# Patient Record
Sex: FEMALE | Race: WHITE | NOT HISPANIC OR LATINO | Employment: FULL TIME | URBAN - METROPOLITAN AREA
[De-identification: names, ages, dates, MRNs, and addresses within clinical notes are randomized per-mention and may not be internally consistent; named-entity substitution may affect disease eponyms.]

---

## 2017-02-06 ENCOUNTER — HOSPITAL ENCOUNTER (OUTPATIENT)
Dept: RADIOLOGY | Facility: HOSPITAL | Age: 47
Discharge: HOME | End: 2017-02-06
Attending: ORTHOPAEDIC SURGERY
Payer: COMMERCIAL

## 2017-02-06 ENCOUNTER — OFFICE VISIT (OUTPATIENT)
Dept: SPORTS MEDICINE | Facility: CLINIC | Age: 47
End: 2017-02-06
Payer: COMMERCIAL

## 2017-02-06 VITALS
WEIGHT: 215 LBS | DIASTOLIC BLOOD PRESSURE: 100 MMHG | BODY MASS INDEX: 33.74 KG/M2 | HEART RATE: 75 BPM | HEIGHT: 67 IN | SYSTOLIC BLOOD PRESSURE: 158 MMHG

## 2017-02-06 DIAGNOSIS — M25.551 RIGHT HIP PAIN: Primary | ICD-10-CM

## 2017-02-06 DIAGNOSIS — M21.851 HIP DYSPLASIA, ACQUIRED, RIGHT: ICD-10-CM

## 2017-02-06 DIAGNOSIS — M25.551 RIGHT HIP PAIN: ICD-10-CM

## 2017-02-06 DIAGNOSIS — M24.151 DEGENERATIVE TEAR OF ACETABULAR LABRUM OF RIGHT HIP: ICD-10-CM

## 2017-02-06 DIAGNOSIS — M25.859 FEMORAL ACETABULAR IMPINGEMENT: ICD-10-CM

## 2017-02-06 PROCEDURE — 99214 OFFICE O/P EST MOD 30 MIN: CPT | Mod: S$GLB,,, | Performed by: ORTHOPAEDIC SURGERY

## 2017-02-06 PROCEDURE — 99999 PR PBB SHADOW E&M-EST. PATIENT-LVL IV: CPT | Mod: PBBFAC,,, | Performed by: ORTHOPAEDIC SURGERY

## 2017-02-06 PROCEDURE — 73502 X-RAY EXAM HIP UNI 2-3 VIEWS: CPT | Mod: 26,RT,, | Performed by: RADIOLOGY

## 2017-02-06 PROCEDURE — 73502 X-RAY EXAM HIP UNI 2-3 VIEWS: CPT | Mod: TC,PO,RT

## 2017-02-06 RX ORDER — CELECOXIB 200 MG/1
200 CAPSULE ORAL 2 TIMES DAILY
Qty: 60 CAPSULE | Refills: 2 | Status: SHIPPED | OUTPATIENT
Start: 2017-02-06 | End: 2017-03-08

## 2017-02-06 NOTE — PATIENT INSTRUCTIONS
When Your Child Has Developmental Dysplasia of the Hip  Your child has been diagnosed with developmental dysplasia of the hip. Hip dysplasia (also called hip dislocation) is a problem with the hip joint. This condition is congenital, meaning its present at birth. It is not painful. And it is very treatable in young children. If your child has this problem, he or she may see a pediatric orthopedist. This is a doctor specializing in treating bone and joint problems in children.  What are the causes of developmental hip dysplasia?  Developmental hip dysplasia happens when the femoral head (the rounded top of the leg bone) slips partly or completely out of the hip socket. The hip socket may be too small or too shallow. This problem is more common in female children, in firstborn children, and in children born into families with a history of this condition. It is also more common in babies that are in breech position (feet first) in the uterus.  What are the signs of developmental hip dysplasia?  Signs of this problem may be noted at birth. But the hip may also seem fine at birth and not show problems until later weeks. Common signs include:  · A hip that is dislocated at birth  · A hip that is loose (this may get worse as the child grows and becomes more active)  · A hip that is less flexible than normal  · A shorter leg, or a leg that turns outward (on the side of the dysplasia)  · Uneven skin folds on the thighs or buttocks  · Limping, walking on the toes, or a waddling duck-like gait in children who have begun to walk  How is developmental hip dysplasia diagnosed?  The condition may be noticed just after birth. Or, it may not be found until later screenings are done. Very mild cases may not be noticed until the child begins to walk. If this problem is suspected, imaging tests such as an ultrasound or an X-ray may be done to confirm the diagnosis.  How is developmental hip dysplasia treated?  In a quarter of  the children with hip dysplasia, the problem goes away without treatment. If treatment is needed, the options depend on the childs age:  · Infants. A Carlos harness is used for 2 to 3 months. This harness holds the hip joint in place so the tissues around the joint can tighten and hold the joint in normal position. Once the harness is removed, the child should grow and develop normally.  · Children 6 months and older. A closed reduction (moving the hip joint into position without incisions) may be done. In some cases, surgery may be needed to move the hip joint into place. After either closed reduction or surgery, a cast is put on the leg and hip to hold the joint in place. The cast may stay on for several months. Once the cast is removed, the child should grow and develop normally.  What are the long-term concerns?     Congenital hip dysplasia can be treated with a special harness.   After hip dysplasia goes away on its own or is treated, most children grow normally. But if the dysplasia remains and isnt treated, long-term joint problems can result. So to be sure there are no lingering problems, your child will likely need to see the doctor regularly for monitoring. This is usually done at 6 months of age, 12 months of age, 4 to 6 years of age, and 14 years of age.  Date Last Reviewed: 11/15/2015  © 2113-4295 [employer]. [address] [place] [address], [place] [address]. All rights reserved. This information is not intended as a substitute for professional medical care. Always follow your healthcare professional's instructions.        Hip Arthroscopy: Repairing Femoroacetabular Impingement           When excess bone has formed on the edge of the ball (femoral head) or the socket (acetabulum) of the hip, it is called femoroacetabular impingement (ALBERT). ALBERT can cause pain and limit movement. Arthroscopy can repair ALBERT with only small incisions and special instruments.  In the operating room  Just before  surgery, you may be asked several times which hip is to be treated. This is a standard safety measure. In the operating room, you will likely receive general anesthesia to make you sleep.  During the procedure  After you receive anesthesia, your leg is gently pulled to distract, or widen, the hip joint. Next, the surgeon makes a few small incisions called portals. Through these portals, he or she inserts surgical tools, including the arthroscope. The arthroscope sends images of the joint to a video screen. These images allow the surgeon to look inside the joint. The joint is filled with sterile fluid to help the surgeon see more clearly.  Repairing femoroacetabular impingement  To treat ALBERT, the area is reshaped by removing excess bone. Excess bone can be removed from the socket side or ball side of the hip joint, or both. ALBERT can lead to other problems, such as labral tears or chondral damage. If present, these problems are also treated. Once the surgeon finishes the procedure, the portals are closed and bandaged. Then you are taken to the recovery room.  Date Last Reviewed: 7/1/2016 © 2000-2016 [employer]. [address] [place] [address]. All rights reserved. This information is not intended as a substitute for professional medical care. Always follow your healthcare professional's instructions.        Hip Arthroscopy: Repairing Labral Tears     During arthroscopy, the hip joint is gently widened (distracted) to allow access to the joint.                The strong, flexible ring of cartilage attached to the edge of the hip socket is called the labrum. When the labrum is torn, there can be symptoms of pain, catching, clicking, or locking in the joint. Arthroscopy is a surgical procedure that uses special instruments inserted through small incisions. With these tools, your surgeon can repair or remove a torn labrum. Your healthcare provider has suggested this procedure called  arthroscopy.  In the operating room  Just before surgery, your healthcare providers may ask you several times which hip is to be treated. This is a standard safety measure in the operating room. You will likely receive general anesthesia to make you sleep.  During the procedure  After you receive anesthesia, your leg is gently pulled to widen the hip joint. Next, your surgeon makes a few small incisions called portals. Through these portals, he or she inserts surgical tools, including the arthroscope. The arthroscope sends images of your joint to a video screen. These images allow your surgeon to look inside your joint. The joint is filled with sterile fluid to help your surgeon see more clearly.  Repairing labral tears  Labral tears can be removed or repaired. The torn piece of labrum may be removed by cutting, shaving, or heating the tissue to remove it (ablation). A repair of the torn labrum may be done by suturing the tear to the bone. In this case, an anchor is placed in the bone and a suture ties the labrum to the anchor. Some bone may be removed during this process using arthroscopic instruments. Your surgeon also uses arthroscopic instruments to pass the suture through tissue and tie knots through the small arthroscopic portals. Once your surgeon finishes the procedure, he or she closes the portals and bandages them. Then you are taken to the recovery room.  Date Last Reviewed: 8/28/2015 © 2000-2016 [employer]. [address] [place] [address]. All rights reserved. This information is not intended as a substitute for professional medical care. Always follow your healthcare professional's instructions.        Hip Arthroscopy: Repairing Chondral Damage           When the firm, smooth tissue that covers the ball and socket of the hip (joint cartilage) is damaged, it can cause painful catching in the joint.  Arthroscopy, a surgical technique that requires only small incisions and special  instruments, can repair chondral (cartilage) damage.   In the operating room  Just before surgery, you may be asked several times which hip is to be treated. This is a standard safety measure in the operating room. You will likely receive general anesthesia to make you sleep.  During the procedure  After you receive anesthesia, your leg is gently pulled to widen, the hip joint. Next, the surgeon makes a few small incisions called portals. Through these portals, he or she inserts surgical tools, including the arthroscope. The arthroscope sends images of the joint to a video screen. These images allow the surgeon to look inside the joint. The joint is filled with sterile fluid to help the surgeon see more clearly.  Repairing chondral damage  If the damaged cartilage is loose, it is removed. If the cartilage is missing, the exposed bone may be shaved to smooth it. Or, small holes may be placed in the bone (microfracture). This allows new cartilage to form. Once the surgeon finishes the procedure, the portals are closed and bandaged. Then you are taken to the recovery room.  Date Last Reviewed: 8/28/2015 © 2000-2016 [employer]. [address] [place] [address]. All rights reserved. This information is not intended as a substitute for professional medical care. Always follow your healthcare professional's instructions.        Hip Arthroscopy: After Surgery  You will likely go home the day of your surgery. Before you go home, you will be told how to care for your hip as it heals. Youll probably have a follow-up appointment 1 to 2 weeks after surgery. At this visit, sutures may be removed. Your surgeon may also prescribe physical therapy.  In the recovery room    You are in the recovery room for a short time after your procedure. While youre there, hospital staff help you get up and walking soon after your procedure. Your surgeon may limit your amount of weight-bearing activity after surgery. You  may also need a brace and/or crutches.   Going home  You are likely given crutches or a walker to bring home with you. Youre told how long to use them. Youre also told how much you can walk or stand on the operated leg. You may be given a brace to wear. You may also be told to wear compression stockings for a few days after surgery to help prevent blood clots.     When to call your doctor   Call your surgeon or doctor if you have any of the following:  · Shortness of breath, coughing up of blood, or chest pain  · Increasing pain, or pain not controlled by medication  · Pain and swelling in the calf or thigh  · Fever, drainage from the wound, redness, sudden increase in pain, or other signs of infection      After surgery  Use this list to remind you of what to do after surgery.  · Your dressing should be changed in 24 hours. Ask whether this should be done at home or at the surgeons office.  · Take care of your incisions as directed. You can usually begin bathing again in 24 hours.  · Use ice as instructed to reduce swelling and pain.  · If you were given crutches or a walker, use as instructed.  · See your surgeon for a follow-up visit on ______________________.                                   · Do rehab exercises as prescribed.  · Ask your surgeon what activities you should avoid.  Date Last Reviewed: 8/28/2015  © 3034-6566 [employer]. [address] [place] [address]. All rights reserved. This information is not intended as a substitute for professional medical care. Always follow your healthcare professional's instructions.        Hip Arthroscopy: Before Surgery    Your surgeon may have recommended hip arthroscopy. This procedure may relieve much or all of your hip pain. In fact, you may be moving around more easily soon after surgery. But depending on your original hip problem, it can take up to several months for you to fully heal. And youll likely need to commit to a prescribed  rehabilitation (rehab) plan.  Before your procedure  You will be examined and have certain tests. These help make sure you are healthy enough for surgery. Then, you may meet with your surgeon. At this meeting:  · Your surgeon may explain more about the surgery and discuss risks with you.  · You may be told to not eat or drink anything for at least 8 hours before surgery.  · You should tell your surgeon what medicines you take. This includes all prescription and non-prescription medicines and supplements, herbal remedies, etc. And ask if you should stop taking any of them.  · You may be given a prescription for pain medicine to take after the surgery.  Risks and complications  Although safe, hip arthroscopy has certain risks and possible complications. These include:  · Nerve or blood vessel injury  · Infection  · Bleeding  · Blood clots  · Continued or increased hip pain  · Damage to articular cartilage or labrum  · Progression of arthritis  · Need for more surgery  Before surgery  Use this list to remind you what to do before your surgery:  · Tell your surgeon what medicines, supplements, or herbal remedies you take. Ask if you should stop taking any of them before surgery.  · If possible, fill any prescriptions for post-op pain medicines before you have the procedure.  · Confirm what time you should arrive at the hospital or surgery center. Arrange for an adult family member or friend to give you a ride to and from surgery.  · Dont eat or drink anything as youve been instructed before your procedure. This includes water, gum, and mints.  Date Last Reviewed: 8/28/2015  © 6894-3650 [employer]. [address] [place] [address], [place] [address]. All rights reserved. This information is not intended as a substitute for professional medical care. Always follow your healthcare professional's instructions.

## 2017-02-06 NOTE — MR AVS SNAPSHOT
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 Vern Carrasquillo for MRA results review. Patient will not fill out Franco Hip Sco, for RTC in 1 weeks with Dr. Vern Carrasquillo for MRA results review. Patient will not fill out Franco Hip Sco.       These Medications        Disp Refills Start End    celecoxib (CELEBREX) 200 MG capsule 60 capsule 2 2017 3/8/2017    Take 1 capsule (200 mg total) by mouth 2 (two) times daily. - Oral    Pharmacy: [facility] [address] #: [phone]         [facility]     [facility] -  Assistance  Registered nurses in the [facility] provide clinical advisement, health education, appointment booking, and other advisory services.  Call for this free service at [phone].             Medications           Message regarding Medications     Verify the changes and/or additions to your medication regime listed below are the same as discussed with your clinician today.  If any of these changes or additions are incorrect, please notify your healthcare provider.        START taking these NEW medications        Refills    celecoxib (CELEBREX) 200 MG capsule 2    Sig: Take 1 capsule (200 mg total) by mouth 2 (two) times daily.    Class: Normal  "   Route: Oral           Verify that the below list of medications is an accurate representation of the medications you are currently taking.  If none reported, the list may be blank. If incorrect, please contact your healthcare provider. Carry this list with you in case of emergency.           Current Medications     diclofenac sodium 1 % Gel Apply 2 g topically as needed.    fish oil-omega-3 fatty acids 300-1,000 mg capsule Take 1 g by mouth once daily.    losartan (COZAAR) 50 MG tablet Take 1 tablet (50 mg total) by mouth once daily.    multivitamin (THERAGRAN) per tablet Take 1 tablet by mouth once daily.     vitamin D 185 MG Tab Take 185 mg by mouth once daily.     celecoxib (CELEBREX) 200 MG capsule Take 1 capsule (200 mg total) by mouth 2 (two) times daily.           Clinical Reference Information           Your Vitals Were     BP Pulse Height Weight BMI    158/100 75 5' 7" (1.702 m) 97.5 kg (215 lb) 33.67 kg/m2      Blood Pressure          Most Recent Value    BP  (!)  158/100      Allergies as of 2/6/2017     Cephalexin    Methohexital    Penicillins    Sulfa (Sulfonamide Antibiotics)      Immunizations Administered on Date of Encounter - 2/6/2017     None      Orders Placed During Today's Visit     Future Labs/Procedures Expected by Expires    MRI Lower Extremity Joint With Contrast Right  2/6/2017 2/6/2018    X-Ray Arthrogram Hip Right  2/6/2017 2/6/2018    X-Ray Hip 2 View Right  2/6/2017 2/6/2018      Instructions      When Your Child Has Developmental Dysplasia of the Hip  Your child has been diagnosed with developmental dysplasia of the hip. Hip dysplasia (also called hip dislocation) is a problem with the hip joint. This condition is congenital, meaning its present at birth. It is not painful. And it is very treatable in young children. If your child has this problem, he or she may see a pediatric orthopedist. This is a doctor specializing in treating bone and joint problems in children.  What are " the causes of developmental hip dysplasia?  Developmental hip dysplasia happens when the femoral head (the rounded top of the leg bone) slips partly or completely out of the hip socket. The hip socket may be too small or too shallow. This problem is more common in female children, in firstborn children, and in children born into families with a history of this condition. It is also more common in babies that are in breech position (feet first) in the uterus.  What are the signs of developmental hip dysplasia?  Signs of this problem may be noted at birth. But the hip may also seem fine at birth and not show problems until later weeks. Common signs include:  · A hip that is dislocated at birth  · A hip that is loose (this may get worse as the child grows and becomes more active)  · A hip that is less flexible than normal  · A shorter leg, or a leg that turns outward (on the side of the dysplasia)  · Uneven skin folds on the thighs or buttocks  · Limping, walking on the toes, or a waddling duck-like gait in children who have begun to walk  How is developmental hip dysplasia diagnosed?  The condition may be noticed just after birth. Or, it may not be found until later screenings are done. Very mild cases may not be noticed until the child begins to walk. If this problem is suspected, imaging tests such as an ultrasound or an X-ray may be done to confirm the diagnosis.  How is developmental hip dysplasia treated?  In a quarter of the children with hip dysplasia, the problem goes away without treatment. If treatment is needed, the options depend on the childs age:  · Infants. A Carlos harness is used for 2 to 3 months. This harness holds the hip joint in place so the tissues around the joint can tighten and hold the joint in normal position. Once the harness is removed, the child should grow and develop normally.  · Children 6 months and older. A closed reduction (moving the hip joint into position without incisions)  may be done. In some cases, surgery may be needed to move the hip joint into place. After either closed reduction or surgery, a cast is put on the leg and hip to hold the joint in place. The cast may stay on for several months. Once the cast is removed, the child should grow and develop normally.  What are the long-term concerns?     Congenital hip dysplasia can be treated with a special harness.   After hip dysplasia goes away on its own or is treated, most children grow normally. But if the dysplasia remains and isnt treated, long-term joint problems can result. So to be sure there are no lingering problems, your child will likely need to see the doctor regularly for monitoring. This is usually done at 6 months of age, 12 months of age, 4 to 6 years of age, and 14 years of age.  Date Last Reviewed: 11/15/2015  © 9145-6855 [employer]. [address] [place] [address]. All rights reserved. This information is not intended as a substitute for professional medical care. Always follow your healthcare professional's instructions.        Hip Arthroscopy: Repairing Femoroacetabular Impingement           When excess bone has formed on the edge of the ball (femoral head) or the socket (acetabulum) of the hip, it is called femoroacetabular impingement (ALBERT). ALBERT can cause pain and limit movement. Arthroscopy can repair ALBERT with only small incisions and special instruments.  In the operating room  Just before surgery, you may be asked several times which hip is to be treated. This is a standard safety measure. In the operating room, you will likely receive general anesthesia to make you sleep.  During the procedure  After you receive anesthesia, your leg is gently pulled to distract, or widen, the hip joint. Next, the surgeon makes a few small incisions called portals. Through these portals, he or she inserts surgical tools, including the arthroscope. The arthroscope sends images of the joint to a  video screen. These images allow the surgeon to look inside the joint. The joint is filled with sterile fluid to help the surgeon see more clearly.  Repairing femoroacetabular impingement  To treat ALBERT, the area is reshaped by removing excess bone. Excess bone can be removed from the socket side or ball side of the hip joint, or both. ALBERT can lead to other problems, such as labral tears or chondral damage. If present, these problems are also treated. Once the surgeon finishes the procedure, the portals are closed and bandaged. Then you are taken to the recovery room.  Date Last Reviewed: 7/1/2016 © 2000-2016 [employer]. [address] [place] [address], [place] [address]. All rights reserved. This information is not intended as a substitute for professional medical care. Always follow your healthcare professional's instructions.        Hip Arthroscopy: Repairing Labral Tears     During arthroscopy, the hip joint is gently widened (distracted) to allow access to the joint.                The strong, flexible ring of cartilage attached to the edge of the hip socket is called the labrum. When the labrum is torn, there can be symptoms of pain, catching, clicking, or locking in the joint. Arthroscopy is a surgical procedure that uses special instruments inserted through small incisions. With these tools, your surgeon can repair or remove a torn labrum. Your healthcare provider has suggested this procedure called arthroscopy.  In the operating room  Just before surgery, your healthcare providers may ask you several times which hip is to be treated. This is a standard safety measure in the operating room. You will likely receive general anesthesia to make you sleep.  During the procedure  After you receive anesthesia, your leg is gently pulled to widen the hip joint. Next, your surgeon makes a few small incisions called portals. Through these portals, he or she inserts surgical tools, including the arthroscope. The  arthroscope sends images of your joint to a video screen. These images allow your surgeon to look inside your joint. The joint is filled with sterile fluid to help your surgeon see more clearly.  Repairing labral tears  Labral tears can be removed or repaired. The torn piece of labrum may be removed by cutting, shaving, or heating the tissue to remove it (ablation). A repair of the torn labrum may be done by suturing the tear to the bone. In this case, an anchor is placed in the bone and a suture ties the labrum to the anchor. Some bone may be removed during this process using arthroscopic instruments. Your surgeon also uses arthroscopic instruments to pass the suture through tissue and tie knots through the small arthroscopic portals. Once your surgeon finishes the procedure, he or she closes the portals and bandages them. Then you are taken to the recovery room.  Date Last Reviewed: 8/28/2015 © 2000-2016 [employer]. [address] [place] [address]. All rights reserved. This information is not intended as a substitute for professional medical care. Always follow your healthcare professional's instructions.        Hip Arthroscopy: Repairing Chondral Damage           When the firm, smooth tissue that covers the ball and socket of the hip (joint cartilage) is damaged, it can cause painful catching in the joint.  Arthroscopy, a surgical technique that requires only small incisions and special instruments, can repair chondral (cartilage) damage.   In the operating room  Just before surgery, you may be asked several times which hip is to be treated. This is a standard safety measure in the operating room. You will likely receive general anesthesia to make you sleep.  During the procedure  After you receive anesthesia, your leg is gently pulled to widen, the hip joint. Next, the surgeon makes a few small incisions called portals. Through these portals, he or she inserts surgical tools, including  the arthroscope. The arthroscope sends images of the joint to a video screen. These images allow the surgeon to look inside the joint. The joint is filled with sterile fluid to help the surgeon see more clearly.  Repairing chondral damage  If the damaged cartilage is loose, it is removed. If the cartilage is missing, the exposed bone may be shaved to smooth it. Or, small holes may be placed in the bone (microfracture). This allows new cartilage to form. Once the surgeon finishes the procedure, the portals are closed and bandaged. Then you are taken to the recovery room.  Date Last Reviewed: 8/28/2015 © 2000-2016 [employer]. [address] [place] [address], [place] [address]. All rights reserved. This information is not intended as a substitute for professional medical care. Always follow your healthcare professional's instructions.        Hip Arthroscopy: After Surgery  You will likely go home the day of your surgery. Before you go home, you will be told how to care for your hip as it heals. Youll probably have a follow-up appointment 1 to 2 weeks after surgery. At this visit, sutures may be removed. Your surgeon may also prescribe physical therapy.  In the recovery room    You are in the recovery room for a short time after your procedure. While youre there, hospital staff help you get up and walking soon after your procedure. Your surgeon may limit your amount of weight-bearing activity after surgery. You may also need a brace and/or crutches.   Going home  You are likely given crutches or a walker to bring home with you. Youre told how long to use them. Youre also told how much you can walk or stand on the operated leg. You may be given a brace to wear. You may also be told to wear compression stockings for a few days after surgery to help prevent blood clots.     When to call your doctor   Call your surgeon or doctor if you have any of the following:  · Shortness of breath, coughing up of blood, or  chest pain  · Increasing pain, or pain not controlled by medication  · Pain and swelling in the calf or thigh  · Fever, drainage from the wound, redness, sudden increase in pain, or other signs of infection      After surgery  Use this list to remind you of what to do after surgery.  · Your dressing should be changed in 24 hours. Ask whether this should be done at home or at the surgeons office.  · Take care of your incisions as directed. You can usually begin bathing again in 24 hours.  · Use ice as instructed to reduce swelling and pain.  · If you were given crutches or a walker, use as instructed.  · See your surgeon for a follow-up visit on ______________________.                                   · Do rehab exercises as prescribed.  · Ask your surgeon what activities you should avoid.  Date Last Reviewed: 8/28/2015  © 1861-4506 "[employer]. [address] [place] [address]. All rights reserved. This information is not intended as a substitute for professional medical care. Always follow your healthcare professional's instructions.        Hip Arthroscopy: Before Surgery    Your surgeon may have recommended hip arthroscopy. This procedure may relieve much or all of your hip pain. In fact, you may be moving around more easily soon after surgery. But depending on your original hip problem, it can take up to several months for you to fully heal. And youll likely need to commit to a prescribed rehabilitation (rehab) plan.  Before your procedure  You will be examined and have certain tests. These help make sure you are healthy enough for surgery. Then, you may meet with your surgeon. At this meeting:  · Your surgeon may explain more about the surgery and discuss risks with you.  · You may be told to not eat or drink anything for at least 8 hours before surgery.  · You should tell your surgeon what medicines you take. This includes all prescription and non-prescription medicines and  supplements, herbal remedies, etc. And ask if you should stop taking any of them.  · You may be given a prescription for pain medicine to take after the surgery.  Risks and complications  Although safe, hip arthroscopy has certain risks and possible complications. These include:  · Nerve or blood vessel injury  · Infection  · Bleeding  · Blood clots  · Continued or increased hip pain  · Damage to articular cartilage or labrum  · Progression of arthritis  · Need for more surgery  Before surgery  Use this list to remind you what to do before your surgery:  · Tell your surgeon what medicines, supplements, or herbal remedies you take. Ask if you should stop taking any of them before surgery.  · If possible, fill any prescriptions for post-op pain medicines before you have the procedure.  · Confirm what time you should arrive at the hospital or surgery center. Arrange for an adult family member or friend to give you a ride to and from surgery.  · Dont eat or drink anything as youve been instructed before your procedure. This includes water, gum, and mints.  Date Last Reviewed: 8/28/2015 © 2000-2016 [employer]. [address] [place] [address]. All rights reserved. This information is not intended as a substitute for professional medical care. Always follow your healthcare professional's instructions.             Language Assistance Services     ATTENTION: Language assistance services are available, free of charge. Please call [phone].      ATENCIÓN: Si fabiolala marisel, tiene a smith disposición servicios gratuitos de asistencia lingüística. Llame al [phone].     MELIZA Ý: N?u b?n nói Ti?ng Vi?t, có các d?ch v? h? tr? ngôn ng? mi?n phí dành cho b?n. G?i s? [phone].         [facility] complies with applicable Federal civil rights laws and does not discriminate on the basis of race, color, national origin, age, disability, or sex.

## 2017-02-06 NOTE — PROGRESS NOTES
Subjective:          Chief Complaint: Shelbie Ardon is a 47 y.o. female who had concerns including Pain of the Right Hip.    HPI Comments: Patient is here for an evaluation of her right hip. She states that it has been bothering her on and off for the past year. She notices more pain with exertion. The pain will calm down or go away with rest and NSAIDs but flares up with any exertion or extended periods of walking.     Handedness: right-handed  Sport played:    Level:            Pain   This is a new problem. The current episode started more than 1 year ago. The problem has been waxing and waning. Pertinent negatives include no chest pain, chills, diaphoresis, joint swelling, myalgias, nausea, numbness, rash, vomiting or weakness. The symptoms are aggravated by exertion. She has tried NSAIDs for the symptoms. The treatment provided mild relief.       Review of Systems   Constitution: Negative for chills, decreased appetite, diaphoresis, weakness, malaise/fatigue, night sweats, weight gain and weight loss.   Eyes: Negative for blurred vision, double vision and pain.   Cardiovascular: Negative for chest pain, cyanosis, dyspnea on exertion, leg swelling, orthopnea and palpitations.   Respiratory: Negative for hemoptysis, shortness of breath and wheezing.    Skin: Negative for color change and rash.   Musculoskeletal: Positive for joint pain. Negative for arthritis, back pain, falls, gout, joint swelling, muscle cramps, muscle weakness, myalgias and stiffness.   Gastrointestinal: Negative for hematemesis, hematochezia, melena, nausea and vomiting.   Genitourinary: Negative for dysuria, frequency and hematuria.   Neurological: Negative for dizziness, light-headedness, loss of balance and numbness.   Psychiatric/Behavioral: Negative for altered mental status, depression and memory loss. The patient does not have insomnia and is not nervous/anxious.        Pain Related Questions  Over the past 3 days, what was your  average pain during activity? (I.e. running, jogging, walking, climbing stairs, getting dressed, ect.): 6  Over the past 3 days, what was your highest pain level?: 5  Over the past 3 days, what was your lowest pain level? : 1    Other  How many nights a week are you awakened by your affected body part?: 0  Was the patient's HEIGHT measured or patient reported?: Patient Reported  Was the patient's WEIGHT measured or patient reported?: Measured      Objective:        General: Shelbie is well-developed, well-nourished, appears stated age, in no acute distress, alert and oriented to time, place and person.     General    Vitals reviewed.  Constitutional: She is oriented to person, place, and time. She appears well-developed and well-nourished. No distress.   HENT:   Mouth/Throat: No oropharyngeal exudate.   Eyes: Right eye exhibits no discharge. Left eye exhibits no discharge.   Neck: Normal range of motion.   Cardiovascular: Normal rate.    Pulmonary/Chest: Effort normal and breath sounds normal. No respiratory distress.   Neurological: She is alert and oriented to person, place, and time. She has normal reflexes. No cranial nerve deficit. Coordination normal.   Psychiatric: She has a normal mood and affect. Her behavior is normal. Judgment and thought content normal.     General Musculoskeletal Exam   Gait: antalgic   Pelvic Obliquity: none      Right Knee Exam     Inspection   Alignment:  normal  Effusion: effusion    Left Knee Exam     Inspection   Alignment:  normal  Effusion: absent    Right Hip Exam     Inspection   Scars: absent  Swelling: absent  Bruising: absent  No deformity of hip.  Quadriceps Atrophy:  Negative  Erythema: absent    Range of Motion   Extension:  0 normal   Flexion:  110 normal   Internal Rotation:  15 normal   External Rotation:  50 normal   Abduction:  40 normal   Adduction:  20 normal     Tests   Pain w/ forced internal rotation (ERIK): absent  Pain w/ forced external rotation (FADIR):  present  Baylee: negative  Trendelenburg Test: negative  Circumduction test: negative  Stinchfield test: positive  Log Roll: negative  Snapping Hip (internal): negative  Step-down test: negative  Resisted sit-up pain: negative  Unresisted sit-up pain: negative  Resisted sit-up pain with adductor contraction pain: negative    Other   Sensation: normal  Left Hip Exam     Inspection   Scars: absent  Swelling: absent  No deformity of hip.  Quadriceps Atrophy:  negative  Erythema: absent  Bruising: absent    Range of Motion   Extension:  0 normal   Flexion:  110 normal   Internal Rotation: 15 normal   External Rotation:  60 normal   Abduction:  40 normal   Adduction:  20 normal     Tests   Pain w/ forced internal rotation (ERIK): absent  Pain w/ forced external rotation (FADIR): absent  Baylee: negative  Trendelenburg Test: negative  Circumduction test: negative  Stinchfield test: negative  Log Roll: negative  Snapping Hip (internal): negative  Step-down test: negative  Resisted sit-up pain: negative  Resisted sit-up pain with adductor contraction pain: negative  Unresisted sit-up pain: negative    Other   Sensation: normal      Back (L-Spine & T-Spine) / Neck (C-Spine) Exam   Back exam is normal.      Muscle Strength   Right Lower Extremity   Hip Abduction: 5/5   Hip Adduction: 5/5   Hip Flexion: 5/5   Left Lower Extremity   Hip Abduction: 5/5   Hip Adduction: 5/5   Hip Flexion: 5/5     Reflexes     Left Side  Quadriceps:  2+  Achilles:  2+    Right Side   Quadriceps:  2+  Achilles:  2+    Vascular Exam     Right Pulses  Dorsalis Pedis:      2+  Posterior Tibial:      2+        Left Pulses  Dorsalis Pedis:      2+  Posterior Tibial:      2+        Capillary Refill  Right Hand: normal capillary refill  Left Hand: normal capillary refill    Edema  Right Upper Leg: absent  Left Upper Leg: absent      RADIOGRAPHS TODAY  Results:  2 views of the right hip were obtained. Moderate degenerative changes are seen within the superior  acetabulum of the right hip with joint space narrowing and marginal osteophytes. Mild degenerative changes are seen within the left hip.    No evidence of acute fracture or dislocation.  Bony mineralization is normal.  Soft tissues are unremarkable.        Assessment:       Encounter Diagnoses   Name Primary?   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 Franco Hip Score was filled out today in clinic.     RTC in 1 weeks with Dr. Vern Carrasquillo for MRA results review. Patient will not fill out Franco Hip Score on return.      2. MRA right hip to assess labrum and ALBERT and to provide acetabular index                    [facility] patient questionnaires have been collected today.

## 2017-02-10 ENCOUNTER — HOSPITAL ENCOUNTER (OUTPATIENT)
Dept: RADIOLOGY | Facility: HOSPITAL | Age: 47
Discharge: HOME | End: 2017-02-10
Attending: ORTHOPAEDIC SURGERY
Payer: COMMERCIAL

## 2017-02-10 DIAGNOSIS — M24.151 DEGENERATIVE TEAR OF ACETABULAR LABRUM OF RIGHT HIP: ICD-10-CM

## 2017-02-10 DIAGNOSIS — M21.851 HIP DYSPLASIA, ACQUIRED, RIGHT: ICD-10-CM

## 2017-02-10 DIAGNOSIS — M25.551 RIGHT HIP PAIN: ICD-10-CM

## 2017-02-10 DIAGNOSIS — M25.859 FEMORAL ACETABULAR IMPINGEMENT: ICD-10-CM

## 2017-02-10 PROCEDURE — 73525 CONTRAST X-RAY OF HIP: CPT | Mod: 26,,, | Performed by: RADIOLOGY

## 2017-02-10 PROCEDURE — 25500020 PHARM REV CODE 255: Performed by: ORTHOPAEDIC SURGERY

## 2017-02-10 PROCEDURE — 73525 CONTRAST X-RAY OF HIP: CPT | Mod: TC

## 2017-02-10 PROCEDURE — 25000003 PHARM REV CODE 250: Performed by: ORTHOPAEDIC SURGERY

## 2017-02-10 PROCEDURE — 73722 MRI JOINT OF LWR EXTR W/DYE: CPT | Mod: TC,RT

## 2017-02-10 PROCEDURE — A9585 GADOBUTROL INJECTION: HCPCS | Performed by: ORTHOPAEDIC SURGERY

## 2017-02-10 PROCEDURE — 27093 INJECTION FOR HIP X-RAY: CPT | Mod: ,,, | Performed by: RADIOLOGY

## 2017-02-10 PROCEDURE — 73722 MRI JOINT OF LWR EXTR W/DYE: CPT | Mod: 26,RT,, | Performed by: RADIOLOGY

## 2017-02-10 RX ORDER — GADOBUTROL 604.72 MG/ML
5 INJECTION INTRAVENOUS
Status: COMPLETED | OUTPATIENT
Start: 2017-02-10 | End: 2017-02-10

## 2017-02-10 RX ORDER — BUPIVACAINE HYDROCHLORIDE 2.5 MG/ML
5 INJECTION, SOLUTION EPIDURAL; INFILTRATION; INTRACAUDAL ONCE
Status: COMPLETED | OUTPATIENT
Start: 2017-02-10 | End: 2017-02-10

## 2017-02-10 RX ORDER — LIDOCAINE HYDROCHLORIDE 10 MG/ML
10 INJECTION, SOLUTION EPIDURAL; INFILTRATION; INTRACAUDAL; PERINEURAL ONCE
Status: COMPLETED | OUTPATIENT
Start: 2017-02-10 | End: 2017-02-10

## 2017-02-10 RX ADMIN — GADOBUTROL 5 ML: 604.72 INJECTION INTRAVENOUS at 03:02

## 2017-02-10 RX ADMIN — LIDOCAINE HYDROCHLORIDE 100 MG: 10 INJECTION, SOLUTION EPIDURAL; INFILTRATION; INTRACAUDAL; PERINEURAL at 03:02

## 2017-02-10 RX ADMIN — IOHEXOL 10 ML: 300 INJECTION, SOLUTION INTRAVENOUS at 03:02

## 2017-02-10 RX ADMIN — BUPIVACAINE HYDROCHLORIDE 12.5 MG: 2.5 INJECTION, SOLUTION EPIDURAL; INFILTRATION; INTRACAUDAL; PERINEURAL at 03:02

## 2017-02-13 ENCOUNTER — OFFICE VISIT (OUTPATIENT)
Dept: SPORTS MEDICINE | Facility: CLINIC | Age: 47
End: 2017-02-13
Payer: COMMERCIAL

## 2017-02-13 VITALS
DIASTOLIC BLOOD PRESSURE: 91 MMHG | HEIGHT: 67 IN | HEART RATE: 79 BPM | WEIGHT: 215 LBS | BODY MASS INDEX: 33.74 KG/M2 | SYSTOLIC BLOOD PRESSURE: 137 MMHG

## 2017-02-13 DIAGNOSIS — M24.151 DEGENERATIVE TEAR OF ACETABULAR LABRUM OF RIGHT HIP: ICD-10-CM

## 2017-02-13 DIAGNOSIS — M21.851 ACQUIRED DYSPLASIA OF RIGHT HIP: ICD-10-CM

## 2017-02-13 DIAGNOSIS — M25.859 FEMORAL ACETABULAR IMPINGEMENT: Primary | ICD-10-CM

## 2017-02-13 PROCEDURE — 99214 OFFICE O/P EST MOD 30 MIN: CPT | Mod: S$GLB,,, | Performed by: ORTHOPAEDIC SURGERY

## 2017-02-13 PROCEDURE — 99999 PR PBB SHADOW E&M-EST. PATIENT-LVL III: CPT | Mod: PBBFAC,,, | Performed by: ORTHOPAEDIC SURGERY

## 2017-02-13 NOTE — MR AVS SNAPSHOT
[facility]  [address]  [facility] [address]  Phone: [phone]                  Shelbie Ardon   2017 1:30 PM   Office Visit    Description:  Female : 1970   Provider:  Vern Carrasquillo MD   Department:  [facility]           Reason for Visit     Right Hip - Follow-up           Diagnoses this Visit        Comments    Femoral acetabular impingement    -  Primary     Degenerative tear of acetabular labrum of right hip         Acquired dysplasia of right hip                To Do List           Goals (5 Years of Data)     None      Follow-Up and Disposition     Return in about 6 weeks (around 3/27/2017) for RTC in 6 weeks follow injections with Dr. Vern Carrasquillo. Patient will fill out Franco Hip  Score on re.      [facility] On Call     [facility] On Call Nurse Care Line -  Assistance  Registered nurses in the [facility] On Call Center provide clinical advisement, health education, appointment booking, and other advisory services.  Call for this free service at [phone].             Medications           Message regarding Medications     Verify the changes and/or additions to your medication regime listed below are the same as discussed with your clinician today.  If any of these changes or additions are incorrect, please notify your healthcare provider.             Verify that the below list of medications is an accurate representation of the medications you are currently taking.  If none reported, the list may be blank. If incorrect, please contact your healthcare provider. Carry this list with you in case of emergency.           Current Medications     celecoxib (CELEBREX) 200 MG capsule Take 1 capsule (200 mg total) by mouth 2 (two) times daily.    diclofenac sodium 1 % Gel Apply 2 g topically as needed.    fish oil-omega-3 fatty acids 300-1,000 mg capsule Take 1 g by mouth once daily.    losartan (COZAAR) 50 MG tablet Take 1 tablet (50 mg total)  "by mouth once daily.    multivitamin (THERAGRAN) per tablet Take 1 tablet by mouth once daily.     vitamin D 185 MG Tab Take 185 mg by mouth once daily.            Clinical Reference Information           Your Vitals Were     BP Pulse Height Weight Last Period BMI    137/91 79 5' 7" (1.702 m) 97.5 kg (215 lb) 02/01/2017 33.67 kg/m2      Blood Pressure          Most Recent Value    BP  (!)  137/91      Allergies as of 2/13/2017     Cephalexin    Methohexital    Penicillins    Sulfa (Sulfonamide Antibiotics)      Immunizations Administered on Date of Encounter - 2/13/2017     None      Orders Placed During Today's Visit      Normal Orders This Visit    Ambulatory Referral to Physical/Occupational Therapy       Instructions      Hip Arthroscopy: Repairing Labral Tears     During arthroscopy, the hip joint is gently widened (distracted) to allow access to the joint.                The strong, flexible ring of cartilage attached to the edge of the hip socket is called the labrum. When the labrum is torn, there can be symptoms of pain, catching, clicking, or locking in the joint. Arthroscopy is a surgical procedure that uses special instruments inserted through small incisions. With these tools, your surgeon can repair or remove a torn labrum. Your healthcare provider has suggested this procedure called arthroscopy.  In the operating room  Just before surgery, your healthcare providers may ask you several times which hip is to be treated. This is a standard safety measure in the operating room. You will likely receive general anesthesia to make you sleep.  During the procedure  After you receive anesthesia, your leg is gently pulled to widen the hip joint. Next, your surgeon makes a few small incisions called portals. Through these portals, he or she inserts surgical tools, including the arthroscope. The arthroscope sends images of your joint to a video screen. These images allow your surgeon to look inside your joint. " The joint is filled with sterile fluid to help your surgeon see more clearly.  Repairing labral tears  Labral tears can be removed or repaired. The torn piece of labrum may be removed by cutting, shaving, or heating the tissue to remove it (ablation). A repair of the torn labrum may be done by suturing the tear to the bone. In this case, an anchor is placed in the bone and a suture ties the labrum to the anchor. Some bone may be removed during this process using arthroscopic instruments. Your surgeon also uses arthroscopic instruments to pass the suture through tissue and tie knots through the small arthroscopic portals. Once your surgeon finishes the procedure, he or she closes the portals and bandages them. Then you are taken to the recovery room.  Date Last Reviewed: 8/28/2015 © 2000-2016 [employer]. [address] [place] [address]. All rights reserved. This information is not intended as a substitute for professional medical care. Always follow your healthcare professional's instructions.        Hip Arthroscopy: Repairing Femoroacetabular Impingement           When excess bone has formed on the edge of the ball (femoral head) or the socket (acetabulum) of the hip, it is called femoroacetabular impingement (ALBERT). ALBERT can cause pain and limit movement. Arthroscopy can repair ALBERT with only small incisions and special instruments.  In the operating room  Just before surgery, you may be asked several times which hip is to be treated. This is a standard safety measure. In the operating room, you will likely receive general anesthesia to make you sleep.  During the procedure  After you receive anesthesia, your leg is gently pulled to distract, or widen, the hip joint. Next, the surgeon makes a few small incisions called portals. Through these portals, he or she inserts surgical tools, including the arthroscope. The arthroscope sends images of the joint to a video screen. These images allow  the surgeon to look inside the joint. The joint is filled with sterile fluid to help the surgeon see more clearly.  Repairing femoroacetabular impingement  To treat ALBERT, the area is reshaped by removing excess bone. Excess bone can be removed from the socket side or ball side of the hip joint, or both. ALBERT can lead to other problems, such as labral tears or chondral damage. If present, these problems are also treated. Once the surgeon finishes the procedure, the portals are closed and bandaged. Then you are taken to the recovery room.  Date Last Reviewed: 7/1/2016 © 2000-2016 [employer]. [address] [place] [address]. All rights reserved. This information is not intended as a substitute for professional medical care. Always follow your healthcare professional's instructions.        Hip Arthroscopy: Repairing Chondral Damage           When the firm, smooth tissue that covers the ball and socket of the hip (joint cartilage) is damaged, it can cause painful catching in the joint.  Arthroscopy, a surgical technique that requires only small incisions and special instruments, can repair chondral (cartilage) damage.   In the operating room  Just before surgery, you may be asked several times which hip is to be treated. This is a standard safety measure in the operating room. You will likely receive general anesthesia to make you sleep.  During the procedure  After you receive anesthesia, your leg is gently pulled to widen, the hip joint. Next, the surgeon makes a few small incisions called portals. Through these portals, he or she inserts surgical tools, including the arthroscope. The arthroscope sends images of the joint to a video screen. These images allow the surgeon to look inside the joint. The joint is filled with sterile fluid to help the surgeon see more clearly.  Repairing chondral damage  If the damaged cartilage is loose, it is removed. If the cartilage is missing, the exposed bone  may be shaved to smooth it. Or, small holes may be placed in the bone (microfracture). This allows new cartilage to form. Once the surgeon finishes the procedure, the portals are closed and bandaged. Then you are taken to the recovery room.  Date Last Reviewed: 8/28/2015 © 2000-2016 [employer]. [address] [place] [address]. All rights reserved. This information is not intended as a substitute for professional medical care. Always follow your healthcare professional's instructions.        Hip Arthroscopy: After Surgery  You will likely go home the day of your surgery. Before you go home, you will be told how to care for your hip as it heals. Youll probably have a follow-up appointment 1 to 2 weeks after surgery. At this visit, sutures may be removed. Your surgeon may also prescribe physical therapy.  In the recovery room    You are in the recovery room for a short time after your procedure. While youre there, hospital staff help you get up and walking soon after your procedure. Your surgeon may limit your amount of weight-bearing activity after surgery. You may also need a brace and/or crutches.   Going home  You are likely given crutches or a walker to bring home with you. Youre told how long to use them. Youre also told how much you can walk or stand on the operated leg. You may be given a brace to wear. You may also be told to wear compression stockings for a few days after surgery to help prevent blood clots.     When to call your doctor   Call your surgeon or doctor if you have any of the following:  · Shortness of breath, coughing up of blood, or chest pain  · Increasing pain, or pain not controlled by medication  · Pain and swelling in the calf or thigh  · Fever, drainage from the wound, redness, sudden increase in pain, or other signs of infection      After surgery  Use this list to remind you of what to do after surgery.  · Your dressing should be changed in 24 hours. Ask  whether this should be done at home or at the surgeons office.  · Take care of your incisions as directed. You can usually begin bathing again in 24 hours.  · Use ice as instructed to reduce swelling and pain.  · If you were given crutches or a walker, use as instructed.  · See your surgeon for a follow-up visit on ______________________.                                   · Do rehab exercises as prescribed.  · Ask your surgeon what activities you should avoid.  Date Last Reviewed: 8/28/2015 © 2000-2016 [employer]. [address] [place] [address], [place] [address]. All rights reserved. This information is not intended as a substitute for professional medical care. Always follow your healthcare professional's instructions.        Hip Arthroscopy: Before Surgery    Your surgeon may have recommended hip arthroscopy. This procedure may relieve much or all of your hip pain. In fact, you may be moving around more easily soon after surgery. But depending on your original hip problem, it can take up to several months for you to fully heal. And youll likely need to commit to a prescribed rehabilitation (rehab) plan.  Before your procedure  You will be examined and have certain tests. These help make sure you are healthy enough for surgery. Then, you may meet with your surgeon. At this meeting:  · Your surgeon may explain more about the surgery and discuss risks with you.  · You may be told to not eat or drink anything for at least 8 hours before surgery.  · You should tell your surgeon what medicines you take. This includes all prescription and non-prescription medicines and supplements, herbal remedies, etc. And ask if you should stop taking any of them.  · You may be given a prescription for pain medicine to take after the surgery.  Risks and complications  Although safe, hip arthroscopy has certain risks and possible complications. These include:  · Nerve or blood vessel injury  · Infection  · Bleeding  · Blood  clots  · Continued or increased hip pain  · Damage to articular cartilage or labrum  · Progression of arthritis  · Need for more surgery  Before surgery  Use this list to remind you what to do before your surgery:  · Tell your surgeon what medicines, supplements, or herbal remedies you take. Ask if you should stop taking any of them before surgery.  · If possible, fill any prescriptions for post-op pain medicines before you have the procedure.  · Confirm what time you should arrive at the hospital or surgery center. Arrange for an adult family member or friend to give you a ride to and from surgery.  · Dont eat or drink anything as youve been instructed before your procedure. This includes water, gum, and mints.  Date Last Reviewed: 8/28/2015 © 2000-2016 [employer]. [address] [place] [address], [place] [address]. All rights reserved. This information is not intended as a substitute for professional medical care. Always follow your healthcare professional's instructions.             Language Assistance Services     ATTENTION: Language assistance services are available, free of charge. Please call [phone].      ATENCIÓN: Si habla marisel, tiene a smith disposición servicios gratuitos de asistencia lingüística. Llame al [phone].     MELIZA Ý: N?u b?n nói Ti?ng Vi?t, có các d?ch v? h? tr? ngôn ng? mi?n phí dành cho b?n. G?i s? [phone].         [facility] complies with applicable Federal civil rights laws and does not discriminate on the basis of race, color, national origin, age, disability, or sex.

## 2017-02-13 NOTE — PATIENT INSTRUCTIONS
Hip Arthroscopy: Repairing Labral Tears     During arthroscopy, the hip joint is gently widened (distracted) to allow access to the joint.                The strong, flexible ring of cartilage attached to the edge of the hip socket is called the labrum. When the labrum is torn, there can be symptoms of pain, catching, clicking, or locking in the joint. Arthroscopy is a surgical procedure that uses special instruments inserted through small incisions. With these tools, your surgeon can repair or remove a torn labrum. Your healthcare provider has suggested this procedure called arthroscopy.  In the operating room  Just before surgery, your healthcare providers may ask you several times which hip is to be treated. This is a standard safety measure in the operating room. You will likely receive general anesthesia to make you sleep.  During the procedure  After you receive anesthesia, your leg is gently pulled to widen the hip joint. Next, your surgeon makes a few small incisions called portals. Through these portals, he or she inserts surgical tools, including the arthroscope. The arthroscope sends images of your joint to a video screen. These images allow your surgeon to look inside your joint. The joint is filled with sterile fluid to help your surgeon see more clearly.  Repairing labral tears  Labral tears can be removed or repaired. The torn piece of labrum may be removed by cutting, shaving, or heating the tissue to remove it (ablation). A repair of the torn labrum may be done by suturing the tear to the bone. In this case, an anchor is placed in the bone and a suture ties the labrum to the anchor. Some bone may be removed during this process using arthroscopic instruments. Your surgeon also uses arthroscopic instruments to pass the suture through tissue and tie knots through the small arthroscopic portals. Once your surgeon finishes the procedure, he or she closes the portals and bandages them. Then you are  taken to the recovery room.  Date Last Reviewed: 8/28/2015  © 8622-1647 [employer]. [address] [place] [address]. All rights reserved. This information is not intended as a substitute for professional medical care. Always follow your healthcare professional's instructions.        Hip Arthroscopy: Repairing Femoroacetabular Impingement           When excess bone has formed on the edge of the ball (femoral head) or the socket (acetabulum) of the hip, it is called femoroacetabular impingement (ALBERT). ALBERT can cause pain and limit movement. Arthroscopy can repair ALBERT with only small incisions and special instruments.  In the operating room  Just before surgery, you may be asked several times which hip is to be treated. This is a standard safety measure. In the operating room, you will likely receive general anesthesia to make you sleep.  During the procedure  After you receive anesthesia, your leg is gently pulled to distract, or widen, the hip joint. Next, the surgeon makes a few small incisions called portals. Through these portals, he or she inserts surgical tools, including the arthroscope. The arthroscope sends images of the joint to a video screen. These images allow the surgeon to look inside the joint. The joint is filled with sterile fluid to help the surgeon see more clearly.  Repairing femoroacetabular impingement  To treat ALBERT, the area is reshaped by removing excess bone. Excess bone can be removed from the socket side or ball side of the hip joint, or both. ALBERT can lead to other problems, such as labral tears or chondral damage. If present, these problems are also treated. Once the surgeon finishes the procedure, the portals are closed and bandaged. Then you are taken to the recovery room.  Date Last Reviewed: 7/1/2016  © 7610-7294 [employer]. [address] [place] [address]. All rights reserved. This information is not intended as a substitute for  professional medical care. Always follow your healthcare professional's instructions.        Hip Arthroscopy: Repairing Chondral Damage           When the firm, smooth tissue that covers the ball and socket of the hip (joint cartilage) is damaged, it can cause painful catching in the joint.  Arthroscopy, a surgical technique that requires only small incisions and special instruments, can repair chondral (cartilage) damage.   In the operating room  Just before surgery, you may be asked several times which hip is to be treated. This is a standard safety measure in the operating room. You will likely receive general anesthesia to make you sleep.  During the procedure  After you receive anesthesia, your leg is gently pulled to widen, the hip joint. Next, the surgeon makes a few small incisions called portals. Through these portals, he or she inserts surgical tools, including the arthroscope. The arthroscope sends images of the joint to a video screen. These images allow the surgeon to look inside the joint. The joint is filled with sterile fluid to help the surgeon see more clearly.  Repairing chondral damage  If the damaged cartilage is loose, it is removed. If the cartilage is missing, the exposed bone may be shaved to smooth it. Or, small holes may be placed in the bone (microfracture). This allows new cartilage to form. Once the surgeon finishes the procedure, the portals are closed and bandaged. Then you are taken to the recovery room.  Date Last Reviewed: 8/28/2015  © 9327-2535 [employer]. [address] [place] [address]. All rights reserved. This information is not intended as a substitute for professional medical care. Always follow your healthcare professional's instructions.        Hip Arthroscopy: After Surgery  You will likely go home the day of your surgery. Before you go home, you will be told how to care for your hip as it heals. Youll probably have a follow-up appointment 1 to  2 weeks after surgery. At this visit, sutures may be removed. Your surgeon may also prescribe physical therapy.  In the recovery room    You are in the recovery room for a short time after your procedure. While youre there, hospital staff help you get up and walking soon after your procedure. Your surgeon may limit your amount of weight-bearing activity after surgery. You may also need a brace and/or crutches.   Going home  You are likely given crutches or a walker to bring home with you. Youre told how long to use them. Youre also told how much you can walk or stand on the operated leg. You may be given a brace to wear. You may also be told to wear compression stockings for a few days after surgery to help prevent blood clots.     When to call your doctor   Call your surgeon or doctor if you have any of the following:  · Shortness of breath, coughing up of blood, or chest pain  · Increasing pain, or pain not controlled by medication  · Pain and swelling in the calf or thigh  · Fever, drainage from the wound, redness, sudden increase in pain, or other signs of infection      After surgery  Use this list to remind you of what to do after surgery.  · Your dressing should be changed in 24 hours. Ask whether this should be done at home or at the surgeons office.  · Take care of your incisions as directed. You can usually begin bathing again in 24 hours.  · Use ice as instructed to reduce swelling and pain.  · If you were given crutches or a walker, use as instructed.  · See your surgeon for a follow-up visit on ______________________.                                   · Do rehab exercises as prescribed.  · Ask your surgeon what activities you should avoid.  Date Last Reviewed: 8/28/2015  © 1831-4577 [employer]. [address] [place] [address], [place] [address]. All rights reserved. This information is not intended as a substitute for professional medical care. Always follow your healthcare professional's  instructions.        Hip Arthroscopy: Before Surgery    Your surgeon may have recommended hip arthroscopy. This procedure may relieve much or all of your hip pain. In fact, you may be moving around more easily soon after surgery. But depending on your original hip problem, it can take up to several months for you to fully heal. And youll likely need to commit to a prescribed rehabilitation (rehab) plan.  Before your procedure  You will be examined and have certain tests. These help make sure you are healthy enough for surgery. Then, you may meet with your surgeon. At this meeting:  · Your surgeon may explain more about the surgery and discuss risks with you.  · You may be told to not eat or drink anything for at least 8 hours before surgery.  · You should tell your surgeon what medicines you take. This includes all prescription and non-prescription medicines and supplements, herbal remedies, etc. And ask if you should stop taking any of them.  · You may be given a prescription for pain medicine to take after the surgery.  Risks and complications  Although safe, hip arthroscopy has certain risks and possible complications. These include:  · Nerve or blood vessel injury  · Infection  · Bleeding  · Blood clots  · Continued or increased hip pain  · Damage to articular cartilage or labrum  · Progression of arthritis  · Need for more surgery  Before surgery  Use this list to remind you what to do before your surgery:  · Tell your surgeon what medicines, supplements, or herbal remedies you take. Ask if you should stop taking any of them before surgery.  · If possible, fill any prescriptions for post-op pain medicines before you have the procedure.  · Confirm what time you should arrive at the hospital or surgery center. Arrange for an adult family member or friend to give you a ride to and from surgery.  · Dont eat or drink anything as youve been instructed before your procedure. This includes water, gum, and  mints.  Date Last Reviewed: 8/28/2015  © 0239-6717 [employer]. [address] [place] [address], [place] [address]. All rights reserved. This information is not intended as a substitute for professional medical care. Always follow your healthcare professional's instructions.

## 2017-02-13 NOTE — PROGRESS NOTES
Subjective:          Chief Complaint: Shelbie Ardon is a 47 y.o. female who had concerns including Follow-up of the Right Hip.    HPI Comments: Patient is here for an evaluation of her right hip. She states that it has been bothering her on and off for the past year. She notices more pain with exertion. The pain will calm down or go away with rest and NSAIDs but flares up with any exertion or extended periods of walking.     Handedness: right-handed  Sport played:    Level:            Pain   This is a new problem. The current episode started more than 1 year ago. The problem has been waxing and waning. Pertinent negatives include no chest pain, chills, diaphoresis, joint swelling, myalgias, nausea, numbness, rash, vomiting or weakness. The symptoms are aggravated by exertion. She has tried NSAIDs for the symptoms. The treatment provided mild relief.       Review of Systems   Constitution: Negative for chills, decreased appetite, diaphoresis, weakness, malaise/fatigue, night sweats, weight gain and weight loss.   Eyes: Negative for blurred vision, double vision and pain.   Cardiovascular: Negative for chest pain, cyanosis, dyspnea on exertion, leg swelling, orthopnea and palpitations.   Respiratory: Negative for hemoptysis, shortness of breath and wheezing.    Skin: Negative for color change and rash.   Musculoskeletal: Positive for joint pain. Negative for arthritis, back pain, falls, gout, joint swelling, muscle cramps, muscle weakness, myalgias and stiffness.   Gastrointestinal: Negative for hematemesis, hematochezia, melena, nausea and vomiting.   Genitourinary: Negative for dysuria, frequency and hematuria.   Neurological: Negative for dizziness, light-headedness, loss of balance and numbness.   Psychiatric/Behavioral: Negative for altered mental status, depression and memory loss. The patient does not have insomnia and is not nervous/anxious.        Pain Related Questions  Over the past 3 days, what was  your average pain during activity? (I.e. running, jogging, walking, climbing stairs, getting dressed, ect.): 9  Over the past 3 days, what was your highest pain level?: 8  Over the past 3 days, what was your lowest pain level? : 0    Other  How many nights a week are you awakened by your affected body part?: 1  Was the patient's HEIGHT measured or patient reported?: Patient Reported  Was the patient's WEIGHT measured or patient reported?: Measured      Objective:        General: Shelbie is well-developed, well-nourished, appears stated age, in no acute distress, alert and oriented to time, place and person.     General    Vitals reviewed.  Constitutional: She is oriented to person, place, and time. She appears well-developed and well-nourished. No distress.   HENT:   Mouth/Throat: No oropharyngeal exudate.   Eyes: Right eye exhibits no discharge. Left eye exhibits no discharge.   Neck: Normal range of motion.   Cardiovascular: Normal rate.    Pulmonary/Chest: Effort normal and breath sounds normal. No respiratory distress.   Neurological: She is alert and oriented to person, place, and time. She has normal reflexes. No cranial nerve deficit. Coordination normal.   Psychiatric: She has a normal mood and affect. Her behavior is normal. Judgment and thought content normal.     General Musculoskeletal Exam   Gait: antalgic   Pelvic Obliquity: none      Right Knee Exam     Inspection   Alignment:  normal  Effusion: effusion    Left Knee Exam     Inspection   Alignment:  normal  Effusion: absent    Right Hip Exam     Inspection   Scars: absent  Swelling: absent  Bruising: absent  No deformity of hip.  Quadriceps Atrophy:  Negative  Erythema: absent    Range of Motion   Extension:  0 normal   Flexion:  110 normal   Internal Rotation:  15 normal   External Rotation:  50 normal   Abduction:  40 normal   Adduction:  20 normal     Tests   Pain w/ forced internal rotation (ERIK): absent  Pain w/ forced external rotation  (FADIR): present  Baylee: negative  Trendelenburg Test: negative  Circumduction test: negative  Stinchfield test: positive  Log Roll: negative  Snapping Hip (internal): negative  Step-down test: negative  Resisted sit-up pain: negative  Unresisted sit-up pain: negative  Resisted sit-up pain with adductor contraction pain: negative    Other   Sensation: normal  Left Hip Exam     Inspection   Scars: absent  Swelling: absent  No deformity of hip.  Quadriceps Atrophy:  negative  Erythema: absent  Bruising: absent    Range of Motion   Extension:  0 normal   Flexion:  110 normal   Internal Rotation: 15 normal   External Rotation:  60 normal   Abduction:  40 normal   Adduction:  20 normal     Tests   Pain w/ forced internal rotation (ERIK): absent  Pain w/ forced external rotation (FADIR): absent  Baylee: negative  Trendelenburg Test: negative  Circumduction test: negative  Stinchfield test: negative  Log Roll: negative  Snapping Hip (internal): negative  Step-down test: negative  Resisted sit-up pain: negative  Resisted sit-up pain with adductor contraction pain: negative  Unresisted sit-up pain: negative    Other   Sensation: normal      Back (L-Spine & T-Spine) / Neck (C-Spine) Exam   Back exam is normal.      Muscle Strength   Right Lower Extremity   Hip Abduction: 5/5   Hip Adduction: 5/5   Hip Flexion: 5/5   Left Lower Extremity   Hip Abduction: 5/5   Hip Adduction: 5/5   Hip Flexion: 5/5     Reflexes     Left Side  Quadriceps:  2+  Achilles:  2+    Right Side   Quadriceps:  2+  Achilles:  2+    Vascular Exam     Right Pulses  Dorsalis Pedis:      2+  Posterior Tibial:      2+        Left Pulses  Dorsalis Pedis:      2+  Posterior Tibial:      2+        Capillary Refill  Right Hand: normal capillary refill  Left Hand: normal capillary refill    Edema  Right Upper Leg: absent  Left Upper Leg: absent      MRA RESULTS    There is no evidence of a discrete labral tear.  The labrum is prominent with mild degenerative  fraying.    The chondrolabral junction is intact. There is mild subchondral edema with associated superior joint space narrowing with underlying cartilage loss at both the acetabular and superiorly and superolateral femoral head.    This represents moderate degenerative change         There is an osseous spur at the level of the femoral head neck junction anterior aspect seen in retrospect on the plain film examination.  The alpha angle measures 59 degrees, borderline normal.  There is no definite MR evidence of femoral acetabular impingement.      The abductors of the hip, including gluteus minimus, muscular attachment of the medius and medius tendon are normal. There is no abnormality at the level of the greater trochanteric bursa.Ligamentum teres is intact.    Limited pelvic content evaluation demonstrates no significant abnormalities.\    Limited pelvic content evaluation demonstrates no significant abnormalities.   Impression    Degenerative changes RIGHT hip with joint space loss superior femoral-acetabular region with associated cartilaginous loss at this level and mild underlying subchondral edema superolateral acetabulum.                 Assessment:       Encounter Diagnoses   Name Primary?    Femoral acetabular impingement Yes    Degenerative tear of acetabular labrum of right hip     Acquired dysplasia of right hip           Plan:       1. Franco Hip Score was not filled out today in clinic.     RTC in 3 months with Dr. Vern Carrasquillo. Patient will fill out Franco Hip  Score on return.    2. Referral to Dr. Ortega for hip joint injection    3. We reviewed with Shelbie today, the pathology and natural history of her diagnosis. We have discussed a variety of treatment options including medications, physical therapy and other alternative treatments. I also explained the indications, risks and benefits of surgery. After discussion, Shelbie decided to proceed with surgery. The decision was made to go  forward with :  1. Right hip arthroscopic femoral neck osteoplasty  2. Right hip arthroscopic chondroplasty  3. Right hip arthroscopic labral debridement    The details of the surgical procedure were explained, including the location of probable incisions and a description of likely hardware and/or grafts to be used.  The patient understands the likely convalescence after surgery.  Also, we have thoroughly discussed the risks, benefits and alternatives to surgery, including, but not limited to, the risk of infection, joint stiffness, blood clot (including DVT and/or pulmonary embolus), neurologic and vascular injury.  It was explained that, if tissue has been repaired or reconstructed, there is a chance of failure, which may require further management.      All of the patient's questions were answered and informed consent was obtained. The patient will contact us if they have any questions or concerns in the interim.    4. Continue celebrex at this time    5. Referral to PT at [facility]                [facility] patient questionnaires have been collected today.

## 2017-03-10 ENCOUNTER — CLINICAL SUPPORT (OUTPATIENT)
Dept: REHABILITATION | Facility: HOSPITAL | Age: 47
End: 2017-03-10
Attending: ORTHOPAEDIC SURGERY
Payer: COMMERCIAL

## 2017-03-10 DIAGNOSIS — M25.551 RIGHT HIP PAIN: Primary | ICD-10-CM

## 2017-03-10 PROCEDURE — 97530 THERAPEUTIC ACTIVITIES: CPT | Mod: PO

## 2017-03-10 PROCEDURE — 97161 PT EVAL LOW COMPLEX 20 MIN: CPT | Mod: PO

## 2017-03-10 NOTE — PROGRESS NOTES
Physical Therapy Evaluation    Visit: 1    Name: Shelbie Ardon  Clinic Number: [id number]    Shelbie is a 47 y.o. female evaluated on 03/10/2017.     Diagnosis:   Encounter Diagnosis   Name Primary?    Right hip pain Yes       DOS: NA    Physician: Vern Carrasquillo MD  Treatment Orders: PT Eval and Treat    Past Medical History:   Diagnosis Date    Arthritis     Arthritis     Cough     Female infertility NEC     Headache(784.0)     Hyperlipidemia     Hypertension     Tinnitus 12/17/2013     Current Outpatient Prescriptions   Medication Sig    diclofenac sodium 1 % Gel Apply 2 g topically as needed.    fish oil-omega-3 fatty acids 300-1,000 mg capsule Take 1 g by mouth once daily.    losartan (COZAAR) 50 MG tablet Take 1 tablet (50 mg total) by mouth once daily.    multivitamin (THERAGRAN) per tablet Take 1 tablet by mouth once daily.     vitamin D 185 MG Tab Take 185 mg by mouth once daily.      No current facility-administered medications for this visit.      Review of patient's allergies indicates:   Allergen Reactions    Cephalexin Hives    Methohexital      Other reaction(s): Difficulty breathing    Penicillins Hives    Sulfa (sulfonamide antibiotics) Hives     Precautions: Standard  Occupation: [employer]       Subjective  Onset/PIERO: gradual  Involved Area/Location: right  Current Symptoms: Anterior Pain and pinching    Increases symptoms: Sit to stand transfers  Decreases Symptoms: Medication    Current Function: Limited walking secondary to R hip  Previous function:  Walking through B foot pain    Diagnostic Tests: MRI    Pain Scale: Shelbie rates R hip pain to be 2 - 6 on a scale of 1-10.    Patient Goals: Llearn how to manage her R hip pain and set up an exercise program      Objective  Trunk ROM WNL     Observation:    Posture: Pelvis height WNL      Passive Range of Motion: hip   Left Right   Flexion:  110 90   Extension NT NT            Lower Extremity Strength  Right LE  Left LE    Knee extension: 5/5 Knee extension: 4+/5   Knee flexion: 5/5 Knee flexion: 4/5   Hip flexion: 3+/5 Hip flexion: 4/5   Hip extension:  4-/5 Hip extension: 4+/5   Hip abduction/Glut Medius: 3+/5 Hip abduction/Glut medius 5/5   Hip adduction: 4+/5 Hip adduction: 4+/5   Hip internal rotation: NT Hip internal rotation: NT   Hip external rotation: NT Hip external rotation: NT   Ankle dorsiflexion: NT Ankle dorsiflexion: NT   Ankle plantarflexion: NT Ankle plantarflexion: NT       Special Tests: SI LIGAMENT TESTS (-)    Joint Mobility: hypomobile R hip flexion & IR  Palpation: no palpable tenderness.  Sensation: intact to light touch    Edema: NA      Gait: Shelbie ambulated 200 feet with no assistive device.    Balance: NT    PT Evaluation Completed? Yes  Discussed Plan of Care with patient: Yes    Treatment:  Pt performed there ex's for R LE strengthening, ROM, flexibility and endurance including:  Clams 10 x 3 on R and L  Bridges 10 x 3  Long axis distraction R hip    Exercises were reviewed and pt was able to demonstrate them prior to the end of the session.     Ed pt to use ice PRN 10- 20 min when pain or swelling occurs.     Shelbie laura good understanding of the education provided. Patient demo good return demo of skill of exercises.      Assessment  This is a 47 y.o. female referred to outpatient physical therapy and presents with a medical diagnosis of   Encounter Diagnosis   Name Primary?    Right hip pain Yes    and demonstrates limitations as described in the problem list. Pt will benefit from physcial therapy services in order to maximize pain free and/or functional use of right hip. The following goals were discussed with the patient and patient is in agreement with them as to be addressed in the treatment plan.     Problem List: pain, decreased ROM, decreased flexibility, decreased strength and decreased ability to fully  participate in recreational/sports related activities.      GOALS: Short Term Goals:  2 weeks  1. Independent with HEP to increase patient's tolerance for ADL's   2. Pt able to perform sit to stand transfers with less discomfort.    Long Term Goals: 8 weeks  1.Report decreased    R hip    pain  <   / =  2  /10 at worse with Sit to Stand transfers to increase tolerance for ADL's and walking  2.Increased MMT  for  R LE  to increase tolerance for ADL and work activities.  3.Increased arom  for  R hip to improve normal movement patterns during ADL's.  4. Pt will report improvement in overall functional abilities of LE, evidenced by improved score on the FOTO Hip Survey.    Plan  Pt will be treated by physical therapy 1-2 times a week for 8 weeks for Pt Education, HEP, therapeutic exercises, neuromuscular re-education/ balance exercises, joint mobilizations, modalities prn   to achieve established goals. Shelbie may at times be seen by a PTA as part of the Rehab Team.     Cont PT for  8         weeks.   I certify the need for these services furnished under this plan of treatment and while under my care.______________________________ Physician/Referring Practitioner  Date of Signature

## 2017-03-13 ENCOUNTER — TELEPHONE (OUTPATIENT)
Dept: OBSTETRICS AND GYNECOLOGY | Facility: CLINIC | Age: 47
End: 2017-03-13

## 2017-03-13 NOTE — TELEPHONE ENCOUNTER
----- Message from Urbano Lindsey sent at 3/13/2017 10:22 AM CDT -----  Contact: Pt  _X 1st Request  _ 2nd Request  _ 3rd Request    Who:MELINDA HDEZ [[id number]]    Why: Patient would like to speak with staff in regards to scheduling an annual     What Number to Call Back: [phone]    When to Expect a call back: (Before the end of the day)  -- if call after 3:00 call back will be tomorrow.

## 2017-03-14 NOTE — PLAN OF CARE
Physical Therapy Evaluation    Visit: 1    Name: Shelbie Ardon  Clinic Number: [id number]    Shelbie is a 47 y.o. female evaluated on 03/10/2017.     Diagnosis:   Encounter Diagnosis   Name Primary?    Right hip pain Yes       DOS: NA    Physician: Vern Carrasquillo MD  Treatment Orders: PT Eval and Treat    Past Medical History:   Diagnosis Date    Arthritis     Arthritis     Cough     Female infertility NEC     Headache(784.0)     Hyperlipidemia     Hypertension     Tinnitus 12/17/2013     Current Outpatient Prescriptions   Medication Sig    diclofenac sodium 1 % Gel Apply 2 g topically as needed.    fish oil-omega-3 fatty acids 300-1,000 mg capsule Take 1 g by mouth once daily.    losartan (COZAAR) 50 MG tablet Take 1 tablet (50 mg total) by mouth once daily.    multivitamin (THERAGRAN) per tablet Take 1 tablet by mouth once daily.     vitamin D 185 MG Tab Take 185 mg by mouth once daily.      No current facility-administered medications for this visit.      Review of patient's allergies indicates:   Allergen Reactions    Cephalexin Hives    Methohexital      Other reaction(s): Difficulty breathing    Penicillins Hives    Sulfa (sulfonamide antibiotics) Hives     Precautions: Standard  Occupation: [employer]       Subjective  Onset/PIERO: gradual  Involved Area/Location: right  Current Symptoms: Anterior Pain and pinching    Increases symptoms: Sit to stand transfers  Decreases Symptoms: Medication    Current Function: Limited walking secondary to R hip  Previous function:  Walking through B foot pain    Diagnostic Tests: MRI    Pain Scale: Shelbie rates R hip pain to be 2 - 6 on a scale of 1-10.    Patient Goals: Llearn how to manage her R hip pain and set up an exercise program      Objective  Trunk ROM WNL     Observation:    Posture: Pelvis height WNL      Passive Range of Motion: hip   Left Right   Flexion: 110 90   Extension NT NT            Lower Extremity Strength  Right LE  Left  LE    Knee extension: 5/5 Knee extension: 4+/5   Knee flexion: 5/5 Knee flexion: 4/5   Hip flexion: 3+/5 Hip flexion: 4/5   Hip extension:  4-/5 Hip extension: 4+/5   Hip abduction/Glut Medius: 3+/5 Hip abduction/Glut medius 5/5   Hip adduction: 4+/5 Hip adduction: 4+/5   Hip internal rotation: NT Hip internal rotation: NT   Hip external rotation: NT Hip external rotation: NT   Ankle dorsiflexion: NT Ankle dorsiflexion: NT   Ankle plantarflexion: NT Ankle plantarflexion: NT       Special Tests: SI LIGAMENT TESTS (-)    Joint Mobility: hypomobile R hip flexion & IR  Palpation: no palpable tenderness.  Sensation: intact to light touch    Edema: NA      Gait: Shelbie ambulated 200 feet with no assistive device.    Balance: NT    PT Evaluation Completed? Yes  Discussed Plan of Care with patient: Yes    Treatment:  Pt performed there ex's for R LE strengthening, ROM, flexibility and endurance including:  Clams 10 x 3 on R and L  Bridges 10 x 3  Long axis distraction R hip    Exercises were reviewed and pt was able to demonstrate them prior to the end of the session.     Ed pt to use ice PRN 10- 20 min when pain or swelling occurs.     Shelbie laura good understanding of the education provided. Patient demo good return demo of skill of exercises.      Assessment  This is a 47 y.o. female referred to outpatient physical therapy and presents with a medical diagnosis of   Encounter Diagnosis   Name Primary?    Right hip pain Yes    and demonstrates limitations as described in the problem list. Pt will benefit from physcial therapy services in order to maximize pain free and/or functional use of right hip. The following goals were discussed with the patient and patient is in agreement with them as to be addressed in the treatment plan.     Problem List: pain, decreased ROM, decreased flexibility, decreased strength and decreased ability to fully participate in recreational/sports related activities.      GOALS: Short Term  Goals:  2 weeks  1. Independent with HEP to increase patient's tolerance for ADL's   2. Pt able to perform sit to stand transfers with less discomfort.    Long Term Goals: 8 weeks  1.Report decreased    R hip    pain  <   / =  2  /10 at worse with Sit to Stand transfers to increase tolerance for ADL's and walking  2.Increased MMT  for  R LE  to increase tolerance for ADL and work activities.  3.Increased arom  for  R hip to improve normal movement patterns during ADL's.  4. Pt will report improvement in overall functional abilities of LE, evidenced by improved score on the FOTO Hip Survey.    Plan  Pt will be treated by physical therapy 1-2 times a week for 8 weeks for Pt Education, HEP, therapeutic exercises, neuromuscular re-education/ balance exercises, joint mobilizations, modalities prn   to achieve established goals. Shelbie may at times be seen by a PTA as part of the Rehab Team.     Cont PT for  8         weeks.   I certify the need for these services furnished under this plan of treatment and while under my care.______________________________ Physician/Referring Practitioner  Date of Signature

## 2017-03-21 ENCOUNTER — CLINICAL SUPPORT (OUTPATIENT)
Dept: REHABILITATION | Facility: HOSPITAL | Age: 47
End: 2017-03-21
Attending: ORTHOPAEDIC SURGERY
Payer: COMMERCIAL

## 2017-03-21 DIAGNOSIS — M25.551 RIGHT HIP PAIN: Primary | ICD-10-CM

## 2017-03-21 PROCEDURE — 97110 THERAPEUTIC EXERCISES: CPT | Mod: PO

## 2017-03-21 PROCEDURE — 97140 MANUAL THERAPY 1/> REGIONS: CPT | Mod: PO

## 2017-03-21 NOTE — PROGRESS NOTES
Physical Therapy Daily Note    Visit: 2    Name: Shelbie Ardon  Clinic Number: [id number]    Shelbie is a 47 y.o. female evaluated on 03/21/2017.     Diagnosis:   Encounter Diagnosis   Name Primary?    Right hip pain Yes       DOS: NA    Physician: Vern Carrasquillo MD  Treatment Orders: PT Eval and Treat    Past Medical History:   Diagnosis Date    Arthritis     Arthritis     Cough     Female infertility NEC     Headache(784.0)     Hyperlipidemia     Hypertension     Tinnitus 12/17/2013     Current Outpatient Prescriptions   Medication Sig    diclofenac sodium 1 % Gel Apply 2 g topically as needed.    fish oil-omega-3 fatty acids 300-1,000 mg capsule Take 1 g by mouth once daily.    losartan (COZAAR) 50 MG tablet Take 1 tablet (50 mg total) by mouth once daily.    multivitamin (THERAGRAN) per tablet Take 1 tablet by mouth once daily.     vitamin D 185 MG Tab Take 185 mg by mouth once daily.      No current facility-administered medications for this visit.      Review of patient's allergies indicates:   Allergen Reactions    Cephalexin Hives    Methohexital      Other reaction(s): Difficulty breathing    Penicillins Hives    Sulfa (sulfonamide antibiotics) Hives     Precautions: Standard  Occupation: [employer]       Subjective  Onset/PIERO: gradual  Involved Area/Location: right  Current Symptoms: Anterior Pain and pinching    Increases symptoms: Sit to stand transfers  Decreases Symptoms: Medication    Current Function: Limited walking secondary to R hip  Previous function:  Walking through B foot pain    Diagnostic Tests: MRI    Pain Scale: Shelbie rates R hip pain to be 2 - 6 on a scale of 1-10.    Patient Goals: Learn how to manage her R hip pain and set up an exercise program      Objective  Trunk ROM WNL     Observation:    Posture: Pelvis height WNL      Passive Range of Motion: hip   Left Right   Flexion: 110  90   Extension NT NT            Lower Extremity Strength  Right LE  Left LE    Knee extension: 5/5 Knee extension: 4+/5   Knee flexion: 5/5 Knee flexion: 4/5   Hip flexion: 3+/5 Hip flexion: 4/5   Hip extension:  4-/5 Hip extension: 4+/5   Hip abduction/Glut Medius: 3+/5 Hip abduction/Glut medius 5/5   Hip adduction: 4+/5 Hip adduction: 4+/5   Hip internal rotation: NT Hip internal rotation: NT   Hip external rotation: NT Hip external rotation: NT   Ankle dorsiflexion: NT Ankle dorsiflexion: NT   Ankle plantarflexion: NT Ankle plantarflexion: NT       Special Tests: SI LIGAMENT TESTS (-)    Joint Mobility: hypomobile R hip flexion & IR  Palpation: no palpable tenderness.  Soft tissue restriction in R adductors as compared to the L  Sensation: intact to light touch    Edema: NA      Gait: Shelbie ambulated 200 feet with no assistive device.    Balance: NT    Treatment:  Pt performed there ex's for R LE strengthening, ROM, flexibility and endurance including:  Clams 10 x 3 on R and L  Bridges 10 x 3  Hook lying adduction with ball 5 sec hold 10 x 3   Long axis distraction R hip  Lateral distraction of the R hip    Exercises were reviewed and pt was able to demonstrate them prior to the end of the session.     Ed pt to use ice PRN 10- 20 min when pain or swelling occurs.     Shelbie sanchez good understanding of the education provided. Patient demo good return demo of skill of exercises.      Assessment  This is a 47 y.o. female referred to outpatient physical therapy and presents with a medical diagnosis of   Encounter Diagnosis   Name Primary?    Right hip pain Yes    and demonstrates limitations as described in the problem list. Pt will benefit from physcial therapy services in order to maximize pain free and/or functional use of right hip. The following goals were discussed with the patient and patient is in agreement with them as to be addressed in the treatment plan.  Pt with less discomfort with R hip flexion  following joint mobilization.    Problem List: pain, decreased ROM, decreased flexibility, decreased strength and decreased ability to fully participate in recreational/sports related activities.      GOALS: Short Term Goals:  2 weeks  1. Independent with HEP to increase patient's tolerance for ADL's   2. Pt able to perform sit to stand transfers with less discomfort.    Long Term Goals: 8 weeks  1.Report decreased    R hip    pain  <   / =  2  /10 at worse with Sit to Stand transfers to increase tolerance for ADL's and walking  2.Increased MMT  for  R LE  to increase tolerance for ADL and work activities.  3.Increased arom  for  R hip to improve normal movement patterns during ADL's.  4. Pt will report improvement in overall functional abilities of LE, evidenced by improved score on the FOTO Hip Survey.    Plan  Pt will be treated by physical therapy 1-2 times a week for 8 weeks for Pt Education, HEP, therapeutic exercises, neuromuscular re-education/ balance exercises, joint mobilizations, modalities prn   to achieve established goals. Shelbie may at times be seen by a PTA as part of the Rehab Team.     Cont PT for  8         weeks.   I certify the need for these services furnished under this plan of treatment and while under my care.______________________________ Physician/Referring Practitioner  Date of Signature

## 2017-03-23 ENCOUNTER — CLINICAL SUPPORT (OUTPATIENT)
Dept: REHABILITATION | Facility: HOSPITAL | Age: 47
End: 2017-03-23
Attending: ORTHOPAEDIC SURGERY
Payer: COMMERCIAL

## 2017-03-23 DIAGNOSIS — M25.551 RIGHT HIP PAIN: Primary | ICD-10-CM

## 2017-03-23 PROCEDURE — 97530 THERAPEUTIC ACTIVITIES: CPT | Mod: PO

## 2017-03-23 PROCEDURE — 97110 THERAPEUTIC EXERCISES: CPT | Mod: PO

## 2017-03-23 NOTE — PROGRESS NOTES
Physical Therapy Daily Note    Visit: 3    Name: Shelbie Ardon  Clinic Number: [id number]    Shelbie is a 47 y.o. female evaluated on 03/23/2017.     Diagnosis:   Encounter Diagnosis   Name Primary?    Right hip pain Yes       DOS: NA    Physician: Vern Carrasquillo MD  Treatment Orders: PT Eval and Treat    Past Medical History:   Diagnosis Date    Arthritis     Arthritis     Cough     Female infertility NEC     Headache(784.0)     Hyperlipidemia     Hypertension     Tinnitus 12/17/2013     Current Outpatient Prescriptions   Medication Sig    diclofenac sodium 1 % Gel Apply 2 g topically as needed.    fish oil-omega-3 fatty acids 300-1,000 mg capsule Take 1 g by mouth once daily.    losartan (COZAAR) 50 MG tablet Take 1 tablet (50 mg total) by mouth once daily.    multivitamin (THERAGRAN) per tablet Take 1 tablet by mouth once daily.     vitamin D 185 MG Tab Take 185 mg by mouth once daily.      No current facility-administered medications for this visit.      Review of patient's allergies indicates:   Allergen Reactions    Cephalexin Hives    Methohexital      Other reaction(s): Difficulty breathing    Penicillins Hives    Sulfa (sulfonamide antibiotics) Hives     Precautions: Standard  Occupation: [employer]       Subjective  Onset/PIERO: gradual  Involved Area/Location: right  Current Symptoms: Anterior Pain and pinching    Increases symptoms: Sit to stand transfers  Decreases Symptoms: Medication    Current Function: Limited walking secondary to R hip  Previous function:  Walking through B foot pain    Diagnostic Tests: MRI    Pain Scale: Shelbie rates R hip pain to be 2  on a scale of 1-10.    Patient Goals: Llearn how to manage her R hip pain and set up an exercise program      Objective  Trunk ROM WNL     Observation:    Posture: Pelvis height WNL      Passive Range of Motion: hip   Left Right  "  Flexion: 110 90   Extension NT NT            Lower Extremity Strength  Right LE  Left LE    Knee extension: 5/5 Knee extension: 4+/5   Knee flexion: 5/5 Knee flexion: 4/5   Hip flexion: 3+/5 Hip flexion: 4/5   Hip extension:  4-/5 Hip extension: 4+/5   Hip abduction/Glut Medius: 3+/5 Hip abduction/Glut medius 5/5   Hip adduction: 4+/5 Hip adduction: 4+/5   Hip internal rotation: NT Hip internal rotation: NT   Hip external rotation: NT Hip external rotation: NT   Ankle dorsiflexion: NT Ankle dorsiflexion: NT   Ankle plantarflexion: NT Ankle plantarflexion: NT       Special Tests: SI LIGAMENT TESTS (-)    Joint Mobility: hypomobile R hip flexion & IR  Palpation: no palpable tenderness.  Sensation: intact to light touch    Edema: NA      Gait: Shelbie ambulated 200 feet with no assistive device.    Balance: NT    PT Evaluation Completed? Yes  Discussed Plan of Care with patient: Yes    Treatment:  Pt performed there ex's for R LE strengthening, ROM, flexibility and endurance including:  Clams 10 x 3 on R and L  Bridges 10 x 3 with 6" box under L foot  Long axis distraction R hip  Shuttle standing hip extension on R 7 L with 12.5# (Red cord)  Lateral step up 10 x 3 on R & L  Supine hip flexor stretches on L & R 4 x 30 sec each  Exercises were reviewed and pt was able to demonstrate them prior to the end of the session.       Added standing orange TB hip ext/abd to HEP  Ed pt to use ice PRN 10- 20 min when pain or swelling occurs.     Shelbie sanchez good understanding of the education provided. Patient demo good return demo of skill of exercises.      Assessment  This is a 47 y.o. female referred to outpatient physical therapy and presents with a medical diagnosis of   Encounter Diagnosis   Name Primary?    Right hip pain Yes    and demonstrates limitations as described in the problem list. Pt will benefit from physcial therapy services in order to maximize pain free and/or functional use of right hip. The following " goals were discussed with the patient and patient is in agreement with them as to be addressed in the treatment plan. Pt remains limited to 90 deg of hip flexion on the R.  She had no difficulty with new exercises today.    Problem List: pain, decreased ROM, decreased flexibility, decreased strength and decreased ability to fully participate in recreational/sports related activities.      GOALS: Short Term Goals:  2 weeks  1. Independent with HEP to increase patient's tolerance for ADL's   2. Pt able to perform sit to stand transfers with less discomfort.    Long Term Goals: 8 weeks  1.Report decreased    R hip    pain  <   / =  2  /10 at worse with Sit to Stand transfers to increase tolerance for ADL's and walking  2.Increased MMT  for  R LE  to increase tolerance for ADL and work activities.  3.Increased arom  for  R hip to improve normal movement patterns during ADL's.  4. Pt will report improvement in overall functional abilities of LE, evidenced by improved score on the FOTO Hip Survey.    Plan  Pt will be treated by physical therapy 1-2 times a week for 8 weeks for Pt Education, HEP, therapeutic exercises, neuromuscular re-education/ balance exercises, joint mobilizations, modalities prn   to achieve established goals. Shelbie may at times be seen by a PTA as part of the Rehab Team.     Cont PT for  8         weeks.   I certify the need for these services furnished under this plan of treatment and while under my care.______________________________ Physician/Referring Practitioner  Date of Signature

## 2017-03-27 ENCOUNTER — HOSPITAL ENCOUNTER (OUTPATIENT)
Dept: RADIOLOGY | Facility: HOSPITAL | Age: 47
Discharge: HOME | End: 2017-03-27
Attending: OBSTETRICS & GYNECOLOGY
Payer: COMMERCIAL

## 2017-03-27 ENCOUNTER — OFFICE VISIT (OUTPATIENT)
Dept: OBSTETRICS AND GYNECOLOGY | Facility: CLINIC | Age: 47
End: 2017-03-27
Payer: COMMERCIAL

## 2017-03-27 VITALS
HEIGHT: 67 IN | SYSTOLIC BLOOD PRESSURE: 150 MMHG | WEIGHT: 213.63 LBS | DIASTOLIC BLOOD PRESSURE: 100 MMHG | BODY MASS INDEX: 33.53 KG/M2

## 2017-03-27 DIAGNOSIS — Z12.31 SCREENING MAMMOGRAM, ENCOUNTER FOR: ICD-10-CM

## 2017-03-27 DIAGNOSIS — Z01.419 ENCOUNTER FOR GYNECOLOGICAL EXAMINATION WITHOUT ABNORMAL FINDING: Primary | ICD-10-CM

## 2017-03-27 PROCEDURE — 77063 BREAST TOMOSYNTHESIS BI: CPT | Mod: 26,,, | Performed by: RADIOLOGY

## 2017-03-27 PROCEDURE — 77067 SCR MAMMO BI INCL CAD: CPT | Mod: TC

## 2017-03-27 PROCEDURE — 77067 SCR MAMMO BI INCL CAD: CPT | Mod: 26,,, | Performed by: RADIOLOGY

## 2017-03-27 PROCEDURE — 1160F RVW MEDS BY RX/DR IN RCRD: CPT | Mod: S$GLB,,, | Performed by: OBSTETRICS & GYNECOLOGY

## 2017-03-27 PROCEDURE — 99203 OFFICE O/P NEW LOW 30 MIN: CPT | Mod: S$GLB,,, | Performed by: OBSTETRICS & GYNECOLOGY

## 2017-03-27 PROCEDURE — 99999 PR PBB SHADOW E&M-EST. PATIENT-LVL III: CPT | Mod: PBBFAC,,, | Performed by: OBSTETRICS & GYNECOLOGY

## 2017-03-27 NOTE — MR AVS SNAPSHOT
[facility]  [address] [facility], [address]  [facility] [address]  Phone: [phone]  Fax: [phone]                  Shelbie Ardon   3/27/2017 9:15 AM   Office Visit    Description:  Female : 1970   Provider:  Farida Izquierdo MD   Department:  [facility]           Reason for Visit     Gynecologic Exam     Vaginal Bleeding                To Do List           Future Appointments        Provider Department Dept Phone    3/28/2017 5:00 PM Jb Mahan, PT [facility] [phone]    3/30/2017 1:30 PM Jb Mahan, PT [facility] [phone]    2017 8:00 AM Jb Mahan, PT [facility] [phone]    2017 8:00 AM Jb Mahan, PT [facility] [phone]    5/15/2017 8:30 AM Vern Carrasquillo MD [facility] [phone]      Goals (5 Years of Data)     None      [facility]     [facility] Care [facility] -  Assistance  Registered nurses in the [facility] provide clinical advisement, health education, appointment booking, and other advisory services.  Call for this free service at [phone].             Medications           Message regarding Medications     Verify the changes and/or additions to your medication regime listed below are the same as discussed with your clinician today.  If any of these changes or additions are incorrect, please notify your healthcare provider.             Verify that the below list of medications is an accurate representation of the medications you are currently taking.  If none reported, the list may be blank. If incorrect, please contact your healthcare provider. Carry this list with you in case of emergency.           Current Medications     diclofenac sodium 1 % Gel Apply 2 g topically as needed.    fish oil-omega-3 fatty acids 300-1,000 mg capsule Take 1 g by mouth once daily.    losartan (COZAAR) 50  "MG tablet Take 1 tablet (50 mg total) by mouth once daily.    multivitamin (THERAGRAN) per tablet Take 1 tablet by mouth once daily.     vitamin D 185 MG Tab Take 185 mg by mouth once daily.            Clinical Reference Information           Your Vitals Were     BP Height Weight Last Period BMI    124/70 5' 7" (1.702 m) 96.9 kg (213 lb 10 oz) 03/26/2017 33.46 kg/m2      Blood Pressure          Most Recent Value    BP  124/70      Allergies as of 3/27/2017     Cephalexin    Methohexital    Penicillins    Sulfa (Sulfonamide Antibiotics)      Immunizations Administered on Date of Encounter - 3/27/2017     None      Language Assistance Services     ATTENTION: Language assistance services are available, free of charge. Please call [phone].      ATENCIÓN: Si fabiolala marisel, tiene a smith disposición servicios gratuitos de asistencia lingüística. Llame al [phone].     MELIZA Ý: N?u b?n nói Ti?ng Vi?t, có các d?ch v? h? tr? ngôn ng? mi?n phí dành cho b?n. G?i s? [phone].         [facility] complies with applicable Federal civil rights laws and does not discriminate on the basis of race, color, national origin, age, disability, or sex.        "

## 2017-03-27 NOTE — PROGRESS NOTES
Subjective:       Patient ID: Shelbie Ardon is a 47 y.o. female.    Chief Complaint:  Vaginal Bleeding      History of Present Illness  HP47yo WF originally scheduled today for annual but would like to defer due to heavy menses.  Last seen by me in 2012.  She was being seen by multiple fertility specialists but was never successful.  Now with menses q27-30 days lasting 6-7 days.  On her heaviest day she requires 2 pads at a time and changes q3-4 hours.  Passes quarter sized clots.  ++cramps--better with Celebrex.  Reports that menses worsened about 1 yr ago.  No hot flashes.  + mood swings nakul prior to menses.  Some trouble sleeping.      Last pap 3 yrs ago per pt  Due for MMG    GYN & OB History  Patient's last menstrual period was 03/26/2017.   Date of Last Pap: No result found    OB History   No data available       Review of Systems  Review of Systems    Constitutional: negative  Eyes: negative  Ears, nose, mouth, throat, and face: negative  Respiratory: negative  Cardiovascular: negative  Gastrointestinal: negative  Genitourinary:  See HPI  Integument/breast: negative  Hematologic/lymphatic: negative  Musculoskeletal:negative  Neurological: negative  Behavioral/Psych: negative  Endocrine: negative  Allergic/Immunologic: negative        Objective:    Physical Exam      Deferred    Assessment:        1. Encounter for gynecological examination without abnormal finding    2. Screening mammogram, encounter for                Plan:      Counseled on menorrhagia and change in menses.  Would recommend EMBx due to change over the last yr and possible ultrasound based on exam.  Will return in 2 weeks for annual exam with pap and EMBx.      Management options for menorrhagia discussed including low dose OCPs/Ortho Evra/Nuvaring (since BP is well controlled and this would be more likely to help with PMS also) or Novasure.  Risks, benefits and side effects of all reviewed.  She will consider.    Approximately 20  min spent in counseling today.

## 2017-03-28 ENCOUNTER — CLINICAL SUPPORT (OUTPATIENT)
Dept: REHABILITATION | Facility: HOSPITAL | Age: 47
End: 2017-03-28
Attending: ORTHOPAEDIC SURGERY
Payer: COMMERCIAL

## 2017-03-28 DIAGNOSIS — M25.551 RIGHT HIP PAIN: Primary | ICD-10-CM

## 2017-03-28 PROCEDURE — 97110 THERAPEUTIC EXERCISES: CPT | Mod: PO

## 2017-03-28 NOTE — PROGRESS NOTES
Physical Therapy Daily Note    Visit: 4    Name: Shelbie Ardon  Clinic Number: [id number]    Shelbie is a 47 y.o. female evaluated on 03/28/2017.     Diagnosis:   Encounter Diagnosis   Name Primary?    Right hip pain Yes       DOS: NA    Physician: Vern Carrasquillo MD  Treatment Orders: PT Eval and Treat    Past Medical History:   Diagnosis Date    Arthritis     Arthritis     Cough     Female infertility NEC     Headache(784.0)     Hyperlipidemia     Hypertension     Tinnitus 12/17/2013     Current Outpatient Prescriptions   Medication Sig    diclofenac sodium 1 % Gel Apply 2 g topically as needed.    fish oil-omega-3 fatty acids 300-1,000 mg capsule Take 1 g by mouth once daily.    losartan (COZAAR) 50 MG tablet Take 1 tablet (50 mg total) by mouth once daily.    multivitamin (THERAGRAN) per tablet Take 1 tablet by mouth once daily.     vitamin D 185 MG Tab Take 185 mg by mouth once daily.      No current facility-administered medications for this visit.      Review of patient's allergies indicates:   Allergen Reactions    Cephalexin Hives    Methohexital      Other reaction(s): Difficulty breathing    Penicillins Hives    Sulfa (sulfonamide antibiotics) Hives     Precautions: Standard  Occupation: [employer]       Subjective  Onset/PIERO: gradual  Involved Area/Location: right  Current Symptoms: Anterior Pain and pinching    Increases symptoms: Sit to stand transfers  Decreases Symptoms: Medication    Current Function: Limited walking secondary to R hip  Previous function:  Walking through B foot pain    Diagnostic Tests: MRI    Pain Scale: Shelbie rates R hip pain to be 2  on a scale of 1-10.    Patient Goals: Llearn how to manage her R hip pain and set up an exercise program  Pt reported significant pain in her R hip following her last PT visit.    Objective  Trunk ROM WNL     Observation:    Posture:  Pelvis height WNL      Passive Range of Motion: hip   Left Right   Flexion: 110 90   Extension NT NT            Lower Extremity Strength  Right LE  Left LE    Knee extension: 5/5 Knee extension: 4+/5   Knee flexion: 5/5 Knee flexion: 4/5   Hip flexion: 3+/5 Hip flexion: 4/5   Hip extension:  4-/5 Hip extension: 4+/5   Hip abduction/Glut Medius: 3+/5 Hip abduction/Glut medius 5/5   Hip adduction: 4+/5 Hip adduction: 4+/5   Hip internal rotation: NT Hip internal rotation: NT   Hip external rotation: NT Hip external rotation: NT   Ankle dorsiflexion: NT Ankle dorsiflexion: NT   Ankle plantarflexion: NT Ankle plantarflexion: NT       Special Tests: SI LIGAMENT TESTS (-)    Joint Mobility: hypomobile R hip flexion & IR  Palpation: no palpable tenderness.  Sensation: intact to light touch    Edema: NA      Gait: Shelbie ambulated 200 feet with no assistive device.    Balance: NT    PT Evaluation Completed? Yes  Discussed Plan of Care with patient: Yes    Treatment:  Pt performed there ex's for R LE strengthening, ROM, flexibility and endurance including:  Clams 15 x 3 on R and L  Bridges 12 x 3   Long axis distraction R hip  Shuttle standing hip extension on R & L with 12.5# (Red cord)  Monster walks 20 ' x 6 with orange TB  Pilates box step downs 10 x 3 on L & R with springs in highest position.  Supine hip flexor stretches on L & R 4 x 30 sec each  Exercises were reviewed and pt was able to demonstrate them prior to the end of the session.       Added standing orange TB hip ext/abd to HEP  Ed pt to use ice PRN 10- 20 min when pain or swelling occurs.  Treatments not performed today:   Lateral step up 10 x 3 on R & L  Shelbie sanchez good understanding of the education provided. Patient demo good return demo of skill of exercises.      Assessment  This is a 47 y.o. female referred to outpatient physical therapy and presents with a medical diagnosis of   Encounter Diagnosis   Name Primary?    Right hip pain Yes    and  demonstrates limitations as described in the problem list. Pt will benefit from physcial therapy services in order to maximize pain free and/or functional use of right hip. The following goals were discussed with the patient and patient is in agreement with them as to be addressed in the treatment plan. Pt remains limited to 90 deg of hip flexion on the R.  Pt with increased pain following her last visit.  Removed block from L LE and cut out lateral step ups today.  Added monster walks and Pilates box with no report of increased discomfort.    Problem List: pain, decreased ROM, decreased flexibility, decreased strength and decreased ability to fully participate in recreational/sports related activities.      GOALS: Short Term Goals:  2 weeks  1. Independent with HEP to increase patient's tolerance for ADL's   2. Pt able to perform sit to stand transfers with less discomfort.    Long Term Goals: 8 weeks  1.Report decreased    R hip    pain  <   / =  2  /10 at worse with Sit to Stand transfers to increase tolerance for ADL's and walking  2.Increased MMT  for  R LE  to increase tolerance for ADL and work activities.  3.Increased arom  for  R hip to improve normal movement patterns during ADL's.  4. Pt will report improvement in overall functional abilities of LE, evidenced by improved score on the FOTO Hip Survey.    Plan  Pt will be treated by physical therapy 1-2 times a week for 8 weeks for Pt Education, HEP, therapeutic exercises, neuromuscular re-education/ balance exercises, joint mobilizations, modalities prn   to achieve established goals. Shelbie may at times be seen by a PTA as part of the Rehab Team.     Cont PT for  8         weeks.   I certify the need for these services furnished under this plan of treatment and while under my care.______________________________ Physician/Referring Practitioner  Date of Signature

## 2017-03-30 ENCOUNTER — CLINICAL SUPPORT (OUTPATIENT)
Dept: REHABILITATION | Facility: HOSPITAL | Age: 47
End: 2017-03-30
Attending: ORTHOPAEDIC SURGERY
Payer: COMMERCIAL

## 2017-03-30 DIAGNOSIS — M25.551 RIGHT HIP PAIN: Primary | ICD-10-CM

## 2017-03-30 PROCEDURE — 97110 THERAPEUTIC EXERCISES: CPT | Mod: PO

## 2017-03-30 PROCEDURE — 97140 MANUAL THERAPY 1/> REGIONS: CPT | Mod: PO

## 2017-03-30 NOTE — PROGRESS NOTES
Physical Therapy Daily Note    Visit: 5    Name: Shelbie Ardon  Clinic Number: [id number]    Shelbie is a 47 y.o. female evaluated on 03/30/2017.     Diagnosis:   Encounter Diagnosis   Name Primary?    Right hip pain Yes       DOS: NA    Physician: Vern Carrasquillo MD  Treatment Orders: PT Eval and Treat    Past Medical History:   Diagnosis Date    Arthritis     Arthritis     Cough     Female infertility NEC     Headache(784.0)     Hyperlipidemia     Hypertension     Tinnitus 12/17/2013     Current Outpatient Prescriptions   Medication Sig    diclofenac sodium 1 % Gel Apply 2 g topically as needed.    fish oil-omega-3 fatty acids 300-1,000 mg capsule Take 1 g by mouth once daily.    losartan (COZAAR) 50 MG tablet Take 1 tablet (50 mg total) by mouth once daily.    multivitamin (THERAGRAN) per tablet Take 1 tablet by mouth once daily.     vitamin D 185 MG Tab Take 185 mg by mouth once daily.      No current facility-administered medications for this visit.      Review of patient's allergies indicates:   Allergen Reactions    Cephalexin Hives    Methohexital      Other reaction(s): Difficulty breathing    Penicillins Hives    Sulfa (sulfonamide antibiotics) Hives     Precautions: Standard  Occupation: [employer]       Subjective  Onset/PIERO: gradual  Involved Area/Location: right  Current Symptoms: Anterior Pain and pinching    Increases symptoms: Sit to stand transfers  Decreases Symptoms: Medication    Current Function: Limited walking secondary to R hip  Previous function:  Walking through B foot pain    Diagnostic Tests: MRI    Pain Scale: Shelbie rates R hip pain to be 2  on a scale of 1-10.    Patient Goals: Llearn how to manage her R hip pain and set up an exercise program  Pt reported that her R hip was not as sore following her last Tx as it was before    Objective  Trunk ROM  WNL     Observation:    Posture: Pelvis height WNL      Passive Range of Motion: hip   Left Right   Flexion: 110 90   Extension NT NT            Lower Extremity Strength  Right LE  Left LE    Knee extension: 5/5 Knee extension: 4+/5   Knee flexion: 5/5 Knee flexion: 4/5   Hip flexion: 3+/5 Hip flexion: 4/5   Hip extension:  4-/5 Hip extension: 4+/5   Hip abduction/Glut Medius: 3+/5 Hip abduction/Glut medius 5/5   Hip adduction: 4+/5 Hip adduction: 4+/5   Hip internal rotation: NT Hip internal rotation: NT   Hip external rotation: NT Hip external rotation: NT   Ankle dorsiflexion: NT Ankle dorsiflexion: NT   Ankle plantarflexion: NT Ankle plantarflexion: NT       Special Tests: SI LIGAMENT TESTS (-)    Joint Mobility: hypomobile R hip flexion & IR  Palpation: no palpable tenderness.  Sensation: intact to light touch    Edema: NA      Gait: Shelbie ambulated 200 feet with no assistive device.    Balance: NT    Treatment:  Pt performed there ex's for R LE strengthening, ROM, flexibility and endurance including:  Clams 15 x 3 on R and L  Bridges 12 x 3   Long axis distraction R hip  Shuttle standing hip extension on R & L with 25# (Black cord)  Monster walks 20 ' x 6 with orange TB  Pilates box step downs 10 x 3 on L & R with springs in highest position.  Supine hip flexor stretches on L & R 4 x 30 sec each  Exercises were reviewed and pt was able to demonstrate them prior to the end of the session.       Added standing orange TB hip ext/abd to HEP  Ed pt to use ice PRN 10- 20 min when pain or swelling occurs.  Treatments not performed today:   Lateral step up 10 x 3 on R & L  Shelbie demo good understanding of the education provided. Patient demo good return demo of skill of exercises.      Assessment  This is a 47 y.o. female referred to outpatient physical therapy and presents with a medical diagnosis of   Encounter Diagnosis   Name Primary?    Right hip pain Yes    and demonstrates limitations as described in the  "problem list. Pt will benefit from physcial therapy services in order to maximize pain free and/or functional use of right hip. The following goals were discussed with the patient and patient is in agreement with them as to be addressed in the treatment plan. Pt remains limited to 90 deg of hip flexion on the R.  Pt attempted step ups to a 4" box with the L LE and c/o R hip pain much like she had following a prior Tx.  The exercise was abandoned.    Problem List: pain, decreased ROM, decreased flexibility, decreased strength and decreased ability to fully participate in recreational/sports related activities.      GOALS: Short Term Goals:  2 weeks  1. Independent with HEP to increase patient's tolerance for ADL's   2. Pt able to perform sit to stand transfers with less discomfort.    Long Term Goals: 8 weeks  1.Report decreased    R hip    pain  <   / =  2  /10 at worse with Sit to Stand transfers to increase tolerance for ADL's and walking  2.Increased MMT  for  R LE  to increase tolerance for ADL and work activities.  3.Increased arom  for  R hip to improve normal movement patterns during ADL's.  4. Pt will report improvement in overall functional abilities of LE, evidenced by improved score on the FOTO Hip Survey.    Plan  Pt will be treated by physical therapy 1-2 times a week for 8 weeks for Pt Education, HEP, therapeutic exercises, neuromuscular re-education/ balance exercises, joint mobilizations, modalities prn   to achieve established goals. Shelbie may at times be seen by a PTA as part of the Rehab Team.     Cont PT for  8         weeks.   I certify the need for these services furnished under this plan of treatment and while under my care.______________________________ Physician/Referring Practitioner  Date of Signature         "

## 2017-04-06 ENCOUNTER — CLINICAL SUPPORT (OUTPATIENT)
Dept: REHABILITATION | Facility: HOSPITAL | Age: 47
End: 2017-04-06
Attending: ORTHOPAEDIC SURGERY
Payer: COMMERCIAL

## 2017-04-06 DIAGNOSIS — M25.551 RIGHT HIP PAIN: ICD-10-CM

## 2017-04-06 DIAGNOSIS — M21.851 ACQUIRED DYSPLASIA OF RIGHT HIP: Primary | ICD-10-CM

## 2017-04-06 PROCEDURE — 97110 THERAPEUTIC EXERCISES: CPT | Mod: PO

## 2017-04-06 NOTE — PROGRESS NOTES
Physical Therapy Daily Note    Visit: 6    Name: Shelbie Ardon  Clinic Number: [id number]    Shelbie is a 47 y.o. female evaluated on 04/06/2017.     Diagnosis:   Encounter Diagnoses   Name Primary?    Acquired dysplasia of right hip Yes    Right hip pain        DOS: NA    Physician: Vern Carrasquillo MD  Treatment Orders: PT Eval and Treat    Past Medical History:   Diagnosis Date    Arthritis     Arthritis     Cough     Female infertility NEC     Headache     Hyperlipidemia     Hypertension     Tinnitus 12/17/2013     Current Outpatient Prescriptions   Medication Sig    diclofenac sodium 1 % Gel Apply 2 g topically as needed.    fish oil-omega-3 fatty acids 300-1,000 mg capsule Take 1 g by mouth once daily.    losartan (COZAAR) 50 MG tablet Take 1 tablet (50 mg total) by mouth once daily.    multivitamin (THERAGRAN) per tablet Take 1 tablet by mouth once daily.     vitamin D 185 MG Tab Take 185 mg by mouth once daily.      No current facility-administered medications for this visit.      Review of patient's allergies indicates:   Allergen Reactions    Cephalexin Hives    Methohexital      Other reaction(s): Difficulty breathing    Penicillins Hives    Sulfa (sulfonamide antibiotics) Hives     Precautions: Standard  Occupation: [employer]       Subjective  Onset/PIERO: gradual  Involved Area/Location: right  Current Symptoms: Anterior Pain and pinching    Increases symptoms: Sit to stand transfers  Decreases Symptoms: Medication    Current Function: Limited walking secondary to R hip  Previous function:  Walking through B foot pain    Diagnostic Tests: MRI    Pain Scale: Shelbie rates R hip pain to be 2  on a scale of 1-10.    Patient Goals: Llearn how to manage her R hip pain and set up an exercise program  Pt reported that she has a cold today.  She also stated that she tried to reschedule her appointment  "on Tuesday then was unable to make it due to traffic problems.    Objective  Trunk ROM WNL     Observation:    Posture: Pelvis height WNL      Passive Range of Motion: hip   Left Right   Flexion: 110 95   Extension NT NT            Lower Extremity Strength  Right LE  Left LE    Knee extension: 5/5 Knee extension: 4+/5   Knee flexion: 5/5 Knee flexion: 4/5   Hip flexion: 3+/5 Hip flexion: 4/5   Hip extension:  4-/5 Hip extension: 4+/5   Hip abduction/Glut Medius: 3+/5 Hip abduction/Glut medius 5/5   Hip adduction: 4+/5 Hip adduction: 4+/5   Hip internal rotation: NT Hip internal rotation: NT   Hip external rotation: NT Hip external rotation: NT   Ankle dorsiflexion: NT Ankle dorsiflexion: NT   Ankle plantarflexion: NT Ankle plantarflexion: NT       Special Tests: SI LIGAMENT TESTS (-)    Joint Mobility: hypomobile R hip flexion & IR  Palpation: no palpable tenderness.  Sensation: intact to light touch    Edema: NA      Gait: Shelbie ambulated 200 feet with no assistive device.    Balance: NT    Treatment:  Pt performed there ex's for R LE strengthening, ROM, flexibility and endurance including:  Clams 20 x 3 on R and L  Bridges 15 x 3 opposite foot on 4" box  Long axis distraction R hip  Shuttle standing hip extension on R & L with 25# (Black cord) 10 x 3 on each  Monster walks 20 ' x 6 with orange TB  Pilates box step downs 10 x 3 on L & R with springs in highest position.  Supine hip flexor stretches on L & R 4 x 30 sec each  Exercises were reviewed and pt was able to demonstrate them prior to the end of the session.       Added standing orange TB hip ext/abd to HEP  Ed pt to use ice PRN 10- 20 min when pain or swelling occurs.  Treatments not performed today:   Lateral step up 10 x 3 on R & L  Shelbie demo good understanding of the education provided. Patient demo good return demo of skill of exercises.      Assessment  This is a 47 y.o. female referred to outpatient physical therapy and presents with a medical " diagnosis of   Encounter Diagnoses   Name Primary?    Acquired dysplasia of right hip Yes    Right hip pain     and demonstrates limitations as described in the problem list. Pt will benefit from physcial therapy services in order to maximize pain free and/or functional use of right hip. The following goals were discussed with the patient and patient is in agreement with them as to be addressed in the treatment plan. R hip flexion to 95 degrees following long axis distraction today.     Problem List: pain, decreased ROM, decreased flexibility, decreased strength and decreased ability to fully participate in recreational/sports related activities.      GOALS: Short Term Goals:  2 weeks  1. Independent with HEP to increase patient's tolerance for ADL's   2. Pt able to perform sit to stand transfers with less discomfort.    Long Term Goals: 8 weeks  1.Report decreased    R hip    pain  <   / =  2  /10 at worse with Sit to Stand transfers to increase tolerance for ADL's and walking  2.Increased MMT  for  R LE  to increase tolerance for ADL and work activities.  3.Increased arom  for  R hip to improve normal movement patterns during ADL's.  4. Pt will report improvement in overall functional abilities of LE, evidenced by improved score on the FOTO Hip Survey.    Plan  Pt will be treated by physical therapy 1-2 times a week for 8 weeks for Pt Education, HEP, therapeutic exercises, neuromuscular re-education/ balance exercises, joint mobilizations, modalities prn   to achieve established goals. Shelbie may at times be seen by a PTA as part of the Rehab Team.     Cont PT for  8         weeks.

## 2017-04-11 ENCOUNTER — CLINICAL SUPPORT (OUTPATIENT)
Dept: REHABILITATION | Facility: HOSPITAL | Age: 47
End: 2017-04-11
Attending: ORTHOPAEDIC SURGERY
Payer: COMMERCIAL

## 2017-04-11 PROCEDURE — 97110 THERAPEUTIC EXERCISES: CPT | Mod: PO

## 2017-04-11 PROCEDURE — 97530 THERAPEUTIC ACTIVITIES: CPT | Mod: PO

## 2017-04-11 NOTE — PROGRESS NOTES
Physical Therapy Daily Note    Visit: 7    Name: Shelbie Ardon  Clinic Number: [id number]    Shelbie is a 47 y.o. female evaluated on 04/11/2017.     Diagnosis:   No diagnosis found.    DOS: NA    Physician: Vern Carrasquillo MD  Treatment Orders: PT Eval and Treat    Past Medical History:   Diagnosis Date    Arthritis     Arthritis     Cough     Female infertility NEC     Headache     Hyperlipidemia     Hypertension     Tinnitus 12/17/2013     Current Outpatient Prescriptions   Medication Sig    diclofenac sodium 1 % Gel Apply 2 g topically as needed.    fish oil-omega-3 fatty acids 300-1,000 mg capsule Take 1 g by mouth once daily.    losartan (COZAAR) 50 MG tablet Take 1 tablet (50 mg total) by mouth once daily.    multivitamin (THERAGRAN) per tablet Take 1 tablet by mouth once daily.     vitamin D 185 MG Tab Take 185 mg by mouth once daily.      No current facility-administered medications for this visit.      Review of patient's allergies indicates:   Allergen Reactions    Cephalexin Hives    Methohexital      Other reaction(s): Difficulty breathing    Penicillins Hives    Sulfa (sulfonamide antibiotics) Hives     Precautions: Standard  Occupation: [employer]       Subjective  Onset/PIERO: gradual  Involved Area/Location: right  Current Symptoms: Anterior Pain and pinching    Increases symptoms: Sit to stand transfers  Decreases Symptoms: Medication    Current Function: Limited walking secondary to R hip  Previous function:  Walking through B foot pain    Diagnostic Tests: MRI    Pain Scale: Shelbie rates R hip pain to be 2  on a scale of 1-10.    Patient Goals: Llearn how to manage her R hip pain and set up an exercise program  Pt reported that her hip feels good today.  She stated that she did a lot of hip work in a Yoga class and felt good this morning.    Objective  Trunk ROM  WNL     Observation:    Posture: Pelvis height WNL      Passive Range of Motion: hip   Left Right   Flexion: 110 95   Extension NT NT            Lower Extremity Strength  Right LE  Left LE    Knee extension: 5/5 Knee extension: 4+/5   Knee flexion: 5/5 Knee flexion: 4/5   Hip flexion: 3+/5 Hip flexion: 4/5   Hip extension:  4-/5 Hip extension: 4+/5   Hip abduction/Glut Medius: 3+/5 Hip abduction/Glut medius 5/5   Hip adduction: 4+/5 Hip adduction: 4+/5   Hip internal rotation: NT Hip internal rotation: NT   Hip external rotation: NT Hip external rotation: NT   Ankle dorsiflexion: NT Ankle dorsiflexion: NT   Ankle plantarflexion: NT Ankle plantarflexion: NT       Special Tests: SI LIGAMENT TESTS (-)    Joint Mobility: hypomobile R hip flexion & IR  Palpation: no palpable tenderness.  Sensation: intact to light touch    Edema: NA      Gait: Shelbie ambulated 200 feet with no assistive device.    Balance: NT    Treatment:  Pt performed there ex's for R LE strengthening, ROM, flexibility and endurance including:  Clams 10 x 3 on R and L with orange   Bridges 15 x 3 with both feet on a green ball  Long axis distraction R hip  Shuttle standing hip extension on R & L with 25# (Black cord) 10 x 3 on each  Monster walks 20 ' x 6 with Green TB  Pilates box step downs 10 x 3 on L & R with springs in highest position.  Supine hip flexor stretches on L & R 4 x 30 sec each  Matrix hip flexion 10 x 3 with 25# on R & L  Side lying hip adduction 10 x 3 with 0#  Exercises were reviewed and pt was able to demonstrate them prior to the end of the session.       Added standing orange TB hip ext/abd to HEP  Ed pt to use ice PRN 10- 20 min when pain or swelling occurs.  Treatments not performed today:   Lateral step up 10 x 3 on R & L  Shelbie demo good understanding of the education provided. Patient demo good return demo of skill of exercises.      Assessment  This is a 47 y.o. female referred to outpatient physical therapy and  presents with a medical diagnosis of   No diagnosis found. and demonstrates limitations as described in the problem list. Pt will benefit from physcial therapy services in order to maximize pain free and/or functional use of right hip. The following goals were discussed with the patient and patient is in agreement with them as to be addressed in the treatment plan. R hip flexion to 95 degrees following long axis distraction today.   Pt is feeling better overall with no c/o increased pain following Tx today.  Problem List: pain, decreased ROM, decreased flexibility, decreased strength and decreased ability to fully participate in recreational/sports related activities.      GOALS: Short Term Goals:  2 weeks  1. Independent with HEP to increase patient's tolerance for ADL's   2. Pt able to perform sit to stand transfers with less discomfort.    Long Term Goals: 8 weeks  1.Report decreased    R hip    pain  <   / =  2  /10 at worse with Sit to Stand transfers to increase tolerance for ADL's and walking  2.Increased MMT  for  R LE  to increase tolerance for ADL and work activities.  3.Increased arom  for  R hip to improve normal movement patterns during ADL's.  4. Pt will report improvement in overall functional abilities of LE, evidenced by improved score on the FOTO Hip Survey.    Plan  Pt will be treated by physical therapy 1-2 times a week for 8 weeks for Pt Education, HEP, therapeutic exercises, neuromuscular re-education/ balance exercises, joint mobilizations, modalities prn   to achieve established goals. Shelbie may at times be seen by a PTA as part of the Rehab Team.     Cont PT for  8         weeks.

## 2017-04-13 ENCOUNTER — OFFICE VISIT (OUTPATIENT)
Dept: OBSTETRICS AND GYNECOLOGY | Facility: CLINIC | Age: 47
End: 2017-04-13
Attending: OBSTETRICS & GYNECOLOGY
Payer: COMMERCIAL

## 2017-04-13 ENCOUNTER — CLINICAL SUPPORT (OUTPATIENT)
Dept: REHABILITATION | Facility: HOSPITAL | Age: 47
End: 2017-04-13
Attending: ORTHOPAEDIC SURGERY
Payer: COMMERCIAL

## 2017-04-13 VITALS
SYSTOLIC BLOOD PRESSURE: 140 MMHG | WEIGHT: 211.88 LBS | HEIGHT: 67 IN | BODY MASS INDEX: 33.26 KG/M2 | DIASTOLIC BLOOD PRESSURE: 100 MMHG

## 2017-04-13 DIAGNOSIS — N92.0 MENORRHAGIA WITH REGULAR CYCLE: ICD-10-CM

## 2017-04-13 DIAGNOSIS — M25.551 RIGHT HIP PAIN: Primary | ICD-10-CM

## 2017-04-13 DIAGNOSIS — Z01.419 ENCOUNTER FOR GYNECOLOGICAL EXAMINATION WITHOUT ABNORMAL FINDING: Primary | ICD-10-CM

## 2017-04-13 PROCEDURE — 58100 BIOPSY OF UTERUS LINING: CPT | Mod: S$GLB,,, | Performed by: OBSTETRICS & GYNECOLOGY

## 2017-04-13 PROCEDURE — 88175 CYTOPATH C/V AUTO FLUID REDO: CPT

## 2017-04-13 PROCEDURE — 97110 THERAPEUTIC EXERCISES: CPT | Mod: PO

## 2017-04-13 PROCEDURE — 99999 PR PBB SHADOW E&M-EST. PATIENT-LVL III: CPT | Mod: PBBFAC,,, | Performed by: OBSTETRICS & GYNECOLOGY

## 2017-04-13 PROCEDURE — 88305 TISSUE EXAM BY PATHOLOGIST: CPT | Performed by: PATHOLOGY

## 2017-04-13 PROCEDURE — 99396 PREV VISIT EST AGE 40-64: CPT | Mod: 25,S$GLB,, | Performed by: OBSTETRICS & GYNECOLOGY

## 2017-04-13 RX ORDER — CELECOXIB 200 MG/1
CAPSULE ORAL
COMMUNITY
End: 2018-09-17 | Stop reason: ALTCHOICE

## 2017-04-13 NOTE — MR AVS SNAPSHOT
[facility] [address]  [address]  [address]  [place] [facility] [address]  Phone: [phone]  Fax: [phone]                  Shelbie Ardon   2017 9:45 AM   Office Visit    Description:  Female : 1970   Provider:  Farida Izquierdo MD   Department:  [facility] [address]           Reason for Visit     Established Patient                To Do List           Future Appointments        Provider Department Dept Phone    2017 1:00 PM Jb Mahan, PT [facility] [phone]    2017 8:00 AM Silva Raygoza PTA [facility] [phone]    2017 8:00 AM Silva Raygoza PTA [facility] [phone]    2017 8:00 AM Silva Raygoza PTA [facility] [phone]    2017 7:00 AM Jb Mahan, WIL [facility] [phone]      Goals (5 Years of Data)     None      [facility]     [facility] Nurse Care Line -  Assistance  Unless otherwise directed by your provider, please contact [facility], our nurse care line that is available for  assistance.     Registered nurses in the [facility] provide: appointment scheduling, clinical advisement, health education, and other advisory services.  Call: [phone] (toll free)               Medications           Message regarding Medications     Verify the changes and/or additions to your medication regime listed below are the same as discussed with your clinician today.  If any of these changes or additions are incorrect, please notify your healthcare provider.             Verify that the below list of medications is an accurate representation of the medications you are currently taking.  If none reported, the list may be blank. If incorrect, please contact your healthcare provider. Carry this list with you in case of emergency.           Current Medications     CELECOXIB (CELEBREX  "ORAL) Take by mouth.    diclofenac sodium 1 % Gel Apply 2 g topically as needed.    fish oil-omega-3 fatty acids 300-1,000 mg capsule Take 1 g by mouth once daily.    losartan (COZAAR) 50 MG tablet Take 1 tablet (50 mg total) by mouth once daily.    multivitamin (THERAGRAN) per tablet Take 1 tablet by mouth once daily.     vitamin D 185 MG Tab Take 185 mg by mouth once daily.            Clinical Reference Information           Your Vitals Were     BP Height Weight Last Period BMI    140/100 5' 7" (1.702 m) 96.1 kg (211 lb 13.8 oz) 03/26/2017 (Exact Date) 33.18 kg/m2      Blood Pressure          Most Recent Value    BP  (!)  140/100      Allergies as of 4/13/2017     Cephalexin    Methohexital    Penicillins    Sulfa (Sulfonamide Antibiotics)      Immunizations Administered on Date of Encounter - 4/13/2017     None      Language Assistance Services     ATTENTION: Language assistance services are available, free of charge. Please call [phone].      ATENCIÓN: Si habla marisel, tiene a smith disposición servicios gratuitos de asistencia lingüística. Llame al [phone].     MELIZA Ý: N?u b?n nói Ti?ng Vi?t, có các d?ch v? h? tr? ngôn ng? mi?n phí dành cho b?n. G?i s? [phone].         [facility] [address] complies with applicable Federal civil rights laws and does not discriminate on the basis of race, color, national origin, age, disability, or sex.        "

## 2017-04-13 NOTE — PROGRESS NOTES
Physical Therapy Daily Note    Visit: 8    Name: Shelbie Ardon  Clinic Number: [id number]    Shelbei is a 47 y.o. female evaluated on 04/13/2017.     Diagnosis:   Encounter Diagnosis   Name Primary?    Right hip pain Yes       DOS: NA    Physician: Vern Carrasquillo MD  Treatment Orders: PT Eval and Treat    Past Medical History:   Diagnosis Date    Arthritis     Arthritis     Cough     Female infertility NEC     Headache     Hyperlipidemia     Hypertension     Tinnitus 12/17/2013     Current Outpatient Prescriptions   Medication Sig    CELECOXIB (CELEBREX ORAL) Take by mouth.    diclofenac sodium 1 % Gel Apply 2 g topically as needed.    fish oil-omega-3 fatty acids 300-1,000 mg capsule Take 1 g by mouth once daily.    losartan (COZAAR) 50 MG tablet Take 1 tablet (50 mg total) by mouth once daily.    multivitamin (THERAGRAN) per tablet Take 1 tablet by mouth once daily.     vitamin D 185 MG Tab Take 185 mg by mouth once daily.      No current facility-administered medications for this visit.      Review of patient's allergies indicates:   Allergen Reactions    Cephalexin Hives    Methohexital      Other reaction(s): Difficulty breathing    Penicillins Hives    Sulfa (sulfonamide antibiotics) Hives     Precautions: Standard  Occupation: [employer]       Subjective  Onset/PIERO: gradual  Involved Area/Location: right  Current Symptoms: Anterior Pain and pinching    Increases symptoms: Sit to stand transfers  Decreases Symptoms: Medication    Current Function: Limited walking secondary to R hip  Previous function:  Walking through B foot pain    Diagnostic Tests: MRI    Pain Scale: Shelbie rates R hip pain to be 2  on a scale of 1-10.    Patient Goals: Llearn how to manage her R hip pain and set up an exercise program  Pt reported that her hip feels good today, but she feel a little tired.    Objective  Trunk  ROM WNL     Observation:    Posture: Pelvis height WNL      Passive Range of Motion: hip   Left Right   Flexion: 110 110   Extension NT NT            Lower Extremity Strength  Right LE  Left LE    Knee extension: 5/5 Knee extension: 4+/5   Knee flexion: 5/5 Knee flexion: 4/5   Hip flexion: 3+/5 Hip flexion: 4/5   Hip extension:  4-/5 Hip extension: 4+/5   Hip abduction/Glut Medius: 3+/5 Hip abduction/Glut medius 5/5   Hip adduction: 4+/5 Hip adduction: 4+/5   Hip internal rotation: NT Hip internal rotation: NT   Hip external rotation: NT Hip external rotation: NT   Ankle dorsiflexion: NT Ankle dorsiflexion: NT   Ankle plantarflexion: NT Ankle plantarflexion: NT       Special Tests: SI LIGAMENT TESTS (-)    Joint Mobility: hypomobile R hip flexion & IR  Palpation: no palpable tenderness.  Sensation: intact to light touch    Edema: NA      Gait: Shelbie ambulated 200 feet with no assistive device.    Balance: NT    Treatment:  Pt performed there ex's for R LE strengthening, ROM, flexibility and endurance including:  Clams 10 x 3 on R and L with orange   Bridges 15 x 3 with both feet on a green ball  Long axis distraction R hip  Shuttle standing hip extension on R & L with 25# (Black cord) 10 x 3 on each  Shuttle leg press 10 x 3 with 75#  Monster walks 20 ' x 6 with Green TB  Pilates box step downs 10 x 3 on L & R with springs in highest position with no use of hands.  Supine hip flexor stretches on L & R 4 x 30 sec each  Matrix hip flexion 10 x 3 with 40# on R & L  Side lying hip adduction 10 x 3 with 0#  Exercises were reviewed and pt was able to demonstrate them prior to the end of the session.       Added standing orange TB hip ext/abd to HEP  Ed pt to use ice PRN 10- 20 min when pain or swelling occurs.  Treatments not performed today:   Lateral step up 10 x 3 on R & L - painful  Shelbie demo good understanding of the education provided. Patient demo good return demo of skill of exercises.      Assessment  This  is a 47 y.o. female referred to outpatient physical therapy and presents with a medical diagnosis of   Encounter Diagnosis   Name Primary?    Right hip pain Yes    and demonstrates limitations as described in the problem list. Pt will benefit from physcial therapy services in order to maximize pain free and/or functional use of right hip. The following goals were discussed with the patient and patient is in agreement with them as to be addressed in the treatment plan. R hip flexion to 95 degrees following long axis distraction today.   Pt tolerated shuttle leg press without c/o increased pain in her R hip.      Problem List: pain, decreased ROM, decreased flexibility, decreased strength and decreased ability to fully participate in recreational/sports related activities.      GOALS: Short Term Goals:  2 weeks  1. Independent with HEP to increase patient's tolerance for ADL's   2. Pt able to perform sit to stand transfers with less discomfort.    Long Term Goals: 8 weeks  1.Report decreased    R hip    pain  <   / =  2  /10 at worse with Sit to Stand transfers to increase tolerance for ADL's and walking  2.Increased MMT  for  R LE  to increase tolerance for ADL and work activities.  3.Increased arom  for  R hip to improve normal movement patterns during ADL's.  4. Pt will report improvement in overall functional abilities of LE, evidenced by improved score on the FOTO Hip Survey.    Plan  Pt will be treated by physical therapy 1-2 times a week for 8 weeks for Pt Education, HEP, therapeutic exercises, neuromuscular re-education/ balance exercises, joint mobilizations, modalities prn   to achieve established goals. Shelbie may at times be seen by a PTA as part of the Rehab Team.   Progress closed chain R hip exercises slowly to tolerance.  Cont PT for  8         weeks.

## 2017-04-13 NOTE — PROGRESS NOTES
"CC: Well woman exam    Shelbie Ardon is a 47 y.o. female No obstetric history on file. presents for a well woman exam.  She has no issues, problems, or complaints.  Was seen for menorrhagia 2 weeks ago and is here today for annual exam.    Last pap 3 yrs ago--normal    Past Medical History:   Diagnosis Date    Arthritis     Arthritis     Cough     Female infertility NEC     Headache     Hyperlipidemia     Hypertension     Tinnitus 12/17/2013       Past Surgical History:   Procedure Laterality Date    DILATION AND CURETTAGE OF UTERUS      KNEE SURGERY      acl repair as child/ revision 2013       OB History   No data available       Family History   Problem Relation Age of Onset    Hypertension Mother     Cancer Father     Lung cancer Father     Cancer Paternal Grandmother      unknown GYN cancer     Lung cancer Paternal Grandmother     Cancer Paternal Grandfather     Lung cancer Paternal Grandfather     No Known Problems Sister     No Known Problems Sister     No Known Problems Brother     No Known Problems Maternal Aunt     No Known Problems Maternal Uncle     No Known Problems Paternal Aunt     No Known Problems Paternal Uncle     No Known Problems Maternal Grandmother     No Known Problems Maternal Grandfather     Anemia Neg Hx     Arrhythmia Neg Hx     Asthma Neg Hx     Clotting disorder Neg Hx     Fainting Neg Hx     Heart attack Neg Hx     Heart disease Neg Hx     Heart failure Neg Hx     Hyperlipidemia Neg Hx     Stroke Neg Hx     Atrial Septal Defect Neg Hx     Breast cancer Neg Hx     Colon cancer Neg Hx     Ovarian cancer Neg Hx        Social History   Substance Use Topics    Smoking status: Never Smoker    Smokeless tobacco: Never Used    Alcohol use 4.2 oz/week     7 Glasses of wine per week       BP (!) 140/100  Ht 5' 7" (1.702 m)  Wt 96.1 kg (211 lb 13.8 oz)  LMP 03/26/2017 (Exact Date)  BMI 33.18 kg/m2    ROS:  GENERAL: Denies weight gain or weight " loss. Feeling well overall.   SKIN: Denies rash or lesions.   HEAD: Denies head injury or headache.   NODES: Denies enlarged lymph nodes.   CHEST: Denies chest pain or shortness of breath.   CARDIOVASCULAR: Denies palpitations or left sided chest pain.   ABDOMEN: No abdominal pain, constipation, diarrhea, nausea, vomiting or rectal bleeding.   URINARY: No frequency, dysuria, hematuria, or burning on urination.  REPRODUCTIVE: See HPI.   BREASTS: The patient performs breast self-examination and denies pain, lumps, or nipple discharge.   HEMATOLOGIC: No easy bruisability or excessive bleeding.  MUSCULOSKELETAL: Denies joint pain or swelling.   NEUROLOGIC: Denies syncope or weakness.   PSYCHIATRIC: Denies depression, anxiety or mood swings.    Physical Exam:  APPEARANCE: Well nourished, well developed, in no acute distress.  AFFECT: WNL, alert and oriented x 3  SKIN: No acne or hirsutism  NECK: Neck symmetric without masses or thyromegaly  NODES: No inguinal, cervical, axillary, or femoral lymph node enlargement  CHEST: Good respiratory effect  ABDOMEN: Soft.  No tenderness or masses.  No hepatosplenomegaly.  No hernias.  BREASTS: Symmetrical, no skin changes or visible lesions.  No palpable masses, nipple discharge bilaterally.  Fibrocystic changes equal and bilateral.  PELVIC: Normal external genitalia without lesions.  Normal hair distribution.  Adequate perineal body, normal urethral meatus.  Vagina moist and well rugated without lesions or discharge.  Cervix pink, without lesions, discharge or tenderness.  No significant cystocele or rectocele.  Bimanual exam shows uterus to be normal size, regular, mobile and nontender.  Adnexa without masses or tenderness.    EXTREMITIES: No edema.    CC: ENDOMETRIAL BIOPSPY  The risks, benefits and alternatives were discussed and she has agreed to proceed.    The cervix was visualized and prepped with betadine x 3.  The cvx was grasped anteriorly using a single tooth tenaculum  and then then uterus was sounded to 9cm using the endometrial pipelle.  Adequate endometial tissue was obtained with the pipelle.  The single tooth tenaculum was removed and both tenaculum sites were noted to be hemostatic.      The pt tolerated the entire procedure well.  Tissue will be sent to pathology.  There were not complications.      ASSESSMENT AND PLAN  1. Encounter for gynecological examination without abnormal finding         Patient was counseled today on diet, exercise and A.C.S. Pap guidelines and recommendations for yearly pelvic exams, mammograms and monthly self breast exams; to see her PCP for other health maintenance. Next pap due 2020 if this one is normal.  Will determine management plan for menorrhagia based on results.    POST ENDOMETRIAL BIOPSY COUNSELING:    Manage post biopsy cramping with NSAIDs or Tylenol.  Expect spotting or light bleeding for a few days.  Report bleeding heavier than a period, fever > 101.0 F, worsening pain or a foul smelling vaginal discharge.    Counseling lasted approximately 15 minutes and all her questions were answered.    Return in about 1 year (around 4/13/2018).for annual exam.

## 2017-04-18 ENCOUNTER — CLINICAL SUPPORT (OUTPATIENT)
Dept: REHABILITATION | Facility: HOSPITAL | Age: 47
End: 2017-04-18
Attending: ORTHOPAEDIC SURGERY
Payer: COMMERCIAL

## 2017-04-18 DIAGNOSIS — M21.851 ACQUIRED DYSPLASIA OF RIGHT HIP: ICD-10-CM

## 2017-04-18 DIAGNOSIS — M25.551 RIGHT HIP PAIN: Primary | ICD-10-CM

## 2017-04-18 PROCEDURE — 97110 THERAPEUTIC EXERCISES: CPT | Mod: PO

## 2017-04-18 NOTE — PROGRESS NOTES
Physical Therapy Daily Note    Visit: 8    Name: Shelbie Ardon  Clinic Number: [id number]    Shelbie is a 47 y.o. female evaluated on 04/18/2017.     Diagnosis:   Encounter Diagnoses   Name Primary?    Right hip pain Yes    Acquired dysplasia of right hip        DOS: NA    Physician: Vern Carrasquillo MD  Treatment Orders: PT Eval and Treat    Past Medical History:   Diagnosis Date    Arthritis     Arthritis     Cough     Female infertility NEC     Headache     Hyperlipidemia     Hypertension     Tinnitus 12/17/2013     Current Outpatient Prescriptions   Medication Sig    CELECOXIB (CELEBREX ORAL) Take by mouth.    diclofenac sodium 1 % Gel Apply 2 g topically as needed.    fish oil-omega-3 fatty acids 300-1,000 mg capsule Take 1 g by mouth once daily.    losartan (COZAAR) 50 MG tablet Take 1 tablet (50 mg total) by mouth once daily.    multivitamin (THERAGRAN) per tablet Take 1 tablet by mouth once daily.     vitamin D 185 MG Tab Take 185 mg by mouth once daily.      No current facility-administered medications for this visit.      Review of patient's allergies indicates:   Allergen Reactions    Cephalexin Hives    Methohexital      Other reaction(s): Difficulty breathing    Penicillins Hives    Sulfa (sulfonamide antibiotics) Hives     Precautions: Standard  Occupation: [employer]       Subjective  Onset/PIERO: gradual  Involved Area/Location: right  Current Symptoms: Anterior Pain and pinching    Increases symptoms: Sit to stand transfers  Decreases Symptoms: Medication    Current Function: Limited walking secondary to R hip  Previous function:  Walking through B foot pain    Diagnostic Tests: MRI    Pain Scale: Shelbie rates R hip pain to be 4 on a scale of 1-10.    Patient Goals: Llearn how to manage her R hip pain and set up an exercise program  Pt reported that her hip feels good today, but she feel a  little tired.    Objective  Trunk ROM WNL     Observation:    Posture: Pelvis height WNL      Passive Range of Motion: hip   Left Right   Flexion: 110 110   Extension NT NT            Lower Extremity Strength  Right LE  Left LE    Knee extension: 5/5 Knee extension: 4+/5   Knee flexion: 5/5 Knee flexion: 4/5   Hip flexion: 3+/5 Hip flexion: 4/5   Hip extension:  4-/5 Hip extension: 4+/5   Hip abduction/Glut Medius: 3+/5 Hip abduction/Glut medius 5/5   Hip adduction: 4+/5 Hip adduction: 4+/5   Hip internal rotation: NT Hip internal rotation: NT   Hip external rotation: NT Hip external rotation: NT   Ankle dorsiflexion: NT Ankle dorsiflexion: NT   Ankle plantarflexion: NT Ankle plantarflexion: NT       Special Tests: SI LIGAMENT TESTS (-)    Joint Mobility: hypomobile R hip flexion & IR  Palpation: no palpable tenderness.  Sensation: intact to light touch    Edema: NA      Gait: Shelbie ambulated 200 feet with no assistive device.    Balance: NT    Treatment:    Pt performed there ex's for R LE strengthening, ROM, flexibility and endurance including:    Clams 10 x 3 on R and L with orange   Bridges 15 x 3 with both feet on a green ball  Long axis distraction R hip  Shuttle standing hip extension on R & L with 25# (Black cord) 10 x 3 on each  Shuttle leg press 10 x 3 with 75#  Monster walks 20 ' x 6 with Green TB  Pilates box step downs 10 x 3 on L & R with springs in highest position with no use of hands.  Supine hip flexor stretches on L & R 4 x 30 sec each  Matrix hip flexion 10 x 3 with 40# on R & L (#55lbs for hip flexion)  Side lying hip adduction 10 x 3 with 0#    Exercises were reviewed and pt was able to demonstrate them prior to the end of the session.       Added standing orange TB hip ext/abd to HEP  Ed pt to use ice PRN 10- 20 min when pain or swelling occurs.  Treatments not performed today:   Lateral step up 10 x 3 on R & L - painful  Shelbie jannyo good understanding of the education provided. Patient  demo good return demo of skill of exercises.      Assessment  Patient tolerated therapy session well. Remains with significant weakness to bilateral hips but has made progress since time of evaluation. This is a 47 y.o. female referred to outpatient physical therapy and presents with a medical diagnosis of   Encounter Diagnoses   Name Primary?    Right hip pain Yes    Acquired dysplasia of right hip     and demonstrates limitations as described in the problem list. Pt will benefit from physcial therapy services in order to maximize pain free and/or functional use of right hip. The following goals were discussed with the patient and patient is in agreement with them as to be addressed in the treatment plan. R hip flexion to 95 degrees following long axis distraction today.   Pt tolerated shuttle leg press without c/o increased pain in her R hip.      Problem List: pain, decreased ROM, decreased flexibility, decreased strength and decreased ability to fully participate in recreational/sports related activities.      GOALS: Short Term Goals:  2 weeks  1. Independent with HEP to increase patient's tolerance for ADL's   2. Pt able to perform sit to stand transfers with less discomfort.    Long Term Goals: 8 weeks  1.Report decreased    R hip    pain  <   / =  2  /10 at worse with Sit to Stand transfers to increase tolerance for ADL's and walking  2.Increased MMT  for  R LE  to increase tolerance for ADL and work activities.  3.Increased arom  for  R hip to improve normal movement patterns during ADL's.  4. Pt will report improvement in overall functional abilities of LE, evidenced by improved score on the FOTO Hip Survey.    Plan  Pt will be treated by physical therapy 1-2 times a week for 8 weeks for Pt Education, HEP, therapeutic exercises, neuromuscular re-education/ balance exercises, joint mobilizations, modalities prn   to achieve established goals. Shelbie may at times be seen by a PTA as part of the Rehab  Team.   Progress closed chain R hip exercises slowly to tolerance.  Cont PT for  8         weeks.

## 2017-04-20 ENCOUNTER — CLINICAL SUPPORT (OUTPATIENT)
Dept: REHABILITATION | Facility: HOSPITAL | Age: 47
End: 2017-04-20
Attending: ORTHOPAEDIC SURGERY
Payer: COMMERCIAL

## 2017-04-20 DIAGNOSIS — M21.851 ACQUIRED DYSPLASIA OF RIGHT HIP: ICD-10-CM

## 2017-04-20 DIAGNOSIS — M25.551 RIGHT HIP PAIN: Primary | ICD-10-CM

## 2017-04-20 PROCEDURE — 97110 THERAPEUTIC EXERCISES: CPT | Mod: PO

## 2017-04-20 NOTE — PROGRESS NOTES
Physical Therapy Daily Note    Visit: 8    Name: Shelbie Ardon  Clinic Number: [id number]    Shelbie is a 47 y.o. female evaluated on 04/20/2017.     Diagnosis:   Encounter Diagnoses   Name Primary?    Right hip pain Yes    Acquired dysplasia of right hip        DOS: NA    Physician: Vern Carrasquillo MD  Treatment Orders: PT Eval and Treat    Past Medical History:   Diagnosis Date    Arthritis     Arthritis     Cough     Female infertility NEC     Headache     Hyperlipidemia     Hypertension     Tinnitus 12/17/2013     Current Outpatient Prescriptions   Medication Sig    CELECOXIB (CELEBREX ORAL) Take by mouth.    diclofenac sodium 1 % Gel Apply 2 g topically as needed.    fish oil-omega-3 fatty acids 300-1,000 mg capsule Take 1 g by mouth once daily.    losartan (COZAAR) 50 MG tablet Take 1 tablet (50 mg total) by mouth once daily.    multivitamin (THERAGRAN) per tablet Take 1 tablet by mouth once daily.     vitamin D 185 MG Tab Take 185 mg by mouth once daily.      No current facility-administered medications for this visit.      Review of patient's allergies indicates:   Allergen Reactions    Cephalexin Hives    Methohexital      Other reaction(s): Difficulty breathing    Penicillins Hives    Sulfa (sulfonamide antibiotics) Hives     Precautions: Standard  Occupation: [employer]       Subjective  Onset/PIERO: gradual  Involved Area/Location: right  Current Symptoms: Anterior Pain and pinching    Increases symptoms: Sit to stand transfers  Decreases Symptoms: Medication    Current Function: Limited walking secondary to R hip  Previous function:  Walking through B foot pain    Diagnostic Tests: MRI    Pain Scale: Shelbie rates R hip pain to be 3 on a scale of 1-10.    Patient Goals: Learn how to manage her R hip pain and set up an exercise program  Pt report no pain today     Objective  Trunk ROM  WNL     Observation:    ial Tests: SI LIGAMENT TESTS (-)    Joint Mobility: hypomobile R hip flexion & IR  Palpation: no palpable tenderness.  Sensation: intact to light touch    Edema: NA      Gait: Shelbie ambulated 200 feet with no assistive device.    Balance: NT    Treatment:    Pt performed there ex's for R LE strengthening, ROM, flexibility and endurance including:    Clams 10 x 3 on R and L with Green   Bridges 15 x 3 with both feet on a green ball  Long axis distraction R hip  Shuttle standing hip extension on R & L with 25# (Black cord) 10 x 3 on each  Shuttle leg press 10 x 3 with 75#  Monster walks 20 ' x 6 with Green TB  Pilates box step downs 10 x 3 on L & R with springs in highest position with no use of hands.  Supine hip flexor stretches on L & R 4 x 30 sec each  Matrix hip flexion 10 x 3 with 40# on R & L (#55lbs for hip flexion)  Side lying hip adduction 10 x 3 with 0#  Single leg ballerinas : 3 x 20 seconds on foam     Exercises were reviewed and pt was able to demonstrate them prior to the end of the session      Added standing orange TB hip ext/abd to HEP  Ed pt to use ice PRN 10- 20 min when pain or swelling occurs.  Treatments not performed today:   Lateral step up 10 x 3 on R & L - painful  Shelbie demo good understanding of the education provided. Patient demo good return demo of skill of exercises.      Assessment  Patient tolerated therapy session well. Remains with significant weakness to bilateral hips but has made progress since time of evaluation. ABle to increase resistance with clamshells and add balance activities.  This is a 47 y.o. female referred to outpatient physical therapy and presents with a medical diagnosis of   Encounter Diagnoses   Name Primary?    Right hip pain Yes    Acquired dysplasia of right hip     and demonstrates limitations as described in the problem list. Pt will benefit from physcial therapy services in order to maximize pain free and/or functional use  of right hip. The following goals were discussed with the patient and patient is in agreement with them as to be addressed in the treatment plan. R hip flexion to 95 degrees following long axis distraction today.   Pt tolerated shuttle leg press without c/o increased pain in her R hip.      Problem List: pain, decreased ROM, decreased flexibility, decreased strength and decreased ability to fully participate in recreational/sports related activities.      GOALS: Short Term Goals:  2 weeks  1. Independent with HEP to increase patient's tolerance for ADL's   2. Pt able to perform sit to stand transfers with less discomfort.    Long Term Goals: 8 weeks  1.Report decreased    R hip    pain  <   / =  2  /10 at worse with Sit to Stand transfers to increase tolerance for ADL's and walking  2.Increased MMT  for  R LE  to increase tolerance for ADL and work activities.  3.Increased arom  for  R hip to improve normal movement patterns during ADL's.  4. Pt will report improvement in overall functional abilities of LE, evidenced by improved score on the FOTO Hip Survey.    Plan  Pt will be treated by physical therapy 1-2 times a week for 8 weeks for Pt Education, HEP, therapeutic exercises, neuromuscular re-education/ balance exercises, joint mobilizations, modalities prn   to achieve established goals. Shelbie may at times be seen by a PTA as part of the Rehab Team.   Progress closed chain R hip exercises slowly to tolerance.  Cont PT for  8         weeks.

## 2017-04-21 ENCOUNTER — PATIENT MESSAGE (OUTPATIENT)
Dept: OBSTETRICS AND GYNECOLOGY | Facility: CLINIC | Age: 47
End: 2017-04-21

## 2017-04-21 DIAGNOSIS — N92.0 MENORRHAGIA WITH REGULAR CYCLE: Primary | ICD-10-CM

## 2017-04-28 ENCOUNTER — CLINICAL SUPPORT (OUTPATIENT)
Dept: REHABILITATION | Facility: HOSPITAL | Age: 47
End: 2017-04-28
Attending: ORTHOPAEDIC SURGERY
Payer: COMMERCIAL

## 2017-04-28 DIAGNOSIS — M25.551 RIGHT HIP PAIN: Primary | ICD-10-CM

## 2017-04-28 PROCEDURE — 97110 THERAPEUTIC EXERCISES: CPT | Mod: PO

## 2017-04-28 RX ORDER — MEDROXYPROGESTERONE ACETATE 150 MG/ML
150 INJECTION, SUSPENSION INTRAMUSCULAR ONCE
Qty: 1 ML | Refills: 3 | Status: SHIPPED | OUTPATIENT
Start: 2017-04-28 | End: 2018-04-17 | Stop reason: SDUPTHER

## 2017-04-28 NOTE — PROGRESS NOTES
Physical Therapy Daily Note    Visit: 11    Name: Shelbie Ardon  Clinic Number: [id number]    Shelbie is a 47 y.o. female evaluated on 04/28/2017.     Diagnosis:   Encounter Diagnosis   Name Primary?    Right hip pain Yes       DOS: NA    Physician: Vern Carrasquillo MD  Treatment Orders: PT Eval and Treat    Past Medical History:   Diagnosis Date    Arthritis     Arthritis     Cough     Female infertility NEC     Headache     Hyperlipidemia     Hypertension     Tinnitus 12/17/2013     Current Outpatient Prescriptions   Medication Sig    CELECOXIB (CELEBREX ORAL) Take by mouth.    diclofenac sodium 1 % Gel Apply 2 g topically as needed.    fish oil-omega-3 fatty acids 300-1,000 mg capsule Take 1 g by mouth once daily.    losartan (COZAAR) 50 MG tablet Take 1 tablet (50 mg total) by mouth once daily.    multivitamin (THERAGRAN) per tablet Take 1 tablet by mouth once daily.     vitamin D 185 MG Tab Take 185 mg by mouth once daily.      No current facility-administered medications for this visit.      Review of patient's allergies indicates:   Allergen Reactions    Cephalexin Hives    Methohexital      Other reaction(s): Difficulty breathing    Penicillins Hives    Sulfa (sulfonamide antibiotics) Hives     Precautions: Standard  Occupation: [employer]       Subjective  Onset/PIERO: gradual  Involved Area/Location: right  Current Symptoms: Anterior Pain and pinching    Increases symptoms: Sit to stand transfers  Decreases Symptoms: Medication    Current Function: Limited walking secondary to R hip  Previous function:  Walking through B foot pain    Diagnostic Tests: MRI    Pain Scale: Shelbie rates R hip pain to be 3 on a scale of 1-10.    Patient Goals: Learn how to manage her R hip pain and set up an exercise program  Pt reported that she was out of town on Tuesday and forgot to call and Cx her  appointment    Objective  Trunk ROM WNL     Observation:    ial Tests: SI LIGAMENT TESTS (-)    Joint Mobility: hypomobile R hip flexion & IR  Palpation: no palpable tenderness.  Sensation: intact to light touch    Edema: NA      Gait: Shelbie ambulated 200 feet with no assistive device.    Balance: NT    Treatment:    Pt performed there ex's for R LE strengthening, ROM, flexibility and endurance including:    Clams 10 x 3 on R and L with orange TB   Bridges 15 x 3 with both feet on a green ball  Long axis distraction R hip  Shuttle standing hip extension on R & L with 25# (Black cord) 10 x 3 on each  Shuttle leg press 10 x 3 with 125# with green band around knees  Single leg press 10 x 3 with 75#  Monster walks 20 ' x 6 with Green TB  Pilates box step downs 10 x 3 on L & R with springs in highest position with no use of hands.  Supine hip flexor stretches on L & R 4 x 30 sec each  Matrix hip abd & flexion 10 x 3 with 40# on R & L     Treatments not performed today:  Side lying hip adduction 10 x 3 with 0#  Single leg ballerinas : 3 x 20 seconds on foam     Exercises were reviewed and pt was able to demonstrate them prior to the end of the session      Added standing orange TB hip ext/abd to HEP  Ed pt to use ice PRN 10- 20 min when pain or swelling occurs.  Treatments not performed today:   Lateral step up 10 x 3 on R & L - painful  Shelbie demo good understanding of the education provided. Patient demo good return demo of skill of exercises.      Assessment  Patient tolerated therapy session well. Remains with significant weakness to bilateral hips but has made progress since time of evaluation. ABle to increase resistance with clamshells and add balance activities.  This is a 47 y.o. female referred to outpatient physical therapy and presents with a medical diagnosis of   Encounter Diagnosis   Name Primary?    Right hip pain Yes    and demonstrates limitations as described in the problem list. Pt will benefit  from physcial therapy services in order to maximize pain free and/or functional use of right hip. The following goals were discussed with the patient and patient is in agreement with them as to be addressed in the treatment plan. R hip flexion to 95 degrees following long axis distraction today.   Pt reported that her legs were fatigued following Tx today.     Problem List: pain, decreased ROM, decreased flexibility, decreased strength and decreased ability to fully participate in recreational/sports related activities.      GOALS: Short Term Goals:  2 weeks  1. Independent with HEP to increase patient's tolerance for ADL's   2. Pt able to perform sit to stand transfers with less discomfort.    Long Term Goals: 8 weeks  1.Report decreased    R hip    pain  <   / =  2  /10 at worse with Sit to Stand transfers to increase tolerance for ADL's and walking  2.Increased MMT  for  R LE  to increase tolerance for ADL and work activities.  3.Increased arom  for  R hip to improve normal movement patterns during ADL's.  4. Pt will report improvement in overall functional abilities of LE, evidenced by improved score on the FOTO Hip Survey.    Plan  Pt will be treated by physical therapy 1-2 times a week for 8 weeks for Pt Education, HEP, therapeutic exercises, neuromuscular re-education/ balance exercises, joint mobilizations, modalities prn   to achieve established goals. Shelbie may at times be seen by a PTA as part of the Rehab Team.   Progress closed chain R hip exercises slowly to tolerance.  Cont PT for  8         weeks.

## 2017-05-01 ENCOUNTER — PATIENT MESSAGE (OUTPATIENT)
Dept: OBSTETRICS AND GYNECOLOGY | Facility: CLINIC | Age: 47
End: 2017-05-01

## 2017-05-02 NOTE — PROGRESS NOTES
Administered Medroxyprogesterone 150mg/ml on 04/28/2017    LOT [id number]  EXP 12/2020    Dr JUAN More, PharmD

## 2017-05-09 ENCOUNTER — CLINICAL SUPPORT (OUTPATIENT)
Dept: REHABILITATION | Facility: HOSPITAL | Age: 47
End: 2017-05-09
Attending: ORTHOPAEDIC SURGERY
Payer: COMMERCIAL

## 2017-05-09 DIAGNOSIS — M25.551 RIGHT HIP PAIN: Primary | ICD-10-CM

## 2017-05-09 PROCEDURE — 97110 THERAPEUTIC EXERCISES: CPT | Mod: PO

## 2017-05-09 NOTE — PROGRESS NOTES
Physical Therapy Daily Note    Visit: 11    Name: Shelbie Ardon  Clinic Number: [id number]    Shelbie is a 47 y.o. female evaluated on 05/09/2017.     Diagnosis:   Encounter Diagnosis   Name Primary?    Right hip pain Yes       DOS: NA    Physician: Vern Carrasquillo MD  Treatment Orders: PT Eval and Treat    Past Medical History:   Diagnosis Date    Arthritis     Arthritis     Cough     Female infertility NEC     Headache     Hyperlipidemia     Hypertension     Tinnitus 12/17/2013     Current Outpatient Prescriptions   Medication Sig    CELECOXIB (CELEBREX ORAL) Take by mouth.    diclofenac sodium 1 % Gel Apply 2 g topically as needed.    fish oil-omega-3 fatty acids 300-1,000 mg capsule Take 1 g by mouth once daily.    losartan (COZAAR) 50 MG tablet Take 1 tablet (50 mg total) by mouth once daily.    medroxyPROGESTERone (DEPO-PROVERA) 150 mg/mL Syrg Inject 1 mL (150 mg total) into the muscle once.    multivitamin (THERAGRAN) per tablet Take 1 tablet by mouth once daily.     vitamin D 185 MG Tab Take 185 mg by mouth once daily.      No current facility-administered medications for this visit.      Review of patient's allergies indicates:   Allergen Reactions    Cephalexin Hives    Methohexital      Other reaction(s): Difficulty breathing    Penicillins Hives    Sulfa (sulfonamide antibiotics) Hives     Precautions: Standard  Occupation: [employer]       Subjective  Onset/PIERO: gradual  Involved Area/Location: right  Current Symptoms: Anterior Pain and pinching    Increases symptoms: Sit to stand transfers  Decreases Symptoms: Medication    Current Function: Limited walking secondary to R hip  Previous function:  Walking through B foot pain    Diagnostic Tests: MRI    Pain Scale: Shelbie rates R hip pain to be 3 on a scale of 1-10.    Patient Goals: Learn how to manage her R hip pain and set up an exercise  program  Pt reports no pain in her hip today.    Objective  Trunk ROM WNL     Observation:    ial Tests: SI LIGAMENT TESTS (-)    Joint Mobility: hypomobile R hip flexion & IR  Palpation: no palpable tenderness.  Sensation: intact to light touch    Edema: NA      Gait: Shelbie ambulated 200 feet with no assistive device.    Balance: NT    Treatment:    Pt performed there ex's for R LE strengthening, ROM, flexibility and endurance including:  Elliptical x 10 min  Clams 10 x 3 on R and L with green TB   Bridges 15 x 2 with both feet on a green ball  Single leg bridge 10 x 2 on R & L  Long axis distraction R hip  Shuttle standing hip extension on R & L with 25# (Black cord) 10 x 3 on each  Shuttle leg press 10 x 3 with 125# with green band around knees  Single leg press 10 x 3 with 75#  Monster walks 20 ' x 6 with Green TB  Pilates box step downs 10 x 3 on L & R with springs in highest position with no use of hands.  Supine hip flexor stretches on L & R 4 x 30 sec each  Matrix hip abd & flexion 10 x 3 with 40# on R & L     Treatments not performed today:  Side lying hip adduction 10 x 3 with 0#  Single leg ballerinas : 3 x 20 seconds on foam     Exercises were reviewed and pt was able to demonstrate them prior to the end of the session      Added standing orange TB hip ext/abd to HEP  Ed pt to use ice PRN 10- 20 min when pain or swelling occurs.  Treatments not performed today:   Lateral step up 10 x 3 on R & L - painful  Shelbie demo good understanding of the education provided. Patient demo good return demo of skill of exercises.      Assessment  Patient tolerated therapy session well. Remains with significant weakness to bilateral hips but has made progress since time of evaluation. ABle to increase resistance with clamshells and add balance activities.  This is a 47 y.o. female referred to outpatient physical therapy and presents with a medical diagnosis of   Encounter Diagnosis   Name Primary?    Right hip pain  Yes    and demonstrates limitations as described in the problem list. Pt will benefit from physcial therapy services in order to maximize pain free and/or functional use of right hip. The following goals were discussed with the patient and patient is in agreement with them as to be addressed in the treatment plan. R hip flexion to 95 degrees following long axis distraction today.   Pt reported that her hips were tired following Tx today.     Problem List: pain, decreased ROM, decreased flexibility, decreased strength and decreased ability to fully participate in recreational/sports related activities.      GOALS: Short Term Goals:  2 weeks  1. Independent with HEP to increase patient's tolerance for ADL's   2. Pt able to perform sit to stand transfers with less discomfort.    Long Term Goals: 8 weeks  1.Report decreased    R hip    pain  <   / =  2  /10 at worse with Sit to Stand transfers to increase tolerance for ADL's and walking  2.Increased MMT  for  R LE  to increase tolerance for ADL and work activities.  3.Increased arom  for  R hip to improve normal movement patterns during ADL's.  4. Pt will report improvement in overall functional abilities of LE, evidenced by improved score on the FOTO Hip Survey.    Plan  Pt will be treated by physical therapy 1-2 times a week for 8 weeks for Pt Education, HEP, therapeutic exercises, neuromuscular re-education/ balance exercises, joint mobilizations, modalities prn   to achieve established goals. Shelbie may at times be seen by a PTA as part of the Rehab Team.   Progress closed chain R hip exercises slowly to tolerance.  Cont PT for  8         weeks.

## 2017-05-11 ENCOUNTER — CLINICAL SUPPORT (OUTPATIENT)
Dept: REHABILITATION | Facility: HOSPITAL | Age: 47
End: 2017-05-11
Attending: ORTHOPAEDIC SURGERY
Payer: COMMERCIAL

## 2017-05-11 DIAGNOSIS — M25.551 RIGHT HIP PAIN: Primary | ICD-10-CM

## 2017-05-11 PROCEDURE — 97110 THERAPEUTIC EXERCISES: CPT | Mod: PO

## 2017-05-11 NOTE — PROGRESS NOTES
Physical Therapy Daily Note    Visit: 12    Name: Shelbie Ardon  Clinic Number: [id number]    Shelbie is a 47 y.o. female evaluated on 05/11/2017.     Diagnosis:   Encounter Diagnosis   Name Primary?    Right hip pain Yes       DOS: NA    Physician: Vern Carrasquillo MD  Treatment Orders: PT Eval and Treat    Past Medical History:   Diagnosis Date    Arthritis     Arthritis     Cough     Female infertility NEC     Headache     Hyperlipidemia     Hypertension     Tinnitus 12/17/2013     Current Outpatient Prescriptions   Medication Sig    CELECOXIB (CELEBREX ORAL) Take by mouth.    diclofenac sodium 1 % Gel Apply 2 g topically as needed.    fish oil-omega-3 fatty acids 300-1,000 mg capsule Take 1 g by mouth once daily.    losartan (COZAAR) 50 MG tablet Take 1 tablet (50 mg total) by mouth once daily.    medroxyPROGESTERone (DEPO-PROVERA) 150 mg/mL Syrg Inject 1 mL (150 mg total) into the muscle once.    multivitamin (THERAGRAN) per tablet Take 1 tablet by mouth once daily.     vitamin D 185 MG Tab Take 185 mg by mouth once daily.      No current facility-administered medications for this visit.      Review of patient's allergies indicates:   Allergen Reactions    Cephalexin Hives    Methohexital      Other reaction(s): Difficulty breathing    Penicillins Hives    Sulfa (sulfonamide antibiotics) Hives     Precautions: Standard  Occupation: [employer]       Subjective  Onset/PIERO: gradual  Involved Area/Location: right  Current Symptoms: Anterior Pain and pinching    Increases symptoms: Sit to stand transfers  Decreases Symptoms: Medication    Current Function: Limited walking secondary to R hip  Previous function:  Walking through B foot pain    Diagnostic Tests: MRI    Pain Scale: Shelbie rates R hip pain to be 3 on a scale of 1-10.    Patient Goals: Learn how to manage her R hip pain and set up an exercise  program  Pt with no new c/o    Objective  Trunk ROM WNL     Observation:    Special Tests: SI LIGAMENT TESTS (-)    Joint Mobility: hypomobile R hip flexion & IR  Palpation: no palpable tenderness.  Sensation: intact to light touch    Edema: NA      Gait: Shelbie ambulated 200 feet with no assistive device.    Balance: NT    Treatment:    Pt performed there ex's for R LE strengthening, ROM, flexibility and endurance including:  Elliptical x 10 min  Clams 10 x 3 on R and L with green TB   Bridges 15 x 2 with both feet on a green ball  Single leg bridge 10 x 2 on R & L  Long axis distraction R hip  Shuttle standing hip extension on R & L with 25# (Black cord) 10 x 3 on each  Shuttle leg press 10 x 3 with 125# with green band around knees  Single leg press 10 x 3 with 75#  Monster walks 20 ' x 6 with Green TB  Pilates box step downs 10 x 3 on L & R with springs in highest position with no use of hands.  Supine hip flexor stretches on L & R 4 x 30 sec each  Matrix hip abd & flexion 10 x 3 with 40# on R & L     Treatments not performed today:  Side lying hip adduction 10 x 3 with 0#  Single leg ballerinas : 3 x 20 seconds on foam     Exercises were reviewed and pt was able to demonstrate them prior to the end of the session      Added standing orange TB hip ext/abd to HEP  Ed pt to use ice PRN 10- 20 min when pain or swelling occurs.  Treatments not performed today:   Lateral step up 10 x 3 on R & L - painful  Shelbie demo good understanding of the education provided. Patient demo good return demo of skill of exercises.      Assessment  Patient tolerated therapy session well. Remains with significant weakness to bilateral hips but has made progress since time of evaluation. ABle to increase resistance with clamshells and add balance activities.  This is a 47 y.o. female referred to outpatient physical therapy and presents with a medical diagnosis of   Encounter Diagnosis   Name Primary?    Right hip pain Yes    and  demonstrates limitations as described in the problem list. Pt will benefit from physcial therapy services in order to maximize pain free and/or functional use of right hip. The following goals were discussed with the patient and patient is in agreement with them as to be addressed in the treatment plan. R hip flexion to 95 degrees following long axis distraction today.   Pt with no c/o pain following Tx today    Problem List: pain, decreased ROM, decreased flexibility, decreased strength and decreased ability to fully participate in recreational/sports related activities.      GOALS: Short Term Goals:  2 weeks  1. Independent with HEP to increase patient's tolerance for ADL's   2. Pt able to perform sit to stand transfers with less discomfort.    Long Term Goals: 8 weeks  1.Report decreased    R hip    pain  <   / =  2  /10 at worse with Sit to Stand transfers to increase tolerance for ADL's and walking  2.Increased MMT  for  R LE  to increase tolerance for ADL and work activities.  3.Increased arom  for  R hip to improve normal movement patterns during ADL's.  4. Pt will report improvement in overall functional abilities of LE, evidenced by improved score on the FOTO Hip Survey.    Plan  Pt will be treated by physical therapy 1-2 times a week for 8 weeks for Pt Education, HEP, therapeutic exercises, neuromuscular re-education/ balance exercises, joint mobilizations, modalities prn   to achieve established goals. Shelbie may at times be seen by a PTA as part of the Rehab Team.   Progress closed chain R hip exercises slowly to tolerance.  Cont PT for  8         weeks.

## 2017-05-15 ENCOUNTER — OFFICE VISIT (OUTPATIENT)
Dept: SPORTS MEDICINE | Facility: CLINIC | Age: 47
End: 2017-05-15
Payer: COMMERCIAL

## 2017-05-15 VITALS
BODY MASS INDEX: 33.12 KG/M2 | DIASTOLIC BLOOD PRESSURE: 84 MMHG | SYSTOLIC BLOOD PRESSURE: 128 MMHG | HEART RATE: 83 BPM | WEIGHT: 211 LBS | HEIGHT: 67 IN

## 2017-05-15 DIAGNOSIS — M25.859 FEMORAL ACETABULAR IMPINGEMENT: ICD-10-CM

## 2017-05-15 DIAGNOSIS — M21.851 ACQUIRED DYSPLASIA OF RIGHT HIP: ICD-10-CM

## 2017-05-15 DIAGNOSIS — M17.10 PRIMARY LOCALIZED OSTEOARTHROSIS, LOWER LEG, UNSPECIFIED LATERALITY: ICD-10-CM

## 2017-05-15 DIAGNOSIS — M24.151 DEGENERATIVE TEAR OF ACETABULAR LABRUM OF RIGHT HIP: ICD-10-CM

## 2017-05-15 DIAGNOSIS — M25.551 RIGHT HIP PAIN: Primary | ICD-10-CM

## 2017-05-15 PROCEDURE — 99214 OFFICE O/P EST MOD 30 MIN: CPT | Mod: S$GLB,,, | Performed by: ORTHOPAEDIC SURGERY

## 2017-05-15 PROCEDURE — 99999 PR PBB SHADOW E&M-EST. PATIENT-LVL III: CPT | Mod: PBBFAC,,, | Performed by: ORTHOPAEDIC SURGERY

## 2017-05-15 PROCEDURE — 1160F RVW MEDS BY RX/DR IN RCRD: CPT | Mod: S$GLB,,, | Performed by: ORTHOPAEDIC SURGERY

## 2017-05-15 RX ORDER — CELECOXIB 200 MG/1
200 CAPSULE ORAL 2 TIMES DAILY
Qty: 60 CAPSULE | Refills: 2 | Status: SHIPPED | OUTPATIENT
Start: 2017-05-15 | End: 2019-04-08 | Stop reason: SDUPTHER

## 2017-05-15 NOTE — MR AVS SNAPSHOT
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 Vern Carrasquillo. Patient will fill out Franco Hip  Score on return..       These Medications        Disp Refills Start End    celecoxib (CELEBREX) 200 MG capsule 60 capsule 2 5/15/2017     Take 1 capsule (200 mg total) by mouth 2 (two) times daily. - Oral    [address] #: [phone]         [facility]     [facility] Nurse Care Line - 24/ Assistance  Unless otherwise directed by your provider, please contact [facility], our nurse care line that is available for  assistance.     Registered nurses in the [facility] provide: appointment scheduling, clinical advisement, health education, and other advisory services.  Call: [phone] (toll free)               Medications           Message regarding Medications     Verify the changes and/or additions to your medication regime listed below are the same as discussed with your clinician  "today.  If any of these changes or additions are incorrect, please notify your healthcare provider.        START taking these NEW medications        Refills    celecoxib (CELEBREX) 200 MG capsule 2    Sig: Take 1 capsule (200 mg total) by mouth 2 (two) times daily.    Class: Normal    Route: Oral           Verify that the below list of medications is an accurate representation of the medications you are currently taking.  If none reported, the list may be blank. If incorrect, please contact your healthcare provider. Carry this list with you in case of emergency.           Current Medications     CELECOXIB (CELEBREX ORAL) Take by mouth.    diclofenac sodium 1 % Gel Apply 2 g topically as needed.    fish oil-omega-3 fatty acids 300-1,000 mg capsule Take 1 g by mouth once daily.    losartan (COZAAR) 50 MG tablet Take 1 tablet (50 mg total) by mouth once daily.    multivitamin (THERAGRAN) per tablet Take 1 tablet by mouth once daily.     vitamin D 185 MG Tab Take 185 mg by mouth once daily.     celecoxib (CELEBREX) 200 MG capsule Take 1 capsule (200 mg total) by mouth 2 (two) times daily.    medroxyPROGESTERone (DEPO-PROVERA) 150 mg/mL Syrg Inject 1 mL (150 mg total) into the muscle once.           Clinical Reference Information           Your Vitals Were     BP Pulse Height Weight BMI    128/84 83 5' 7" (1.702 m) 95.7 kg (211 lb) 33.05 kg/m2      Blood Pressure          Most Recent Value    BP  128/84      Allergies as of 5/15/2017     Cephalexin    Methohexital    Penicillins    Sulfa (Sulfonamide Antibiotics)      Immunizations Administered on Date of Encounter - 5/15/2017     None      Orders Placed During Today's Visit      Normal Orders This Visit    Ambulatory Referral to Physical/Occupational Therapy       Language Assistance Services     ATTENTION: Language assistance services are available, free of charge. Please call [phone].      ATENCIÓN: Si omar joiner, tiene a smith disposición servicios gratuitos " de asistencia lingüística. Ana Cristina jones [phone].     MELIZA Ý: N?u b?n nói Ti?ng Vi?t, có các d?ch v? h? tr? ngôn ng? mi?n phí dành cho b?n. G?i s? [phone].         [facility] complies with applicable Federal civil rights laws and does not discriminate on the basis of race, color, national origin, age, disability, or sex.

## 2017-05-15 NOTE — PROGRESS NOTES
Subjective:          Chief Complaint: Shelbie Ardon is a 47 y.o. female who had concerns including Pain of the Right Hip.    HPI Comments: Shelbie Ardon is a 47 y.o. female here for followup for right hip. [facility]. Notes improvement in her pain.    Handedness: right-handed  Sport played:    Level:            Pain   This is a new problem. The current episode started more than 1 year ago. The problem has been waxing and waning. Pertinent negatives include no chest pain, chills, diaphoresis, joint swelling, myalgias, nausea, numbness, rash, vomiting or weakness. The symptoms are aggravated by exertion. She has tried NSAIDs for the symptoms. The treatment provided mild relief.       Review of Systems   Constitution: Negative for chills, decreased appetite, diaphoresis, weakness, malaise/fatigue, night sweats, weight gain and weight loss.   Eyes: Negative for blurred vision, double vision and pain.   Cardiovascular: Negative for chest pain, cyanosis, dyspnea on exertion, leg swelling, orthopnea and palpitations.   Respiratory: Negative for hemoptysis, shortness of breath and wheezing.    Skin: Negative for color change and rash.   Musculoskeletal: Positive for joint pain. Negative for arthritis, back pain, falls, gout, joint swelling, muscle cramps, muscle weakness, myalgias and stiffness.   Gastrointestinal: Negative for hematemesis, hematochezia, melena, nausea and vomiting.   Genitourinary: Negative for dysuria, frequency and hematuria.   Neurological: Negative for dizziness, light-headedness, loss of balance and numbness.   Psychiatric/Behavioral: Negative for altered mental status, depression and memory loss. The patient does not have insomnia and is not nervous/anxious.        Pain Related Questions  Over the past 3 days, what was your average pain during activity? (I.e. running, jogging, walking, climbing stairs, getting dressed, ect.): 4  Over the past 3 days, what was your  highest pain level?: 4  Over the past 3 days, what was your lowest pain level? : 2    Other  How many nights a week are you awakened by your affected body part?: 0  Was the patient's HEIGHT measured or patient reported?: Patient Reported  Was the patient's WEIGHT measured or patient reported?: Patient Reported      Objective:        General: Shelbie is well-developed, well-nourished, appears stated age, in no acute distress, alert and oriented to time, place and person.     General    Vitals reviewed.  Constitutional: She is oriented to person, place, and time. She appears well-developed and well-nourished. No distress.   HENT:   Mouth/Throat: No oropharyngeal exudate.   Eyes: Right eye exhibits no discharge. Left eye exhibits no discharge.   Neck: Normal range of motion.   Cardiovascular: Normal rate.    Pulmonary/Chest: Effort normal and breath sounds normal. No respiratory distress.   Neurological: She is alert and oriented to person, place, and time. She has normal reflexes. No cranial nerve deficit. Coordination normal.   Psychiatric: She has a normal mood and affect. Her behavior is normal. Judgment and thought content normal.     General Musculoskeletal Exam   Gait: antalgic   Pelvic Obliquity: none      Right Knee Exam     Inspection   Alignment:  normal  Effusion: effusion    Left Knee Exam     Inspection   Alignment:  normal  Effusion: absent    Right Hip Exam     Inspection   Scars: absent  Swelling: absent  Bruising: absent  No deformity of hip.  Quadriceps Atrophy:  Negative  Erythema: absent    Range of Motion   Extension:  0 normal   Flexion:  110 normal   Internal Rotation:  15 normal   External Rotation:  50 normal   Abduction:  40 normal   Adduction:  20 normal     Tests   Pain w/ forced internal rotation (ERIK): absent  Pain w/ forced external rotation (FADIR): present  Baylee: negative  Trendelenburg Test: negative  Circumduction test: negative  Stinchfield test: positive  Log Roll:  negative  Snapping Hip (internal): negative  Step-down test: negative  Resisted sit-up pain: negative  Unresisted sit-up pain: negative  Resisted sit-up pain with adductor contraction pain: negative    Other   Sensation: normal  Left Hip Exam     Inspection   Scars: absent  Swelling: absent  No deformity of hip.  Quadriceps Atrophy:  negative  Erythema: absent  Bruising: absent    Range of Motion   Extension:  0 normal   Flexion:  110 normal   Internal Rotation: 15 normal   External Rotation:  60 normal   Abduction:  40 normal   Adduction:  20 normal     Tests   Pain w/ forced internal rotation (ERIK): absent  Pain w/ forced external rotation (FADIR): absent  Baylee: negative  Trendelenburg Test: negative  Circumduction test: negative  Stinchfield test: negative  Log Roll: negative  Snapping Hip (internal): negative  Step-down test: negative  Resisted sit-up pain: negative  Resisted sit-up pain with adductor contraction pain: negative  Unresisted sit-up pain: negative    Other   Sensation: normal      Back (L-Spine & T-Spine) / Neck (C-Spine) Exam   Back exam is normal.      Muscle Strength   Right Lower Extremity   Hip Abduction: 5/5   Hip Adduction: 5/5   Hip Flexion: 5/5   Left Lower Extremity   Hip Abduction: 5/5   Hip Adduction: 5/5   Hip Flexion: 5/5     Reflexes     Left Side  Quadriceps:  2+  Achilles:  2+    Right Side   Quadriceps:  2+  Achilles:  2+    Vascular Exam     Right Pulses  Dorsalis Pedis:      2+  Posterior Tibial:      2+        Left Pulses  Dorsalis Pedis:      2+  Posterior Tibial:      2+        Capillary Refill  Right Hand: normal capillary refill  Left Hand: normal capillary refill    Edema  Right Upper Leg: absent  Left Upper Leg: absent      MRA RESULTS    There is no evidence of a discrete labral tear.  The labrum is prominent with mild degenerative fraying.    The chondrolabral junction is intact. There is mild subchondral edema with associated superior joint space narrowing with  underlying cartilage loss at both the acetabular and superiorly and superolateral femoral head.    This represents moderate degenerative change         There is an osseous spur at the level of the femoral head neck junction anterior aspect seen in retrospect on the plain film examination.  The alpha angle measures 59 degrees, borderline normal.  There is no definite MR evidence of femoral acetabular impingement.      The abductors of the hip, including gluteus minimus, muscular attachment of the medius and medius tendon are normal. There is no abnormality at the level of the greater trochanteric bursa.Ligamentum teres is intact.    Limited pelvic content evaluation demonstrates no significant abnormalities.\    Limited pelvic content evaluation demonstrates no significant abnormalities.   Impression    Degenerative changes RIGHT hip with joint space loss superior femoral-acetabular region with associated cartilaginous loss at this level and mild underlying subchondral edema superolateral acetabulum.                 Assessment:       Encounter Diagnoses   Name Primary?   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 Franco Hip Score was filled out today in clinic.     RTC in 3 months with Dr. Vern Carrasquillo. Patient will fill out Franco Hip  Score on return.    2. If pain reoccurs/worsens will see  Dr. Ortega for hip joint injection    3. When pain worsens, can consider below:  We reviewed with Shelbie today, the pathology and natural history of her diagnosis. We have discussed a variety of treatment options including medications, physical therapy and other alternative treatments. I also explained the indications, risks and benefits of surgery. After discussion, Shelbie decided to proceed with surgery. The decision  was made to go forward with :  1. Right hip arthroscopic femoral neck osteoplasty  2. Right hip arthroscopic chondroplasty  3. Right hip arthroscopic labral debridement    The details of the surgical procedure were explained, including the location of probable incisions and a description of likely hardware and/or grafts to be used.  The patient understands the likely convalescence after surgery.  Also, we have thoroughly discussed the risks, benefits and alternatives to surgery, including, but not limited to, the risk of infection, joint stiffness, blood clot (including DVT and/or pulmonary embolus), neurologic and vascular injury.  It was explained that, if tissue has been repaired or reconstructed, there is a chance of failure, which may require further management.      All of the patient's questions were answered and informed consent was obtained. The patient will contact us if they have any questions or concerns in the interim.    4. Continue celebrex at this time    5. Referral to PT at [facility]                [facility] patient questionnaires have been collected today.

## 2017-05-16 ENCOUNTER — CLINICAL SUPPORT (OUTPATIENT)
Dept: REHABILITATION | Facility: HOSPITAL | Age: 47
End: 2017-05-16
Attending: ORTHOPAEDIC SURGERY
Payer: COMMERCIAL

## 2017-05-16 DIAGNOSIS — M25.551 RIGHT HIP PAIN: Primary | ICD-10-CM

## 2017-05-16 PROCEDURE — 97110 THERAPEUTIC EXERCISES: CPT | Mod: PO

## 2017-05-16 NOTE — PROGRESS NOTES
Physical Therapy Daily Note    Visit: 13    Name: Shelbie Ardon  Clinic Number: [id number]    Shelbie is a 47 y.o. female evaluated on 05/16/2017.     Diagnosis:   Encounter Diagnosis   Name Primary?    Right hip pain Yes       DOS: NA    Physician: Vern Carrasquillo MD  Treatment Orders: PT Eval and Treat    Past Medical History:   Diagnosis Date    Arthritis     Arthritis     Cough     Female infertility NEC     Headache     Hyperlipidemia     Hypertension     Tinnitus 12/17/2013     Current Outpatient Prescriptions   Medication Sig    CELECOXIB (CELEBREX ORAL) Take by mouth.    celecoxib (CELEBREX) 200 MG capsule Take 1 capsule (200 mg total) by mouth 2 (two) times daily.    diclofenac sodium 1 % Gel Apply 2 g topically as needed.    fish oil-omega-3 fatty acids 300-1,000 mg capsule Take 1 g by mouth once daily.    losartan (COZAAR) 50 MG tablet Take 1 tablet (50 mg total) by mouth once daily.    medroxyPROGESTERone (DEPO-PROVERA) 150 mg/mL Syrg Inject 1 mL (150 mg total) into the muscle once.    multivitamin (THERAGRAN) per tablet Take 1 tablet by mouth once daily.     vitamin D 185 MG Tab Take 185 mg by mouth once daily.      No current facility-administered medications for this visit.      Review of patient's allergies indicates:   Allergen Reactions    Cephalexin Hives    Methohexital      Other reaction(s): Difficulty breathing    Penicillins Hives    Sulfa (sulfonamide antibiotics) Hives     Precautions: Standard  Occupation: [employer]       Subjective  Onset/PIERO: gradual  Involved Area/Location: right  Current Symptoms: Anterior Pain and pinching    Increases symptoms: Sit to stand transfers  Decreases Symptoms: Medication    Current Function: Limited walking secondary to R hip  Previous function:  Walking through B foot pain    Diagnostic Tests: MRI    Pain Scale: Shelbie rates R hip pain to  be 3 on a scale of 1-10.    Patient Goals: Learn how to manage her R hip pain and set up an exercise program  Pt reported that she saw Dr. Carrasquillo last week and was sore after the evaluation.  She is to continue PT and see him again in 3 months.      Objective  Trunk ROM WNL    Special Tests: SI LIGAMENT TESTS (-)    Joint Mobility: hypomobile R hip flexion & IR  Palpation: no palpable tenderness.  Sensation: intact to light touch    Edema: NA      Gait: Shelbie ambulated 200 feet with no assistive device.    Balance: NT    Treatment:    Pt performed there ex's for R LE strengthening, ROM, flexibility and endurance including:  Elliptical x 10 min  Clams 10 x 3 on R and L with green TB   Single leg bridge 10 x 2 on R & L  Shuttle standing hip extension on R & L with 25# (Black cord) 10 x 3 on each  Shuttle leg press 10 x 3 with 125# with green band around knees  Single leg press 10 x 3 with 75#  Shuttle side lying leg press 10 x 2 with 50#  Monster walks 20 ' x 6 with Green TB  Pilates box step downs 10 x 3 on L & R with springs in highest position with no use of hands.  Supine hip flexor stretches on L & R 4 x 30 sec each  Matrix hip abd, add & flexion 10 x 3 with 40# on R & L     Treatments not performed today:  Side lying hip adduction 10 x 3 with 0#  Single leg ballerinas : 3 x 20 seconds on foam   Bridges 15 x 2 with both feet on a green ball  Exercises were reviewed and pt was able to demonstrate them prior to the end of the session  Long axis distraction R hip    Added standing orange TB hip ext/abd to HEP  Ed pt to use ice PRN 10- 20 min when pain or swelling occurs.  Treatments not performed today:   Lateral step up 10 x 3 on R & L - painful  Shelbie demo good understanding of the education provided. Patient demo good return demo of skill of exercises.      Assessment  Patient tolerated therapy session well. Remains with significant weakness to bilateral hips but has made progress since time of evaluation.  ABle to increase resistance with clamshells and add balance activities.  This is a 47 y.o. female referred to outpatient physical therapy and presents with a medical diagnosis of   Encounter Diagnosis   Name Primary?    Right hip pain Yes    and demonstrates limitations as described in the problem list. Pt will benefit from physcial therapy services in order to maximize pain free and/or functional use of right hip. The following goals were discussed with the patient and patient is in agreement with them as to be addressed in the treatment plan. R hip flexion to 95 degrees following long axis distraction today.   Pt reported that her gluteals were fatigued following Tx today.    Problem List: pain, decreased ROM, decreased flexibility, decreased strength and decreased ability to fully participate in recreational/sports related activities.      GOALS: Short Term Goals:  2 weeks  1. Independent with HEP to increase patient's tolerance for ADL's   2. Pt able to perform sit to stand transfers with less discomfort.    Long Term Goals: 8 weeks  1.Report decreased    R hip    pain  <   / =  2  /10 at worse with Sit to Stand transfers to increase tolerance for ADL's and walking  2.Increased MMT  for  R LE  to increase tolerance for ADL and work activities.  3.Increased arom  for  R hip to improve normal movement patterns during ADL's.  4. Pt will report improvement in overall functional abilities of LE, evidenced by improved score on the FOTO Hip Survey.    Plan  Pt will be treated by physical therapy 1-2 times a week for 8 weeks for Pt Education, HEP, therapeutic exercises, neuromuscular re-education/ balance exercises, joint mobilizations, modalities prn   to achieve established goals. Shelbie may at times be seen by a PTA as part of the Rehab Team.   Progress closed chain R hip exercises slowly to tolerance.  Cont PT for  8         weeks.

## 2017-05-18 ENCOUNTER — CLINICAL SUPPORT (OUTPATIENT)
Dept: REHABILITATION | Facility: HOSPITAL | Age: 47
End: 2017-05-18
Attending: ORTHOPAEDIC SURGERY
Payer: COMMERCIAL

## 2017-05-18 DIAGNOSIS — M25.551 RIGHT HIP PAIN: Primary | ICD-10-CM

## 2017-05-18 PROCEDURE — 97110 THERAPEUTIC EXERCISES: CPT | Mod: PO

## 2017-05-18 NOTE — PROGRESS NOTES
Physical Therapy Daily Note    Visit: 15    Name: Shelbie Ardon  Clinic Number: [id number]    Shelbie is a 47 y.o. female evaluated on 05/18/2017.     Diagnosis:   Encounter Diagnosis   Name Primary?    Right hip pain Yes       DOS: NA    Physician: Vern Carrasquillo MD  Treatment Orders: PT Eval and Treat    Past Medical History:   Diagnosis Date    Arthritis     Arthritis     Cough     Female infertility NEC     Headache     Hyperlipidemia     Hypertension     Tinnitus 12/17/2013     Current Outpatient Prescriptions   Medication Sig    CELECOXIB (CELEBREX ORAL) Take by mouth.    celecoxib (CELEBREX) 200 MG capsule Take 1 capsule (200 mg total) by mouth 2 (two) times daily.    diclofenac sodium 1 % Gel Apply 2 g topically as needed.    fish oil-omega-3 fatty acids 300-1,000 mg capsule Take 1 g by mouth once daily.    losartan (COZAAR) 50 MG tablet Take 1 tablet (50 mg total) by mouth once daily.    medroxyPROGESTERone (DEPO-PROVERA) 150 mg/mL Syrg Inject 1 mL (150 mg total) into the muscle once.    multivitamin (THERAGRAN) per tablet Take 1 tablet by mouth once daily.     vitamin D 185 MG Tab Take 185 mg by mouth once daily.      No current facility-administered medications for this visit.      Review of patient's allergies indicates:   Allergen Reactions    Cephalexin Hives    Methohexital      Other reaction(s): Difficulty breathing    Penicillins Hives    Sulfa (sulfonamide antibiotics) Hives     Precautions: Standard  Occupation: [employer]       Subjective  Onset/PIERO: gradual  Involved Area/Location: right  Current Symptoms: Anterior Pain and pinching    Increases symptoms: Sit to stand transfers  Decreases Symptoms: Medication    Current Function: Limited walking secondary to R hip  Previous function:  Walking through B foot pain    Diagnostic Tests: MRI    Pain Scale: Shelbie rates R hip pain to  be 3 on a scale of 1-10.    Patient Goals: Learn how to manage her R hip pain and set up an exercise program  Pt with no c/o pain in her hip.  She stated that she could feel her hip abductors working on the multi-hip machine      Objective  Trunk ROM WNL    Special Tests: SI LIGAMENT TESTS (-)    Joint Mobility: hypomobile R hip flexion & IR  Palpation: no palpable tenderness.  Sensation: intact to light touch    Edema: NA      Gait: Shelbie ambulated 200 feet with no assistive device.    Balance: NT    Treatment:    Pt performed there ex's for R LE strengthening, ROM, flexibility and endurance including:  Elliptical x 10 min  Clams 10 x 3 on R and L with green TB   Single leg bridge 10 x 2 on R & L  Shuttle standing hip extension on R & L with 25# (Black cord) 10 x 3 on each  Shuttle leg press 10 x 3 with 125# with green band around knees  Single leg press 10 x 3 with 75#  Shuttle side lying leg press 10 x 2 with 50#  Monster walks 20 ' x 6 with Green TB  Pilates box step downs 10 x 3 on L & R with springs in highest position with no use of hands.  Supine hip flexor stretches on L & R 4 x 30 sec each  Matrix hip abd, add & flexion 10 x 3 with 40# on R & L     Treatments not performed today:  Side lying hip adduction 10 x 3 with 0#  Single leg ballerinas : 3 x 20 seconds on foam   Bridges 15 x 2 with both feet on a green ball  Exercises were reviewed and pt was able to demonstrate them prior to the end of the session  Long axis distraction R hip    Added standing orange TB hip ext/abd to HEP  Ed pt to use ice PRN 10- 20 min when pain or swelling occurs.  Treatments not performed today:   Lateral step up 10 x 3 on R & L - painful  Shelbie demo good understanding of the education provided. Patient demo good return demo of skill of exercises.      Assessment  Patient tolerated therapy session well. Remains with significant weakness to bilateral hips but has made progress since time of evaluation. ABle to increase  resistance with clamshells and add balance activities.  This is a 47 y.o. female referred to outpatient physical therapy and presents with a medical diagnosis of   Encounter Diagnosis   Name Primary?    Right hip pain Yes    and demonstrates limitations as described in the problem list. Pt will benefit from physcial therapy services in order to maximize pain free and/or functional use of right hip. The following goals were discussed with the patient and patient is in agreement with them as to be addressed in the treatment plan. R hip flexion to 95 degrees following long axis distraction today.   Pt is tolerating her program well.  Plan to increase resistance with some of her exercises next visit.    Problem List: pain, decreased ROM, decreased flexibility, decreased strength and decreased ability to fully participate in recreational/sports related activities.      GOALS: Short Term Goals:  2 weeks  1. Independent with HEP to increase patient's tolerance for ADL's   2. Pt able to perform sit to stand transfers with less discomfort.    Long Term Goals: 8 weeks  1.Report decreased    R hip    pain  <   / =  2  /10 at worse with Sit to Stand transfers to increase tolerance for ADL's and walking  2.Increased MMT  for  R LE  to increase tolerance for ADL and work activities.  3.Increased arom  for  R hip to improve normal movement patterns during ADL's.  4. Pt will report improvement in overall functional abilities of LE, evidenced by improved score on the FOTO Hip Survey.    Plan  Pt will be treated by physical therapy 1-2 times a week for 8 weeks for Pt Education, HEP, therapeutic exercises, neuromuscular re-education/ balance exercises, joint mobilizations, modalities prn   to achieve established goals. Shelbie may at times be seen by a PTA as part of the Rehab Team.   Progress closed chain R hip exercises slowly to tolerance.  Cont PT for  8         weeks.

## 2017-05-26 ENCOUNTER — CLINICAL SUPPORT (OUTPATIENT)
Dept: REHABILITATION | Facility: HOSPITAL | Age: 47
End: 2017-05-26
Attending: ORTHOPAEDIC SURGERY
Payer: COMMERCIAL

## 2017-05-26 DIAGNOSIS — M25.551 RIGHT HIP PAIN: Primary | ICD-10-CM

## 2017-05-26 PROCEDURE — 97110 THERAPEUTIC EXERCISES: CPT | Mod: PO

## 2017-05-26 NOTE — PROGRESS NOTES
Physical Therapy Daily Note    Visit: 16    Name: Shelbie Ardon  Clinic Number: [id number]    Shelbie is a 47 y.o. female treated on 05/26/2017.     Diagnosis:   Encounter Diagnosis   Name Primary?    Right hip pain Yes       DOS: NA    Physician: Vern Carrasquillo MD  Treatment Orders: PT Eval and Treat    Past Medical History:   Diagnosis Date    Arthritis     Arthritis     Cough     Female infertility NEC     Headache     Hyperlipidemia     Hypertension     Tinnitus 12/17/2013     Current Outpatient Prescriptions   Medication Sig    CELECOXIB (CELEBREX ORAL) Take by mouth.    celecoxib (CELEBREX) 200 MG capsule Take 1 capsule (200 mg total) by mouth 2 (two) times daily.    diclofenac sodium 1 % Gel Apply 2 g topically as needed.    fish oil-omega-3 fatty acids 300-1,000 mg capsule Take 1 g by mouth once daily.    losartan (COZAAR) 50 MG tablet Take 1 tablet (50 mg total) by mouth once daily.    medroxyPROGESTERone (DEPO-PROVERA) 150 mg/mL Syrg Inject 1 mL (150 mg total) into the muscle once.    multivitamin (THERAGRAN) per tablet Take 1 tablet by mouth once daily.     vitamin D 185 MG Tab Take 185 mg by mouth once daily.      No current facility-administered medications for this visit.      Review of patient's allergies indicates:   Allergen Reactions    Cephalexin Hives    Methohexital      Other reaction(s): Difficulty breathing    Penicillins Hives    Sulfa (sulfonamide antibiotics) Hives     Precautions: Standard  Occupation: [employer]       Subjective  Onset/PIERO: gradual  Involved Area/Location: right  Current Symptoms: Anterior Pain and pinching    Increases symptoms: Sit to stand transfers  Decreases Symptoms: Medication    Current Function: Limited walking secondary to R hip  Previous function:  Walking through B foot pain    Diagnostic Tests: MRI    Pain Scale: Shelbie rates R hip pain to be  3 on a scale of 1-10.    Patient Goals: Learn how to manage her R hip pain and set up an exercise program  Pt with no c/o pain in her hip.  She stated that she mixed up her Tx times on Tuesday and was not able to come in    Objective  Trunk ROM WNL    Special Tests: SI LIGAMENT TESTS (-)    Joint Mobility: hypomobile R hip flexion & IR  Palpation: no palpable tenderness.  Sensation: intact to light touch    Edema: NA      Gait: Shelbie ambulated 200 feet with no assistive device.    Balance: NT    Treatment:    Pt performed there ex's for R LE strengthening, ROM, flexibility and endurance including:  Elliptical x 10 min  Clams 10 x 3 on R and L with green TB   Single leg bridge 10 x 2 on R & L  Shuttle standing hip extension on R & L with 25# (Black cord) 10 x 3 on each  Shuttle leg press 10 x 3 with 125# with green band around knees  Single leg press 10 x 3 with 75#  Shuttle side lying leg press 10 x 2 with 50#  Monster walks 20 ' x 6 with Green TB  Pilates box step downs 10 x 3 on L & R with springs in highest position with no use of hands.  Supine hip flexor stretches on L & R 4 x 30 sec each  Matrix hip abd, add, extension & flexion 10 x 3 with 40# on R & L     Treatments not performed today:  Side lying hip adduction 10 x 3 with 0#  Single leg ballerinas : 3 x 20 seconds on foam   Bridges 15 x 2 with both feet on a green ball  Exercises were reviewed and pt was able to demonstrate them prior to the end of the session  Long axis distraction R hip    Added standing orange TB hip ext/abd to HEP  Ed pt to use ice PRN 10- 20 min when pain or swelling occurs.  Treatments not performed today:   Lateral step up 10 x 3 on R & L - painful  Shelbie demo good understanding of the education provided. Patient demo good return demo of skill of exercises.      Assessment  Patient tolerated therapy session well. Remains with significant weakness to bilateral hips but has made progress since time of evaluation. ABle to  increase resistance with clamshells and add balance activities.  This is a 47 y.o. female referred to outpatient physical therapy and presents with a medical diagnosis of   Encounter Diagnosis   Name Primary?    Right hip pain Yes    and demonstrates limitations as described in the problem list. Pt will benefit from physcial therapy services in order to maximize pain free and/or functional use of right hip. The following goals were discussed with the patient and patient is in agreement with them as to be addressed in the treatment plan. R hip flexion to 95 degrees following long axis distraction today.   Pt is tolerating her program well.      Problem List: pain, decreased ROM, decreased flexibility, decreased strength and decreased ability to fully participate in recreational/sports related activities.      GOALS: Short Term Goals:  2 weeks  1. Independent with HEP to increase patient's tolerance for ADL's   2. Pt able to perform sit to stand transfers with less discomfort.    Long Term Goals: 8 weeks  1.Report decreased    R hip    pain  <   / =  2  /10 at worse with Sit to Stand transfers to increase tolerance for ADL's and walking  2.Increased MMT  for  R LE  to increase tolerance for ADL and work activities.  3.Increased arom  for  R hip to improve normal movement patterns during ADL's.  4. Pt will report improvement in overall functional abilities of LE, evidenced by improved score on the FOTO Hip Survey.    Plan  Pt will be treated by physical therapy 1-2 times a week for 8 weeks for Pt Education, HEP, therapeutic exercises, neuromuscular re-education/ balance exercises, joint mobilizations, modalities prn   to achieve established goals. Shelbie may at times be seen by a PTA as part of the Rehab Team.   Progress closed chain R hip exercises slowly to tolerance.  Cont PT for  8         weeks.

## 2017-05-30 ENCOUNTER — CLINICAL SUPPORT (OUTPATIENT)
Dept: REHABILITATION | Facility: HOSPITAL | Age: 47
End: 2017-05-30
Attending: ORTHOPAEDIC SURGERY
Payer: COMMERCIAL

## 2017-05-30 DIAGNOSIS — M25.551 RIGHT HIP PAIN: Primary | ICD-10-CM

## 2017-05-30 PROCEDURE — 97110 THERAPEUTIC EXERCISES: CPT | Mod: PO

## 2017-05-30 NOTE — PROGRESS NOTES
Physical Therapy Daily Note    Visit: 17    Name: Shelbie Ardon  Clinic Number: [id number]    Shelbie is a 47 y.o. female treated on 05/30/2017.     Diagnosis:   Encounter Diagnosis   Name Primary?    Right hip pain Yes       DOS: NA    Physician: Vern Carrasquillo MD  Treatment Orders: PT Eval and Treat    Past Medical History:   Diagnosis Date    Arthritis     Arthritis     Cough     Female infertility NEC     Headache     Hyperlipidemia     Hypertension     Tinnitus 12/17/2013     Current Outpatient Prescriptions   Medication Sig    CELECOXIB (CELEBREX ORAL) Take by mouth.    celecoxib (CELEBREX) 200 MG capsule Take 1 capsule (200 mg total) by mouth 2 (two) times daily.    diclofenac sodium 1 % Gel Apply 2 g topically as needed.    fish oil-omega-3 fatty acids 300-1,000 mg capsule Take 1 g by mouth once daily.    losartan (COZAAR) 50 MG tablet Take 1 tablet (50 mg total) by mouth once daily.    medroxyPROGESTERone (DEPO-PROVERA) 150 mg/mL Syrg Inject 1 mL (150 mg total) into the muscle once.    multivitamin (THERAGRAN) per tablet Take 1 tablet by mouth once daily.     vitamin D 185 MG Tab Take 185 mg by mouth once daily.      No current facility-administered medications for this visit.      Review of patient's allergies indicates:   Allergen Reactions    Cephalexin Hives    Methohexital      Other reaction(s): Difficulty breathing    Penicillins Hives    Sulfa (sulfonamide antibiotics) Hives     Precautions: Standard  Occupation: [employer]     Time in:: 11:00  Time Out: 12:00  Treatment time: 55   1on1 time: 30      Subjective  Patient reports improved overall pain control and activity tolerance. She states that she was able to go for a prolonged walk yesterday without onset of symptoms.  No c/o pain presently.       Objective     Treatment:    Pt performed there ex's for R LE strengthening, ROM,  flexibility and endurance x 55 minutes including:  Elliptical x 8 min  Level 3  Supine hip flexor stretches on L & R 3 x 30 sec each  Clams 10 x 3 on R and L with green TB   Single leg bridge 10 x 2 on R & L  Shuttle standing hip extension on R & L with 25# (Black cord) 10 x 3 on each  Shuttle leg press 10 x 3 with 125# with green band around knees  Single leg press 10 x 3 with 75#  Shuttle side lying leg press 10 x 2 with 50#  Monster walks 20 ' x 6 with Green TB    Matrix hip abd, add, extension & flexion 10 x 3 with 40# on R & L     Treatments not performed today:  Side lying hip adduction 10 x 3 with 0#  Single leg ballerinas : 3 x 20 seconds on foam   Bridges 15 x 2 with both feet on a green ball  Exercises were reviewed and pt was able to demonstrate them prior to the end of the session  Long axis distraction R hip  Pilates box step downs 10 x 3 on L & R with springs in highest position with no use of hands.--NP   standing orange TB hip ext/abd to HEP    Ed pt to use ice PRN 10- 20 min when pain or swelling occurs.        Shelbie sanchez good understanding of the education provided. Patient demo good return demo of skill of exercises.      Assessment  Patient tolerated therapy session well. She was able to perform the above ex's and activities with muscular fatigue only and no c/o pain. Patient with subjective reports of improved overall pain control and activity tolerance and is pleased with her progress thus far.    This is a 47 y.o. female referred to outpatient physical therapy and presents with a medical diagnosis of   Encounter Diagnosis   Name Primary?    Right hip pain Yes    and demonstrates limitations as described in the problem list. Pt will benefit from physcial therapy services in order to maximize pain free and/or functional use of right hip. The following goals were discussed with the patient and patient is in agreement with them as to be addressed in the treatment plan. R hip flexion to 95  degrees following long axis distraction today.   Pt is tolerating her program well.      Problem List: pain, decreased ROM, decreased flexibility, decreased strength and decreased ability to fully participate in recreational/sports related activities.      GOALS: Short Term Goals:  2 weeks  1. Independent with HEP to increase patient's tolerance for ADL's   2. Pt able to perform sit to stand transfers with less discomfort.    Long Term Goals: 8 weeks  1.Report decreased    R hip    pain  <   / =  2  /10 at worse with Sit to Stand transfers to increase tolerance for ADL's and walking  2.Increased MMT  for  R LE  to increase tolerance for ADL and work activities.  3.Increased arom  for  R hip to improve normal movement patterns during ADL's.  4. Pt will report improvement in overall functional abilities of LE, evidenced by improved score on the FOTO Hip Survey.    Plan  Pt will be treated by physical therapy 1-2 times a week for 8 weeks for Pt Education, HEP, therapeutic exercises, neuromuscular re-education/ balance exercises, joint mobilizations, modalities prn   to achieve established goals. Shelbie may at times be seen by a PTA as part of the Rehab Team.   Progress closed chain R hip exercises slowly to tolerance.  Cont PT for  8         weeks.

## 2017-06-13 ENCOUNTER — CLINICAL SUPPORT (OUTPATIENT)
Dept: REHABILITATION | Facility: HOSPITAL | Age: 47
End: 2017-06-13
Attending: ORTHOPAEDIC SURGERY
Payer: COMMERCIAL

## 2017-06-13 DIAGNOSIS — M25.551 RIGHT HIP PAIN: Primary | ICD-10-CM

## 2017-06-13 PROCEDURE — 97110 THERAPEUTIC EXERCISES: CPT | Mod: PO

## 2017-06-13 NOTE — PROGRESS NOTES
Physical Therapy Daily Note    Visit: 18    Name: Shelbie Ardon  Clinic Number: [id number]    Shelbie is a 47 y.o. female treated on 06/13/2017.     Diagnosis:   Encounter Diagnosis   Name Primary?    Right hip pain Yes       DOS: NA    Physician: Vern Carrasquillo MD  Treatment Orders: PT Eval and Treat    Past Medical History:   Diagnosis Date    Arthritis     Arthritis     Cough     Female infertility NEC     Headache     Hyperlipidemia     Hypertension     Tinnitus 12/17/2013     Current Outpatient Prescriptions   Medication Sig    CELECOXIB (CELEBREX ORAL) Take by mouth.    celecoxib (CELEBREX) 200 MG capsule Take 1 capsule (200 mg total) by mouth 2 (two) times daily.    diclofenac sodium 1 % Gel Apply 2 g topically as needed.    fish oil-omega-3 fatty acids 300-1,000 mg capsule Take 1 g by mouth once daily.    losartan (COZAAR) 50 MG tablet Take 1 tablet (50 mg total) by mouth once daily.    medroxyPROGESTERone (DEPO-PROVERA) 150 mg/mL Syrg Inject 1 mL (150 mg total) into the muscle once.    multivitamin (THERAGRAN) per tablet Take 1 tablet by mouth once daily.     vitamin D 185 MG Tab Take 185 mg by mouth once daily.      No current facility-administered medications for this visit.      Review of patient's allergies indicates:   Allergen Reactions    Cephalexin Hives    Methohexital      Other reaction(s): Difficulty breathing    Penicillins Hives    Sulfa (sulfonamide antibiotics) Hives     Precautions: Standard  Occupation: [employer]     Time in:: 8:00  Time Out: 9:00  Treatment time: 60   1on1 time: 60      Subjective  Patient reported that she experienced increased R hip pain riding a jet ski with her grandson siting in front of her.  This position put her in hip flexion greater than 90 degrees with hip abduction  No c/o pain presently.       Objective     Treatment:    Pt performed there ex's  "for R LE strengthening, ROM, flexibility and endurance x 55 minutes including:  Elliptical x 10 min  Level 3  Supine hip flexor stretches on L & R 3 x 30 sec each  Clams 10 x 3 on R and L with green TB   Single leg bridge 10 x 3 on R & L  Shuttle standing hip extension on R & L with 37# (1 Black & 1 red cord) 10 x 3 on each  Shuttle leg press 10 x 3 with 125# with green band around knees  Single leg press 10 x 3 with 75#  Shuttle side lying leg press 10 x 2 with 62#  Monster walks 20 ' x 6 with Green TB  Matrix hip abd, add, extension & flexion 10 x 3 with 55# on R & L   Long axis distraction R hip  Step ups forward and laterally x 10 on a 4" and 6" step    Treatments not performed today:  Side lying hip adduction 10 x 3 with 0#  Single leg ballerinas : 3 x 20 seconds on foam   Bridges 15 x 2 with both feet on a green ball  Exercises were reviewed and pt was able to demonstrate them prior to the end of the session  Pilates box step downs 10 x 3 on L & R with springs in highest position with no use of hands.--NP   standing orange TB hip ext/abd to HEP    Ed pt to use ice PRN 10- 20 min when pain or swelling occurs.        Shelbie sanchez good understanding of the education provided. Patient demo good return demo of skill of exercises.      Assessment  Patient tolerated therapy session with little discomfort.  She reported relief from R hip discomfort with long axis distraction of the R LE.  Pt was able to tolerate increased resistance with standing hip extension on the shuttle and with all four exercises on the Matrix without increased pain.  She also was able to perform steps forward and laterally on a 4 and 6" step without pain.    This is a 47 y.o. female referred to outpatient physical therapy and presents with a medical diagnosis of   Encounter Diagnosis   Name Primary?    Right hip pain Yes    and demonstrates limitations as described in the problem list. Pt will benefit from physcial therapy services in order " to maximize pain free and/or functional use of right hip. The following goals were discussed with the patient and patient is in agreement with them as to be addressed in the treatment plan. R hip flexion to 95 degrees following long axis distraction today.   Pt is tolerating her program well.      Problem List: pain, decreased ROM, decreased flexibility, decreased strength and decreased ability to fully participate in recreational/sports related activities.      GOALS: Short Term Goals:  2 weeks  1. Independent with HEP to increase patient's tolerance for ADL's   2. Pt able to perform sit to stand transfers with less discomfort.    Long Term Goals: 8 weeks  1.Report decreased    R hip    pain  <   / =  2  /10 at worse with Sit to Stand transfers to increase tolerance for ADL's and walking  2.Increased MMT  for  R LE  to increase tolerance for ADL and work activities.  3.Increased arom  for  R hip to improve normal movement patterns during ADL's.  4. Pt will report improvement in overall functional abilities of LE, evidenced by improved score on the FOTO Hip Survey.    Plan  Pt will be treated by physical therapy 1-2 times a week for 8 weeks for Pt Education, HEP, therapeutic exercises, neuromuscular re-education/ balance exercises, joint mobilizations, modalities prn   to achieve established goals. Shelbie may at times be seen by a PTA as part of the Rehab Team.   Progress closed chain R hip exercises slowly to tolerance.  Cont PT for  2         weeks.

## 2017-06-15 ENCOUNTER — CLINICAL SUPPORT (OUTPATIENT)
Dept: REHABILITATION | Facility: HOSPITAL | Age: 47
End: 2017-06-15
Attending: ORTHOPAEDIC SURGERY
Payer: COMMERCIAL

## 2017-06-15 DIAGNOSIS — M25.551 RIGHT HIP PAIN: Primary | ICD-10-CM

## 2017-06-15 PROCEDURE — 97110 THERAPEUTIC EXERCISES: CPT | Mod: PO

## 2017-06-15 NOTE — PROGRESS NOTES
Physical Therapy Daily Note    Visit: 19    Name: Shelbie Ardon  Clinic Number: [id number]    Shelbie is a 47 y.o. female treated on 06/15/2017.     Diagnosis:   Encounter Diagnosis   Name Primary?    Right hip pain Yes       DOS: NA    Physician: Vern Carrasquillo MD  Treatment Orders: PT Eval and Treat    Past Medical History:   Diagnosis Date    Arthritis     Arthritis     Cough     Female infertility NEC     Headache     Hyperlipidemia     Hypertension     Tinnitus 12/17/2013     Current Outpatient Prescriptions   Medication Sig    CELECOXIB (CELEBREX ORAL) Take by mouth.    celecoxib (CELEBREX) 200 MG capsule Take 1 capsule (200 mg total) by mouth 2 (two) times daily.    diclofenac sodium 1 % Gel Apply 2 g topically as needed.    fish oil-omega-3 fatty acids 300-1,000 mg capsule Take 1 g by mouth once daily.    losartan (COZAAR) 50 MG tablet Take 1 tablet (50 mg total) by mouth once daily.    medroxyPROGESTERone (DEPO-PROVERA) 150 mg/mL Syrg Inject 1 mL (150 mg total) into the muscle once.    multivitamin (THERAGRAN) per tablet Take 1 tablet by mouth once daily.     vitamin D 185 MG Tab Take 185 mg by mouth once daily.      No current facility-administered medications for this visit.      Review of patient's allergies indicates:   Allergen Reactions    Cephalexin Hives    Methohexital      Other reaction(s): Difficulty breathing    Penicillins Hives    Sulfa (sulfonamide antibiotics) Hives     Precautions: Standard  Occupation: [employer]     Time in:: 8:00  Time Out: 9:00  Treatment time: 60   1on1 time: 60      Subjective  Patient reported no new c/o pain in her R hip  No c/o pain presently.       Objective     Treatment:    Pt performed there ex's for R LE strengthening, ROM, flexibility and endurance x 55 minutes including:  Elliptical x 10 min  Level 3  Supine hip flexor stretches on L & R 3 x 30  "sec each  Single leg bridge 10 x 3 on R & L  Shuttle standing hip extension on R & L with 37# (1 Black & 1 red cord) 10 x 3 on each  Shuttle leg press 10 x 3 with 125# with green band around knees    Shuttle side lying leg press 10 x 3 with 62# on L & R  Matrix hip abd, add, extension & flexion 10 x 3 with 55# on R & L   Long axis distraction R hip  Pilates box step downs 10 x 3 on L & R with springs in highest position with no use of hands.      Treatments not performed today:  Clams 10 x 3 on R and L with green TB  Side lying hip adduction 10 x 3 with 0#  Single leg ballerinas : 3 x 20 seconds on foam   Bridges 15 x 2 with both feet on a green ball  Exercises were reviewed and pt was able to demonstrate them prior to the end of the session  Step ups forward and laterally x 10 on a 4" and 6" step   standing orange TB hip ext/abd to HEP    Monster walks 20 ' x 6 with Green TB  Ed pt to use ice PRN 10- 20 min when pain or swelling occurs.        Shelbie sanchez good understanding of the education provided. Patient demo good return demo of skill of exercises.      Assessment  Patient tolerated therapy session with no discomfort.  She reported relief from R hip discomfort with long axis distraction of the R LE.    This is a 47 y.o. female referred to outpatient physical therapy and presents with a medical diagnosis of   Encounter Diagnosis   Name Primary?    Right hip pain Yes    and demonstrates limitations as described in the problem list. Pt will benefit from physcial therapy services in order to maximize pain free and/or functional use of right hip. The following goals were discussed with the patient and patient is in agreement with them as to be addressed in the treatment plan. R hip flexion to 95 degrees following long axis distraction today.   Pt is tolerating her program well.      Problem List: pain, decreased ROM, decreased flexibility, decreased strength and decreased ability to fully participate in " recreational/sports related activities.      GOALS: Short Term Goals:  2 weeks  1. Independent with HEP to increase patient's tolerance for ADL's   2. Pt able to perform sit to stand transfers with less discomfort.    Long Term Goals: 8 weeks  1.Report decreased    R hip    pain  <   / =  2  /10 at worse with Sit to Stand transfers to increase tolerance for ADL's and walking  2.Increased MMT  for  R LE  to increase tolerance for ADL and work activities.  3.Increased arom  for  R hip to improve normal movement patterns during ADL's.  4. Pt will report improvement in overall functional abilities of LE, evidenced by improved score on the FOTO Hip Survey.    Plan  Pt will be treated by physical therapy 1-2 times a week for 8 weeks for Pt Education, HEP, therapeutic exercises, neuromuscular re-education/ balance exercises, joint mobilizations, modalities prn   to achieve established goals. Shelbie may at times be seen by a PTA as part of the Rehab Team.   Progress closed chain R hip exercises slowly to tolerance.  Cont PT for  2         weeks.

## 2017-06-20 ENCOUNTER — CLINICAL SUPPORT (OUTPATIENT)
Dept: REHABILITATION | Facility: HOSPITAL | Age: 47
End: 2017-06-20
Attending: ORTHOPAEDIC SURGERY
Payer: COMMERCIAL

## 2017-06-20 DIAGNOSIS — M25.551 RIGHT HIP PAIN: Primary | ICD-10-CM

## 2017-06-20 PROCEDURE — 97110 THERAPEUTIC EXERCISES: CPT | Mod: PO

## 2017-06-20 NOTE — PROGRESS NOTES
Physical Therapy Daily Note    Visit: 20    Name: Shelbie Ardon  Clinic Number: [id number]    Shelbie is a 47 y.o. female treated on 06/20/2017.     Diagnosis:   Encounter Diagnosis   Name Primary?    Right hip pain Yes       DOS: NA    Physician: Vern Carrasquillo MD  Treatment Orders: PT Eval and Treat    Past Medical History:   Diagnosis Date    Arthritis     Arthritis     Cough     Female infertility NEC     Headache     Hyperlipidemia     Hypertension     Tinnitus 12/17/2013     Current Outpatient Prescriptions   Medication Sig    CELECOXIB (CELEBREX ORAL) Take by mouth.    celecoxib (CELEBREX) 200 MG capsule Take 1 capsule (200 mg total) by mouth 2 (two) times daily.    diclofenac sodium 1 % Gel Apply 2 g topically as needed.    fish oil-omega-3 fatty acids 300-1,000 mg capsule Take 1 g by mouth once daily.    losartan (COZAAR) 50 MG tablet Take 1 tablet (50 mg total) by mouth once daily.    medroxyPROGESTERone (DEPO-PROVERA) 150 mg/mL Syrg Inject 1 mL (150 mg total) into the muscle once.    multivitamin (THERAGRAN) per tablet Take 1 tablet by mouth once daily.     vitamin D 185 MG Tab Take 185 mg by mouth once daily.      No current facility-administered medications for this visit.      Review of patient's allergies indicates:   Allergen Reactions    Cephalexin Hives    Methohexital      Other reaction(s): Difficulty breathing    Penicillins Hives    Sulfa (sulfonamide antibiotics) Hives     Precautions: Standard  Occupation: [employer]     Time in:: 8:10  Time Out: 9:10  Treatment time: 60   1on1 time: 60      Subjective  Patient reported that her hip is feeling much better.  She stated that she went fishing this weekend [place], which normally causes R hip pain, with no discomfort.  Pt stated that she would like to enter the [facility],       Objective    "  Treatment:    Pt performed there ex's for R LE strengthening, ROM, flexibility and endurance x 55 minutes including:  Elliptical x 10 min  Level 3  Single leg bridge 10 x 3 on R & L  Shuttle standing hip extension on R & L with 37# (1 Black & 1 red cord) 10 x 3 on each  Shuttle leg press 10 x 3 with 125# with green band around knees  Shuttle side lying leg press 10 x 3 with 62# on L & R  Matrix hip abd, add, extension & flexion 10 x 3 with 55# on R & L   Pilates box step downs 10 x 3 on L & R with springs in highest position with no use of hands.      Treatments not performed today:  Clams 10 x 3 on R and L with green TB  Side lying hip adduction 10 x 3 with 0#  Single leg ballerinas : 3 x 20 seconds on foam   Bridges 15 x 2 with both feet on a green ball  Exercises were reviewed and pt was able to demonstrate them prior to the end of the session  Step ups forward and laterally x 10 on a 4" and 6" step  Standing orange TB hip ext/abd to HEP  Supine hip flexor stretches on L & R 3 x 30 sec each  Monster walks 20 ' x 6 with Green TB  Long axis distraction R hip  Ed pt to use ice PRN 10- 20 min when pain or swelling occurs.        Shelbie sanchez good understanding of the education provided. Patient laura good return demo of skill of exercises.      Assessment  Patient tolerated therapy session with no discomfort.  She stated that she is ready to transition to the gym and would like to enter the [facility].   This is a 47 y.o. female referred to outpatient physical therapy and presents with a medical diagnosis of   Encounter Diagnosis   Name Primary?    Right hip pain Yes    and demonstrates limitations as described in the problem list. Pt will benefit from physcial therapy services in order to maximize pain free and/or functional use of right hip. The following goals were discussed with the patient and patient is in agreement with them as to be addressed in the treatment plan. R hip flexion to 95 degrees " following long axis distraction today.   Pt is tolerating her program well.      Problem List: pain, decreased ROM, decreased flexibility, decreased strength and decreased ability to fully participate in recreational/sports related activities.      GOALS: Short Term Goals:  2 weeks  1. Independent with HEP to increase patient's tolerance for ADL's - met  2. Pt able to perform sit to stand transfers with less discomfort. - met    Long Term Goals: 8 weeks  1.Report decreased    R hip    pain  <   / =  2  /10 at worse with Sit to Stand transfers to increase tolerance for ADL's and walking - met  2.Increased MMT  for  R LE  to increase tolerance for ADL and work activities. - met  3.Increased arom  for  R hip to improve normal movement patterns during ADL's. - met  4. Pt will report improvement in overall functional abilities of LE, evidenced by improved score on the FOTO Hip Survey. Not reassessed    Plan  Pt discharged from Physical Therapy to the [facility]

## 2017-08-10 DIAGNOSIS — I10 ESSENTIAL HYPERTENSION: ICD-10-CM

## 2017-08-10 DIAGNOSIS — E78.5 HYPERLIPIDEMIA: ICD-10-CM

## 2017-08-10 RX ORDER — LOSARTAN POTASSIUM 50 MG/1
50 TABLET ORAL DAILY
Qty: 30 TABLET | Refills: 11 | Status: SHIPPED | OUTPATIENT
Start: 2017-08-10 | End: 2018-04-26 | Stop reason: SDUPTHER

## 2018-03-05 ENCOUNTER — TELEPHONE (OUTPATIENT)
Dept: OBSTETRICS AND GYNECOLOGY | Facility: CLINIC | Age: 48
End: 2018-03-05

## 2018-03-05 NOTE — TELEPHONE ENCOUNTER
----- Message from Jeanne Everett sent at 3/2/2018  7:02 PM CST -----  Contact: PT Portal Request  Appointment Request From: Shelbie Ardon    With Provider: Other - (see comments)    Would Accept With:Only the person I've selected    Preferred Date Range: From 2/27/2018 To 5/1/2018    Preferred Times: Any    Reason for visit: Request an Appt    Comments:  I would Like to see Dr Tushar Gorman as recommended by my previous doctor, Dr. Izquierdo.  Regarding annual visit needed and prescription

## 2018-04-06 ENCOUNTER — PATIENT MESSAGE (OUTPATIENT)
Dept: OBSTETRICS AND GYNECOLOGY | Facility: CLINIC | Age: 48
End: 2018-04-06

## 2018-04-17 ENCOUNTER — OFFICE VISIT (OUTPATIENT)
Dept: OBSTETRICS AND GYNECOLOGY | Facility: CLINIC | Age: 48
End: 2018-04-17
Attending: OBSTETRICS & GYNECOLOGY
Payer: COMMERCIAL

## 2018-04-17 VITALS
DIASTOLIC BLOOD PRESSURE: 102 MMHG | WEIGHT: 211 LBS | HEIGHT: 67 IN | SYSTOLIC BLOOD PRESSURE: 154 MMHG | BODY MASS INDEX: 33.12 KG/M2

## 2018-04-17 DIAGNOSIS — Z01.419 WELL WOMAN EXAM WITH ROUTINE GYNECOLOGICAL EXAM: Primary | ICD-10-CM

## 2018-04-17 DIAGNOSIS — N92.0 MENORRHAGIA WITH REGULAR CYCLE: ICD-10-CM

## 2018-04-17 DIAGNOSIS — L91.8 SKIN TAG: ICD-10-CM

## 2018-04-17 DIAGNOSIS — Z12.31 SCREENING MAMMOGRAM, ENCOUNTER FOR: ICD-10-CM

## 2018-04-17 PROCEDURE — 99396 PREV VISIT EST AGE 40-64: CPT | Mod: S$GLB,,, | Performed by: OBSTETRICS & GYNECOLOGY

## 2018-04-17 RX ORDER — MEDROXYPROGESTERONE ACETATE 150 MG/ML
150 INJECTION, SUSPENSION INTRAMUSCULAR ONCE
Qty: 1 ML | Refills: 3 | Status: SHIPPED | OUTPATIENT
Start: 2018-04-17 | End: 2018-04-17

## 2018-04-17 NOTE — PROGRESS NOTES
Subjective:       Patient ID: Shelbie Ardon is a 48 y.o. female.    Chief Complaint:  Well Woman      History of Present Illness  HPI  This 48 yr old female is here for routine exam.  She is a patient of Dr Izquierdo's  She is up to date with her pap (normal last yr) and is due for mammogram.  She takes depoprovera for cycle control.  She is not having menses at all.  No family history that she knows of gyn wise.  She has HTN and is on Cozzar but today her BP is elevated.  runnng late will recheck.  Still elevated after recheck.  She is asymptomatic and is calling her internist.    GYN & OB History  No LMP recorded. Patient is not currently having periods (Reason: Other).   Date of Last Pap: 4/18/2017    OB History   No data available       Review of Systems  Review of Systems   Constitutional: Negative for chills and fever.   Respiratory: Negative for shortness of breath.    Cardiovascular: Negative for chest pain.   Gastrointestinal: Negative for abdominal pain, nausea and vomiting.   Genitourinary: Negative for difficulty urinating, dyspareunia, genital sores, menstrual problem, pelvic pain, vaginal bleeding, vaginal discharge and vaginal pain.   Skin: Negative for wound.   Hematological: Negative for adenopathy.           Objective:    Physical Exam:               Genitourinary:                              Assessment:        1. Well woman exam with routine gynecological exam    2. Screening mammogram, encounter for    3. Menorrhagia with regular cycle    4. Skin tag               Plan:      Will continue depoprovera and follow up yearly  Pap every 3 yrs  Mammogram yealry  Follow up for skin tag removal prn

## 2018-04-26 ENCOUNTER — OFFICE VISIT (OUTPATIENT)
Dept: CARDIOLOGY | Facility: CLINIC | Age: 48
End: 2018-04-26
Payer: COMMERCIAL

## 2018-04-26 VITALS
DIASTOLIC BLOOD PRESSURE: 86 MMHG | WEIGHT: 208.31 LBS | BODY MASS INDEX: 32.7 KG/M2 | SYSTOLIC BLOOD PRESSURE: 147 MMHG | HEIGHT: 67 IN | HEART RATE: 71 BPM

## 2018-04-26 DIAGNOSIS — E78.2 MIXED HYPERLIPIDEMIA: ICD-10-CM

## 2018-04-26 DIAGNOSIS — I10 ESSENTIAL HYPERTENSION: Primary | ICD-10-CM

## 2018-04-26 PROCEDURE — 99999 PR PBB SHADOW E&M-EST. PATIENT-LVL III: CPT | Mod: PBBFAC,,, | Performed by: INTERNAL MEDICINE

## 2018-04-26 PROCEDURE — 99214 OFFICE O/P EST MOD 30 MIN: CPT | Mod: S$GLB,,, | Performed by: INTERNAL MEDICINE

## 2018-04-26 RX ORDER — LOSARTAN POTASSIUM 100 MG/1
100 TABLET ORAL DAILY
Qty: 90 TABLET | Refills: 3 | Status: SHIPPED | OUTPATIENT
Start: 2018-04-26 | End: 2019-03-18 | Stop reason: SDUPTHER

## 2018-04-26 NOTE — PROGRESS NOTES
Subjective:   Patient ID:  Shelbie Ardon is a 48 y.o. female who presents for follow-up of Hypertension (elevated b/p) and Headache      HPI: She reports that for the past one month, her blood pressure readings have been elevated. Some readings have been as high as 170/120. She has been under a lot of stress at work. Her pressure had been well controlled prior to the past month. She practices yoga and walks for exercise. She has been watching the sodium in her diet. She occasionally take celebrex for arthritis.     Past Medical History:   Diagnosis Date    Arthritis     Arthritis     Cough     Female infertility NEC     Headache(784.0)     Hyperlipidemia     Hypertension     Tinnitus 12/17/2013       Past Surgical History:   Procedure Laterality Date    DILATION AND CURETTAGE OF UTERUS      KNEE SURGERY      acl repair as child/ revision 2013       Social History     Social History    Marital status:      Spouse name: N/A    Number of children: N/A    Years of education: N/A     Social History Main Topics    Smoking status: Never Smoker    Smokeless tobacco: Never Used    Alcohol use 4.2 oz/week     7 Glasses of wine per week    Drug use: No    Sexual activity: Yes     Partners: Male     Birth control/ protection: None     Other Topics Concern    None     Social History Narrative    .        Family History   Problem Relation Age of Onset    Hypertension Mother     Cancer Father     Lung cancer Father     Cancer Paternal Grandmother      unknown GYN cancer     Lung cancer Paternal Grandmother     Cancer Paternal Grandfather     Lung cancer Paternal Grandfather     No Known Problems Sister     No Known Problems Sister     No Known Problems Brother     No Known Problems Maternal Aunt     No Known Problems Maternal Uncle     No Known Problems Paternal Aunt     No Known Problems Paternal Uncle     No Known Problems Maternal Grandmother     No Known Problems Maternal  Grandfather     Anemia Neg Hx     Arrhythmia Neg Hx     Asthma Neg Hx     Clotting disorder Neg Hx     Fainting Neg Hx     Heart attack Neg Hx     Heart disease Neg Hx     Heart failure Neg Hx     Hyperlipidemia Neg Hx     Stroke Neg Hx     Atrial Septal Defect Neg Hx     Breast cancer Neg Hx     Colon cancer Neg Hx     Ovarian cancer Neg Hx        Patient's Medications   New Prescriptions    No medications on file   Previous Medications    CELECOXIB (CELEBREX ORAL)    Take by mouth.    CELECOXIB (CELEBREX) 200 MG CAPSULE    Take 1 capsule (200 mg total) by mouth 2 (two) times daily.    DICLOFENAC SODIUM 1 % GEL    Apply 2 g topically as needed.    FISH OIL-OMEGA-3 FATTY ACIDS 300-1,000 MG CAPSULE    Take 1 g by mouth once daily.    MEDROXYPROGESTERONE (DEPO-PROVERA) 150 MG/ML SYRG    Inject 1 mL (150 mg total) into the muscle once.    MULTIVITAMIN (THERAGRAN) PER TABLET    Take 1 tablet by mouth once daily.     VITAMIN D 185 MG TAB    Take 185 mg by mouth once daily.    Modified Medications    Modified Medication Previous Medication    LOSARTAN (COZAAR) 100 MG TABLET losartan (COZAAR) 50 MG tablet       Take 1 tablet (100 mg total) by mouth once daily.    Take 1 tablet (50 mg total) by mouth once daily.   Discontinued Medications    No medications on file       Review of Systems   Constitution: Negative for weakness, malaise/fatigue and weight gain.   HENT: Negative for hearing loss.    Eyes: Negative for visual disturbance.   Cardiovascular: Negative for chest pain, claudication, dyspnea on exertion, leg swelling, near-syncope, orthopnea, palpitations, paroxysmal nocturnal dyspnea and syncope.   Respiratory: Negative for cough, shortness of breath, sleep disturbances due to breathing, snoring and wheezing.    Endocrine: Negative for cold intolerance, heat intolerance, polydipsia, polyphagia and polyuria.   Hematologic/Lymphatic: Negative for bleeding problem. Does not bruise/bleed easily.   Skin:  "Negative for rash and suspicious lesions.   Musculoskeletal: Negative for arthritis, falls, joint pain, muscle weakness and myalgias.   Gastrointestinal: Negative for abdominal pain, change in bowel habit, constipation, diarrhea, heartburn, hematochezia, melena and nausea.   Genitourinary: Negative for hematuria and nocturia.   Neurological: Positive for headaches. Negative for excessive daytime sleepiness, dizziness, light-headedness and loss of balance.   Psychiatric/Behavioral: Negative for depression. The patient is not nervous/anxious.    Allergic/Immunologic: Negative for environmental allergies.       BP (!) 147/86 (BP Location: Left arm, Patient Position: Sitting, BP Method: Large (Automatic))   Pulse 71   Ht 5' 7" (1.702 m)   Wt 94.5 kg (208 lb 5.4 oz)   BMI 32.63 kg/m²     Objective:   Physical Exam   Constitutional: She is oriented to person, place, and time. She appears well-developed and well-nourished.        HENT:   Head: Normocephalic and atraumatic.   Mouth/Throat: Oropharynx is clear and moist.   Eyes: Conjunctivae and EOM are normal. Pupils are equal, round, and reactive to light. No scleral icterus.   Neck: Normal range of motion. Neck supple. No hepatojugular reflux and no JVD present. No tracheal deviation present. No thyromegaly present.   Cardiovascular: Normal rate, regular rhythm, normal heart sounds and intact distal pulses.  PMI is not displaced.    Pulses:       Carotid pulses are 2+ on the right side, and 2+ on the left side.       Radial pulses are 2+ on the right side, and 2+ on the left side.        Dorsalis pedis pulses are 2+ on the right side, and 2+ on the left side.        Posterior tibial pulses are 2+ on the right side, and 2+ on the left side.   Pulmonary/Chest: Effort normal and breath sounds normal.   Abdominal: Soft. Bowel sounds are normal. She exhibits no distension and no mass. There is no hepatosplenomegaly. There is no tenderness.   Musculoskeletal: She exhibits " no edema or tenderness.   Lymphadenopathy:     She has no cervical adenopathy.   Neurological: She is alert and oriented to person, place, and time.   Skin: Skin is warm and dry. No rash noted. No cyanosis or erythema. Nails show no clubbing.   Psychiatric: She has a normal mood and affect. Her speech is normal and behavior is normal.       Lab Results   Component Value Date     05/06/2015    K 4.3 05/06/2015     05/06/2015    CO2 26 05/06/2015    BUN 13 05/06/2015    CREATININE 0.9 05/06/2015    GLU 97 05/06/2015    AST 17 10/15/2014    ALT 16 10/15/2014    ALBUMIN 3.8 10/15/2014    PROT 7.4 10/15/2014    BILITOT 0.3 10/15/2014    WBC 8.02 10/15/2014    HGB 14.4 10/15/2014    HCT 41.8 10/15/2014    MCV 88 10/15/2014     10/15/2014    TSH 1.450 12/06/2013    CHOL 265 (H) 05/06/2015    HDL 47 05/06/2015    LDLCALC 183.2 (H) 05/06/2015    TRIG 174 (H) 05/06/2015       Assessment:     1. Essential hypertension: Increase losartan to 100 mg daily. We discussed keeping a log of home blood pressures and notifying the office if it is consistently running above 130/80 mmHg.If so, we discussed adding a thiazide diuretic.   2. Mixed hyperlipidemia: FLP ordered.        Plan:     Shelbie was seen today for hypertension and headache.    Diagnoses and all orders for this visit:    Essential hypertension  -     losartan (COZAAR) 100 MG tablet; Take 1 tablet (100 mg total) by mouth once daily.  -     TSH; Future  -     Lipid panel; Future  -     Comprehensive metabolic panel; Future  -     CBC auto differential; Future    Mixed hyperlipidemia  -     losartan (COZAAR) 100 MG tablet; Take 1 tablet (100 mg total) by mouth once daily.  -     TSH; Future  -     Lipid panel; Future  -     Comprehensive metabolic panel; Future  -     CBC auto differential; Future        Thank you for allowing me to participate in this patient's care. Please do not hesitate to contact me with any questions or concerns.

## 2018-04-26 NOTE — PATIENT INSTRUCTIONS
Increase losartan to 100 mg daily.    We discussed keeping a log of home blood pressures and notifying the office if it is consistently running above 130/80 mmHg.

## 2018-04-30 ENCOUNTER — PATIENT MESSAGE (OUTPATIENT)
Dept: CARDIOLOGY | Facility: CLINIC | Age: 48
End: 2018-04-30

## 2018-05-01 ENCOUNTER — TELEPHONE (OUTPATIENT)
Dept: CARDIOLOGY | Facility: CLINIC | Age: 48
End: 2018-05-01

## 2018-05-01 DIAGNOSIS — I10 ESSENTIAL HYPERTENSION: Primary | ICD-10-CM

## 2018-05-01 RX ORDER — CHLORTHALIDONE 25 MG/1
25 TABLET ORAL DAILY
Qty: 30 TABLET | Refills: 11 | Status: SHIPPED | OUTPATIENT
Start: 2018-05-01 | End: 2019-03-11 | Stop reason: SDUPTHER

## 2018-05-08 ENCOUNTER — LAB VISIT (OUTPATIENT)
Dept: LAB | Facility: HOSPITAL | Age: 48
End: 2018-05-08
Attending: INTERNAL MEDICINE
Payer: COMMERCIAL

## 2018-05-08 DIAGNOSIS — I10 ESSENTIAL HYPERTENSION: ICD-10-CM

## 2018-05-08 DIAGNOSIS — E78.2 MIXED HYPERLIPIDEMIA: ICD-10-CM

## 2018-05-08 LAB
ALBUMIN SERPL BCP-MCNC: 3.7 G/DL
ALP SERPL-CCNC: 75 U/L
ALT SERPL W/O P-5'-P-CCNC: 27 U/L
ANION GAP SERPL CALC-SCNC: 9 MMOL/L
AST SERPL-CCNC: 22 U/L
BASOPHILS # BLD AUTO: 0.07 K/UL
BASOPHILS NFR BLD: 1 %
BILIRUB SERPL-MCNC: 0.9 MG/DL
BUN SERPL-MCNC: 10 MG/DL
CALCIUM SERPL-MCNC: 9.6 MG/DL
CHLORIDE SERPL-SCNC: 103 MMOL/L
CHOLEST SERPL-MCNC: 209 MG/DL
CHOLEST/HDLC SERPL: 5.1 {RATIO}
CO2 SERPL-SCNC: 27 MMOL/L
CREAT SERPL-MCNC: 0.9 MG/DL
DIFFERENTIAL METHOD: ABNORMAL
EOSINOPHIL # BLD AUTO: 0.2 K/UL
EOSINOPHIL NFR BLD: 2.8 %
ERYTHROCYTE [DISTWIDTH] IN BLOOD BY AUTOMATED COUNT: 11.6 %
EST. GFR  (AFRICAN AMERICAN): >60 ML/MIN/1.73 M^2
EST. GFR  (NON AFRICAN AMERICAN): >60 ML/MIN/1.73 M^2
GLUCOSE SERPL-MCNC: 97 MG/DL
HCT VFR BLD AUTO: 42.4 %
HDLC SERPL-MCNC: 41 MG/DL
HDLC SERPL: 19.6 %
HGB BLD-MCNC: 14.8 G/DL
IMM GRANULOCYTES # BLD AUTO: 0.02 K/UL
IMM GRANULOCYTES NFR BLD AUTO: 0.3 %
LDLC SERPL CALC-MCNC: 136.8 MG/DL
LYMPHOCYTES # BLD AUTO: 1.5 K/UL
LYMPHOCYTES NFR BLD: 21.2 %
MCH RBC QN AUTO: 31.8 PG
MCHC RBC AUTO-ENTMCNC: 34.9 G/DL
MCV RBC AUTO: 91 FL
MONOCYTES # BLD AUTO: 0.6 K/UL
MONOCYTES NFR BLD: 8.5 %
NEUTROPHILS # BLD AUTO: 4.7 K/UL
NEUTROPHILS NFR BLD: 66.2 %
NONHDLC SERPL-MCNC: 168 MG/DL
NRBC BLD-RTO: 0 /100 WBC
PLATELET # BLD AUTO: 269 K/UL
PMV BLD AUTO: 10.6 FL
POTASSIUM SERPL-SCNC: 3.5 MMOL/L
PROT SERPL-MCNC: 7.1 G/DL
RBC # BLD AUTO: 4.65 M/UL
SODIUM SERPL-SCNC: 139 MMOL/L
TRIGL SERPL-MCNC: 156 MG/DL
TSH SERPL DL<=0.005 MIU/L-ACNC: 1.14 UIU/ML
WBC # BLD AUTO: 7.06 K/UL

## 2018-05-08 PROCEDURE — 80061 LIPID PANEL: CPT

## 2018-05-08 PROCEDURE — 80053 COMPREHEN METABOLIC PANEL: CPT

## 2018-05-08 PROCEDURE — 85025 COMPLETE CBC W/AUTO DIFF WBC: CPT

## 2018-05-08 PROCEDURE — 84443 ASSAY THYROID STIM HORMONE: CPT

## 2018-05-08 PROCEDURE — 36415 COLL VENOUS BLD VENIPUNCTURE: CPT | Mod: PO

## 2018-05-10 ENCOUNTER — PATIENT MESSAGE (OUTPATIENT)
Dept: CARDIOLOGY | Facility: CLINIC | Age: 48
End: 2018-05-10

## 2018-05-15 ENCOUNTER — PROCEDURE VISIT (OUTPATIENT)
Dept: OBSTETRICS AND GYNECOLOGY | Facility: CLINIC | Age: 48
End: 2018-05-15
Attending: OBSTETRICS & GYNECOLOGY
Payer: COMMERCIAL

## 2018-05-15 VITALS
DIASTOLIC BLOOD PRESSURE: 76 MMHG | BODY MASS INDEX: 31.74 KG/M2 | HEIGHT: 67 IN | WEIGHT: 202.25 LBS | SYSTOLIC BLOOD PRESSURE: 118 MMHG

## 2018-05-15 DIAGNOSIS — L91.8 SKIN TAG: Primary | ICD-10-CM

## 2018-05-15 PROCEDURE — 88304 TISSUE EXAM BY PATHOLOGIST: CPT | Mod: 26,,, | Performed by: PATHOLOGY

## 2018-05-15 PROCEDURE — 56605 BIOPSY OF VULVA/PERINEUM: CPT | Mod: S$GLB,,, | Performed by: OBSTETRICS & GYNECOLOGY

## 2018-05-15 PROCEDURE — 88304 TISSUE EXAM BY PATHOLOGIST: CPT | Performed by: PATHOLOGY

## 2018-05-15 PROCEDURE — 56606 BIOPSY OF VULVA/PERINEUM: CPT | Mod: S$GLB,,, | Performed by: OBSTETRICS & GYNECOLOGY

## 2018-05-15 NOTE — PROCEDURES
Procedures   After informed consent obtained, betadine prepped and draped the two skin tag sites.  One on left groin and one on right perineum.  Lidocaine injected one cc per site  While grasping the skin tag #1 by my MA, a 2-0 chromic suture was tied at the base and the skin tag removed and sent to path for eval.  The suture then came off while cutting strings and silver nitrate used x1 to stop oozing.  The second skin tag removed in similar pattern but the suture remained.  Explained to pt will fall off.  EBL <1cc.  bandaids placed and post procedure instructions given  Follow up with path results.

## 2018-06-14 ENCOUNTER — HOSPITAL ENCOUNTER (OUTPATIENT)
Dept: RADIOLOGY | Facility: HOSPITAL | Age: 48
Discharge: HOME | End: 2018-06-14
Attending: OBSTETRICS & GYNECOLOGY
Payer: COMMERCIAL

## 2018-06-14 DIAGNOSIS — Z12.31 SCREENING MAMMOGRAM, ENCOUNTER FOR: ICD-10-CM

## 2018-06-14 PROCEDURE — 77067 SCR MAMMO BI INCL CAD: CPT | Mod: TC,PO

## 2018-06-14 PROCEDURE — 77067 SCR MAMMO BI INCL CAD: CPT | Mod: 26,,, | Performed by: RADIOLOGY

## 2018-06-14 PROCEDURE — 77063 BREAST TOMOSYNTHESIS BI: CPT | Mod: 26,,, | Performed by: RADIOLOGY

## 2018-06-18 ENCOUNTER — TELEPHONE (OUTPATIENT)
Dept: RADIOLOGY | Facility: HOSPITAL | Age: 48
End: 2018-06-18

## 2018-06-18 NOTE — TELEPHONE ENCOUNTER
Spoke with patient and explained mammogram findings.Patient expressed understanding of results. Patient is scheduled for a abnormal mammogram follow up appointment at [facility] on 6/19/18

## 2018-06-19 ENCOUNTER — HOSPITAL ENCOUNTER (OUTPATIENT)
Dept: RADIOLOGY | Facility: HOSPITAL | Age: 48
Discharge: HOME | End: 2018-06-19
Attending: OBSTETRICS & GYNECOLOGY
Payer: COMMERCIAL

## 2018-06-19 DIAGNOSIS — R92.8 ABNORMAL MAMMOGRAM: ICD-10-CM

## 2018-06-19 PROCEDURE — 77061 BREAST TOMOSYNTHESIS UNI: CPT | Mod: TC,PO

## 2018-06-19 PROCEDURE — 77065 DX MAMMO INCL CAD UNI: CPT | Mod: 26,,, | Performed by: RADIOLOGY

## 2018-06-19 PROCEDURE — 77061 BREAST TOMOSYNTHESIS UNI: CPT | Mod: 26,,, | Performed by: RADIOLOGY

## 2018-06-19 PROCEDURE — 77065 DX MAMMO INCL CAD UNI: CPT | Mod: TC,PO

## 2018-07-03 RX ORDER — MEDROXYPROGESTERONE ACETATE 150 MG/ML
INJECTION, SUSPENSION INTRAMUSCULAR
Qty: 1 ML | Refills: 2 | OUTPATIENT
Start: 2018-07-03 | End: 2019-03-20 | Stop reason: SDUPTHER

## 2018-07-03 NOTE — PROGRESS NOTES
Shelbie Ardon received IM Depo Provera to the right hip upper outer side of the deltoid on 7/3/2018.    The patient was instructed to get the next injection on .    Lot Number: [id number]  Expiration Date: 12/2019   BY PERRY HUMMEL

## 2018-09-17 ENCOUNTER — OFFICE VISIT (OUTPATIENT)
Dept: INTERNAL MEDICINE | Facility: CLINIC | Age: 48
End: 2018-09-17
Payer: COMMERCIAL

## 2018-09-17 VITALS
BODY MASS INDEX: 32.2 KG/M2 | HEART RATE: 91 BPM | DIASTOLIC BLOOD PRESSURE: 78 MMHG | HEIGHT: 67 IN | OXYGEN SATURATION: 98 % | SYSTOLIC BLOOD PRESSURE: 122 MMHG | WEIGHT: 205.13 LBS

## 2018-09-17 DIAGNOSIS — Z88.0 PENICILLIN ALLERGY: ICD-10-CM

## 2018-09-17 DIAGNOSIS — Z71.84 COUNSELING ABOUT TRAVEL: ICD-10-CM

## 2018-09-17 DIAGNOSIS — I10 ESSENTIAL HYPERTENSION: ICD-10-CM

## 2018-09-17 DIAGNOSIS — Z00.00 ANNUAL PHYSICAL EXAM: Primary | ICD-10-CM

## 2018-09-17 PROCEDURE — 99386 PREV VISIT NEW AGE 40-64: CPT | Mod: 25,S$GLB,, | Performed by: FAMILY MEDICINE

## 2018-09-17 PROCEDURE — 90686 IIV4 VACC NO PRSV 0.5 ML IM: CPT | Mod: S$GLB,,, | Performed by: FAMILY MEDICINE

## 2018-09-17 PROCEDURE — 99999 PR PBB SHADOW E&M-EST. PATIENT-LVL IV: CPT | Mod: PBBFAC,,, | Performed by: FAMILY MEDICINE

## 2018-09-17 PROCEDURE — 90471 IMMUNIZATION ADMIN: CPT | Mod: S$GLB,,, | Performed by: FAMILY MEDICINE

## 2018-09-17 NOTE — PROGRESS NOTES
"Subjective:       Patient ID: Shelbie Ardon is a 48 y.o. female.    Chief Complaint: Annual Exam and Establish Care    HPI 47 y/o female with HTN is here for annual exam.  She is feeling good overall, she does have stress, she does walking daily 26 minutes and yoga a few days a week, sleeping fair, wakes up tired, she does not snore, no witnessed apnea, denies f/n/v/d/constipation/cp/sob/urinary sx. Eating healthy.   HTN: diagnosed about 4 years ago, doing well on losartan and chlorthalidone, followed by cardiology, has a home bp cuff  OA hip and OA knee: taking celebrex 1-2 times a month  GYN: mmg utd and normal  Hx of LPRD, not needed since  Colonoscopy due declined scope will do Fit kit  Derm never been  Eye exam utd  Dental utd  Flu shot today    Review of Systems   Constitutional: Negative for activity change, appetite change, fatigue and fever.   Respiratory: Negative for cough and shortness of breath.    Cardiovascular: Negative for chest pain, palpitations and leg swelling.   Gastrointestinal: Negative for constipation, diarrhea, nausea and vomiting.   Genitourinary: Negative for difficulty urinating and dysuria.   Skin: Negative for rash.   Neurological: Negative for dizziness and light-headedness.   Psychiatric/Behavioral: Negative for sleep disturbance.       Objective:      /78   Pulse 91   Ht 5' 7" (1.702 m)   Wt 93.1 kg (205 lb 2.2 oz)   LMP 01/17/2017 (Approximate)   SpO2 98%   BMI 32.13 kg/m²   Physical Exam   Constitutional: She appears well-developed and well-nourished.   HENT:   Head: Normocephalic and atraumatic.   Mouth/Throat: No oropharyngeal exudate.   Neck: Normal range of motion. Neck supple. No thyromegaly present.   Cardiovascular: Normal rate, regular rhythm and normal heart sounds.   Pulmonary/Chest: Effort normal and breath sounds normal. No respiratory distress.   Abdominal: Soft. Bowel sounds are normal. She exhibits no distension. There is no tenderness. "   Musculoskeletal: She exhibits no edema.   Lymphadenopathy:     She has no cervical adenopathy.   Neurological: She is alert.   Skin: Skin is warm and dry.   Psychiatric: She has a normal mood and affect.   Nursing note and vitals reviewed.      Assessment:       1. Annual physical exam    2. Essential hypertension    3. Penicillin allergy    4. Counseling about travel        Plan:   Shelbie was seen today for annual exam and establish care.    Diagnoses and all orders for this visit:    Annual physical exam    Essential hypertension  -     [facility]  -     Hypertension Digital Medicine ([facility]): Assign Onboarding Questionnaires  -     Comprehensive metabolic panel; Future    Penicillin allergy  -     Ambulatory referral to Allergy    Counseling about travel  -     Ambulatory Referral to Travel Clinic

## 2018-09-17 NOTE — PROGRESS NOTES
After obtaining consent, and per orders of Dr. Carrasquillo, injection of fluzone Lot [id number] 6/30/19 given in the LD by AYALA RIVERA. Patient tolerated well and band aid applied. Patient instructed to remain in clinic for 15 minutes afterwards, and to report any adverse reaction to me immediately.

## 2018-09-24 ENCOUNTER — CLINICAL SUPPORT (OUTPATIENT)
Dept: INTERNAL MEDICINE | Facility: CLINIC | Age: 48
End: 2018-09-24
Payer: COMMERCIAL

## 2018-09-24 DIAGNOSIS — Z23 NEED FOR DIPHTHERIA-TETANUS-PERTUSSIS (TDAP) VACCINE: Primary | ICD-10-CM

## 2018-09-24 PROCEDURE — 90471 IMMUNIZATION ADMIN: CPT | Mod: S$GLB,,, | Performed by: FAMILY MEDICINE

## 2018-09-24 PROCEDURE — 90715 TDAP VACCINE 7 YRS/> IM: CPT | Mod: S$GLB,,, | Performed by: FAMILY MEDICINE

## 2018-09-24 NOTE — PROGRESS NOTES
After obtaining consent, and per orders of Dr. Carrasquillo, injection of tdap Lot [id number] Exp 2/6/20 given in the LD by AYALA RIVERA. Patient tolerated well and band aid applied. Patient instructed to remain in clinic for 15 minutes afterwards, and to report any adverse reaction to me immediately.

## 2018-09-25 ENCOUNTER — OFFICE VISIT (OUTPATIENT)
Dept: ALLERGY | Facility: CLINIC | Age: 48
End: 2018-09-25
Payer: COMMERCIAL

## 2018-09-25 VITALS
HEIGHT: 67 IN | SYSTOLIC BLOOD PRESSURE: 120 MMHG | DIASTOLIC BLOOD PRESSURE: 60 MMHG | BODY MASS INDEX: 31.87 KG/M2 | OXYGEN SATURATION: 96 % | WEIGHT: 203.06 LBS | HEART RATE: 90 BPM

## 2018-09-25 DIAGNOSIS — Z88.0 HISTORY OF PENICILLIN ALLERGY: Primary | ICD-10-CM

## 2018-09-25 DIAGNOSIS — H10.423 SIMPLE CHRONIC CONJUNCTIVITIS OF BOTH EYES: ICD-10-CM

## 2018-09-25 DIAGNOSIS — I10 ESSENTIAL HYPERTENSION: ICD-10-CM

## 2018-09-25 DIAGNOSIS — M15.9 PRIMARY OSTEOARTHRITIS INVOLVING MULTIPLE JOINTS: ICD-10-CM

## 2018-09-25 PROCEDURE — 99999 PR PBB SHADOW E&M-EST. PATIENT-LVL III: CPT | Mod: PBBFAC,,, | Performed by: ALLERGY & IMMUNOLOGY

## 2018-09-25 PROCEDURE — 99244 OFF/OP CNSLTJ NEW/EST MOD 40: CPT | Mod: S$GLB,,, | Performed by: ALLERGY & IMMUNOLOGY

## 2018-09-25 NOTE — LETTER
September 25, 2018      Kendra Carrasquillo MD  [address] [place]  [address]  [place] [address]           [facility]  [address]  [facility] [address]  Phone: [phone]  Fax: [phone]          Patient: Shelbie Ardon   MR Number: [id number]   YOB: 1970   Date of Visit: 9/25/2018       Dear Dr. Kendra Carrasquillo:    Thank you for referring Shelbie Ardon to me for evaluation. Attached you will find relevant portions of my assessment and plan of care.    If you have questions, please do not hesitate to call me. I look forward to following Shelbie Ardon along with you.    Sincerely,    OSMANY Davis III, MD    Enclosure  CC:  No Recipients    If you would like to receive this communication electronically, please contact [email or web address] or [phone] to request more information on [employer] Link access.    For providers and/or their staff who would like to refer a patient to [facility], please contact us through our one-stop-shop provider referral line, [facility], at [phone].    If you feel you have received this communication in error or would no longer like to receive these types of communications, please e-mail [email or web address]

## 2018-09-25 NOTE — PROGRESS NOTES
Shelbie Ardon is referred by Dr. Kendra Carrasquillo for a consult regarding a history of penicillin allergy.  She is here alone.    When she was in high school, she had strep throat and her pediatrician gave her IM penicillin.  She subsequently developed an erythematous rash that was red, raised, and pruritic.  She was told to avoid penicillin.    In 1990 she had a rash after taking sulfa and now avoids.    In 1993 or '94 she had a tooth abscess and was given oral Keflex.  She developed urticaria.    In 1989 she had difficulty breathing after Brevital.    About a month after another strep throat she developed a rash that was diagnosed as erythema nodosum.  She was evaluated and treated by Dr. Pk Salguero in Dermatology.    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  She works with her , Dr. Padilla Howard who was at [facility] before [place].  He had endocarditis with a penicillin allergy and had open heart surgery.    She has itching of her eyes.  She attributes this to her three cats as well as dry eye syndrome.  She uses Alaway or Zaditor twice a day with relief.    She has hypertension that is controlled with chlorthalidone and losartan.  She has osteoarthritis of her toes, left knee, right hip, and left thumb.    [facility] PEQ ALLERGY QUESTIONNAIRE LONG 9/21/2018   Do you have symptoms in your head, eyes, ears, nose, or sinuses? No   Head or facial pain: No symptoms   Eyes: Itching, Contacts   Which kind of eye drops do you use, if applicable? Alaway/Zador   Ears: No symptoms   Nose: No symptoms   Throat: No symptoms   Sinuses: No symptoms   Do you have symptoms in your lungs?  No   Lungs: No symptoms   Have you ever has a tuberculosis skin test?  Yes   When did you have a tuberculosis skin test, if applicable? approx 20 years ago   Was your tuberculosis skin test positive or negative, if applicable? Negative   Have you ever had a lung-function test? No   Have you had a  flu shot this year? Yes   Have you had the pneumonia vaccine?  No   Do you have any known problems with your immune system? No   Do you suspect you may have problems with your immune system? No   Do you have frequent infections? No   Do you have skin symptoms? No   Skin: No symptoms   Have you associated the hives or swelling with any of the following? Not applicable   Have you had any other associated symptoms with the hives or swelling such as: Not applicable   When did these symptoms first occur? n/a   Are they getting worse or better? Better   How often do these symptoms occur? n/a   When do these symptoms occur? n/a   Do they occur year round? No   If there is any seasonal variation in your symptoms, when are they worse? n/a   Is there a particular time of the day or night when the symptoms are worse? n/a   Is there anything you have identified, which can cause symptoms or make them worse? (such as dust, grass, plant or animal products, mold, heat, cold, strong odors, exercise) n/a   Is there anything you have identified, which can make symptoms better?  n/a   What medications have you tried in the past to help control these symptoms?  n/a   Please list all the vitamins or herbal medications you are taking. n/a   Please list all the other medications you are taking, including over-the-counter medications. n/a   Have you ever seen an allergist for these symptoms? No   When did you see the allergist, if applicable? n/a   Have you ever had skin tests? No   When did you have skin tests, if applicable? n/a   Have you ever had any other type of allergy testing? No   When did you have allergy tests, if applicable?  n/a   Have you ever had allergy shots? No   How long did you receive allergy shots, if applicable? n/a   Were the allergy shots helpful, if applicable? No   Do you have food allergies? No   Do you have drug allergies? Yes   Please list the drug(s), type of reaction(s), last date of reaction(s), and if you  have used the medication since discovering you are allergic.  Penicillin, hives, 1987, Keflex, hives, 1994, Sulfa-abx, hives, 1990, Brevital, trouble breathing, 1999   Do you have insect allergies? No   Do you have latex allergies? No   Constitution: No symptoms   Cardiovascular: High blood pressue   Gastrointestinal: No symptoms   Genital/ urinary No symptoms   Musculoskeletal: Joint pain   Endocrine: No symptoms   Hematologic: No symptoms   How long have you lived at your current address? 2 years   Has your residence ever had water or flood damage? No   Is there any evidence of mold in the house? No   Does your house have: Central air conditioning   Does your bedroom have: Carpeting   What type of pillow do you have, for example feather, foam and fiberfill?  fiber and foam   Do you have pets? Yes   Please list the type of pet(s), how many, how long you have had the pet(s), whether or not the pet(s) are living inside or outside, and whether the pet(s) aggravate your symptoms.  cats   Does anyone in the house smoke? No   What is your occupation?    Did you find this questionnaire helpful in addressing your symptoms?  No     Physical Examination:  General: Well-developed, well-nourished, no acute distress.  Head: No sinus tenderness.  Eyes: Conjunctivae:  No bulbar or palpebral conjunctival injection.  Ears: EAC's clear.  TM's clear.  No pre-auricular nodes.  Nose: Nasal Mucosa:  Pink.  Septum: No apparent deviation.  Turbinates:  No significant edema.  Polyps/Mass:  None visible.  Teeth/Gums:  No bleeding noted.  Oropharynx: No exudates.  Neck: Supple without thyromegaly. No cervical lymphadenopathy.    Respiratory/Chest: Effort: Good.  Auscultation:  Clear bilaterally.  Cardiovascular:  No murmur, rubs, or gallop heard.   GI:  Non-tender.  No masses.  No organomegaly.  Extremities:  No swelling.  No cyanosis, clubbing, or edema.  Skin: Good turgor.  No urticaria or angioedema.  Neuro/Psych: Oriented x  3.    Assessment:  1.  History of penicillin allergy.  2.  Chronic conjunctivitis, consider allergic.  3.  Hypertension on chlorthalidone and losartan.  4.  Osteoarthritis.    Recommendations:  1.  Penicillin skin testing in the future.  2.  Hold eyedrops before test.  3.  She may have her  scheduled for penicillin skin testing as well.

## 2018-10-03 NOTE — PROGRESS NOTES
Shelbie Ardon received IM Depo Provera to the right upper outer side of the deltoid on 10/2/2018     The patient was instructed to get the next injection on .    Lot Number: [id number]  Expiration Date: 06/2020  BY PERRY HUMMEL

## 2018-10-30 ENCOUNTER — PATIENT MESSAGE (OUTPATIENT)
Dept: INTERNAL MEDICINE | Facility: CLINIC | Age: 48
End: 2018-10-30

## 2018-11-06 ENCOUNTER — CLINICAL SUPPORT (OUTPATIENT)
Dept: INTERNAL MEDICINE | Facility: CLINIC | Age: 48
End: 2018-11-06
Payer: COMMERCIAL

## 2018-11-06 DIAGNOSIS — I10 ESSENTIAL HYPERTENSION: ICD-10-CM

## 2018-11-06 LAB
ALBUMIN SERPL BCP-MCNC: 3.7 G/DL
ALP SERPL-CCNC: 69 U/L
ALT SERPL W/O P-5'-P-CCNC: 24 U/L
ANION GAP SERPL CALC-SCNC: 9 MMOL/L
AST SERPL-CCNC: 19 U/L
BILIRUB SERPL-MCNC: 0.6 MG/DL
BUN SERPL-MCNC: 17 MG/DL
CALCIUM SERPL-MCNC: 10 MG/DL
CHLORIDE SERPL-SCNC: 103 MMOL/L
CO2 SERPL-SCNC: 27 MMOL/L
CREAT SERPL-MCNC: 0.9 MG/DL
EST. GFR  (AFRICAN AMERICAN): >60 ML/MIN/1.73 M^2
EST. GFR  (NON AFRICAN AMERICAN): >60 ML/MIN/1.73 M^2
GLUCOSE SERPL-MCNC: 100 MG/DL
POTASSIUM SERPL-SCNC: 3.6 MMOL/L
PROT SERPL-MCNC: 7.4 G/DL
SODIUM SERPL-SCNC: 139 MMOL/L

## 2018-11-06 PROCEDURE — 80053 COMPREHEN METABOLIC PANEL: CPT

## 2019-02-01 DIAGNOSIS — Z12.11 COLON CANCER SCREENING: ICD-10-CM

## 2019-03-04 ENCOUNTER — PATIENT OUTREACH (OUTPATIENT)
Dept: ADMINISTRATIVE | Facility: HOSPITAL | Age: 49
End: 2019-03-04

## 2019-03-11 DIAGNOSIS — I10 ESSENTIAL HYPERTENSION: ICD-10-CM

## 2019-03-11 RX ORDER — CHLORTHALIDONE 25 MG/1
TABLET ORAL
Qty: 30 TABLET | Refills: 3 | Status: SHIPPED | OUTPATIENT
Start: 2019-03-11 | End: 2019-03-18 | Stop reason: SDUPTHER

## 2019-03-18 ENCOUNTER — OFFICE VISIT (OUTPATIENT)
Dept: INTERNAL MEDICINE | Facility: CLINIC | Age: 49
End: 2019-03-18
Payer: COMMERCIAL

## 2019-03-18 VITALS
SYSTOLIC BLOOD PRESSURE: 122 MMHG | HEIGHT: 67 IN | HEART RATE: 102 BPM | WEIGHT: 210.31 LBS | BODY MASS INDEX: 33.01 KG/M2 | DIASTOLIC BLOOD PRESSURE: 74 MMHG | OXYGEN SATURATION: 99 %

## 2019-03-18 DIAGNOSIS — Z13.21 ENCOUNTER FOR VITAMIN DEFICIENCY SCREENING: ICD-10-CM

## 2019-03-18 DIAGNOSIS — I10 ESSENTIAL HYPERTENSION: Primary | ICD-10-CM

## 2019-03-18 DIAGNOSIS — Z13.220 ENCOUNTER FOR LIPID SCREENING FOR CARDIOVASCULAR DISEASE: ICD-10-CM

## 2019-03-18 DIAGNOSIS — Z13.6 ENCOUNTER FOR LIPID SCREENING FOR CARDIOVASCULAR DISEASE: ICD-10-CM

## 2019-03-18 DIAGNOSIS — E78.2 MIXED HYPERLIPIDEMIA: ICD-10-CM

## 2019-03-18 PROCEDURE — 3008F PR BODY MASS INDEX (BMI) DOCUMENTED: ICD-10-PCS | Mod: CPTII,S$GLB,, | Performed by: FAMILY MEDICINE

## 2019-03-18 PROCEDURE — 3008F BODY MASS INDEX DOCD: CPT | Mod: CPTII,S$GLB,, | Performed by: FAMILY MEDICINE

## 2019-03-18 PROCEDURE — 99999 PR PBB SHADOW E&M-EST. PATIENT-LVL III: ICD-10-PCS | Mod: PBBFAC,,, | Performed by: FAMILY MEDICINE

## 2019-03-18 PROCEDURE — 99214 PR OFFICE/OUTPT VISIT, EST, LEVL IV, 30-39 MIN: ICD-10-PCS | Mod: S$GLB,,, | Performed by: FAMILY MEDICINE

## 2019-03-18 PROCEDURE — 99214 OFFICE O/P EST MOD 30 MIN: CPT | Mod: S$GLB,,, | Performed by: FAMILY MEDICINE

## 2019-03-18 PROCEDURE — 99999 PR PBB SHADOW E&M-EST. PATIENT-LVL III: CPT | Mod: PBBFAC,,, | Performed by: FAMILY MEDICINE

## 2019-03-18 RX ORDER — LOSARTAN POTASSIUM 100 MG/1
100 TABLET ORAL DAILY
Qty: 90 TABLET | Refills: 3 | Status: SHIPPED | OUTPATIENT
Start: 2019-03-18 | End: 2019-04-30 | Stop reason: SDUPTHER

## 2019-03-18 RX ORDER — CHLORTHALIDONE 25 MG/1
25 TABLET ORAL DAILY
Qty: 90 TABLET | Refills: 3 | Status: SHIPPED | OUTPATIENT
Start: 2019-03-18 | End: 2020-04-13

## 2019-03-18 NOTE — PROGRESS NOTES
"Subjective:       Patient ID: Shelbie Ardon is a 49 y.o. female.    Chief Complaint: Hypertension    HPI  50 y/o female with HTN is here for follow up    She has been traveling to a from [place] to visit her mother that is in a nursing home and not doing well.  She has a home cuff that has not been checked, does not want to do digital med, weight up 5 pounds.  She is running 120's over 70's at home, checking weekly.  She is walking 30 min days a week and yoga a few days a week.  She is sleeping ok, she denies f/n/v/d/constipation/cp/sob/urinary sx.  Trying to eat healthy.     HTN: diagnosed about 4 years ago, doing well on losartan and chlorthalidone, followed by cardiology, has a home bp cuff  OA hip and OA knee: taking celebrex 1-2 times a month  GYN: mmg utd and normal, has upcoming appt  Hx of LPRD, not needed since  Colonoscopy due declined scope, mother had a perforation with colonoscopy, will do Fit kit  Derm never been  Eye exam utd  Dental utd  OTC: fish oil, tumeric, Vitamin D 500 IU      Review of Systems   Constitutional: Negative for activity change, appetite change, fatigue and fever.   Respiratory: Negative for cough and shortness of breath.    Cardiovascular: Negative for chest pain, palpitations and leg swelling.   Gastrointestinal: Negative for constipation, diarrhea, nausea and vomiting.   Genitourinary: Negative for difficulty urinating and dysuria.   Skin: Negative for rash.   Neurological: Negative for dizziness and light-headedness.   Psychiatric/Behavioral: Negative for sleep disturbance.       Objective:      /74 (BP Location: Right arm, Patient Position: Sitting, BP Method: X-Large (Manual))   Pulse 102   Ht 5' 7" (1.702 m)   Wt 95.4 kg (210 lb 5.1 oz)   SpO2 99%   BMI 32.94 kg/m²   Physical Exam   Constitutional: She appears well-developed and well-nourished.   HENT:   Head: Normocephalic and atraumatic.   Mouth/Throat: No oropharyngeal exudate.   Neck: Normal range of motion. " Neck supple. No thyromegaly present.   Cardiovascular: Normal rate, regular rhythm and normal heart sounds.   Pulmonary/Chest: Effort normal and breath sounds normal. No respiratory distress.   Musculoskeletal: She exhibits no edema.   Lymphadenopathy:     She has no cervical adenopathy.   Neurological: She is alert.   Skin: Skin is warm and dry.   Psychiatric: She has a normal mood and affect.   Nursing note and vitals reviewed.      Assessment:       1. Essential hypertension    2. Encounter for lipid screening for cardiovascular disease    3. BMI 32.0-32.9,adult    4. Encounter for vitamin deficiency screening    5. Mixed hyperlipidemia        Plan:   Shelbie was seen today for hypertension.    Diagnoses and all orders for this visit:    Essential hypertension  -     CBC auto differential; Future  -     Comprehensive metabolic panel; Future  -     TSH; Future  -     Urinalysis; Future  -     losartan (COZAAR) 100 MG tablet; Take 1 tablet (100 mg total) by mouth once daily.  -     chlorthalidone (HYGROTEN) 25 MG Tab; Take 1 tablet (25 mg total) by mouth once daily.    Encounter for lipid screening for cardiovascular disease  -     Lipid panel; Future    BMI 32.0-32.9,adult  -     Hemoglobin A1c; Future    Encounter for vitamin deficiency screening  -     Vitamin D; Future    Mixed hyperlipidemia  -     losartan (COZAAR) 100 MG tablet; Take 1 tablet (100 mg total) by mouth once daily.

## 2019-03-20 ENCOUNTER — OFFICE VISIT (OUTPATIENT)
Dept: OBSTETRICS AND GYNECOLOGY | Facility: CLINIC | Age: 49
End: 2019-03-20
Payer: COMMERCIAL

## 2019-03-20 VITALS
HEIGHT: 67 IN | DIASTOLIC BLOOD PRESSURE: 92 MMHG | WEIGHT: 208.75 LBS | BODY MASS INDEX: 32.76 KG/M2 | SYSTOLIC BLOOD PRESSURE: 144 MMHG

## 2019-03-20 DIAGNOSIS — Z30.9 ENCOUNTER FOR CONTRACEPTIVE MANAGEMENT, UNSPECIFIED TYPE: ICD-10-CM

## 2019-03-20 DIAGNOSIS — Z12.31 SCREENING MAMMOGRAM, ENCOUNTER FOR: ICD-10-CM

## 2019-03-20 DIAGNOSIS — Z01.419 WELL WOMAN EXAM WITH ROUTINE GYNECOLOGICAL EXAM: Primary | ICD-10-CM

## 2019-03-20 PROCEDURE — 99396 PR PREVENTIVE VISIT,EST,40-64: ICD-10-PCS | Mod: S$GLB,,, | Performed by: OBSTETRICS & GYNECOLOGY

## 2019-03-20 PROCEDURE — 99396 PREV VISIT EST AGE 40-64: CPT | Mod: S$GLB,,, | Performed by: OBSTETRICS & GYNECOLOGY

## 2019-03-20 PROCEDURE — 99999 PR PBB SHADOW E&M-EST. PATIENT-LVL III: ICD-10-PCS | Mod: PBBFAC,,, | Performed by: OBSTETRICS & GYNECOLOGY

## 2019-03-20 PROCEDURE — 99999 PR PBB SHADOW E&M-EST. PATIENT-LVL III: CPT | Mod: PBBFAC,,, | Performed by: OBSTETRICS & GYNECOLOGY

## 2019-03-20 RX ORDER — MEDROXYPROGESTERONE ACETATE 150 MG/ML
INJECTION, SUSPENSION INTRAMUSCULAR
Qty: 1 ML | Refills: 2 | Status: SHIPPED | OUTPATIENT
Start: 2019-03-20 | End: 2020-05-05

## 2019-03-20 NOTE — PROGRESS NOTES
Subjective:       Patient ID: Shelbie Ardon is a 49 y.o. female.    Chief Complaint:  Well Woman      History of Present Illness  HPI  This 49 yr old P0 female is here for routine exam.  She is using depoprovera 150 every 3 months for birth control and for cycle control and no bleeding.  She had last pap 2017 and due next yr.  She had normal mammogram with low risk 6 months ago.  She is not having any symptoms of menopause and her mother and sister both had menses until mid 50's.  She had terrible irreg and heavy bleeding before starting the depoprovera about yr and half ago.  We had a very long discussion and will continue for one more yr and re eval.  She agrees to take baby ASA daily. No complaints.    GYN & OB History  No LMP recorded. Patient is not currently having periods (Reason: Birth Control).   Date of Last Pap: 2017    OB History    Para Term  AB Living       0         SAB TAB Ectopic Multiple Live Births                         Review of Systems  Review of Systems   Constitutional: Negative for chills and fever.   Respiratory: Negative for shortness of breath.    Cardiovascular: Negative for chest pain.   Gastrointestinal: Negative for abdominal pain, nausea and vomiting.   Genitourinary: Negative for difficulty urinating, dyspareunia, genital sores, menstrual problem, pelvic pain, vaginal bleeding, vaginal discharge and vaginal pain.   Skin: Negative for wound.   Hematological: Negative for adenopathy.           Objective:   Physical Exam:   Constitutional: She is oriented to person, place, and time. She appears well-developed and well-nourished.    HENT:   Head: Normocephalic.    Eyes: EOM are normal.    Neck: Normal range of motion.    Cardiovascular: Normal rate.     Pulmonary/Chest: Effort normal. She exhibits no mass and no tenderness. Right breast exhibits no inverted nipple, no mass, no skin change and no tenderness. Left breast exhibits no inverted nipple, no mass,  no skin change and no tenderness.        Abdominal: Soft. She exhibits no distension. There is no tenderness.     Genitourinary: Vagina normal and uterus normal. There is no rash, tenderness or lesion on the right labia. There is no rash, tenderness or lesion on the left labia. Uterus is not tender. Cervix is normal. Right adnexum displays no mass, no tenderness and no fullness. Left adnexum displays no mass, no tenderness and no fullness. Cervix exhibits no discharge.           Musculoskeletal: Normal range of motion.       Neurological: She is alert and oriented to person, place, and time.    Skin: Skin is warm and dry.    Psychiatric: She has a normal mood and affect.          Assessment:        1. Well woman exam with routine gynecological exam    2. Encounter for contraceptive management, unspecified type               Plan:      Will continue depo for another yr and reassess yearly  Pap next yr and mammogram in June.  Continue exercise and watch BP

## 2019-04-08 ENCOUNTER — PATIENT MESSAGE (OUTPATIENT)
Dept: SPORTS MEDICINE | Facility: CLINIC | Age: 49
End: 2019-04-08

## 2019-04-08 DIAGNOSIS — M25.551 RIGHT HIP PAIN: ICD-10-CM

## 2019-04-08 RX ORDER — CELECOXIB 200 MG/1
200 CAPSULE ORAL 2 TIMES DAILY
Qty: 60 CAPSULE | Refills: 6 | Status: SHIPPED | OUTPATIENT
Start: 2019-04-08 | End: 2020-05-05

## 2019-04-30 DIAGNOSIS — I10 ESSENTIAL HYPERTENSION: ICD-10-CM

## 2019-04-30 DIAGNOSIS — E78.2 MIXED HYPERLIPIDEMIA: ICD-10-CM

## 2019-04-30 RX ORDER — LOSARTAN POTASSIUM 100 MG/1
TABLET ORAL
Qty: 90 TABLET | Refills: 0 | Status: SHIPPED | OUTPATIENT
Start: 2019-04-30 | End: 2019-10-01 | Stop reason: SDUPTHER

## 2019-05-30 ENCOUNTER — LAB VISIT (OUTPATIENT)
Dept: LAB | Facility: HOSPITAL | Age: 49
End: 2019-05-30
Attending: FAMILY MEDICINE
Payer: COMMERCIAL

## 2019-05-30 DIAGNOSIS — Z13.220 ENCOUNTER FOR LIPID SCREENING FOR CARDIOVASCULAR DISEASE: ICD-10-CM

## 2019-05-30 DIAGNOSIS — Z13.6 ENCOUNTER FOR LIPID SCREENING FOR CARDIOVASCULAR DISEASE: ICD-10-CM

## 2019-05-30 DIAGNOSIS — I10 ESSENTIAL HYPERTENSION: ICD-10-CM

## 2019-05-30 DIAGNOSIS — Z13.21 ENCOUNTER FOR VITAMIN DEFICIENCY SCREENING: ICD-10-CM

## 2019-05-30 LAB
25(OH)D3+25(OH)D2 SERPL-MCNC: 46 NG/ML (ref 30–96)
ALBUMIN SERPL BCP-MCNC: 4.1 G/DL (ref 3.5–5.2)
ALP SERPL-CCNC: 57 U/L (ref 55–135)
ALT SERPL W/O P-5'-P-CCNC: 17 U/L (ref 10–44)
ANION GAP SERPL CALC-SCNC: 9 MMOL/L (ref 8–16)
AST SERPL-CCNC: 18 U/L (ref 10–40)
BASOPHILS # BLD AUTO: 0.08 K/UL (ref 0–0.2)
BASOPHILS NFR BLD: 1 % (ref 0–1.9)
BILIRUB SERPL-MCNC: 0.6 MG/DL (ref 0.1–1)
BUN SERPL-MCNC: 12 MG/DL (ref 6–20)
CALCIUM SERPL-MCNC: 10 MG/DL (ref 8.7–10.5)
CHLORIDE SERPL-SCNC: 103 MMOL/L (ref 95–110)
CHOLEST SERPL-MCNC: 190 MG/DL (ref 120–199)
CHOLEST/HDLC SERPL: 4.3 {RATIO} (ref 2–5)
CO2 SERPL-SCNC: 25 MMOL/L (ref 23–29)
CREAT SERPL-MCNC: 0.9 MG/DL (ref 0.5–1.4)
DIFFERENTIAL METHOD: ABNORMAL
EOSINOPHIL # BLD AUTO: 0.3 K/UL (ref 0–0.5)
EOSINOPHIL NFR BLD: 3.5 % (ref 0–8)
ERYTHROCYTE [DISTWIDTH] IN BLOOD BY AUTOMATED COUNT: 11.9 % (ref 11.5–14.5)
EST. GFR  (AFRICAN AMERICAN): >60 ML/MIN/1.73 M^2
EST. GFR  (NON AFRICAN AMERICAN): >60 ML/MIN/1.73 M^2
ESTIMATED AVG GLUCOSE: 91 MG/DL (ref 68–131)
GLUCOSE SERPL-MCNC: 93 MG/DL (ref 70–110)
HBA1C MFR BLD HPLC: 4.8 % (ref 4–5.6)
HCT VFR BLD AUTO: 42.2 % (ref 37–48.5)
HDLC SERPL-MCNC: 44 MG/DL (ref 40–75)
HDLC SERPL: 23.2 % (ref 20–50)
HGB BLD-MCNC: 14.6 G/DL (ref 12–16)
IMM GRANULOCYTES # BLD AUTO: 0.03 K/UL (ref 0–0.04)
IMM GRANULOCYTES NFR BLD AUTO: 0.4 % (ref 0–0.5)
LDLC SERPL CALC-MCNC: 120.2 MG/DL (ref 63–159)
LYMPHOCYTES # BLD AUTO: 2.4 K/UL (ref 1–4.8)
LYMPHOCYTES NFR BLD: 30.7 % (ref 18–48)
MCH RBC QN AUTO: 32.2 PG (ref 27–31)
MCHC RBC AUTO-ENTMCNC: 34.6 G/DL (ref 32–36)
MCV RBC AUTO: 93 FL (ref 82–98)
MONOCYTES # BLD AUTO: 0.6 K/UL (ref 0.3–1)
MONOCYTES NFR BLD: 7.4 % (ref 4–15)
NEUTROPHILS # BLD AUTO: 4.5 K/UL (ref 1.8–7.7)
NEUTROPHILS NFR BLD: 57 % (ref 38–73)
NONHDLC SERPL-MCNC: 146 MG/DL
NRBC BLD-RTO: 0 /100 WBC
PLATELET # BLD AUTO: 296 K/UL (ref 150–350)
PMV BLD AUTO: 10.7 FL (ref 9.2–12.9)
POTASSIUM SERPL-SCNC: 3.7 MMOL/L (ref 3.5–5.1)
PROT SERPL-MCNC: 7.3 G/DL (ref 6–8.4)
RBC # BLD AUTO: 4.53 M/UL (ref 4–5.4)
SODIUM SERPL-SCNC: 137 MMOL/L (ref 136–145)
TRIGL SERPL-MCNC: 129 MG/DL (ref 30–150)
TSH SERPL DL<=0.005 MIU/L-ACNC: 1.9 UIU/ML (ref 0.4–4)
WBC # BLD AUTO: 7.81 K/UL (ref 3.9–12.7)

## 2019-05-30 PROCEDURE — 85025 COMPLETE CBC W/AUTO DIFF WBC: CPT

## 2019-05-30 PROCEDURE — 80061 LIPID PANEL: CPT

## 2019-05-30 PROCEDURE — 82306 VITAMIN D 25 HYDROXY: CPT

## 2019-05-30 PROCEDURE — 83036 HEMOGLOBIN GLYCOSYLATED A1C: CPT

## 2019-05-30 PROCEDURE — 36415 COLL VENOUS BLD VENIPUNCTURE: CPT | Mod: PO

## 2019-05-30 PROCEDURE — 84443 ASSAY THYROID STIM HORMONE: CPT

## 2019-05-30 PROCEDURE — 80053 COMPREHEN METABOLIC PANEL: CPT

## 2019-06-19 ENCOUNTER — HOSPITAL ENCOUNTER (OUTPATIENT)
Dept: RADIOLOGY | Facility: HOSPITAL | Age: 49
Discharge: HOME | End: 2019-06-19
Attending: OBSTETRICS & GYNECOLOGY
Payer: COMMERCIAL

## 2019-06-19 DIAGNOSIS — Z12.31 SCREENING MAMMOGRAM, ENCOUNTER FOR: ICD-10-CM

## 2019-06-19 PROCEDURE — 77063 MAMMO DIGITAL SCREENING BILAT WITH TOMOSYNTHESIS_CAD: ICD-10-PCS | Mod: 26,,, | Performed by: RADIOLOGY

## 2019-06-19 PROCEDURE — 77067 MAMMO DIGITAL SCREENING BILAT WITH TOMOSYNTHESIS_CAD: ICD-10-PCS | Mod: 26,,, | Performed by: RADIOLOGY

## 2019-06-19 PROCEDURE — 77067 SCR MAMMO BI INCL CAD: CPT | Mod: 26,,, | Performed by: RADIOLOGY

## 2019-06-19 PROCEDURE — 77067 SCR MAMMO BI INCL CAD: CPT | Mod: TC,PO

## 2019-06-19 PROCEDURE — 77063 BREAST TOMOSYNTHESIS BI: CPT | Mod: 26,,, | Performed by: RADIOLOGY

## 2019-09-17 ENCOUNTER — PATIENT OUTREACH (OUTPATIENT)
Dept: ADMINISTRATIVE | Facility: HOSPITAL | Age: 49
End: 2019-09-17

## 2019-10-01 ENCOUNTER — OFFICE VISIT (OUTPATIENT)
Dept: INTERNAL MEDICINE | Facility: CLINIC | Age: 49
End: 2019-10-01
Payer: COMMERCIAL

## 2019-10-01 VITALS
HEART RATE: 68 BPM | DIASTOLIC BLOOD PRESSURE: 76 MMHG | TEMPERATURE: 99 F | SYSTOLIC BLOOD PRESSURE: 124 MMHG | OXYGEN SATURATION: 99 % | BODY MASS INDEX: 26.19 KG/M2 | WEIGHT: 166.88 LBS | HEIGHT: 67 IN

## 2019-10-01 DIAGNOSIS — Z00.00 ANNUAL PHYSICAL EXAM: Primary | ICD-10-CM

## 2019-10-01 DIAGNOSIS — I10 ESSENTIAL HYPERTENSION: ICD-10-CM

## 2019-10-01 DIAGNOSIS — E78.2 MIXED HYPERLIPIDEMIA: ICD-10-CM

## 2019-10-01 DIAGNOSIS — M17.10 PRIMARY LOCALIZED OSTEOARTHROSIS OF LOWER LEG, UNSPECIFIED LATERALITY: ICD-10-CM

## 2019-10-01 DIAGNOSIS — Z12.83 SKIN CANCER SCREENING: ICD-10-CM

## 2019-10-01 DIAGNOSIS — Z12.11 SCREEN FOR COLON CANCER: ICD-10-CM

## 2019-10-01 LAB
ALBUMIN SERPL BCP-MCNC: 4.2 G/DL (ref 3.5–5.2)
ALP SERPL-CCNC: 57 U/L (ref 55–135)
ALT SERPL W/O P-5'-P-CCNC: 17 U/L (ref 10–44)
ANION GAP SERPL CALC-SCNC: 10 MMOL/L (ref 8–16)
AST SERPL-CCNC: 22 U/L (ref 10–40)
BILIRUB SERPL-MCNC: 0.6 MG/DL (ref 0.1–1)
BUN SERPL-MCNC: 19 MG/DL (ref 6–20)
CALCIUM SERPL-MCNC: 9.5 MG/DL (ref 8.7–10.5)
CHLORIDE SERPL-SCNC: 101 MMOL/L (ref 95–110)
CO2 SERPL-SCNC: 25 MMOL/L (ref 23–29)
CREAT SERPL-MCNC: 0.8 MG/DL (ref 0.5–1.4)
EST. GFR  (AFRICAN AMERICAN): >60 ML/MIN/1.73 M^2
EST. GFR  (NON AFRICAN AMERICAN): >60 ML/MIN/1.73 M^2
GLUCOSE SERPL-MCNC: 89 MG/DL (ref 70–110)
POTASSIUM SERPL-SCNC: 3.8 MMOL/L (ref 3.5–5.1)
PROT SERPL-MCNC: 7.3 G/DL (ref 6–8.4)
SODIUM SERPL-SCNC: 136 MMOL/L (ref 136–145)

## 2019-10-01 PROCEDURE — 99396 PR PREVENTIVE VISIT,EST,40-64: ICD-10-PCS | Mod: 25,S$GLB,, | Performed by: FAMILY MEDICINE

## 2019-10-01 PROCEDURE — 90471 IMMUNIZATION ADMIN: CPT | Mod: S$GLB,,, | Performed by: FAMILY MEDICINE

## 2019-10-01 PROCEDURE — 90686 IIV4 VACC NO PRSV 0.5 ML IM: CPT | Mod: S$GLB,,, | Performed by: FAMILY MEDICINE

## 2019-10-01 PROCEDURE — 99999 PR PBB SHADOW E&M-EST. PATIENT-LVL IV: ICD-10-PCS | Mod: PBBFAC,,, | Performed by: FAMILY MEDICINE

## 2019-10-01 PROCEDURE — 90686 FLU VACCINE (QUAD) GREATER THAN OR EQUAL TO 3YO PRESERVATIVE FREE IM: ICD-10-PCS | Mod: S$GLB,,, | Performed by: FAMILY MEDICINE

## 2019-10-01 PROCEDURE — 99999 PR PBB SHADOW E&M-EST. PATIENT-LVL IV: CPT | Mod: PBBFAC,,, | Performed by: FAMILY MEDICINE

## 2019-10-01 PROCEDURE — 90471 FLU VACCINE (QUAD) GREATER THAN OR EQUAL TO 3YO PRESERVATIVE FREE IM: ICD-10-PCS | Mod: S$GLB,,, | Performed by: FAMILY MEDICINE

## 2019-10-01 PROCEDURE — 99396 PREV VISIT EST AGE 40-64: CPT | Mod: 25,S$GLB,, | Performed by: FAMILY MEDICINE

## 2019-10-01 PROCEDURE — 80053 COMPREHEN METABOLIC PANEL: CPT

## 2019-10-01 RX ORDER — DICLOFENAC SODIUM 10 MG/G
2 GEL TOPICAL
Qty: 1 TUBE | Refills: 1 | Status: SHIPPED | OUTPATIENT
Start: 2019-10-01 | End: 2020-10-14

## 2019-10-01 RX ORDER — LOSARTAN POTASSIUM 100 MG/1
100 TABLET ORAL DAILY
Qty: 90 TABLET | Refills: 1 | Status: SHIPPED | OUTPATIENT
Start: 2019-10-01 | End: 2020-05-05 | Stop reason: SDUPTHER

## 2019-10-01 NOTE — PROGRESS NOTES
After obtaining consent, and per orders of Dr. Carrasquillo, injection of Fluarix Lot [id number] Exp 06/30/2020 given in the LD by Vanda Mercedes. Patient tolerated well and band aid applied. Patient instructed to remain in clinic for 15 minutes afterwards, and to report any adverse reaction to me immediately.

## 2019-10-01 NOTE — PROGRESS NOTES
"Subjective:       Patient ID: Shelbie Ardon is a 49 y.o. female.    Chief Complaint: Annual Exam    HPI  48 y/o female with HTN is here for annual exam.     She has lost 40 pounds over the past 6 months, she has been doing weight watchers, she is walking most days for an hour. She has known L knee OA, was followed by sports med in the past, she is taking Celebrex prn and topical diclofenac prn.  She is sleeping ok, she denies f/n/v/d/constipation/cp/sob/urinary sx.      HTN: diagnosed about 2015, losartan 100 mg daily and chlorthalidone 25 mg daily, followed by cardiology, has a home bp cuff  OA hip and OA knee: taking celebrex 1-2 times a month  GYN: mmg utd and normal, depo shot montly  Hx of LPRD, not needed since  Colonoscopy due declined scope, mother had a perforation with colonoscopy, will do Fit kit  Derm never been  Eye exam utd  Dental utd  OTC: fish oil, tumeric, Vitamin D 500 IU    Review of Systems   Constitutional: Negative for activity change, appetite change, fatigue and fever.   Respiratory: Negative for cough and shortness of breath.    Cardiovascular: Negative for chest pain, palpitations and leg swelling.   Gastrointestinal: Negative for constipation, diarrhea, nausea and vomiting.   Genitourinary: Negative for difficulty urinating and dysuria.   Skin: Negative for rash.   Neurological: Negative for dizziness and light-headedness.   Psychiatric/Behavioral: Negative for sleep disturbance.       Objective:      /76 (BP Location: Left arm, Patient Position: Sitting, BP Method: Large (Manual))   Pulse 68   Temp 98.5 °F (36.9 °C)   Ht 5' 7" (1.702 m)   Wt 75.7 kg (166 lb 14.2 oz)   LMP  (LMP Unknown)   SpO2 99%   BMI 26.14 kg/m²   Physical Exam   Constitutional: She appears well-developed and well-nourished.   HENT:   Head: Normocephalic and atraumatic.   Mouth/Throat: No oropharyngeal exudate.   Neck: Normal range of motion. Neck supple. No thyromegaly present.   Cardiovascular: " Normal rate, regular rhythm and normal heart sounds.   Pulmonary/Chest: Effort normal and breath sounds normal. No respiratory distress.   Abdominal: Soft. Bowel sounds are normal. She exhibits no distension. There is no tenderness.   Musculoskeletal: She exhibits no edema.   Lymphadenopathy:     She has no cervical adenopathy.   Neurological: She is alert.   Skin: Skin is warm and dry.   Psychiatric: She has a normal mood and affect.   Nursing note and vitals reviewed.      Assessment:       1. Annual physical exam    2. Primary localized osteoarthrosis of lower leg, unspecified laterality    3. Essential hypertension    4. Mixed hyperlipidemia    5. Screen for colon cancer    6. Skin cancer screening        Plan:   Shelbie was seen today for annual exam.    Diagnoses and all orders for this visit:    Annual physical exam    Primary localized osteoarthrosis of lower leg, unspecified laterality  -     diclofenac sodium (VOLTAREN) 1 % Gel; Apply 2 g topically as needed.    Essential hypertension  -     losartan (COZAAR) 100 MG tablet; Take 1 tablet (100 mg total) by mouth once daily.  -     Comprehensive metabolic panel    Mixed hyperlipidemia  -     losartan (COZAAR) 100 MG tablet; Take 1 tablet (100 mg total) by mouth once daily.    Screen for colon cancer  -     Fecal Immunochemical Test (iFOBT); Future    Skin cancer screening  -     Ambulatory referral to Dermatology

## 2019-11-11 ENCOUNTER — LAB VISIT (OUTPATIENT)
Dept: LAB | Facility: HOSPITAL | Age: 49
End: 2019-11-11
Attending: FAMILY MEDICINE
Payer: COMMERCIAL

## 2019-11-11 DIAGNOSIS — Z12.11 SCREEN FOR COLON CANCER: ICD-10-CM

## 2019-11-11 PROCEDURE — 82274 ASSAY TEST FOR BLOOD FECAL: CPT

## 2019-11-20 LAB — HEMOCCULT STL QL IA: NEGATIVE

## 2019-12-10 ENCOUNTER — OFFICE VISIT (OUTPATIENT)
Dept: DERMATOLOGY | Facility: CLINIC | Age: 49
End: 2019-12-10
Payer: COMMERCIAL

## 2019-12-10 DIAGNOSIS — L81.4 LENTIGO: Primary | ICD-10-CM

## 2019-12-10 DIAGNOSIS — D22.9 MULTIPLE BENIGN NEVI: ICD-10-CM

## 2019-12-10 DIAGNOSIS — L82.1 SK (SEBORRHEIC KERATOSIS): ICD-10-CM

## 2019-12-10 DIAGNOSIS — Z12.83 SCREENING EXAM FOR SKIN CANCER: ICD-10-CM

## 2019-12-10 PROCEDURE — 99999 PR PBB SHADOW E&M-EST. PATIENT-LVL II: CPT | Mod: PBBFAC,,, | Performed by: DERMATOLOGY

## 2019-12-10 PROCEDURE — 99203 PR OFFICE/OUTPT VISIT, NEW, LEVL III, 30-44 MIN: ICD-10-PCS | Mod: S$GLB,,, | Performed by: DERMATOLOGY

## 2019-12-10 PROCEDURE — 99999 PR PBB SHADOW E&M-EST. PATIENT-LVL II: ICD-10-PCS | Mod: PBBFAC,,, | Performed by: DERMATOLOGY

## 2019-12-10 PROCEDURE — 99203 OFFICE O/P NEW LOW 30 MIN: CPT | Mod: S$GLB,,, | Performed by: DERMATOLOGY

## 2019-12-10 NOTE — PROGRESS NOTES
Subjective:       Patient ID:  Shelbie Ardon is a 49 y.o. female who presents for   Chief Complaint   Patient presents with    Skin Check     TBSE      Here for TBSE    No h/o nmsc or mm    Had dry patch on left ear x 5 days that self resolved    + fishes and boats      Review of Systems   Skin: Positive for daily sunscreen use and activity-related sunscreen use. Negative for recent sunburn.   Hematologic/Lymphatic: Does not bruise/bleed easily.        Objective:    Physical Exam   Constitutional: She appears well-developed and well-nourished. No distress.   Neurological: She is alert and oriented to person, place, and time. She is not disoriented.   Psychiatric: She has a normal mood and affect.   Skin:   Areas Examined (abnormalities noted in diagram):   Scalp / Hair Palpated and Inspected  Head / Face Inspection Performed  Neck Inspection Performed  Chest / Axilla Inspection Performed  Abdomen Inspection Performed  Genitals / Buttocks / Groin Inspection Performed  Back Inspection Performed  RUE Inspected  LUE Inspection Performed  RLE Inspected  LLE Inspection Performed  Nails and Digits Inspection Performed                       Diagram Legend     Erythematous scaling macule/papule c/w actinic keratosis       Vascular papule c/w angioma      Pigmented verrucoid papule/plaque c/w seborrheic keratosis      Yellow umbilicated papule c/w sebaceous hyperplasia      Irregularly shaped tan macule c/w lentigo     1-2 mm smooth white papules consistent with Milia      Movable subcutaneous cyst with punctum c/w epidermal inclusion cyst      Subcutaneous movable cyst c/w pilar cyst      Firm pink to brown papule c/w dermatofibroma      Pedunculated fleshy papule(s) c/w skin tag(s)      Evenly pigmented macule c/w junctional nevus     Mildly variegated pigmented, slightly irregular-bordered macule c/w mildly atypical nevus      Flesh colored to evenly pigmented papule c/w intradermal nevus       Pink pearly  papule/plaque c/w basal cell carcinoma      Erythematous hyperkeratotic cursted plaque c/w SCC      Surgical scar with no sign of skin cancer recurrence      Open and closed comedones      Inflammatory papules and pustules      Verrucoid papule consistent consistent with wart     Erythematous eczematous patches and plaques     Dystrophic onycholytic nail with subungual debris c/w onychomycosis     Umbilicated papule    Erythematous-base heme-crusted tan verrucoid plaque consistent with inflamed seborrheic keratosis     Erythematous Silvery Scaling Plaque c/w Psoriasis     See annotation      Assessment / Plan:        Lentigo  This is a benign hyperpigmented sun induced lesion. Daily sun protection will reduce the number of new lesions. Treatment of these benign lesions are considered cosmetic.      SK (seborrheic keratosis)  These are benign inherited growths without a malignant potential. Reassurance given to patient. No treatment is necessary.     Multiple benign nevi  total body skin examination performed today including at least 12 points as noted in physical examination. No lesions suspicious for malignancy noted.  Reassurance provided.  Instructed patient to observe lesion(s) for changes and follow up in clinic if changes are noted. Discussed ABCDE's of moles and brochure provided.    Screening exam for skin cancer    Total body skin examination performed today including at least 12 points as noted in physical examination. No lesions suspicious for malignancy noted.  Patient instructed in importance in daily sun protection of at least spf 30. Sun avoidance and topical protection discussed.     Patient encouraged to wear hat for all outdoor exposure.     Also discussed sun protective clothing.               Follow up if symptoms worsen or fail to improve.

## 2020-01-22 ENCOUNTER — OFFICE VISIT (OUTPATIENT)
Dept: OBSTETRICS AND GYNECOLOGY | Facility: CLINIC | Age: 50
End: 2020-01-22
Payer: COMMERCIAL

## 2020-01-22 ENCOUNTER — LAB VISIT (OUTPATIENT)
Dept: LAB | Facility: HOSPITAL | Age: 50
End: 2020-01-22
Attending: OBSTETRICS & GYNECOLOGY
Payer: COMMERCIAL

## 2020-01-22 VITALS
BODY MASS INDEX: 24.4 KG/M2 | SYSTOLIC BLOOD PRESSURE: 122 MMHG | HEIGHT: 67 IN | DIASTOLIC BLOOD PRESSURE: 80 MMHG | WEIGHT: 155.44 LBS

## 2020-01-22 DIAGNOSIS — Z78.0 MENOPAUSE: ICD-10-CM

## 2020-01-22 DIAGNOSIS — Z12.4 PAP SMEAR FOR CERVICAL CANCER SCREENING: ICD-10-CM

## 2020-01-22 DIAGNOSIS — Z01.419 WELL WOMAN EXAM WITH ROUTINE GYNECOLOGICAL EXAM: Primary | ICD-10-CM

## 2020-01-22 LAB
ESTRADIOL SERPL-MCNC: <10 PG/ML
FSH SERPL-ACNC: 38.1 MIU/ML
PROGEST SERPL-MCNC: 0.3 NG/ML

## 2020-01-22 PROCEDURE — 99999 PR PBB SHADOW E&M-EST. PATIENT-LVL III: CPT | Mod: PBBFAC,,, | Performed by: OBSTETRICS & GYNECOLOGY

## 2020-01-22 PROCEDURE — 84402 ASSAY OF FREE TESTOSTERONE: CPT

## 2020-01-22 PROCEDURE — 84144 ASSAY OF PROGESTERONE: CPT

## 2020-01-22 PROCEDURE — 83001 ASSAY OF GONADOTROPIN (FSH): CPT

## 2020-01-22 PROCEDURE — 99396 PR PREVENTIVE VISIT,EST,40-64: ICD-10-PCS | Mod: S$GLB,,, | Performed by: OBSTETRICS & GYNECOLOGY

## 2020-01-22 PROCEDURE — 36415 COLL VENOUS BLD VENIPUNCTURE: CPT

## 2020-01-22 PROCEDURE — 87624 HPV HI-RISK TYP POOLED RSLT: CPT

## 2020-01-22 PROCEDURE — 99999 PR PBB SHADOW E&M-EST. PATIENT-LVL III: ICD-10-PCS | Mod: PBBFAC,,, | Performed by: OBSTETRICS & GYNECOLOGY

## 2020-01-22 PROCEDURE — 99396 PREV VISIT EST AGE 40-64: CPT | Mod: S$GLB,,, | Performed by: OBSTETRICS & GYNECOLOGY

## 2020-01-22 PROCEDURE — 88175 CYTOPATH C/V AUTO FLUID REDO: CPT

## 2020-01-22 PROCEDURE — 82670 ASSAY OF TOTAL ESTRADIOL: CPT

## 2020-01-22 NOTE — PROGRESS NOTES
Subjective:       Patient ID: Shelbie Ardon is a 49 y.o. female.    Chief Complaint:  Well Woman      History of Present Illness  HPI  This 49 yr old G0 female is here for WWE.  Her last pap was negative in  and her mammogram was negative and low risk in .  She just lost lost 60 lbs with weight watcher and exercise.  She had her last injection of depoprovera in Sept and not bleeding now.  Having night sweats and has hip pain that also keeps her up.  We discussed and will check her hormone levels and decide what to do next.  She is open to HRT just wants to make sure this is what this is.  She will call when she is ready to come in and discuss.    GYN & OB History  No LMP recorded. (Menstrual status: Birth Control).   Date of Last Pap: 2017    OB History    Para Term  AB Living       0         SAB TAB Ectopic Multiple Live Births                   Review of Systems  Review of Systems   Constitutional: Negative for chills and fever.   Respiratory: Negative for shortness of breath.    Cardiovascular: Negative for chest pain.   Gastrointestinal: Negative for abdominal pain, nausea and vomiting.   Endocrine: Positive for heat intolerance.   Genitourinary: Negative for difficulty urinating, dyspareunia, genital sores, menstrual problem, pelvic pain, vaginal bleeding, vaginal discharge and vaginal pain.   Skin: Negative for wound.   Hematological: Negative for adenopathy.           Objective:   Physical Exam:   Constitutional: She is oriented to person, place, and time. She appears well-developed and well-nourished.    HENT:   Head: Normocephalic.    Eyes: EOM are normal.    Neck: Normal range of motion.    Cardiovascular: Normal rate.     Pulmonary/Chest: Effort normal. She exhibits no mass and no tenderness. Right breast exhibits no inverted nipple, no mass, no skin change and no tenderness. Left breast exhibits no inverted nipple, no mass, no skin change and no tenderness.         Abdominal: Soft. She exhibits no distension. There is no tenderness.     Genitourinary: Vagina normal and uterus normal. There is no rash, tenderness or lesion on the right labia. There is no rash, tenderness or lesion on the left labia. Uterus is not tender. Cervix is normal. Right adnexum displays no mass, no tenderness and no fullness. Left adnexum displays no mass, no tenderness and no fullness. Cervix exhibits no discharge.           Musculoskeletal: Normal range of motion.       Neurological: She is alert and oriented to person, place, and time.    Skin: Skin is warm and dry.    Psychiatric: She has a normal mood and affect.          Assessment:        1. Well woman exam with routine gynecological exam    2. Pap smear for cervical cancer screening    3. Menopause               Plan:      Pap today  Mammogram yearly  Will get labs today and pt to let me know if she would like to try HRT in few months.

## 2020-01-27 LAB — TESTOST FREE SERPL-MCNC: 0.7 PG/ML

## 2020-01-28 ENCOUNTER — PATIENT MESSAGE (OUTPATIENT)
Dept: OBSTETRICS AND GYNECOLOGY | Facility: CLINIC | Age: 50
End: 2020-01-28

## 2020-01-28 LAB
HPV HR 12 DNA SPEC QL NAA+PROBE: NEGATIVE
HPV16 AG SPEC QL: NEGATIVE
HPV18 DNA SPEC QL NAA+PROBE: NEGATIVE

## 2020-02-13 LAB
FINAL PATHOLOGIC DIAGNOSIS: NORMAL
Lab: NORMAL

## 2020-02-17 ENCOUNTER — HOSPITAL ENCOUNTER (OUTPATIENT)
Dept: RADIOLOGY | Facility: HOSPITAL | Age: 50
Discharge: HOME | End: 2020-02-17
Attending: ORTHOPAEDIC SURGERY
Payer: COMMERCIAL

## 2020-02-17 ENCOUNTER — OFFICE VISIT (OUTPATIENT)
Dept: SPORTS MEDICINE | Facility: CLINIC | Age: 50
End: 2020-02-17
Payer: COMMERCIAL

## 2020-02-17 ENCOUNTER — TELEPHONE (OUTPATIENT)
Dept: SPORTS MEDICINE | Facility: CLINIC | Age: 50
End: 2020-02-17

## 2020-02-17 VITALS
DIASTOLIC BLOOD PRESSURE: 87 MMHG | BODY MASS INDEX: 24.33 KG/M2 | HEIGHT: 67 IN | WEIGHT: 155 LBS | HEART RATE: 71 BPM | SYSTOLIC BLOOD PRESSURE: 132 MMHG

## 2020-02-17 DIAGNOSIS — M25.859 FEMORAL ACETABULAR IMPINGEMENT: Primary | ICD-10-CM

## 2020-02-17 DIAGNOSIS — M16.11 ARTHRITIS OF RIGHT HIP: ICD-10-CM

## 2020-02-17 DIAGNOSIS — M25.552 PAIN OF BOTH HIP JOINTS: Primary | ICD-10-CM

## 2020-02-17 DIAGNOSIS — M25.551 RIGHT HIP PAIN: ICD-10-CM

## 2020-02-17 DIAGNOSIS — M25.552 PAIN OF BOTH HIP JOINTS: ICD-10-CM

## 2020-02-17 DIAGNOSIS — M25.551 PAIN OF BOTH HIP JOINTS: Primary | ICD-10-CM

## 2020-02-17 DIAGNOSIS — M21.851 ACQUIRED DYSPLASIA OF RIGHT HIP: ICD-10-CM

## 2020-02-17 DIAGNOSIS — M25.551 PAIN OF BOTH HIP JOINTS: ICD-10-CM

## 2020-02-17 PROCEDURE — 3008F BODY MASS INDEX DOCD: CPT | Mod: CPTII,S$GLB,, | Performed by: ORTHOPAEDIC SURGERY

## 2020-02-17 PROCEDURE — 73521 X-RAY EXAM HIPS BI 2 VIEWS: CPT | Mod: 26,,, | Performed by: RADIOLOGY

## 2020-02-17 PROCEDURE — 20611 PR DRAIN/ASP/INJECT MAJOR JOINT/BURSA W/US GUIDANCE: ICD-10-PCS | Mod: RT,S$GLB,, | Performed by: ORTHOPAEDIC SURGERY

## 2020-02-17 PROCEDURE — 3008F PR BODY MASS INDEX (BMI) DOCUMENTED: ICD-10-PCS | Mod: CPTII,S$GLB,, | Performed by: ORTHOPAEDIC SURGERY

## 2020-02-17 PROCEDURE — 99214 PR OFFICE/OUTPT VISIT, EST, LEVL IV, 30-39 MIN: ICD-10-PCS | Mod: 25,S$GLB,, | Performed by: ORTHOPAEDIC SURGERY

## 2020-02-17 PROCEDURE — 99214 OFFICE O/P EST MOD 30 MIN: CPT | Mod: 25,S$GLB,, | Performed by: ORTHOPAEDIC SURGERY

## 2020-02-17 PROCEDURE — 99999 PR PBB SHADOW E&M-EST. PATIENT-LVL III: ICD-10-PCS | Mod: PBBFAC,,, | Performed by: ORTHOPAEDIC SURGERY

## 2020-02-17 PROCEDURE — 73521 XR HIPS BILATERAL 2 VIEW INCL AP PELVIS: ICD-10-PCS | Mod: 26,,, | Performed by: RADIOLOGY

## 2020-02-17 PROCEDURE — 99999 PR PBB SHADOW E&M-EST. PATIENT-LVL III: CPT | Mod: PBBFAC,,, | Performed by: ORTHOPAEDIC SURGERY

## 2020-02-17 PROCEDURE — 20611 DRAIN/INJ JOINT/BURSA W/US: CPT | Mod: RT,S$GLB,, | Performed by: ORTHOPAEDIC SURGERY

## 2020-02-17 PROCEDURE — 73521 X-RAY EXAM HIPS BI 2 VIEWS: CPT | Mod: TC

## 2020-02-17 RX ORDER — TRIAMCINOLONE ACETONIDE 40 MG/ML
80 INJECTION, SUSPENSION INTRA-ARTICULAR; INTRAMUSCULAR
Status: COMPLETED | OUTPATIENT
Start: 2020-02-17 | End: 2020-02-17

## 2020-02-17 RX ORDER — MELOXICAM 15 MG/1
15 TABLET ORAL DAILY
Qty: 30 TABLET | Refills: 6 | Status: SHIPPED | OUTPATIENT
Start: 2020-02-17 | End: 2020-03-19

## 2020-02-17 RX ADMIN — TRIAMCINOLONE ACETONIDE 80 MG: 40 INJECTION, SUSPENSION INTRA-ARTICULAR; INTRAMUSCULAR at 04:02

## 2020-02-17 NOTE — PROGRESS NOTES
Subjective:          Chief Complaint: Shelbie Ardon is a 50 y.o. female who had concerns including Pain of the Right Hip.    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 Pain is located over anterior hip, lateral thigh, gluteus muscle and later knee on the R hip. She reports that the pain is a 3 /10 sharp, radiating, intermittent, pain with movement and deep pain today and not responding adequately to conservative measures which have included activity modifications, rest, and oral medication. Is affecting ADLs and limiting desired level of activity. She has tried Celebrex without relief.  Denies numbness, tingling, radiation, and inability to bear weight. She enjoys walking for exercises and was once able to walk 5 miles. However, she now has severe pain at mile 2.  Pain is 8 /10 at its worst    Mechanical symptoms:  Subjective instability: (+)   Worse with walking.  Better with rest.   Nocturnal symptoms: (+)    No previous surgeries or trauma on Right hip    DATE OF PROCEDURE:  08/27/2013      ATTENDING SURGEON:  Vern Carrasquillo M.D.      FIRST ASSISTANT:  Giles Hamlin M.D. (RES).     SECOND ASSISTANT:  Sarkis Salazar.     PREOPERATIVE DIAGNOSIS:  Left knee painful hardware.     POSTOPERATIVE DIAGNOSES:  1.  Left knee medial meniscus tear.  2.  Left knee chondromalacia.  3.  Left knee painful hardware.     OPERATIVE PROCEDURES PERFORMED:  1.  Left knee arthroscopic medial meniscectomy.  2.  Left knee arthroscopic chondroplasty.  3.  Left knee deep complex hardware removal.          Review of Systems   Constitution: Negative.   HENT: Negative.    Eyes: Negative.    Cardiovascular: Negative.    Respiratory: Negative.    Endocrine: Negative.    Hematologic/Lymphatic: Negative.    Skin: Negative.    Musculoskeletal: Positive for joint pain.   Gastrointestinal: Negative.    Genitourinary: Negative.     Neurological: Negative.    Psychiatric/Behavioral: Negative.    Allergic/Immunologic: Negative.        Pain Related Questions  Over the past 3 days, what was your average pain during activity? (I.e. running, jogging, walking, climbing stairs, getting dressed, ect.): 8  Over the past 3 days, what was your highest pain level?: 8  Over the past 3 days, what was your lowest pain level? : 5    Other  How many nights a week are you awakened by your affected body part?: 6  Was the patient's HEIGHT measured or patient reported?: Patient Reported  Was the patient's WEIGHT measured or patient reported?: Measured      Objective:        General: Shelbie is well-developed, well-nourished, appears stated age, in no acute distress, alert and oriented to time, place and person.     General    Vitals reviewed.  Constitutional: She is oriented to person, place, and time. She appears well-developed and well-nourished. No distress.   HENT:   Mouth/Throat: No oropharyngeal exudate.   Eyes: Right eye exhibits no discharge. Left eye exhibits no discharge.   Neck: Normal range of motion.   Cardiovascular: Normal rate.    Pulmonary/Chest: Effort normal and breath sounds normal. No respiratory distress.   Neurological: She is alert and oriented to person, place, and time. She has normal reflexes. No cranial nerve deficit. Coordination normal.   Psychiatric: She has a normal mood and affect. Her behavior is normal. Judgment and thought content normal.     General Musculoskeletal Exam   Gait: normal   Pelvic Obliquity: none      Right Knee Exam     Inspection   Alignment:  normal  Erythema: absent  Scars: absent  Swelling: absent  Effusion: absent  Deformity: absent  Bruising: absent    Tenderness   The patient is experiencing no tenderness.     Range of Motion   Extension:  0 normal   Flexion:  130 normal     Tests   Meniscus   Greg:  Medial - negative Lateral - negative  Ligament Examination Lachman: normal (-1 to 2mm) PCL-Posterior  Drawer: normal (0 to 2mm)     MCL - Valgus: normal (0 to 2mm)  LCL - Varus: normalPivot Shift: normal (Equal)Reverse Pivot Shift: normal (Equal)Dial Test at 30 degrees: normal (< 5 degrees)Dial Test at 90 degrees: normal (< 5 degrees)  Posterior Sag Test: negative  Posterolateral Corner: unstable (>15 degrees difference)  Patella   Patellar apprehension: negative  Passive Patellar Tilt: neutral  Patellar Tracking: normal  Patellar Glide (quadrants): Lateral - 1   Medial - 2  Q-Angle at 90 degrees: normal  Patellar Grind: negative  J-Sign: none    Other   Meniscal Cyst: absent  Popliteal (Baker's) Cyst: absent  Sensation: normal    Left Knee Exam     Inspection   Alignment:  normal  Erythema: absent  Scars: absent  Swelling: absent  Effusion: absent  Deformity: absent  Bruising: absent    Tenderness   The patient is experiencing no tenderness.     Range of Motion   Extension:  0 normal   Flexion:  130 normal     Tests   Meniscus   Greg:  Medial - negative Lateral - negative  Stability Lachman: normal (-1 to 2mm) PCL-Posterior Drawer: normal (0 to 2mm)  MCL - Valgus: normal (0 to 2mm)  LCL - Varus: normal (0 to 2mm)Pivot Shift: normal (Equal)Reverse Pivot Shift: normal (Equal)Dial Test at 30 degrees: normal (< 5 degrees)Dial Test at 90 degrees: normal (< 5 degrees)  Posterior Sag Test: negative  Posterolateral Corner: unstable (>15 degrees difference)  Patella   Patellar apprehension: negative  Passive Patellar Tilt: neutral  Patellar Tracking: normal  Patellar Glide (Quadrants): Lateral - 1 Medial - 2  Q-Angle at 90 degrees: normal  Patellar Grind: negative  J-Sign: J sign absent    Other   Meniscal Cyst: absent  Popliteal (Baker's) Cyst: absent  Sensation: normal    Right Hip Exam     Inspection   Scars: absent  Swelling: absent  Bruising: absent  No deformity of hip.  Quadriceps Atrophy:  Negative  Erythema: absent    Range of Motion   Abduction:  30 normal   Adduction:  20 normal   Extension:  0 normal    Flexion:  90 abnormal   External rotation:  30 normal   Internal rotation:  0 abnormal     Tests   Pain w/ forced internal rotation (ERIK): present  Pain w/ forced external rotation (FADIR): present  Baylee: negative  Trendelenburg Test: positive  Circumduction test: negative  Stinchfield test: positive  Log Roll: negative  Snapping Hip (internal): negative  Step-down test: negative  Resisted sit-up pain: negative  Unresisted sit-up pain: negative  Resisted sit-up pain with adductor contraction pain: negative    Other   Sensation: normal  Left Hip Exam     Inspection   Scars: absent  Swelling: absent  No deformity of hip.  Quadriceps Atrophy:  negative  Erythema: absent  Bruising: absent    Range of Motion   Abduction:  30 normal   Adduction:  20 normal   Extension:  0 normal   Flexion:  120 normal   External rotation:  60 normal   Internal rotation: 10 normal     Tests   Pain w/ forced internal rotation (ERIK): absent  Pain w/ forced external rotation (FADIR): absent  Baylee: negative  Trendelenburg Test: negative  Circumduction test: negative  Stinchfield test: negative  Log Roll: negative  Snapping Hip (internal): negative  Step-down test: negative  Resisted sit-up pain: negative  Resisted sit-up pain with adductor contraction pain: negative  Unresisted sit-up pain: negative    Other   Sensation: normal      Back (L-Spine & T-Spine) / Neck (C-Spine) Exam   Back exam is normal.      Muscle Strength   Right Lower Extremity   Hip Abduction: 4/5   Hip Adduction: 5/5   Hip Flexion: 5/5   Ankle Dorsiflexion:  5/5   Left Lower Extremity   Hip Abduction: 5/5   Hip Adduction: 5/5   Hip Flexion: 5/5     Reflexes     Left Side  Quadriceps:  2+  Achilles:  2+    Right Side   Quadriceps:  2+  Achilles:  2+    Vascular Exam     Right Pulses  Dorsalis Pedis:      2+  Posterior Tibial:      2+        Left Pulses  Dorsalis Pedis:      2+  Posterior Tibial:      2+        Capillary Refill  Right Hand: normal capillary refill  Left  Hand: normal capillary refill    Edema  Right Upper Leg: absent  Left Upper Leg: absent    EXAMINATION:  XR HIPS BILATERAL 2 VIEW INCL AP PELVIS    CLINICAL HISTORY:  Pain in right hip    TECHNIQUE:  AP view of the pelvis and frogleg lateral views of both hips were performed.    COMPARISON:  February 2017.    FINDINGS:  Degenerative change at the hips right greater than left.  No fracture or bony destruction.      Impression       Degenerative change right greater than left hip.               Assessment:       Encounter Diagnoses   Name Primary?    Femoral acetabular impingement Yes    Right hip pain     Acquired dysplasia of right hip     Arthritis of right hip           Plan:       1. Franco Hip Score was filled out today in clinic.     RTC in 6 weeks with Dr. Vern Carrasquillo. Patient will fill out Franco Hip Score on return.    2. Injection Procedure-Right Hip    After time out was performed, including verification of patient ID, procedure, site and side, availability of information and equipment, review of safety issues, and agreement with consent, the procedure site was marked and the patient was prepped aseptically. A diagnostic and therapeutic injection of 5cc Kenalog/Marcaine and ethyl chloride spray was given under sterile technique using a 20g x 1.5 needle into the right Hip Joint in seated position.     The patient had no adverse reactions to the medication. Pain decreased. The patient was instructed to apply ice to the joint for 20 minutes and avoid strenuous activities for 24-36 hours following the injection. Patient was warned of possible blood sugar and/or blood pressure changes during that time. Following that time, patient can resume regular activities.    Patient was reminded to call the clinic immediately for any adverse side effects as explained in clinic today.    3. Ultrasound Guidance Procedure  Right Hip Joint visualized with documented right hip djd. Dynamic visualization of the 20g x 1.5  needle performed.                  [facility] patient questionnaires have been collected today.

## 2020-02-17 NOTE — TELEPHONE ENCOUNTER
Spoke to patient about appointment at 4:45pm. Requested that the patient come in to the office for an earlier appointment at 3:15pm.

## 2020-03-19 ENCOUNTER — OFFICE VISIT (OUTPATIENT)
Dept: OBSTETRICS AND GYNECOLOGY | Facility: CLINIC | Age: 50
End: 2020-03-19
Attending: OBSTETRICS & GYNECOLOGY
Payer: COMMERCIAL

## 2020-03-19 VITALS
WEIGHT: 152.75 LBS | BODY MASS INDEX: 23.93 KG/M2 | SYSTOLIC BLOOD PRESSURE: 144 MMHG | DIASTOLIC BLOOD PRESSURE: 86 MMHG

## 2020-03-19 DIAGNOSIS — Z78.0 MENOPAUSE: Primary | ICD-10-CM

## 2020-03-19 PROCEDURE — 99213 PR OFFICE/OUTPT VISIT, EST, LEVL III, 20-29 MIN: ICD-10-PCS | Mod: S$GLB,,, | Performed by: OBSTETRICS & GYNECOLOGY

## 2020-03-19 PROCEDURE — 99213 OFFICE O/P EST LOW 20 MIN: CPT | Mod: S$GLB,,, | Performed by: OBSTETRICS & GYNECOLOGY

## 2020-03-19 PROCEDURE — 3008F BODY MASS INDEX DOCD: CPT | Mod: CPTII,S$GLB,, | Performed by: OBSTETRICS & GYNECOLOGY

## 2020-03-19 PROCEDURE — 3008F PR BODY MASS INDEX (BMI) DOCUMENTED: ICD-10-PCS | Mod: CPTII,S$GLB,, | Performed by: OBSTETRICS & GYNECOLOGY

## 2020-03-19 NOTE — PROGRESS NOTES
Subjective:       Patient ID: Shelbie Ardon is a 50 y.o. female.    Chief Complaint:  Follow-up (discuss hormone treatment)      History of Present Illness  HPI   this 50 yr old P0 female is here to follow up on labs and to discuss starting on HRT.  She is in menopause and all of her labs are low as suspected.  She had some bleeding after her last deopprovera wore off.  Her biggest complaint was hot flashes but since the depo wore off she is not having as many issues.  She has foggy thinking and fatigue.  WE reviewed risks and benefits of HRT and discussed different ways to take.  Will get labs today and start on combipatch and see how she does and we might add testosterone at her follow up visit according to the labs and her needs at that time.  We spent over 15 min and all in counseling.  She is having some mild vaginal issues but not bad.    GYN & OB History  No LMP recorded. (Menstrual status: Birth Control).   Date of Last Pap: 2017    OB History    Para Term  AB Living   0 0 0 0 0 0   SAB TAB Ectopic Multiple Live Births   0 0 0 0 0       Review of Systems  Review of Systems   Constitutional: Negative for chills and fever.   Respiratory: Negative for shortness of breath.    Cardiovascular: Negative for chest pain.   Gastrointestinal: Negative for abdominal pain, nausea and vomiting.   Genitourinary: Negative for difficulty urinating, dyspareunia, genital sores, menstrual problem, pelvic pain, vaginal bleeding, vaginal discharge and vaginal pain.   Skin: Negative for wound.   Hematological: Negative for adenopathy.   Psychiatric/Behavioral: Positive for decreased concentration.           Objective:   Physical Exam       Assessment:        1. Menopause               Plan:      Start on combipatch and get labs and follow up in 8 weeks.  May add testosterone at that time.  We spent over 15 min face time and all in counseling.

## 2020-03-23 ENCOUNTER — TELEPHONE (OUTPATIENT)
Dept: SPORTS MEDICINE | Facility: CLINIC | Age: 50
End: 2020-03-23

## 2020-03-23 NOTE — TELEPHONE ENCOUNTER
Spoke with patient to reschedule her appointment to 5/18 due to the recent concerns of COVID-19. Patient understood.

## 2020-04-11 DIAGNOSIS — Z13.6 ENCOUNTER FOR LIPID SCREENING FOR CARDIOVASCULAR DISEASE: ICD-10-CM

## 2020-04-11 DIAGNOSIS — Z13.220 ENCOUNTER FOR LIPID SCREENING FOR CARDIOVASCULAR DISEASE: ICD-10-CM

## 2020-04-11 DIAGNOSIS — Z00.00 ANNUAL PHYSICAL EXAM: Primary | ICD-10-CM

## 2020-04-11 DIAGNOSIS — E78.2 MIXED HYPERLIPIDEMIA: ICD-10-CM

## 2020-04-11 DIAGNOSIS — I10 ESSENTIAL HYPERTENSION: ICD-10-CM

## 2020-04-13 DIAGNOSIS — I10 ESSENTIAL HYPERTENSION: ICD-10-CM

## 2020-04-13 RX ORDER — CHLORTHALIDONE 25 MG/1
TABLET ORAL
Qty: 90 TABLET | Refills: 1 | Status: SHIPPED | OUTPATIENT
Start: 2020-04-13 | End: 2020-07-11 | Stop reason: SDUPTHER

## 2020-04-13 RX ORDER — CHLORTHALIDONE 25 MG/1
25 TABLET ORAL DAILY
Qty: 90 TABLET | Refills: 0 | Status: SHIPPED | OUTPATIENT
Start: 2020-04-13 | End: 2020-05-05

## 2020-05-04 ENCOUNTER — LAB VISIT (OUTPATIENT)
Dept: LAB | Facility: HOSPITAL | Age: 50
End: 2020-05-04
Attending: FAMILY MEDICINE
Payer: COMMERCIAL

## 2020-05-04 DIAGNOSIS — Z13.220 ENCOUNTER FOR LIPID SCREENING FOR CARDIOVASCULAR DISEASE: ICD-10-CM

## 2020-05-04 DIAGNOSIS — Z00.00 ANNUAL PHYSICAL EXAM: ICD-10-CM

## 2020-05-04 DIAGNOSIS — Z13.6 ENCOUNTER FOR LIPID SCREENING FOR CARDIOVASCULAR DISEASE: ICD-10-CM

## 2020-05-04 DIAGNOSIS — I10 ESSENTIAL HYPERTENSION: ICD-10-CM

## 2020-05-04 DIAGNOSIS — E78.2 MIXED HYPERLIPIDEMIA: ICD-10-CM

## 2020-05-04 LAB
ALBUMIN SERPL BCP-MCNC: 3.7 G/DL (ref 3.5–5.2)
ALP SERPL-CCNC: 51 U/L (ref 55–135)
ALT SERPL W/O P-5'-P-CCNC: 13 U/L (ref 10–44)
ANION GAP SERPL CALC-SCNC: 9 MMOL/L (ref 8–16)
AST SERPL-CCNC: 16 U/L (ref 10–40)
BASOPHILS # BLD AUTO: 0.06 K/UL (ref 0–0.2)
BASOPHILS NFR BLD: 1 % (ref 0–1.9)
BILIRUB SERPL-MCNC: 0.6 MG/DL (ref 0.1–1)
BUN SERPL-MCNC: 14 MG/DL (ref 6–20)
CALCIUM SERPL-MCNC: 9 MG/DL (ref 8.7–10.5)
CHLORIDE SERPL-SCNC: 105 MMOL/L (ref 95–110)
CHOLEST SERPL-MCNC: 210 MG/DL (ref 120–199)
CHOLEST/HDLC SERPL: 3 {RATIO} (ref 2–5)
CO2 SERPL-SCNC: 25 MMOL/L (ref 23–29)
CREAT SERPL-MCNC: 0.7 MG/DL (ref 0.5–1.4)
DIFFERENTIAL METHOD: ABNORMAL
EOSINOPHIL # BLD AUTO: 0.2 K/UL (ref 0–0.5)
EOSINOPHIL NFR BLD: 2.7 % (ref 0–8)
ERYTHROCYTE [DISTWIDTH] IN BLOOD BY AUTOMATED COUNT: 11.6 % (ref 11.5–14.5)
EST. GFR  (AFRICAN AMERICAN): >60 ML/MIN/1.73 M^2
EST. GFR  (NON AFRICAN AMERICAN): >60 ML/MIN/1.73 M^2
ESTIMATED AVG GLUCOSE: 88 MG/DL (ref 68–131)
GLUCOSE SERPL-MCNC: 94 MG/DL (ref 70–110)
HBA1C MFR BLD HPLC: 4.7 % (ref 4–5.6)
HCT VFR BLD AUTO: 39.3 % (ref 37–48.5)
HDLC SERPL-MCNC: 70 MG/DL (ref 40–75)
HDLC SERPL: 33.3 % (ref 20–50)
HGB BLD-MCNC: 13.1 G/DL (ref 12–16)
IMM GRANULOCYTES # BLD AUTO: 0.01 K/UL (ref 0–0.04)
IMM GRANULOCYTES NFR BLD AUTO: 0.2 % (ref 0–0.5)
LDLC SERPL CALC-MCNC: 127.8 MG/DL (ref 63–159)
LYMPHOCYTES # BLD AUTO: 1.8 K/UL (ref 1–4.8)
LYMPHOCYTES NFR BLD: 29.4 % (ref 18–48)
MCH RBC QN AUTO: 33.7 PG (ref 27–31)
MCHC RBC AUTO-ENTMCNC: 33.3 G/DL (ref 32–36)
MCV RBC AUTO: 101 FL (ref 82–98)
MONOCYTES # BLD AUTO: 0.5 K/UL (ref 0.3–1)
MONOCYTES NFR BLD: 8.2 % (ref 4–15)
NEUTROPHILS # BLD AUTO: 3.5 K/UL (ref 1.8–7.7)
NEUTROPHILS NFR BLD: 58.5 % (ref 38–73)
NONHDLC SERPL-MCNC: 140 MG/DL
NRBC BLD-RTO: 0 /100 WBC
PLATELET # BLD AUTO: 223 K/UL (ref 150–350)
PMV BLD AUTO: 9.6 FL (ref 9.2–12.9)
POTASSIUM SERPL-SCNC: 4 MMOL/L (ref 3.5–5.1)
PROT SERPL-MCNC: 6.6 G/DL (ref 6–8.4)
RBC # BLD AUTO: 3.89 M/UL (ref 4–5.4)
SODIUM SERPL-SCNC: 139 MMOL/L (ref 136–145)
TRIGL SERPL-MCNC: 61 MG/DL (ref 30–150)
TSH SERPL DL<=0.005 MIU/L-ACNC: 0.74 UIU/ML (ref 0.4–4)
WBC # BLD AUTO: 5.95 K/UL (ref 3.9–12.7)

## 2020-05-04 PROCEDURE — 84443 ASSAY THYROID STIM HORMONE: CPT

## 2020-05-04 PROCEDURE — 85025 COMPLETE CBC W/AUTO DIFF WBC: CPT

## 2020-05-04 PROCEDURE — 80053 COMPREHEN METABOLIC PANEL: CPT

## 2020-05-04 PROCEDURE — 80061 LIPID PANEL: CPT

## 2020-05-04 PROCEDURE — 36415 COLL VENOUS BLD VENIPUNCTURE: CPT | Mod: PO

## 2020-05-04 PROCEDURE — 83036 HEMOGLOBIN GLYCOSYLATED A1C: CPT

## 2020-05-05 ENCOUNTER — OFFICE VISIT (OUTPATIENT)
Dept: INTERNAL MEDICINE | Facility: CLINIC | Age: 50
End: 2020-05-05
Payer: COMMERCIAL

## 2020-05-05 ENCOUNTER — PATIENT MESSAGE (OUTPATIENT)
Dept: INTERNAL MEDICINE | Facility: CLINIC | Age: 50
End: 2020-05-05

## 2020-05-05 DIAGNOSIS — Z12.31 VISIT FOR SCREENING MAMMOGRAM: ICD-10-CM

## 2020-05-05 DIAGNOSIS — I10 ESSENTIAL HYPERTENSION: ICD-10-CM

## 2020-05-05 DIAGNOSIS — E78.2 MIXED HYPERLIPIDEMIA: ICD-10-CM

## 2020-05-05 DIAGNOSIS — I10 ESSENTIAL HYPERTENSION: Primary | ICD-10-CM

## 2020-05-05 PROCEDURE — 99213 OFFICE O/P EST LOW 20 MIN: CPT | Mod: 95,,, | Performed by: FAMILY MEDICINE

## 2020-05-05 PROCEDURE — 99213 PR OFFICE/OUTPT VISIT, EST, LEVL III, 20-29 MIN: ICD-10-PCS | Mod: 95,,, | Performed by: FAMILY MEDICINE

## 2020-05-05 RX ORDER — MELOXICAM 15 MG/1
15 TABLET ORAL DAILY
COMMUNITY
End: 2020-06-30 | Stop reason: ALTCHOICE

## 2020-05-05 RX ORDER — LOSARTAN POTASSIUM 100 MG/1
100 TABLET ORAL DAILY
Qty: 90 TABLET | Refills: 1 | Status: SHIPPED | OUTPATIENT
Start: 2020-05-05 | End: 2020-05-05 | Stop reason: SDUPTHER

## 2020-05-05 RX ORDER — LOSARTAN POTASSIUM 100 MG/1
100 TABLET ORAL DAILY
Qty: 90 TABLET | Refills: 1 | Status: SHIPPED | OUTPATIENT
Start: 2020-05-05 | End: 2020-11-05 | Stop reason: SDUPTHER

## 2020-05-05 NOTE — PROGRESS NOTES
Subjective:       Patient ID: Shelbie Ardon is a 50 y.o. female.    Chief Complaint: No chief complaint on file.    HPI  51 y/o female with HTN is here for follow up HTN.     She reached her goal weight at [employer], she is still eating healthy and exercising daily, she was seen by sports med and a R LISETH was recomended. She is feeling good, she denies f/n/v/d/constipation/cp/sob/urinary sx. She is sleeping ok.  Home bp 130's over 80's. She has been taking Mobic 15 mg daily since Feb.     HTN: diagnosed about 2015, losartan 100 mg daily and chlorthalidone 25 mg daily, followed by cardiology,   OA hip and OA knee: off celebrex, mobic 15 mg daily, diclofenac topical  GYN: Combipatch, mmg due  Hx of LPRD, not needed since  Colonoscopy due declined scope, mother had a perforation with colonoscopy, will do Fit kit/cologuard   Derm utd  Eye exam utd  Dental utd  OTC: fish oil, tumeric, Vitamin D 500 IU    Labs from 5/4/20 reviewed     Review of Systems  see HPI  Objective:      There were no vitals taken for this visit.  Physical Exam   Constitutional: She is oriented to person, place, and time. She appears well-developed and well-nourished. No distress.   HENT:   Head: Normocephalic and atraumatic.   Pulmonary/Chest: No respiratory distress.   Neurological: She is alert and oriented to person, place, and time.   Skin: She is not diaphoretic.   Psychiatric: She has a normal mood and affect.       Assessment:       1. Essential hypertension    2. Mixed hyperlipidemia    3. Visit for screening mammogram        Plan:   Diagnoses and all orders for this visit:    Essential hypertension  -     losartan (COZAAR) 100 MG tablet; Take 1 tablet (100 mg total) by mouth once daily.    Mixed hyperlipidemia  -     losartan (COZAAR) 100 MG tablet; Take 1 tablet (100 mg total) by mouth once daily.    Visit for screening mammogram  -     Mammo Digital Screening Bilat w/ Charlie; Future    The patient location is: [place]  The chief  complaint leading to consultation is: HTN follow up  Visit type: audiovisual  Total time spent with patient: 15 min  Each patient to whom he or she provides medical services by telemedicine is:  (1) informed of the relationship between the physician and patient and the respective role of any other health care provider with respect to management of the patient; and (2) notified that he or she may decline to receive medical services by telemedicine and may withdraw from such care at any time.

## 2020-05-08 ENCOUNTER — TELEPHONE (OUTPATIENT)
Dept: SPORTS MEDICINE | Facility: CLINIC | Age: 50
End: 2020-05-08

## 2020-05-11 ENCOUNTER — OFFICE VISIT (OUTPATIENT)
Dept: SPORTS MEDICINE | Facility: CLINIC | Age: 50
End: 2020-05-11
Payer: COMMERCIAL

## 2020-05-11 DIAGNOSIS — M94.269: Primary | ICD-10-CM

## 2020-05-11 DIAGNOSIS — M48.061 DEGENERATIVE LUMBAR SPINAL STENOSIS: ICD-10-CM

## 2020-05-11 DIAGNOSIS — M25.551 RIGHT HIP PAIN: ICD-10-CM

## 2020-05-11 DIAGNOSIS — R93.7 ABNORMAL X-RAY OF LUMBAR SPINE: ICD-10-CM

## 2020-05-11 DIAGNOSIS — M24.151 DEGENERATIVE TEAR OF ACETABULAR LABRUM OF RIGHT HIP: ICD-10-CM

## 2020-05-11 DIAGNOSIS — M22.40 CHONDROMALACIA OF PATELLA, UNSPECIFIED LATERALITY: ICD-10-CM

## 2020-05-11 DIAGNOSIS — M25.569 KNEE PAIN, UNSPECIFIED CHRONICITY, UNSPECIFIED LATERALITY: ICD-10-CM

## 2020-05-11 DIAGNOSIS — M25.859 FEMORAL ACETABULAR IMPINGEMENT: ICD-10-CM

## 2020-05-11 DIAGNOSIS — M21.851 ACQUIRED DYSPLASIA OF RIGHT HIP: ICD-10-CM

## 2020-05-11 DIAGNOSIS — M17.10 PRIMARY LOCALIZED OSTEOARTHROSIS OF LOWER LEG, UNSPECIFIED LATERALITY: ICD-10-CM

## 2020-05-11 PROCEDURE — 99213 PR OFFICE/OUTPT VISIT, EST, LEVL III, 20-29 MIN: ICD-10-PCS | Mod: 95,,, | Performed by: ORTHOPAEDIC SURGERY

## 2020-05-11 PROCEDURE — 99213 OFFICE O/P EST LOW 20 MIN: CPT | Mod: 95,,, | Performed by: ORTHOPAEDIC SURGERY

## 2020-05-11 RX ORDER — DULOXETIN HYDROCHLORIDE 20 MG/1
20 CAPSULE, DELAYED RELEASE ORAL DAILY
Qty: 30 CAPSULE | Refills: 11 | Status: SHIPPED | OUTPATIENT
Start: 2020-05-11 | End: 2020-10-14

## 2020-05-11 RX ORDER — ALPRAZOLAM 0.5 MG/1
0.5 TABLET ORAL
Qty: 4 TABLET | Refills: 0 | Status: SHIPPED | OUTPATIENT
Start: 2020-05-11 | End: 2020-10-14

## 2020-05-11 RX ORDER — PREGABALIN 75 MG/1
75 CAPSULE ORAL 2 TIMES DAILY
Qty: 60 CAPSULE | Refills: 6 | Status: SHIPPED | OUTPATIENT
Start: 2020-05-11 | End: 2020-10-14

## 2020-05-11 NOTE — Clinical Note
Needs lumbar MRI scheduledRTC in 2-3  weeks with Dr. Vern Carrasquillo virtually for MRI result review Lumbar MRI. Patient will fill out Franco Hip Score and AP pelvis and frog lateral views bilateral hips on next in person visit on return.

## 2020-05-11 NOTE — PROGRESS NOTES
Subjective:          Chief Complaint: Shelbie Ardon is a 50 y.o. female who had no chief complaint listed for this encounter.    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 Pain is located over anterior hip, lateral thigh, gluteus muscle and later knee on the R hip. She reports that the pain is a 3 /10 sharp, radiating, intermittent, pain with movement and deep pain today and not responding adequately to conservative measures which have included activity modifications, rest, and oral medication. Is affecting ADLs and limiting desired level of activity. She has tried Celebrex without relief.  Denies numbness, tingling, radiation, and inability to bear weight. She enjoys walking for exercises and was once able to walk 5 miles. However, she now has severe pain at mile 2.  Pain is 8 /10 at its worst.     The injection that was performed helped with her right hip pain but she continues to have symptoms radiating to the lateral leg and the foot region. She reports no numbness.     Mechanical symptoms:  Subjective instability: (+)   Worse with walking.  Better with rest.   Nocturnal symptoms: (+)    No previous surgeries or trauma on Right hip    DATE OF PROCEDURE:  08/27/2013      ATTENDING SURGEON:  Vern Carrasquillo M.D.      FIRST ASSISTANT:  Giles Hamlin M.D. (RES).     SECOND ASSISTANT:  Sarkis Salazar.     PREOPERATIVE DIAGNOSIS:  Left knee painful hardware.     POSTOPERATIVE DIAGNOSES:  1.  Left knee medial meniscus tear.  2.  Left knee chondromalacia.  3.  Left knee painful hardware.     OPERATIVE PROCEDURES PERFORMED:  1.  Left knee arthroscopic medial meniscectomy.  2.  Left knee arthroscopic chondroplasty.  3.  Left knee deep complex hardware removal.    Telemedicine/Virtual Visit Documentation:    The patient location is: home    The chief complaint leading to consultation is: see  HPI    VISIT TYPE X  Virtual visit with synchronous audio and video   Telephone E/M service     Total time spent with patient: see X irvin on chart below.   More than half of the time was spent counseling or coordinating care including prognosis, differential diagnosis, risks and benefits of treatment, instructions, compliance risk reductions     EST MINUTES X  [id number] 5   [id number] 10   [id number] 15   [id number] 25   [id number] 40   NEW    83544 10   [id number] 20   [id number] 30   [id number] 45   [id number] 60   PHONE     5-10   79580 11-20   71865 21-30     H&P  Orthopaedics      SUBJECTIVE:    History of Present Illness:  Patient is a 50 y.o. female with increasing right sided leg and foot pain but improved right hip symptoms secondary to previous injection. Intermittent lumbar pain but no numbness reported.    Review of patient's allergies indicates:   -- Amlodipine     --  Edema, palpitations   -- Cephalexin -- Hives   -- Methohexital     --  Other reaction(s): Difficulty breathing   -- Penicillins -- Hives   -- Sulfa (sulfonamide antibiotics) -- Hives    Past Medical History:  No date: Arthritis  No date: Arthritis  No date: Breast cyst  No date: Cough  No date: Female infertility NEC  No date: Headache(784.0)  No date: Hyperlipidemia  No date: Hypertension  No date: Joint pain  12/17/2013: Tinnitus  Past Surgical History:  No date: DILATION AND CURETTAGE OF UTERUS  No date: KNEE SURGERY      Comment:  acl repair as child/ revision 2013  Review of patient's family history indicates:  Problem: Hypertension      Relation: Mother          Age of Onset: (Not Specified)  Problem: Hyperlipidemia      Relation: Mother          Age of Onset: (Not Specified)  Problem: Cancer      Relation: Father          Age of Onset: (Not Specified)          Comment: lung smoker  Problem: Lung cancer      Relation: Father          Age of Onset: (Not Specified)  Problem: Cancer      Relation: Paternal Grandmother          Age of Onset: (Not Specified)          Comment:  unknown GYN cancer   Problem: Lung cancer      Relation: Paternal Grandmother          Age of Onset: (Not Specified)  Problem: Cancer      Relation: Paternal Grandfather          Age of Onset: (Not Specified)  Problem: Lung cancer      Relation: Paternal Grandfather          Age of Onset: (Not Specified)  Problem: Psoriasis      Relation: Sister          Age of Onset: (Not Specified)  Problem: Psoriasis      Relation: Sister          Age of Onset: (Not Specified)  Problem: No Known Problems      Relation: Brother          Age of Onset: (Not Specified)  Problem: No Known Problems      Relation: Maternal Aunt          Age of Onset: (Not Specified)  Problem: No Known Problems      Relation: Maternal Uncle          Age of Onset: (Not Specified)  Problem: No Known Problems      Relation: Paternal Aunt          Age of Onset: (Not Specified)  Problem: No Known Problems      Relation: Paternal Uncle          Age of Onset: (Not Specified)  Problem: No Known Problems      Relation: Maternal Grandmother          Age of Onset: (Not Specified)  Problem: No Known Problems      Relation: Maternal Grandfather          Age of Onset: (Not Specified)  Problem: Anemia      Relation: Neg Hx          Age of Onset: (Not Specified)  Problem: Arrhythmia      Relation: Neg Hx          Age of Onset: (Not Specified)  Problem: Asthma      Relation: Neg Hx          Age of Onset: (Not Specified)  Problem: Clotting disorder      Relation: Neg Hx          Age of Onset: (Not Specified)  Problem: Fainting      Relation: Neg Hx          Age of Onset: (Not Specified)  Problem: Heart attack      Relation: Neg Hx          Age of Onset: (Not Specified)  Problem: Heart disease      Relation: Neg Hx          Age of Onset: (Not Specified)  Problem: Heart failure      Relation: Neg Hx          Age of Onset: (Not Specified)  Problem: Stroke      Relation: Neg Hx          Age of Onset: (Not Specified)  Problem: Atrial Septal Defect      Relation: Neg Hx           Age of Onset: (Not Specified)  Problem: Breast cancer      Relation: Neg Hx          Age of Onset: (Not Specified)  Problem: Colon cancer      Relation: Neg Hx          Age of Onset: (Not Specified)  Problem: Ovarian cancer      Relation: Neg Hx          Age of Onset: (Not Specified)  Problem: Melanoma      Relation: Neg Hx          Age of Onset: (Not Specified)    Social History    Tobacco Use      Smoking status: Never Smoker      Smokeless tobacco: Never Used    Alcohol use: Yes      Alcohol/week: 4.0 standard drinks      Types: 4 Glasses of wine per week      Frequency: 2-3 times a week      Drinks per session: 1 or 2      Comment: socially    Drug use: No       Review of Systems:  Patient denies constitutional symptoms, cardiac symptoms, respiratory symptoms, GI symptoms.  The remainder of the musculoskeletal ROS is included in the HPI.      OBJECTIVE:    Physical Exam:  Gen:  No acute distress  CV:  Peripherally well-perfused.  Pulses 2+ bilaterally.  Lungs:  Normal respiratory effort.  Abdomen:  Soft, non-tender, non-distended  Head/Neck:  Normocephalic.  Atraumatic. No TTP, AROM and PROM intact without pain  Neuro:  CN intact without deficit, SILT throughout B/L Upper & Lower Extremities    MSK:  See attached    No imaging was obtained for this visit as patient was unable to complete an in clinic visit.    ASSESSMENT/PLAN:    A/P: Shelbie Ardon is a 50 y.o. Hip is stable with plan for future US guided injections but will need later hip replacement; Lumbar symptoms increasing and will need lumbar MRI    Plan:  1. Franco Hip Score was filled out today in clinic.     RTC in 2-3  weeks with Dr. Vern Carrasquillo virtually for MRI result review Lumbar MRI. Patient will fill out Franco Hip Score and AP pelvis and frog lateral views bilateral hips on next in person visit on return.    2. 25 minute conversation undertaken with the patient    3. MRI Lumbar spine to assess level of stenosis    4. Cymbalta prescribed  along with lyrica    5. Aquatic program at home recommended              Review of Systems   Constitution: Negative. Negative for fever and night sweats.   HENT: Negative.  Negative for hearing loss.    Eyes: Negative.  Negative for blurred vision and visual disturbance.   Cardiovascular: Negative.  Negative for chest pain and leg swelling.   Respiratory: Negative.  Negative for shortness of breath.    Endocrine: Negative.  Negative for polyuria.   Hematologic/Lymphatic: Negative.  Negative for bleeding problem.   Skin: Negative.  Negative for rash.   Musculoskeletal: Positive for joint pain. Negative for back pain, joint swelling, muscle cramps and muscle weakness.   Gastrointestinal: Negative.  Negative for melena.   Genitourinary: Negative.  Negative for hematuria.   Neurological: Negative.  Negative for loss of balance, numbness and paresthesias.   Psychiatric/Behavioral: Negative.  Negative for altered mental status.   Allergic/Immunologic: Negative.                    Objective:        General: Shelbie is well-developed, well-nourished, appears stated age, in no acute distress, alert and oriented to time, place and person.     General    Vitals reviewed.  Constitutional: She is oriented to person, place, and time. She appears well-developed and well-nourished. No distress.   HENT:   Mouth/Throat: No oropharyngeal exudate.   Eyes: Right eye exhibits no discharge. Left eye exhibits no discharge.   Neck: Normal range of motion.   Cardiovascular: Normal rate.    Pulmonary/Chest: Effort normal and breath sounds normal. No respiratory distress.   Neurological: She is alert and oriented to person, place, and time. She has normal reflexes. No cranial nerve deficit. Coordination normal.   Psychiatric: She has a normal mood and affect. Her behavior is normal. Judgment and thought content normal.     General Musculoskeletal Exam   Gait: normal   Pelvic Obliquity: none      Right Knee Exam     Inspection   Alignment:   normal  Erythema: absent  Scars: absent  Swelling: absent  Effusion: absent  Deformity: absent  Bruising: absent    Tenderness   The patient is experiencing no tenderness.     Range of Motion   Extension:  0 normal   Flexion:  130 normal     Tests   Meniscus   Greg:  Medial - negative Lateral - negative  Ligament Examination Lachman: normal (-1 to 2mm) PCL-Posterior Drawer: normal (0 to 2mm)     MCL - Valgus: normal (0 to 2mm)  LCL - Varus: normalPivot Shift: normal (Equal)Reverse Pivot Shift: normal (Equal)Dial Test at 30 degrees: normal (< 5 degrees)Dial Test at 90 degrees: normal (< 5 degrees)  Posterior Sag Test: negative  Posterolateral Corner: unstable (>15 degrees difference)  Patella   Patellar apprehension: negative  Passive Patellar Tilt: neutral  Patellar Tracking: normal  Patellar Glide (quadrants): Lateral - 1   Medial - 2  Q-Angle at 90 degrees: normal  Patellar Grind: negative  J-Sign: none    Other   Meniscal Cyst: absent  Popliteal (Baker's) Cyst: absent  Sensation: normal    Left Knee Exam     Inspection   Alignment:  normal  Erythema: absent  Scars: absent  Swelling: absent  Effusion: absent  Deformity: absent  Bruising: absent    Tenderness   The patient is experiencing no tenderness.     Range of Motion   Extension:  0 normal   Flexion:  130 normal     Tests   Meniscus   Greg:  Medial - negative Lateral - negative  Stability Lachman: normal (-1 to 2mm) PCL-Posterior Drawer: normal (0 to 2mm)  MCL - Valgus: normal (0 to 2mm)  LCL - Varus: normal (0 to 2mm)Pivot Shift: normal (Equal)Reverse Pivot Shift: normal (Equal)Dial Test at 30 degrees: normal (< 5 degrees)Dial Test at 90 degrees: normal (< 5 degrees)  Posterior Sag Test: negative  Posterolateral Corner: unstable (>15 degrees difference)  Patella   Patellar apprehension: negative  Passive Patellar Tilt: neutral  Patellar Tracking: normal  Patellar Glide (Quadrants): Lateral - 1 Medial - 2  Q-Angle at 90 degrees: normal  Patellar  Grind: negative  J-Sign: J sign absent    Other   Meniscal Cyst: absent  Popliteal (Baker's) Cyst: absent  Sensation: normal    Right Hip Exam     Inspection   Scars: absent  Swelling: absent  Bruising: absent  No deformity of hip.  Quadriceps Atrophy:  Negative  Erythema: absent    Range of Motion   Abduction:  30 normal   Adduction:  20 normal   Extension:  0 normal   Flexion:  90 abnormal   External rotation:  30 normal   Internal rotation:  0 abnormal     Tests   Pain w/ forced internal rotation (ERIK): present  Pain w/ forced external rotation (FADIR): present  Baylee: negative  Trendelenburg Test: positive  Circumduction test: negative  Stinchfield test: positive  Log Roll: negative  Snapping Hip (internal): negative  Step-down test: negative  Resisted sit-up pain: negative  Unresisted sit-up pain: negative  Resisted sit-up pain with adductor contraction pain: negative    Other   Sensation: normal  Left Hip Exam     Inspection   Scars: absent  Swelling: absent  No deformity of hip.  Quadriceps Atrophy:  negative  Erythema: absent  Bruising: absent    Range of Motion   Abduction:  30 normal   Adduction:  20 normal   Extension:  0 normal   Flexion:  120 normal   External rotation:  60 normal   Internal rotation: 10 normal     Tests   Pain w/ forced internal rotation (ERIK): absent  Pain w/ forced external rotation (FADIR): absent  Baylee: negative  Trendelenburg Test: negative  Circumduction test: negative  Stinchfield test: negative  Log Roll: negative  Snapping Hip (internal): negative  Step-down test: negative  Resisted sit-up pain: negative  Resisted sit-up pain with adductor contraction pain: negative  Unresisted sit-up pain: negative    Other   Sensation: normal      Back (L-Spine & T-Spine) / Neck (C-Spine) Exam   Back exam is normal.      Muscle Strength   Right Lower Extremity   Hip Abduction: 4/5   Hip Adduction: 5/5   Hip Flexion: 5/5   Ankle Dorsiflexion:  5/5   Left Lower Extremity   Hip Abduction:  5/5   Hip Adduction: 5/5   Hip Flexion: 5/5     Reflexes     Left Side  Quadriceps:  2+  Achilles:  2+    Right Side   Quadriceps:  2+  Achilles:  2+    Vascular Exam     Right Pulses  Dorsalis Pedis:      2+  Posterior Tibial:      2+        Left Pulses  Dorsalis Pedis:      2+  Posterior Tibial:      2+        Capillary Refill  Right Hand: normal capillary refill  Left Hand: normal capillary refill    Edema  Right Upper Leg: absent  Left Upper Leg: absent    EXAMINATION:  XR HIPS BILATERAL 2 VIEW INCL AP PELVIS    CLINICAL HISTORY:  Pain in right hip    TECHNIQUE:  AP view of the pelvis and frogleg lateral views of both hips were performed.    COMPARISON:  February 2017.    FINDINGS:  Degenerative change at the hips right greater than left.  No fracture or bony destruction.      Impression       Degenerative change right greater than left hip.               Assessment:       Encounter Diagnoses   Name Primary?    Chondromalacia of medial femoral condyle, unspecified laterality Yes    Degenerative tear of acetabular labrum of right hip     Femoral acetabular impingement     Knee pain, unspecified chronicity, unspecified laterality     Right hip pain     Primary localized osteoarthrosis of lower leg, unspecified laterality     Acquired dysplasia of right hip     Chondromalacia of patella, unspecified laterality           Plan:       1. Franco Hip Score was filled out today in clinic.     RTC in 2-3  weeks with Dr. Vern Carrasquillo virtually for MRI result review Lumbar MRI. Patient will fill out Franco Hip Score and AP pelvis and frog lateral views bilateral hips on next in person visit on return.    2. 25 minute conversation undertaken with the patient    3. MRI Lumbar spine to assess level of stenosis    4. Cymbalta prescribed along with lyrica    5. Aquatic program at home recommended                [facility] patient questionnaires have been collected today.

## 2020-05-13 ENCOUNTER — PATIENT MESSAGE (OUTPATIENT)
Dept: SPORTS MEDICINE | Facility: CLINIC | Age: 50
End: 2020-05-13

## 2020-05-18 ENCOUNTER — HOSPITAL ENCOUNTER (OUTPATIENT)
Dept: RADIOLOGY | Facility: HOSPITAL | Age: 50
Discharge: HOME | End: 2020-05-18
Attending: ORTHOPAEDIC SURGERY
Payer: COMMERCIAL

## 2020-05-18 DIAGNOSIS — R93.7 ABNORMAL X-RAY OF LUMBAR SPINE: ICD-10-CM

## 2020-05-18 PROCEDURE — 72148 MRI LUMBAR SPINE W/O DYE: CPT | Mod: TC

## 2020-05-18 PROCEDURE — 72148 MRI LUMBAR SPINE WITHOUT CONTRAST: ICD-10-PCS | Mod: 26,,, | Performed by: RADIOLOGY

## 2020-05-18 PROCEDURE — 72148 MRI LUMBAR SPINE W/O DYE: CPT | Mod: 26,,, | Performed by: RADIOLOGY

## 2020-06-23 ENCOUNTER — HOSPITAL ENCOUNTER (OUTPATIENT)
Dept: RADIOLOGY | Facility: HOSPITAL | Age: 50
Discharge: HOME | End: 2020-06-23
Attending: FAMILY MEDICINE
Payer: COMMERCIAL

## 2020-06-23 ENCOUNTER — PATIENT OUTREACH (OUTPATIENT)
Dept: ADMINISTRATIVE | Facility: OTHER | Age: 50
End: 2020-06-23

## 2020-06-23 ENCOUNTER — LAB VISIT (OUTPATIENT)
Dept: LAB | Facility: HOSPITAL | Age: 50
End: 2020-06-23
Attending: OBSTETRICS & GYNECOLOGY
Payer: COMMERCIAL

## 2020-06-23 DIAGNOSIS — Z78.0 MENOPAUSE: ICD-10-CM

## 2020-06-23 DIAGNOSIS — Z12.31 VISIT FOR SCREENING MAMMOGRAM: ICD-10-CM

## 2020-06-23 PROCEDURE — 77067 MAMMO DIGITAL SCREENING BILAT WITH TOMOSYNTHESIS_CAD: ICD-10-PCS | Mod: 26,,, | Performed by: RADIOLOGY

## 2020-06-23 PROCEDURE — 77063 BREAST TOMOSYNTHESIS BI: CPT | Mod: 26,,, | Performed by: RADIOLOGY

## 2020-06-23 PROCEDURE — 77067 SCR MAMMO BI INCL CAD: CPT | Mod: 26,,, | Performed by: RADIOLOGY

## 2020-06-23 PROCEDURE — 82670 ASSAY OF TOTAL ESTRADIOL: CPT

## 2020-06-23 PROCEDURE — 36415 COLL VENOUS BLD VENIPUNCTURE: CPT | Mod: PO

## 2020-06-23 PROCEDURE — 77063 MAMMO DIGITAL SCREENING BILAT WITH TOMOSYNTHESIS_CAD: ICD-10-PCS | Mod: 26,,, | Performed by: RADIOLOGY

## 2020-06-23 PROCEDURE — 77067 SCR MAMMO BI INCL CAD: CPT | Mod: TC,PO

## 2020-06-24 ENCOUNTER — OFFICE VISIT (OUTPATIENT)
Dept: SPORTS MEDICINE | Facility: CLINIC | Age: 50
End: 2020-06-24
Payer: COMMERCIAL

## 2020-06-24 ENCOUNTER — HOSPITAL ENCOUNTER (OUTPATIENT)
Dept: RADIOLOGY | Facility: HOSPITAL | Age: 50
Discharge: HOME | End: 2020-06-24
Attending: ORTHOPAEDIC SURGERY
Payer: COMMERCIAL

## 2020-06-24 VITALS
WEIGHT: 150 LBS | SYSTOLIC BLOOD PRESSURE: 129 MMHG | DIASTOLIC BLOOD PRESSURE: 88 MMHG | BODY MASS INDEX: 23.54 KG/M2 | HEART RATE: 88 BPM | HEIGHT: 67 IN

## 2020-06-24 DIAGNOSIS — M17.10 PRIMARY LOCALIZED OSTEOARTHROSIS OF LOWER LEG, UNSPECIFIED LATERALITY: ICD-10-CM

## 2020-06-24 DIAGNOSIS — M25.551 RIGHT HIP PAIN: Primary | ICD-10-CM

## 2020-06-24 DIAGNOSIS — M25.859 FEMORAL ACETABULAR IMPINGEMENT: ICD-10-CM

## 2020-06-24 DIAGNOSIS — M94.269: ICD-10-CM

## 2020-06-24 DIAGNOSIS — M21.851 ACQUIRED DYSPLASIA OF RIGHT HIP: ICD-10-CM

## 2020-06-24 DIAGNOSIS — M24.151 DEGENERATIVE TEAR OF ACETABULAR LABRUM OF RIGHT HIP: ICD-10-CM

## 2020-06-24 DIAGNOSIS — M25.551 RIGHT HIP PAIN: ICD-10-CM

## 2020-06-24 DIAGNOSIS — M22.40 CHONDROMALACIA OF PATELLA, UNSPECIFIED LATERALITY: ICD-10-CM

## 2020-06-24 DIAGNOSIS — M25.569 KNEE PAIN, UNSPECIFIED CHRONICITY, UNSPECIFIED LATERALITY: ICD-10-CM

## 2020-06-24 LAB — ESTRADIOL SERPL-MCNC: 29 PG/ML

## 2020-06-24 PROCEDURE — 73522 X-RAY EXAM HIPS BI 3-4 VIEWS: CPT | Mod: TC,50

## 2020-06-24 PROCEDURE — 20611 DRAIN/INJ JOINT/BURSA W/US: CPT | Mod: RT,S$GLB,, | Performed by: ORTHOPAEDIC SURGERY

## 2020-06-24 PROCEDURE — 99214 OFFICE O/P EST MOD 30 MIN: CPT | Mod: 25,S$GLB,, | Performed by: ORTHOPAEDIC SURGERY

## 2020-06-24 PROCEDURE — 3008F BODY MASS INDEX DOCD: CPT | Mod: CPTII,S$GLB,, | Performed by: ORTHOPAEDIC SURGERY

## 2020-06-24 PROCEDURE — 73522 X-RAY EXAM HIPS BI 3-4 VIEWS: CPT | Mod: 26,,, | Performed by: RADIOLOGY

## 2020-06-24 PROCEDURE — 20611 LARGE JOINT ASPIRATION/INJECTION: R HIP JOINT: ICD-10-PCS | Mod: RT,S$GLB,, | Performed by: ORTHOPAEDIC SURGERY

## 2020-06-24 PROCEDURE — 3008F PR BODY MASS INDEX (BMI) DOCUMENTED: ICD-10-PCS | Mod: CPTII,S$GLB,, | Performed by: ORTHOPAEDIC SURGERY

## 2020-06-24 PROCEDURE — 99214 PR OFFICE/OUTPT VISIT, EST, LEVL IV, 30-39 MIN: ICD-10-PCS | Mod: 25,S$GLB,, | Performed by: ORTHOPAEDIC SURGERY

## 2020-06-24 PROCEDURE — 73522 XR HIP 3 OR 4 VIEWS BILATERAL: ICD-10-PCS | Mod: 26,,, | Performed by: RADIOLOGY

## 2020-06-24 PROCEDURE — 99999 PR PBB SHADOW E&M-EST. PATIENT-LVL IV: CPT | Mod: PBBFAC,,, | Performed by: ORTHOPAEDIC SURGERY

## 2020-06-24 PROCEDURE — 99999 PR PBB SHADOW E&M-EST. PATIENT-LVL IV: ICD-10-PCS | Mod: PBBFAC,,, | Performed by: ORTHOPAEDIC SURGERY

## 2020-06-24 RX ORDER — TRIAMCINOLONE ACETONIDE 40 MG/ML
80 INJECTION, SUSPENSION INTRA-ARTICULAR; INTRAMUSCULAR
Status: DISCONTINUED | OUTPATIENT
Start: 2020-06-24 | End: 2020-06-24 | Stop reason: HOSPADM

## 2020-06-24 RX ADMIN — TRIAMCINOLONE ACETONIDE 80 MG: 40 INJECTION, SUSPENSION INTRA-ARTICULAR; INTRAMUSCULAR at 10:06

## 2020-06-24 NOTE — PROGRESS NOTES
Subjective:          Chief Complaint: Shelbie Ardon is a 50 y.o. female who had concerns including Pain of the Right Hip.    Pt returns today for R hip follow up. Previous CSI injection worked well for her. Last about 3 months. She would like to repeat this today.      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 Pain is located over anterior hip, lateral thigh, gluteus muscle and later knee on the R hip. She reports that the pain is a 3 /10 sharp, radiating, intermittent, pain with movement and deep pain today and not responding adequately to conservative measures which have included activity modifications, rest, and oral medication. Is affecting ADLs and limiting desired level of activity. She has tried Celebrex without relief.  Denies numbness, tingling, radiation, and inability to bear weight. She enjoys walking for exercises and was once able to walk 5 miles. However, she now has severe pain at mile 2.  Pain is 8 /10 at its worst.     The injection that was performed helped with her right hip pain but she continues to have symptoms radiating to the lateral leg and the foot region. She reports no numbness.     Mechanical symptoms:  Subjective instability: (+)   Worse with walking.  Better with rest.   Nocturnal symptoms: (+)    No previous surgeries or trauma on Right hip    DATE OF PROCEDURE:  08/27/2013      ATTENDING SURGEON:  Vern Carrasquillo M.D.      FIRST ASSISTANT:  Giles Hamlin M.D. (RES).     SECOND ASSISTANT:  Sarkis Salazar.     PREOPERATIVE DIAGNOSIS:  Left knee painful hardware.     POSTOPERATIVE DIAGNOSES:  1.  Left knee medial meniscus tear.  2.  Left knee chondromalacia.  3.  Left knee painful hardware.     OPERATIVE PROCEDURES PERFORMED:  1.  Left knee arthroscopic medial meniscectomy.  2.  Left knee arthroscopic chondroplasty.  3.  Left knee deep complex  hardware removal.                Review of Systems   Constitution: Negative. Negative for fever and night sweats.   HENT: Negative.  Negative for hearing loss.    Eyes: Negative.  Negative for blurred vision and visual disturbance.   Cardiovascular: Negative.  Negative for chest pain and leg swelling.   Respiratory: Negative.  Negative for shortness of breath.    Endocrine: Negative.  Negative for polyuria.   Hematologic/Lymphatic: Negative.  Negative for bleeding problem.   Skin: Negative.  Negative for rash.   Musculoskeletal: Positive for joint pain. Negative for back pain, joint swelling, muscle cramps and muscle weakness.   Gastrointestinal: Negative.  Negative for melena.   Genitourinary: Negative.  Negative for hematuria.   Neurological: Negative.  Negative for loss of balance, numbness and paresthesias.   Psychiatric/Behavioral: Negative.  Negative for altered mental status.   Allergic/Immunologic: Negative.        Pain Related Questions  Over the past 3 days, what was your average pain during activity? (I.e. running, jogging, walking, climbing stairs, getting dressed, ect.): 6  Over the past 3 days, what was your highest pain level?: 9  Over the past 3 days, what was your lowest pain level? : 4    Other  How many nights a week are you awakened by your affected body part?: 0  Was the patient's HEIGHT measured or patient reported?: Patient Reported  Was the patient's WEIGHT measured or patient reported?: Measured      Objective:        General: Shelbie is well-developed, well-nourished, appears stated age, in no acute distress, alert and oriented to time, place and person.     General    Vitals reviewed.  Constitutional: She is oriented to person, place, and time. She appears well-developed and well-nourished. No distress.   HENT:   Mouth/Throat: No oropharyngeal exudate.   Eyes: Right eye exhibits no discharge. Left eye exhibits no discharge.   Neck: Normal range of motion.   Cardiovascular: Normal rate.     Pulmonary/Chest: Effort normal and breath sounds normal. No respiratory distress.   Neurological: She is alert and oriented to person, place, and time. She has normal reflexes. No cranial nerve deficit. Coordination normal.   Psychiatric: She has a normal mood and affect. Her behavior is normal. Judgment and thought content normal.     General Musculoskeletal Exam   Gait: normal   Pelvic Obliquity: none      Right Knee Exam     Inspection   Alignment:  normal  Erythema: absent  Scars: absent  Swelling: absent  Effusion: absent  Deformity: absent  Bruising: absent    Tenderness   The patient is experiencing no tenderness.     Range of Motion   Extension:  0 normal   Flexion:  130 normal     Tests   Meniscus   Greg:  Medial - negative Lateral - negative  Ligament Examination Lachman: normal (-1 to 2mm) PCL-Posterior Drawer: normal (0 to 2mm)     MCL - Valgus: normal (0 to 2mm)  LCL - Varus: normalPivot Shift: normal (Equal)Reverse Pivot Shift: normal (Equal)Dial Test at 30 degrees: normal (< 5 degrees)Dial Test at 90 degrees: normal (< 5 degrees)  Posterior Sag Test: negative  Posterolateral Corner: unstable (>15 degrees difference)  Patella   Patellar apprehension: negative  Passive Patellar Tilt: neutral  Patellar Tracking: normal  Patellar Glide (quadrants): Lateral - 1   Medial - 2  Q-Angle at 90 degrees: normal  Patellar Grind: negative  J-Sign: none    Other   Meniscal Cyst: absent  Popliteal (Baker's) Cyst: absent  Sensation: normal    Left Knee Exam     Inspection   Alignment:  normal  Erythema: absent  Scars: absent  Swelling: absent  Effusion: absent  Deformity: absent  Bruising: absent    Tenderness   The patient is experiencing no tenderness.     Range of Motion   Extension:  0 normal   Flexion:  130 normal     Tests   Meniscus   Greg:  Medial - negative Lateral - negative  Stability Lachman: normal (-1 to 2mm) PCL-Posterior Drawer: normal (0 to 2mm)  MCL - Valgus: normal (0 to 2mm)  LCL - Varus:  normal (0 to 2mm)Pivot Shift: normal (Equal)Reverse Pivot Shift: normal (Equal)Dial Test at 30 degrees: normal (< 5 degrees)Dial Test at 90 degrees: normal (< 5 degrees)  Posterior Sag Test: negative  Posterolateral Corner: unstable (>15 degrees difference)  Patella   Patellar apprehension: negative  Passive Patellar Tilt: neutral  Patellar Tracking: normal  Patellar Glide (Quadrants): Lateral - 1 Medial - 2  Q-Angle at 90 degrees: normal  Patellar Grind: negative  J-Sign: J sign absent    Other   Meniscal Cyst: absent  Popliteal (Baker's) Cyst: absent  Sensation: normal    Right Hip Exam     Inspection   Scars: absent  Swelling: absent  Bruising: absent  No deformity of hip.  Quadriceps Atrophy:  Negative  Erythema: absent    Range of Motion   Abduction:  30 normal   Adduction:  20 normal   Extension:  0 normal   Flexion:  90 abnormal   External rotation:  30 normal   Internal rotation:  0 abnormal     Tests   Pain w/ forced internal rotation (ERIK): present  Pain w/ forced external rotation (FADIR): present  Baylee: negative  Trendelenburg Test: positive  Circumduction test: negative  Stinchfield test: positive  Log Roll: negative  Snapping Hip (internal): negative  Step-down test: negative  Resisted sit-up pain: negative  Unresisted sit-up pain: negative  Resisted sit-up pain with adductor contraction pain: negative    Other   Sensation: normal  Left Hip Exam     Inspection   Scars: absent  Swelling: absent  No deformity of hip.  Quadriceps Atrophy:  negative  Erythema: absent  Bruising: absent    Range of Motion   Abduction:  30 normal   Adduction:  20 normal   Extension:  0 normal   Flexion:  120 normal   External rotation:  60 normal   Internal rotation: 10 normal     Tests   Pain w/ forced internal rotation (ERIK): absent  Pain w/ forced external rotation (FADIR): absent  Baylee: negative  Trendelenburg Test: negative  Circumduction test: negative  Stinchfield test: negative  Log Roll: negative  Snapping Hip  (internal): negative  Step-down test: negative  Resisted sit-up pain: negative  Resisted sit-up pain with adductor contraction pain: negative  Unresisted sit-up pain: negative    Other   Sensation: normal      Back (L-Spine & T-Spine) / Neck (C-Spine) Exam   Back exam is normal.      Muscle Strength   Right Lower Extremity   Hip Abduction: 4/5   Hip Adduction: 5/5   Hip Flexion: 5/5   Ankle Dorsiflexion:  5/5   Left Lower Extremity   Hip Abduction: 5/5   Hip Adduction: 5/5   Hip Flexion: 5/5     Reflexes     Left Side  Quadriceps:  2+  Achilles:  2+    Right Side   Quadriceps:  2+  Achilles:  2+    Vascular Exam     Right Pulses  Dorsalis Pedis:      2+  Posterior Tibial:      2+        Left Pulses  Dorsalis Pedis:      2+  Posterior Tibial:      2+        Capillary Refill  Right Hand: normal capillary refill  Left Hand: normal capillary refill    Edema  Right Upper Leg: absent  Left Upper Leg: absent    EXAMINATION:  XR HIPS BILATERAL 2 VIEW INCL AP PELVIS    CLINICAL HISTORY:  Pain in right hip    TECHNIQUE:  AP view of the pelvis and frogleg lateral views of both hips were performed.    COMPARISON:  February 2017.    FINDINGS:  Degenerative change at the hips right greater than left.  No fracture or bony destruction.      Impression       Degenerative change right greater than left hip.       Lumbar MRI:  EXAMINATION:  MRI LUMBAR SPINE WITHOUT CONTRAST     CLINICAL HISTORY:  Abnormal xray, lumbar spine, DJD;  Abnormal findings on diagnostic imaging of other parts of musculoskeletal system     TECHNIQUE:  Multiplanar, multisequence MR imaging of the lumbar spine without the use of intravenous contrast.     COMPARISON:  Bilateral hip radiographs from 02/17/2020; CTA abdomen 10/23/2014     FINDINGS:  Alignment: Normal.     Vertebrae: 5 lumbar-type vertebral bodies. No aggressive marrow replacement process or fracture.     Discs: Satisfactory height with some disc desiccation in the lower lumbar spine.     Cord:  Normal.  Conus terminates at L1.     Degenerative findings:     T12-L1: No significant spinal canal stenosis or neural foraminal narrowing.     L1-L2:   No significant spinal canal stenosis or neural foraminal narrowing.     L2-L3: Bilateral facet arthropathy noted.  No significant spinal canal stenosis or neural foraminal narrowing.     L3-L4: Mild diffuse posterior disc bulge and bilateral facet arthropathy without significant spinal canal stenosis.  Findings contribute to mild right neural foraminal narrowing.  Posterior annular fissuring present.     L4-L5: Mild diffuse posterior disc bulge and bilateral facet arthropathy without significant spinal canal stenosis.  Findings contribute to mild right neural foraminal narrowing.  2 mm synovial cyst at the posterior aspect of the left facet joint.  Posterior annular fissuring present.     L5-S1: Mild diffuse posterior disc bulge and bilateral facet arthropathy without significant spinal canal stenosis or neural foraminal narrowing.  Posterior annular fissuring is present.     Paraspinous soft tissues: No significant abnormality.     Impression:     Lumbar spondylosis most prominent in the lower lumbar spine resulting in mild right neural foraminal narrowing at L3-4 and L4-5.  Posterior annular fissuring is present at L3-4, L4-5, and L5-S1.        Assessment:       Encounter Diagnoses   Name Primary?    Right hip pain Yes    Knee pain, unspecified chronicity, unspecified laterality     Chondromalacia of patella, unspecified laterality     Degenerative tear of acetabular labrum of right hip     Femoral acetabular impingement     Acquired dysplasia of right hip     Chondromalacia of medial femoral condyle, unspecified laterality     Primary localized osteoarthrosis of lower leg, unspecified laterality           Plan:       1. Franco Hip Score was filled out today in clinic.     RTC in 3  months with Dr. Vern Carrasquillo. Patient will fill out on return.    2. See  injection procedure              [facility] patient questionnaires have been collected today.

## 2020-06-24 NOTE — PROCEDURES
Large Joint Aspiration/Injection: R hip joint    Date/Time: 6/24/2020 10:15 AM  Performed by: Vern Carrasquillo MD  Authorized by: Vern Carrasquillo MD     Consent Done?:  Yes (Verbal)  Indications:  Joint swelling and pain  Site marked: the procedure site was marked    Timeout: prior to procedure the correct patient, procedure, and site was verified    Prep: patient was prepped and draped in usual sterile fashion      Local anesthesia used?: Yes    Local anesthetic:  Topical anesthetic    Details:  Needle Size:  20 G  Ultrasonic Guidance for needle placement?: Yes    Images are saved and documented.  Approach:  Anterior  Location:  Hip  Site:  R hip joint  Medications:  80 mg triamcinolone acetonide 40 mg/mL  Patient tolerance:  Patient tolerated the procedure well with no immediate complications

## 2020-06-30 ENCOUNTER — OFFICE VISIT (OUTPATIENT)
Dept: SPORTS MEDICINE | Facility: CLINIC | Age: 50
End: 2020-06-30
Payer: COMMERCIAL

## 2020-06-30 DIAGNOSIS — M25.551 RIGHT HIP PAIN: ICD-10-CM

## 2020-06-30 DIAGNOSIS — M16.11 PRIMARY OSTEOARTHRITIS OF RIGHT HIP: Primary | ICD-10-CM

## 2020-06-30 PROCEDURE — 99212 OFFICE O/P EST SF 10 MIN: CPT | Mod: 95,,, | Performed by: PHYSICIAN ASSISTANT

## 2020-06-30 PROCEDURE — 99212 PR OFFICE/OUTPT VISIT, EST, LEVL II, 10-19 MIN: ICD-10-PCS | Mod: 95,,, | Performed by: PHYSICIAN ASSISTANT

## 2020-06-30 RX ORDER — CELECOXIB 100 MG/1
100 CAPSULE ORAL 2 TIMES DAILY
Qty: 60 CAPSULE | Refills: 1 | Status: SHIPPED | OUTPATIENT
Start: 2020-06-30 | End: 2020-10-14

## 2020-06-30 NOTE — PROGRESS NOTES
"Subjective:          Chief Complaint: Shelbie Ardon is a 50 y.o. female who had concerns including Pain of the Right Hip.    The patient location is: [facility]  The chief complaint leading to consultation is:  Right hip pain s/p intra-articular CSI 1 week ago    Visit type: audiovisual    Face to Face time with patient:   20 minutes of total time spent on the encounter, which includes face to face time and non-face to face time preparing to see the patient (eg, review of tests), Obtaining and/or reviewing separately obtained history, Documenting clinical information in the electronic or other health record, Independently interpreting results (not separately reported) and communicating results to the patient/family/caregiver, or Care coordination (not separately reported).         Each patient to whom he or she provides medical services by telemedicine is:  (1) informed of the relationship between the physician and patient and the respective role of any other health care provider with respect to management of the patient; and (2) notified that he or she may decline to receive medical services by telemedicine and may withdraw from such care at any time.    Notes: Right hip pain s/p intra-articular CSI 1 week ago.  She reports pain immediately when she returned home after injection 1 week ago.  She started to experience "the shakes" and sweating that night.  No relief from pain from injection.  Previous injection gave her pain relief for 3 months.  She does want to start some physical therapy at this time.      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 Pain is located over anterior hip, lateral thigh, gluteus muscle and later knee on the R hip. She reports that the pain is a 3 /10 sharp, radiating, intermittent, pain with movement and deep pain today and not responding " adequately to conservative measures which have included activity modifications, rest, and oral medication. Is affecting ADLs and limiting desired level of activity. She has tried Celebrex without relief.  Denies numbness, tingling, radiation, and inability to bear weight. She enjoys walking for exercises and was once able to walk 5 miles. However, she now has severe pain at mile 2.  Pain is 8 /10 at its worst.     The injection that was performed helped with her right hip pain but she continues to have symptoms radiating to the lateral leg and the foot region. She reports no numbness.     Mechanical symptoms:  Subjective instability: (+)   Worse with walking.  Better with rest.   Nocturnal symptoms: (+)    No previous surgeries or trauma on Right hip    DATE OF PROCEDURE:  08/27/2013      ATTENDING SURGEON:  Vern Carrasquillo M.D.      FIRST ASSISTANT:  Giles Hamlin M.D. (RES).     SECOND ASSISTANT:  Sarkis Salazar.     PREOPERATIVE DIAGNOSIS:  Left knee painful hardware.     POSTOPERATIVE DIAGNOSES:  1.  Left knee medial meniscus tear.  2.  Left knee chondromalacia.  3.  Left knee painful hardware.     OPERATIVE PROCEDURES PERFORMED:  1.  Left knee arthroscopic medial meniscectomy.  2.  Left knee arthroscopic chondroplasty.  3.  Left knee deep complex hardware removal.                Review of Systems   Constitution: Negative. Negative for fever and night sweats.   HENT: Negative.  Negative for hearing loss.    Eyes: Negative.  Negative for blurred vision and visual disturbance.   Cardiovascular: Negative.  Negative for chest pain and leg swelling.   Respiratory: Negative.  Negative for shortness of breath.    Endocrine: Negative.  Negative for polyuria.   Hematologic/Lymphatic: Negative.  Negative for bleeding problem.   Skin: Negative.  Negative for rash.   Musculoskeletal: Positive for joint pain. Negative for back pain, joint swelling, muscle cramps and muscle weakness.   Gastrointestinal: Negative.  Negative for  melena.   Genitourinary: Negative.  Negative for hematuria.   Neurological: Negative.  Negative for loss of balance, numbness and paresthesias.   Psychiatric/Behavioral: Negative.  Negative for altered mental status.   Allergic/Immunologic: Negative.                    Objective:        General: Shelbie is well-developed, well-nourished, appears stated age, in no acute distress, alert and oriented to time, place and person.     General    Vitals reviewed.  Constitutional: She is oriented to person, place, and time. She appears well-developed and well-nourished. No distress.   HENT:   Mouth/Throat: No oropharyngeal exudate.   Eyes: Right eye exhibits no discharge. Left eye exhibits no discharge.   Neck: Normal range of motion.   Cardiovascular: Normal rate.    Pulmonary/Chest: Effort normal and breath sounds normal. No respiratory distress.   Neurological: She is alert and oriented to person, place, and time. She has normal reflexes. No cranial nerve deficit. Coordination normal.   Psychiatric: She has a normal mood and affect. Her behavior is normal. Judgment and thought content normal.     General Musculoskeletal Exam   Gait: normal   Pelvic Obliquity: none      Right Knee Exam     Inspection   Alignment:  normal  Erythema: absent  Scars: absent  Swelling: absent  Effusion: absent  Deformity: absent  Bruising: absent    Tenderness   The patient is experiencing no tenderness.     Range of Motion   Extension:  0 normal   Flexion:  130 normal     Tests   Meniscus   Greg:  Medial - negative Lateral - negative  Ligament Examination Lachman: normal (-1 to 2mm) PCL-Posterior Drawer: normal (0 to 2mm)     MCL - Valgus: normal (0 to 2mm)  LCL - Varus: normalPivot Shift: normal (Equal)Reverse Pivot Shift: normal (Equal)Dial Test at 30 degrees: normal (< 5 degrees)Dial Test at 90 degrees: normal (< 5 degrees)  Posterior Sag Test: negative  Posterolateral Corner: unstable (>15 degrees difference)  Patella   Patellar  apprehension: negative  Passive Patellar Tilt: neutral  Patellar Tracking: normal  Patellar Glide (quadrants): Lateral - 1   Medial - 2  Q-Angle at 90 degrees: normal  Patellar Grind: negative  J-Sign: none    Other   Meniscal Cyst: absent  Popliteal (Baker's) Cyst: absent  Sensation: normal    Left Knee Exam     Inspection   Alignment:  normal  Erythema: absent  Scars: absent  Swelling: absent  Effusion: absent  Deformity: absent  Bruising: absent    Tenderness   The patient is experiencing no tenderness.     Range of Motion   Extension:  0 normal   Flexion:  130 normal     Tests   Meniscus   Greg:  Medial - negative Lateral - negative  Stability Lachman: normal (-1 to 2mm) PCL-Posterior Drawer: normal (0 to 2mm)  MCL - Valgus: normal (0 to 2mm)  LCL - Varus: normal (0 to 2mm)Pivot Shift: normal (Equal)Reverse Pivot Shift: normal (Equal)Dial Test at 30 degrees: normal (< 5 degrees)Dial Test at 90 degrees: normal (< 5 degrees)  Posterior Sag Test: negative  Posterolateral Corner: unstable (>15 degrees difference)  Patella   Patellar apprehension: negative  Passive Patellar Tilt: neutral  Patellar Tracking: normal  Patellar Glide (Quadrants): Lateral - 1 Medial - 2  Q-Angle at 90 degrees: normal  Patellar Grind: negative  J-Sign: J sign absent    Other   Meniscal Cyst: absent  Popliteal (Baker's) Cyst: absent  Sensation: normal    Right Hip Exam     Inspection   Scars: absent  Swelling: absent  Bruising: absent  No deformity of hip.  Quadriceps Atrophy:  Negative  Erythema: absent    Range of Motion   Abduction:  30 normal   Adduction:  20 normal   Extension:  0 normal   Flexion:  90 abnormal   External rotation:  30 normal   Internal rotation:  0 abnormal     Tests   Pain w/ forced internal rotation (ERIK): present  Pain w/ forced external rotation (FADIR): present  Baylee: negative  Trendelenburg Test: positive  Circumduction test: negative  Stinchfield test: positive  Log Roll: negative  Snapping Hip  (internal): negative  Step-down test: negative  Resisted sit-up pain: negative  Unresisted sit-up pain: negative  Resisted sit-up pain with adductor contraction pain: negative    Other   Sensation: normal  Left Hip Exam     Inspection   Scars: absent  Swelling: absent  No deformity of hip.  Quadriceps Atrophy:  negative  Erythema: absent  Bruising: absent    Range of Motion   Abduction:  30 normal   Adduction:  20 normal   Extension:  0 normal   Flexion:  120 normal   External rotation:  60 normal   Internal rotation: 10 normal     Tests   Pain w/ forced internal rotation (ERIK): absent  Pain w/ forced external rotation (FADIR): absent  Baylee: negative  Trendelenburg Test: negative  Circumduction test: negative  Stinchfield test: negative  Log Roll: negative  Snapping Hip (internal): negative  Step-down test: negative  Resisted sit-up pain: negative  Resisted sit-up pain with adductor contraction pain: negative  Unresisted sit-up pain: negative    Other   Sensation: normal      Back (L-Spine & T-Spine) / Neck (C-Spine) Exam   Back exam is normal.      Muscle Strength   Right Lower Extremity   Hip Abduction: 4/5   Hip Adduction: 5/5   Hip Flexion: 5/5   Ankle Dorsiflexion:  5/5   Left Lower Extremity   Hip Abduction: 5/5   Hip Adduction: 5/5   Hip Flexion: 5/5     Reflexes     Left Side  Quadriceps:  2+  Achilles:  2+    Right Side   Quadriceps:  2+  Achilles:  2+    Vascular Exam     Right Pulses  Dorsalis Pedis:      2+  Posterior Tibial:      2+        Left Pulses  Dorsalis Pedis:      2+  Posterior Tibial:      2+        Capillary Refill  Right Hand: normal capillary refill  Left Hand: normal capillary refill    Edema  Right Upper Leg: absent  Left Upper Leg: absent    EXAMINATION:  XR HIPS BILATERAL 2 VIEW INCL AP PELVIS    CLINICAL HISTORY:  Pain in right hip    TECHNIQUE:  AP view of the pelvis and frogleg lateral views of both hips were performed.    COMPARISON:  February 2017.    FINDINGS:  Degenerative  change at the hips right greater than left.  No fracture or bony destruction.      Impression       Degenerative change right greater than left hip.       Lumbar MRI:  EXAMINATION:  MRI LUMBAR SPINE WITHOUT CONTRAST     CLINICAL HISTORY:  Abnormal xray, lumbar spine, DJD;  Abnormal findings on diagnostic imaging of other parts of musculoskeletal system     TECHNIQUE:  Multiplanar, multisequence MR imaging of the lumbar spine without the use of intravenous contrast.     COMPARISON:  Bilateral hip radiographs from 02/17/2020; CTA abdomen 10/23/2014     FINDINGS:  Alignment: Normal.     Vertebrae: 5 lumbar-type vertebral bodies. No aggressive marrow replacement process or fracture.     Discs: Satisfactory height with some disc desiccation in the lower lumbar spine.     Cord: Normal.  Conus terminates at L1.     Degenerative findings:     T12-L1: No significant spinal canal stenosis or neural foraminal narrowing.     L1-L2:   No significant spinal canal stenosis or neural foraminal narrowing.     L2-L3: Bilateral facet arthropathy noted.  No significant spinal canal stenosis or neural foraminal narrowing.     L3-L4: Mild diffuse posterior disc bulge and bilateral facet arthropathy without significant spinal canal stenosis.  Findings contribute to mild right neural foraminal narrowing.  Posterior annular fissuring present.     L4-L5: Mild diffuse posterior disc bulge and bilateral facet arthropathy without significant spinal canal stenosis.  Findings contribute to mild right neural foraminal narrowing.  2 mm synovial cyst at the posterior aspect of the left facet joint.  Posterior annular fissuring present.     L5-S1: Mild diffuse posterior disc bulge and bilateral facet arthropathy without significant spinal canal stenosis or neural foraminal narrowing.  Posterior annular fissuring is present.     Paraspinous soft tissues: No significant abnormality.     Impression:     Lumbar spondylosis most prominent in the lower  lumbar spine resulting in mild right neural foraminal narrowing at L3-4 and L4-5.  Posterior annular fissuring is present at L3-4, L4-5, and L5-S1.        Assessment:       Encounter Diagnoses   Name Primary?    Right hip pain     Primary osteoarthritis of right hip Yes          Plan:       We have discussed a variety of treatment options including medications, injections, physical therapy and other alternative treatments. I also explained the indications, risks and benefits of surgery.  Will discuss with Dr. Carrasquillo, is she will be a candidate for repeat injection in 6 weeks.  At this time we will start physical therapy, and initiate Celebrex 100 mg twice daily, due to her blood pressure    1. Franco Hip Score was filled out today in clinic.     RTC in 6  weeks with Dr. Vern Carrasquillo for alternative treatment options. Patient will fill out on return.    2. Ambulatory referral to physical therapy for hip/core strength and conditioning protocol.    3.  Celebrex 100 mg b.i.d. as needed       All of the patient's questions were answered and the patient will contact us if they have any questions or concerns in the interim.          [facility] patient questionnaires have been collected today.

## 2020-07-21 ENCOUNTER — CLINICAL SUPPORT (OUTPATIENT)
Dept: REHABILITATION | Facility: HOSPITAL | Age: 50
End: 2020-07-21
Payer: COMMERCIAL

## 2020-07-21 DIAGNOSIS — Z74.09 IMPAIRED FUNCTIONAL MOBILITY, BALANCE, GAIT, AND ENDURANCE: ICD-10-CM

## 2020-07-21 DIAGNOSIS — G89.29 CHRONIC RIGHT-SIDED LOW BACK PAIN WITHOUT SCIATICA: ICD-10-CM

## 2020-07-21 DIAGNOSIS — M25.661 DECREASED RANGE OF MOTION OF RIGHT LOWER EXTREMITY: ICD-10-CM

## 2020-07-21 DIAGNOSIS — M16.11 PRIMARY OSTEOARTHRITIS OF RIGHT HIP: ICD-10-CM

## 2020-07-21 DIAGNOSIS — M62.81 MUSCLE WEAKNESS: Primary | ICD-10-CM

## 2020-07-21 DIAGNOSIS — M25.551 HIP PAIN, CHRONIC, RIGHT: ICD-10-CM

## 2020-07-21 DIAGNOSIS — M54.50 CHRONIC RIGHT-SIDED LOW BACK PAIN WITHOUT SCIATICA: ICD-10-CM

## 2020-07-21 DIAGNOSIS — G89.29 HIP PAIN, CHRONIC, RIGHT: ICD-10-CM

## 2020-07-21 PROCEDURE — 97162 PT EVAL MOD COMPLEX 30 MIN: CPT | Mod: PO

## 2020-07-21 PROCEDURE — 97110 THERAPEUTIC EXERCISES: CPT | Mod: PO

## 2020-07-21 NOTE — PLAN OF CARE
"[facility]  Physical Therapy Initial Evaluation    Date: 7/21/2020   Name: Shelbie Ardon  Clinic Number: [id number]    Therapy Diagnosis:   Encounter Diagnoses   Name Primary?    Primary osteoarthritis of right hip     Hip pain, chronic, right     Muscle weakness Yes    Chronic right-sided low back pain without sciatica     Impaired functional mobility, balance, gait, and endurance     Decreased range of motion of right lower extremity      Physician: Ric Hameed, *    Physician Orders: PT Eval and Treat hip/core strength and conditioning protocol  Medical Diagnosis from Referral:      M16.11 (ICD-10-CM) - Primary osteoarthritis of right hip       Evaluation Date: 7/21/2020  Authorization Period Expiration: 12/31/20  Plan of Care Expiration: 9/18/20  Visit # / Visits authorized: 1/ 20    Time In: 8:40 AM  Time Out: 9:22 AM  Total Appointment Time (timed & untimed codes): 42 minutes    Precautions: Standard, Fall and R hip avascular necorosis    Subjective   Date of onset: May/Adelia  History of current condition - Shelbie reports: pt states she has bone on bone and avascular necrosis (AVN) of her R hip. "I am trying to get a hip replacement imminently." Pt states she has a consult with an ortho surgeon in [place] on 7/27/20. Pt states she had a steroid injection at the end of June, which did not help. Then she found out she had bone on bone and AVN.  Pt states she may have to wait 3 months until she gets a hip replacement due to recent steroid injection. In the meantime, pt states she feels like her R leg has gotten weak/atrophied and she would like some exericses to do. She feels like her arms are getting weak too. She's had success for hip pain with PT in the past. "Maybe you can help me get back in shape and prepped for that surgery". Pt has been walking with cane since pain worsened, but she does not have a weight-bearing restriction. Pt endorses increase back " pain since this started. She gets shooting pain down her RLE into her foot at times.     Medical History:   Past Medical History:   Diagnosis Date    Arthritis     Arthritis     Breast cyst     Cough     Female infertility NEC     Headache(784.0)     Hyperlipidemia     Hypertension     Joint pain     Tinnitus 12/17/2013       Surgical History:   Shelbie Ardon  has a past surgical history that includes Knee surgery and Dilation and curettage of uterus.    Medications:   Shelbie has a current medication list which includes the following prescription(s): alprazolam, celecoxib, chlorthalidone, diclofenac sodium, duloxetine, estradiol-norethindrone, fish oil-omega-3 fatty acids, losartan, pregabalin, and vitamin d.    Allergies:   Review of patient's allergies indicates:   Allergen Reactions    Amlodipine      Edema, palpitations    Cephalexin Hives    Methohexital      Other reaction(s): Difficulty breathing    Penicillins Hives    Sulfa (sulfonamide antibiotics) Hives        Imaging, bone scan films: Four views bilateral.     Right: There is severe advanced DJD with possible pre-existing AVN.     Left: There is mild DJD.  No fracture, dislocation, or bone destruction seen.    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 Had better mobility overall  Current Level of Function: pt is barely walking. Interns have to transport items. Pt's  is assisting with a lot- cleaning the cat box, carrying groceries, food prep, etc. Pt is walking with cane. Endorses 1 fall. Increased hip pain    Pain:  Current 0/10, worst 10/10, best 0/10   Location: right hip- R anterior mostly, but she endorses pain in R gluteal and low back  pain. Gets occasional pain shooting down lateral thigh to knee  Description: Sharp and Shooting, Aching, burning  Aggravating Factors: Standing, Bending, Walking, Lifting and Getting out of bed/chair  Easing Factors: pain medication, rest and volateran gel, massage gun    Pts goals: pt would like exercises to regain some strength and prevent herself from losing more strength and potentially be better prepared for surgery    Objective     Observation: pt received amb with SPC. Pt with antalgic gait, stiff gait    Posture: rounded shoulders, flattened lumbar lordosis    Hip Range of Motion: R hip flexion is limited ~100 degrees; Hip ER and IR are mildly limited  L hip AROM is WFL    Lower Extremity Strength  Right LE  Left LE    Knee extension: 5/5 Knee extension: 5/5   Knee flexion: 5/5 Knee flexion: 5/5   Hip flexion: 3-/5* Hip flexion: 5/5   Hip Internal Rotation:  5/5 in available range* Hip Internal Rotation: 5/5      Hip External Rotation: 5/5 in available range*    Hip External Rotation: 5/5      Hip extension:  >/=3/5* Hip extension: 5/5   Hip abduction: 2-/5* Hip abduction: 5/5   Hip adduction: >/=3/5* Hip adduction 5/5   Ankle dorsiflexion: 5/5 Ankle dorsiflexion: 5/5   Ankle plantarflexion: 5/5 Ankle plantarflexion: 5/5   *pain      Special Tests:  Bridge Test: + for weakness, denies pain      Flexibility: tight piriformis in RLE   Other muscle flexibility- TBA      Joint Mobility: Not tested    Palpation: tender to palpation at R gluteal region, most prominent at piriformis    Sensation: light touch intact    Edema: none noted      Limitation/Restriction for FOTO Hip Survey    Therapist reviewed FOTO scores for Shelbie Ardon on 7/21/2020.   FOTO documents entered into [employer] - see Media section.    Limitation Score: 76%         TREATMENT   Treatment Time In: 9:10 AM  Treatment Time Out: 9:20 AM  Total Treatment time (time-based codes) separate from Evaluation: 10 minutes    Shelbie portillo  therapeutic exercises to develop strength, endurance and core stabilization for 10 minutes including:  Posterior pelvic tilts, 2 x 10 reps  Gluteal set, 5 reps  X 5s  Hip Add Squeeze x 10 reps, 3s holds  Hip ABD isometric in hook-lying, 10 reps x3s, green band    Home Exercises and Patient Education Provided    Education provided:   - HEP, exercise technique, sleeping position with pillow between knees or use of elevating leg pillow under LE's in supine; potential pool exercises for legs; educated on proper cane height; PT explained that pt can do arm exercises seated and in hook-lying (in bed) to take legs out of the equation for arm strengthening; scheduling, POC; use pf lacrosse ball for managing tightness in R piriformis muscle.    Written Home Exercises Provided: Patient instructed to cont prior HEP.  Exercises were reviewed and Shelbie was able to demonstrate them prior to the end of the session.  Shelbie demonstrated good  understanding of the education provided.     See EMR under Patient Instructions for exercises provided 7/21/2020.    Assessment   Shelbie is a 50 y.o. female referred to outpatient Physical Therapy with a medical diagnosis of Primary osteoarthritis of right hip. Pt also found to have AVN in R hip and is awaiting an ortho consult for R total hip replacement.  Pt presents with R hip and low back pain, decreased strength, poor posture, decreased ROM, impaired gait, decreased endurance, impaired functional mobility, and impaired balance. Pt will benefit from gentle hip and LE strengthening, manual technqiues and stretching as appropriate.       Pt prognosis is Good.   Pt will benefit from skilled outpatient Physical Therapy to address the deficits stated above and in the chart below, provide pt/family education, and to maximize pt's level of independence.     Plan of care discussed with patient: Yes  Pt's spiritual, cultural and educational needs considered and patient is agreeable to the  plan of care and goals as stated below:     Anticipated Barriers for therapy: pt will be traveling to get ortho consult    Medical Necessity is demonstrated by the following  History  Co-morbidities and personal factors that may impact the plan of care Co-morbidities:   arthritis and AVN of hip    Personal Factors:   no deficits     moderate   Examination  Body Structures and Functions, activity limitations and participation restrictions that may impact the plan of care Body Regions:   back  lower extremities  trunk    Body Systems:    ROM  strength  balance  gait  transfers  transitions  motor control  activity tolerance and endurance    Participation Restrictions:   Will be traveling out of state    Activity limitations:   Learning and applying knowledge  no deficits    General Tasks and Commands  no deficits    Communication  no deficits    Mobility  lifting and carrying objects  walking    Self care  dressing    Domestic Life  shopping  cooking  doing house work (cleaning house, washing dishes, laundry)    Interactions/Relationships  no deficits    Life Areas  employment    Community and Social Life  community life  recreation and leisure         high   Clinical Presentation evolving clinical presentation with changing clinical characteristics moderate   Decision Making/ Complexity Score: moderate     Goals:  Short Term Goals: 4 weeks   1. Pt will tolerate HEP for improved strength, functional mobility, ROM, posture, and endurance. (progressing, not met)  2. Pt will report reduced R hip and back pain to </= 6/10 at worst for improved functional mobility and ability to participate in work activities/ADL's. (progressing, not met)  3. Pt will increase R hip flexion AROM to >/= 110 degrees for improved functional mobility and gait mechanics. (progressing, not met)  4. Pt will demo >/= 3+/5 strength in R hip/LE for improved functional mobility, endurance, and posture. (progressing, not met)  5. Pt will report  improved ability to sleep and perform bed mobility without increase in pain/symptoms for improved functional mobility (progressing, not met)    Long Term Goals: 8 weeks   1. Pt will be I with updated HEP for improved functional mobility, posture, strength, and endurance. (progressing, not met)  2. Pt will report reduced R hip and back pain to </= 4/10 at worst for improved functional mobility and ability to participate in work activities/ADL's. (progressing, not met)  3. Pt will increase R ER and IR AROM to WNL for improved functional mobility and gait mechanics. (progressing, not met)  4. Pt will demo >/= 4/5 strength in R hip/LE for improved functional mobility, endurance, and posture. (progressing, not met)  5. Pt will demo/report increased ability to perform standing activities ( ie cooking) without pain/symptoms for improved functional mobility (progressing, not met)    Plan   Plan of care Certification: 7/21/2020 to 9/18/20.    Outpatient Physical Therapy 2 times weekly for 8 weeks to include the following interventions: Gait Training, Manual Therapy, Moist Heat/ Ice, Neuromuscular Re-ed, Orthotic Management and Training, Patient Education, Self Care, Therapeutic Activites, Therapeutic Exercise and Ultrasound,  Modalities, taping, and dry needling as appropriate.     Estephanie Lopez, PT

## 2020-07-24 ENCOUNTER — CLINICAL SUPPORT (OUTPATIENT)
Dept: REHABILITATION | Facility: HOSPITAL | Age: 50
End: 2020-07-24
Payer: COMMERCIAL

## 2020-07-24 DIAGNOSIS — G89.29 CHRONIC RIGHT-SIDED LOW BACK PAIN WITHOUT SCIATICA: ICD-10-CM

## 2020-07-24 DIAGNOSIS — M62.81 MUSCLE WEAKNESS: ICD-10-CM

## 2020-07-24 DIAGNOSIS — M25.661 DECREASED RANGE OF MOTION OF RIGHT LOWER EXTREMITY: ICD-10-CM

## 2020-07-24 DIAGNOSIS — M25.551 HIP PAIN, CHRONIC, RIGHT: ICD-10-CM

## 2020-07-24 DIAGNOSIS — G89.29 HIP PAIN, CHRONIC, RIGHT: ICD-10-CM

## 2020-07-24 DIAGNOSIS — Z74.09 IMPAIRED FUNCTIONAL MOBILITY, BALANCE, GAIT, AND ENDURANCE: ICD-10-CM

## 2020-07-24 DIAGNOSIS — M54.50 CHRONIC RIGHT-SIDED LOW BACK PAIN WITHOUT SCIATICA: ICD-10-CM

## 2020-07-24 PROCEDURE — 97110 THERAPEUTIC EXERCISES: CPT | Mod: PO,CQ

## 2020-07-24 NOTE — PROGRESS NOTES
Physical Therapy Daily Treatment Note     Name: Shelbie Ardon  Clinic Number: [id number]    Therapy Diagnosis:   Encounter Diagnoses   Name Primary?    Hip pain, chronic, right     Muscle weakness     Chronic right-sided low back pain without sciatica     Impaired functional mobility, balance, gait, and endurance     Decreased range of motion of right lower extremity      Physician: Ric Hameed, *    Visit Date: 7/24/2020    Evaluation Date: 7/21/2020  Authorization Period Expiration: 12/31/20  Plan of Care Expiration: 9/18/20  Visit # / Visits authorized: 2 / 20  PTA Visit Number: 1    Time In: 08:20 am   Time Out: 09:00 am  Total Billable Time: 40  minutes  Charges: Te -  3    Precautions: Standard    Subjective     Pt reports: Today is a good day but the pain is about the same as it usually is  She was compliant with home exercise program.  Response to previous treatment: She was a little sore follow last session   Functional change: None     Pain: 5/10  Location: right hip       Objective     Shelbie received therapeutic exercises to develop strength, endurance, ROM, flexibility and posture for 40 minutes including:    Recumbent bike: x 8 minutes   Posterior pelvic tilts, 2 x 10 reps   Gluteal set, 5 reps  X 5s  Prone glut winks with knees bent: x 10 - 5 second hold   Hip Add Squeeze x 10 reps, 3s holds Ball  Hip ABD isometric in hook-lying, 10 reps x3s, green band  Hip abduction on slide board: 2 x 10   Heel slides: with strap x 20     Long axis distraction RLE : x 5 minutes       Home Exercises Provided and Patient Education Provided     Education provided:   -     Written Home Exercises Provided: Patient instructed to cont prior HEP.  Exercises were reviewed and Shelbie was able to demonstrate them prior to the end of the session.  Shelbie demonstrated good  understanding of the education provided.     See EMR under Patient Instructions for exercises provided prior visit.    Assessment      Patient tolerated therapy session fairly well. Patient reported pain relief with distraction. Patient with significant hip musculature weakness and will continue to benefit from skilled physical therapy to address deficits.   Shelbie is progressing well towards her goals.   Pt prognosis is Good.     Pt will continue to benefit from skilled outpatient physical therapy to address the deficits listed in the problem list box on initial evaluation, provide pt/family education and to maximize pt's level of independence in the home and community environment.     Pt's spiritual, cultural and educational needs considered and pt agreeable to plan of care and goals.     Anticipated barriers to physical therapy: None     Goals:     Short Term Goals: 4 weeks   1. Pt will tolerate HEP for improved strength, functional mobility, ROM, posture, and endurance. (progressing, not met)  2. Pt will report reduced R hip and back pain to </= 6/10 at worst for improved functional mobility and ability to participate in work activities/ADL's. (progressing, not met)  3. Pt will increase R hip flexion AROM to >/= 110 degrees for improved functional mobility and gait mechanics. (progressing, not met)  4. Pt will demo >/= 3+/5 strength in R hip/LE for improved functional mobility, endurance, and posture. (progressing, not met)  5. Pt will report improved ability to sleep and perform bed mobility without increase in pain/symptoms for improved functional mobility (progressing, not met)     Long Term Goals: 8 weeks   1. Pt will be I with updated HEP for improved functional mobility, posture, strength, and endurance. (progressing, not met)  2. Pt will report reduced R hip and back pain to </= 4/10 at worst for improved functional mobility and ability to participate in work activities/ADL's. (progressing, not met)  3. Pt will increase R ER and IR AROM to WNL for improved functional mobility and gait mechanics. (progressing, not met)  4. Pt will  demo >/= 4/5 strength in R hip/LE for improved functional mobility, endurance, and posture. (progressing, not met)  5. Pt will demo/report increased ability to perform standing activities ( ie cooking) without pain/symptoms for improved functional mobility (progressing, not met)    Plan     Cont with PT POC as tolerated     Silva Raygoza, PTA

## 2020-07-28 NOTE — PROGRESS NOTES
"  Physical Therapy Daily Treatment Note     Name: Shelbie Ardon  Clinic Number: [id number]    Therapy Diagnosis:   Name    Hip pain, chronic, right    Muscle weakness    Chronic right-sided low back pain without sciatica    Impaired functional mobility, balance, gait, and endurance    Decreased range of motion of right lower extremity   Physician: Ric Hameed, *    Visit Date: 7/29/2020    Evaluation Date: 7/21/2020  Authorization Period Expiration: 12/31/20  Plan of Care Expiration: 9/18/20  Visit # / Visits authorized: 3 / 20  PTA Visit Number: 0    Time In: 4:30 pm    Time Out:5:15 pm  Total Billable Time: 45  minutes    Precautions: Standard       Subjective     Pt reports: "Much better than it's been. It suddenly got better, and it has stabilized." Reported a microfracture stabilized in R hip joint, thus less pain. Her surgery will be in September in [place], [place] (Total hip arthroplasty.  She was compliant with home exercise program.  Response to previous treatment: Better.  Functional change: None     Pain: 410 ( 5/10 last session) "If I sit still, no matter what, I'm okay."  Location: right hip       Objective   Observation: Ambulating with a slight limp/decreased stance phase on R LE upon entrance into gym (longer stance phase on right lower extremity noted after use of bicycle).    Shelbie received therapeutic exercises to develop strength, endurance, ROM, flexibility and posture for 40 minutes including:  Progressions/additions bolded.    Recumbent bike to promote active range of motion, nutrition and/or blood flow to the hip joints and promote flexibility of quadriceps femoris, hamstring muscle group, gastrocnemius and tibilias anterior (along with other dorsiflexors): x 5 minutes- pt tolerated this well    Posterior pelvic tilts, 3 x 10 reps   Gluteal set (legs over bolster), 20 reps x 5 seconds  Hip Adduction isometric contraction with ball + bridge x 20 reps  Hip Abduction in " hook-lying with green band, 20 reps x 3 second hold  Hip abduction on slide board: 3 x 10 reps  Heel slides:  30 reps  Piriformis stretch in supine bilaterally- 5 reps, 30 second hold (reported pain in hip during this stretch)- may want to try slight distraction of femur during stretch or massage to piriformis in lieu of this stretch    Long axis distraction RLE : x 5 minutes for pain relief.      Home Exercises Provided and Patient Education Provided     Education provided:   - Continue HEP.    Written Home Exercises Provided: Patient instructed to cont prior HEP (hook-lying hip abduction, hip adduction isometric contractions with ball, gluteal sets, posterior pelvic tilts)  Exercises were reviewed and Shelbie was able to demonstrate them prior to the end of the session.  Shelbie demonstrated good  understanding of the education provided.     See EMR under Patient Instructions for exercises provided prior visit.    Assessment   Patient was able to perform five minutes on the recumbent bike and demonstrated a better gait pattern thereafter, denoting positive response to movement. She was able to perform the above exercises without adverse effects. She will benefit from continued therapy services, focusing on meeting the goals listed below.       Shelbie is progressing well towards her goals.   Pt prognosis is Good.     Pt will continue to benefit from skilled outpatient physical therapy to address the deficits listed in the problem list box on initial evaluation, provide pt/family education and to maximize pt's level of independence in the home and community environment.     Pt's spiritual, cultural and educational needs considered and pt agreeable to plan of care and goals.     Anticipated barriers to physical therapy: None     Goals:     Short Term Goals: 4 weeks 8/18  1. Pt will tolerate HEP for improved strength, functional mobility, ROM, posture, and endurance. Met (7/29/2020)  2. Pt will report reduced R  hip and back pain to </= 6/10 at worst for improved functional mobility and ability to participate in work activities/ADL's. (progressing, not met)  3. Pt will increase R hip flexion AROM to >/= 110 degrees for improved functional mobility and gait mechanics. (progressing, not met)  4. Pt will demo >/= 3+/5 strength in R hip/LE for improved functional mobility, endurance, and posture. (progressing, not met)  5. Pt will report improved ability to sleep and perform bed mobility without increase in pain/symptoms for improved functional mobility (progressing, not met)     Long Term Goals: 8 weeks   1. Pt will be I with updated HEP for improved functional mobility, posture, strength, and endurance. (progressing, not met)  2. Pt will report reduced R hip and back pain to </= 4/10 at worst for improved functional mobility and ability to participate in work activities/ADL's. (progressing, not met)  3. Pt will increase R ER and IR AROM to WNL for improved functional mobility and gait mechanics. (progressing, not met)  4. Pt will demo >/= 4/5 strength in R hip/LE for improved functional mobility, endurance, and posture. (progressing, not met)  5. Pt will demo/report increased ability to perform standing activities ( ie cooking) without pain/symptoms for improved functional mobility (progressing, not met)    Plan   Outpatient Physical Therapy 2 times weekly for 8 weeks to include the following interventions: Gait Training, Manual Therapy, Moist Heat/ Ice, Neuromuscular Re-ed, Orthotic Management and Training, Patient Education, Self Care, Therapeutic Activites, Therapeutic Exercise and Ultrasound,  Modalities, taping, and dry needling as appropriate.        Nida Cowan, PT

## 2020-07-29 ENCOUNTER — CLINICAL SUPPORT (OUTPATIENT)
Dept: REHABILITATION | Facility: HOSPITAL | Age: 50
End: 2020-07-29
Payer: COMMERCIAL

## 2020-07-29 DIAGNOSIS — M25.551 HIP PAIN, CHRONIC, RIGHT: ICD-10-CM

## 2020-07-29 DIAGNOSIS — G89.29 CHRONIC RIGHT-SIDED LOW BACK PAIN WITHOUT SCIATICA: ICD-10-CM

## 2020-07-29 DIAGNOSIS — M25.661 DECREASED RANGE OF MOTION OF RIGHT LOWER EXTREMITY: ICD-10-CM

## 2020-07-29 DIAGNOSIS — M62.81 MUSCLE WEAKNESS: ICD-10-CM

## 2020-07-29 DIAGNOSIS — M54.50 CHRONIC RIGHT-SIDED LOW BACK PAIN WITHOUT SCIATICA: ICD-10-CM

## 2020-07-29 DIAGNOSIS — G89.29 HIP PAIN, CHRONIC, RIGHT: ICD-10-CM

## 2020-07-29 DIAGNOSIS — Z74.09 IMPAIRED FUNCTIONAL MOBILITY, BALANCE, GAIT, AND ENDURANCE: ICD-10-CM

## 2020-07-29 PROCEDURE — 97110 THERAPEUTIC EXERCISES: CPT | Mod: PO

## 2020-08-03 NOTE — PROGRESS NOTES
"  Physical Therapy Daily Treatment Note     Name: Shelbie Ardon  Clinic Number: [id number]    Therapy Diagnosis:   Name    Hip pain, chronic, right    Muscle weakness    Chronic right-sided low back pain without sciatica    Impaired functional mobility, balance, gait, and endurance    Decreased range of motion of right lower extremity   Physician: Ric Hameed, *    Visit Date: 8/4/2020    Evaluation Date: 7/21/2020  Authorization Period Expiration: 12/31/20  Plan of Care Expiration: 9/18/20  Visit # / Visits authorized: 4 / 20  PTA Visit Number: 1    Time In: 11:55 am     Time Out: 12:30 pm  Total Billable Time: 40 minutes     Precautions: Standard     Subjective     Pt reports: shes doing well and having a good day today . Pt stated it feels great today .   She was compliant with home exercise program.  Response to previous treatment: Better.  Functional change: None     Pain: 2/10 ( 5/10 last session) "If I sit still, no matter what, I'm okay."  Location: right hip       Objective   Observation: Ambulating with a slight limp/decreased stance phase on R LE upon entrance into gym (longer stance phase on right lower extremity noted after use of bicycle).    Shelbie received therapeutic exercises to develop strength, endurance, ROM, flexibility and posture for 35 minutes including:  Progressions/additions bolded.    Recumbent bike to promote active range of motion, nutrition and/or blood flow to the hip joints and promote flexibility of quadriceps femoris, hamstring muscle group, gastrocnemius and tibilias anterior (along with other dorsiflexors): x 5 minutes- pt tolerated this well    Posterior pelvic tilts, 3 x 10 reps    Gluteal set (legs over bolster), 20 reps x 5 seconds  Hip Adduction isometric contraction with ball + bridge x 20 reps  Hip Abduction in hook-lying with green band, 20 reps x 3 second hold  Hip abduction on slide board: 3 x 10 reps  Heel slides:  30 reps  Piriformis stretch in " supine bilaterally- NP - (reported pain in hip during this stretch)    Long axis distraction RLE : x 5 minutes for pain relief.      Home Exercises Provided and Patient Education Provided     Education provided:   - Continue HEP.    Written Home Exercises Provided: Patient instructed to cont prior HEP (hook-lying hip abduction, hip adduction isometric contractions with ball, gluteal sets, posterior pelvic tilts)  Exercises were reviewed and Shelbie was able to demonstrate them prior to the end of the session.  Shelbie demonstrated good  understanding of the education provided.     See EMR under Patient Instructions for exercises provided prior visit.    Assessment     Pt presents with slight antalgic gait pattern observed and after warming up on the recumbent bike she was able to tolerate increased time in stance phase on the right and improved ROM. Pt was able to complete exercises with no adverse effects. Attempted to perform piriformis stretching although pt had anterior hip joint pain with this so did not perform to avoid symptom exacerbation. Will continue to progress next as tolerated.       Shelbie is progressing well towards her goals.   Pt prognosis is Good.     Pt will continue to benefit from skilled outpatient physical therapy to address the deficits listed in the problem list box on initial evaluation, provide pt/family education and to maximize pt's level of independence in the home and community environment.     Pt's spiritual, cultural and educational needs considered and pt agreeable to plan of care and goals.     Anticipated barriers to physical therapy: None     Goals:     Short Term Goals: 4 weeks 8/18  1. Pt will tolerate HEP for improved strength, functional mobility, ROM, posture, and endurance. Met (8/4/2020)  2. Pt will report reduced R hip and back pain to </= 6/10 at worst for improved functional mobility and ability to participate in work activities/ADL's. (progressing, not met)  3. Pt  will increase R hip flexion AROM to >/= 110 degrees for improved functional mobility and gait mechanics. (progressing, not met)  4. Pt will demo >/= 3+/5 strength in R hip/LE for improved functional mobility, endurance, and posture. (progressing, not met)  5. Pt will report improved ability to sleep and perform bed mobility without increase in pain/symptoms for improved functional mobility (progressing, not met)     Long Term Goals: 8 weeks   1. Pt will be I with updated HEP for improved functional mobility, posture, strength, and endurance. (progressing, not met)  2. Pt will report reduced R hip and back pain to </= 4/10 at worst for improved functional mobility and ability to participate in work activities/ADL's. (progressing, not met)  3. Pt will increase R ER and IR AROM to WNL for improved functional mobility and gait mechanics. (progressing, not met)  4. Pt will demo >/= 4/5 strength in R hip/LE for improved functional mobility, endurance, and posture. (progressing, not met)  5. Pt will demo/report increased ability to perform standing activities ( ie cooking) without pain/symptoms for improved functional mobility (progressing, not met)    Plan   Outpatient Physical Therapy 2 times weekly for 8 weeks to include the following interventions: Gait Training, Manual Therapy, Moist Heat/ Ice, Neuromuscular Re-ed, Orthotic Management and Training, Patient Education, Self Care, Therapeutic Activites, Therapeutic Exercise and Ultrasound,  Modalities, taping, and dry needling as appropriate.        Anai Mcpherson, PTA

## 2020-08-04 ENCOUNTER — CLINICAL SUPPORT (OUTPATIENT)
Dept: REHABILITATION | Facility: HOSPITAL | Age: 50
End: 2020-08-04
Payer: COMMERCIAL

## 2020-08-04 DIAGNOSIS — G89.29 HIP PAIN, CHRONIC, RIGHT: ICD-10-CM

## 2020-08-04 DIAGNOSIS — Z74.09 IMPAIRED FUNCTIONAL MOBILITY, BALANCE, GAIT, AND ENDURANCE: ICD-10-CM

## 2020-08-04 DIAGNOSIS — M54.50 CHRONIC RIGHT-SIDED LOW BACK PAIN WITHOUT SCIATICA: ICD-10-CM

## 2020-08-04 DIAGNOSIS — M62.81 MUSCLE WEAKNESS: ICD-10-CM

## 2020-08-04 DIAGNOSIS — M25.661 DECREASED RANGE OF MOTION OF RIGHT LOWER EXTREMITY: ICD-10-CM

## 2020-08-04 DIAGNOSIS — M25.551 HIP PAIN, CHRONIC, RIGHT: ICD-10-CM

## 2020-08-04 DIAGNOSIS — G89.29 CHRONIC RIGHT-SIDED LOW BACK PAIN WITHOUT SCIATICA: ICD-10-CM

## 2020-08-04 PROCEDURE — 97110 THERAPEUTIC EXERCISES: CPT | Mod: PO,CQ

## 2020-08-06 ENCOUNTER — CLINICAL SUPPORT (OUTPATIENT)
Dept: REHABILITATION | Facility: HOSPITAL | Age: 50
End: 2020-08-06
Payer: COMMERCIAL

## 2020-08-06 DIAGNOSIS — G89.29 CHRONIC RIGHT-SIDED LOW BACK PAIN WITHOUT SCIATICA: ICD-10-CM

## 2020-08-06 DIAGNOSIS — M25.551 HIP PAIN, CHRONIC, RIGHT: ICD-10-CM

## 2020-08-06 DIAGNOSIS — M54.50 CHRONIC RIGHT-SIDED LOW BACK PAIN WITHOUT SCIATICA: ICD-10-CM

## 2020-08-06 DIAGNOSIS — M25.661 DECREASED RANGE OF MOTION OF RIGHT LOWER EXTREMITY: ICD-10-CM

## 2020-08-06 DIAGNOSIS — M62.81 MUSCLE WEAKNESS: ICD-10-CM

## 2020-08-06 DIAGNOSIS — Z74.09 IMPAIRED FUNCTIONAL MOBILITY, BALANCE, GAIT, AND ENDURANCE: ICD-10-CM

## 2020-08-06 DIAGNOSIS — G89.29 HIP PAIN, CHRONIC, RIGHT: ICD-10-CM

## 2020-08-06 PROCEDURE — 97110 THERAPEUTIC EXERCISES: CPT | Mod: PO

## 2020-08-06 NOTE — PROGRESS NOTES
"  Physical Therapy Daily Treatment Note     Name: Shelbie Ardon  Clinic Number: [id number]    Therapy Diagnosis:   Name    Hip pain, chronic, right    Muscle weakness    Chronic right-sided low back pain without sciatica    Impaired functional mobility, balance, gait, and endurance    Decreased range of motion of right lower extremity   Physician: Ric Hameed, *    Visit Date: 8/6/2020    Evaluation Date: 7/21/2020  Authorization Period Expiration: 12/31/20  Plan of Care Expiration: 9/18/20  Visit # / Visits authorized: 5 / 20  PTA Visit Number: 0    Time In: 12:10 PM (late arrival)    Time Out: 12:45 PM  Total Billable Time: 30minutes     Precautions: Standard     Subjective     Pt reports: she's late today because she was having issues getting out of the garage. Her hip is feeling good today. She reports she was feeling really good when she went to [place], but she found out that her hip collapsed. "I think it is in a more stable position now." pt had pain over the weekend, but it's better now. Endorses R knee pain because her RLE is shorter than her left.   She was compliant with home exercise program.  Response to previous treatment: Better.  Functional change: None     Pain: 2/10   Location: right hip and knee    Objective   Observation: Ambulating with a slight limp/decreased stance phase on R LE upon entrance into gym  Pt's RLE is shorter than her LLE, so she was provided with heel lift in R shoe, and her walking appears substantially improved.    Shelbie received therapeutic exercises to develop strength, endurance, ROM, flexibility and posture for 30minutes including:  Progressions/additions bolded.    Recumbent bike x 5 min, level 2    Hip Adduction isometric contraction with ball + bridge x 30 reps  Hip Abduction in hook-lying with green band, 30 reps x 3 second hold  Core isometric c/ physioball in hook-lying, 2 x 10 reps  Hip ABD isometric c/ Pilates ring, cue for pelvic tilt, 2 x " "10 reps  Double knees to chest c/ orange swiss ball, 3 x 10 reps  Bridge on ball, 2 x 10 reps  Seated long arc quad with ball squeeze at ankles, 2 x 10 reps  Seated hamstring curl green band, RLE, 2 x 10 reps    Not performed today:  Posterior pelvic tilts, 3 x 10 reps    Gluteal set (legs over bolster), 20 reps x 5 seconds  Hip abduction on slide board: 3 x 10 reps  Heel slides:  30 reps  Piriformis stretch in supine bilaterally- NP - (reported pain in hip during this stretch)  Long axis distraction RLE : x 5 minutes for pain relief.      Home Exercises Provided and Patient Education Provided     Education provided:   - Continue HEP, purpose of heel lift, exercise technique.    Written Home Exercises Provided: Patient instructed to cont prior HEP (hook-lying hip abduction, hip adduction isometric contractions with ball, gluteal sets, posterior pelvic tilts)  Exercises were reviewed and Shelbie was able to demonstrate them prior to the end of the session.  Shelbie demonstrated good  understanding of the education provided.     See EMR under Patient Instructions for exercises provided prior visit.    Assessment     Pt presents with antalgic gait pattern upon arrival (though improved overall). Pt's RLE noted to be lower than her left, so she was provided with heel lift in R shoe, and her gait pattern normalized. Pt states, "I feel like I could go for a walk now." . Pt was able to complete exercises with no adverse effects. Progressed multiple exercises without issue. Will continue to progress next as tolerated.       Shelbie is progressing well towards her goals.   Pt prognosis is Good.     Pt will continue to benefit from skilled outpatient physical therapy to address the deficits listed in the problem list box on initial evaluation, provide pt/family education and to maximize pt's level of independence in the home and community environment.     Pt's spiritual, cultural and educational needs considered and pt " agreeable to plan of care and goals.     Anticipated barriers to physical therapy: None     Goals:     Short Term Goals: 4 weeks 8/18  1. Pt will tolerate HEP for improved strength, functional mobility, ROM, posture, and endurance. Met (8/6/2020)  2. Pt will report reduced R hip and back pain to </= 6/10 at worst for improved functional mobility and ability to participate in work activities/ADL's. (progressing, not met)  3. Pt will increase R hip flexion AROM to >/= 110 degrees for improved functional mobility and gait mechanics. (progressing, not met)  4. Pt will demo >/= 3+/5 strength in R hip/LE for improved functional mobility, endurance, and posture. (progressing, not met)  5. Pt will report improved ability to sleep and perform bed mobility without increase in pain/symptoms for improved functional mobility (progressing, not met)     Long Term Goals: 8 weeks   1. Pt will be I with updated HEP for improved functional mobility, posture, strength, and endurance. (progressing, not met)  2. Pt will report reduced R hip and back pain to </= 4/10 at worst for improved functional mobility and ability to participate in work activities/ADL's. (progressing, not met)  3. Pt will increase R ER and IR AROM to WNL for improved functional mobility and gait mechanics. (progressing, not met)  4. Pt will demo >/= 4/5 strength in R hip/LE for improved functional mobility, endurance, and posture. (progressing, not met)  5. Pt will demo/report increased ability to perform standing activities ( ie cooking) without pain/symptoms for improved functional mobility (progressing, not met)    Plan   Outpatient Physical Therapy 2 times weekly for 8 weeks to include the following interventions: Gait Training, Manual Therapy, Moist Heat/ Ice, Neuromuscular Re-ed, Orthotic Management and Training, Patient Education, Self Care, Therapeutic Activites, Therapeutic Exercise and Ultrasound,  Modalities, taping, and dry needling as appropriate.         Estephanie Lopez, PT

## 2020-08-07 ENCOUNTER — CLINICAL SUPPORT (OUTPATIENT)
Dept: URGENT CARE | Facility: CLINIC | Age: 50
End: 2020-08-07
Payer: COMMERCIAL

## 2020-08-07 DIAGNOSIS — Z13.9 ENCOUNTER FOR SCREENING: Primary | ICD-10-CM

## 2020-08-07 PROCEDURE — 99211 PR OFFICE/OUTPT VISIT, EST, LEVL I: ICD-10-PCS | Mod: S$GLB,,, | Performed by: EMERGENCY MEDICINE

## 2020-08-07 PROCEDURE — U0003 INFECTIOUS AGENT DETECTION BY NUCLEIC ACID (DNA OR RNA); SEVERE ACUTE RESPIRATORY SYNDROME CORONAVIRUS 2 (SARS-COV-2) (CORONAVIRUS DISEASE [COVID-19]), AMPLIFIED PROBE TECHNIQUE, MAKING USE OF HIGH THROUGHPUT TECHNOLOGIES AS DESCRIBED BY CMS-2020-01-R: HCPCS

## 2020-08-07 PROCEDURE — 99211 OFF/OP EST MAY X REQ PHY/QHP: CPT | Mod: S$GLB,,, | Performed by: EMERGENCY MEDICINE

## 2020-08-08 LAB — SARS-COV-2 RNA RESP QL NAA+PROBE: NOT DETECTED

## 2020-08-11 ENCOUNTER — PATIENT MESSAGE (OUTPATIENT)
Dept: INTERNAL MEDICINE | Facility: CLINIC | Age: 50
End: 2020-08-11

## 2020-08-11 ENCOUNTER — CLINICAL SUPPORT (OUTPATIENT)
Dept: REHABILITATION | Facility: HOSPITAL | Age: 50
End: 2020-08-11
Payer: COMMERCIAL

## 2020-08-11 DIAGNOSIS — Z74.09 IMPAIRED FUNCTIONAL MOBILITY, BALANCE, GAIT, AND ENDURANCE: ICD-10-CM

## 2020-08-11 DIAGNOSIS — M25.551 HIP PAIN, CHRONIC, RIGHT: ICD-10-CM

## 2020-08-11 DIAGNOSIS — G89.29 CHRONIC RIGHT-SIDED LOW BACK PAIN WITHOUT SCIATICA: ICD-10-CM

## 2020-08-11 DIAGNOSIS — M54.50 CHRONIC RIGHT-SIDED LOW BACK PAIN WITHOUT SCIATICA: ICD-10-CM

## 2020-08-11 DIAGNOSIS — M25.661 DECREASED RANGE OF MOTION OF RIGHT LOWER EXTREMITY: ICD-10-CM

## 2020-08-11 DIAGNOSIS — G89.29 HIP PAIN, CHRONIC, RIGHT: ICD-10-CM

## 2020-08-11 DIAGNOSIS — M62.81 MUSCLE WEAKNESS: ICD-10-CM

## 2020-08-11 PROCEDURE — 97110 THERAPEUTIC EXERCISES: CPT | Mod: PO

## 2020-08-11 NOTE — PROGRESS NOTES
Physical Therapy Daily Treatment Note/Discharge Summary     Name: Shelbie Ardon  Clinic Number: [id number]    Therapy Diagnosis:   Name    Hip pain, chronic, right    Muscle weakness    Chronic right-sided low back pain without sciatica    Impaired functional mobility, balance, gait, and endurance    Decreased range of motion of right lower extremity   Physician: Ric Hameed, *    Visit Date: 8/12/2020    Evaluation Date: 7/21/2020  Authorization Period Expiration: 12/31/20  Plan of Care Expiration: 9/18/20  Visit # / Visits authorized: 7 / 20  PTA Visit Number: 0    Time In: 4:30PM    Time Out: 5:15 PM  Total Billable Time: 45minutes   Charges: 2 TE, 1 MT    Precautions: Standard     Subjective     Pt reports: Excited to have surgery next week.   She was compliant with home exercise program.  Response to previous treatment: Better.  Functional change: None     Pain: 2/10  After therapy: 1/10  Location: R hip      Objective   Shelbie received therapeutic exercises to develop strength, endurance, ROM, flexibility and posture for 30 minutes including:    Upright bike x 5 min, level 1 to promote blood flow to lower extremities.  Core isometric c/ physioball in hook-lying, 3 x 10 reps  Hip ABD isometric c/ Pilates ring, cue for pelvic tilt, 2 x 10 reps  Double knees to chest c/ orange swiss ball with UE pull down, blue theraband, 3 x 10 reps  Bridge on ball, 3 x 10 reps  Bridge with ball squeeze, 3 x 10 reps  Straight leg raise x20 reps      Standing Hip ABD and Ext, 3 x 10 reps each, RLE only  RLE standing hip Flexion, SLR, 3 x 10 reps    95 degrees flexion (R hip)    R hip MMT:  4+/5 abduction  4/5 extension  4+/5 adduction  4-/5 hip flexion    Manual therapy performed for 15 mins:  R knee lateral soft tissue massage and trigger point release along IT band (positive response)  Long-axis traction to R hip x 1 min with relief.    Home Exercises Provided and Patient Education Provided     Education  provided:   - Continue HEP, exercise technique.    Written Home Exercises Provided: Patient instructed to cont prior HEP (Exercises were reviewed and Shelbie was able to demonstrate them prior to the end of the session.  Shelbie demonstrated good  understanding of the education provided.     See EMR under Patient Instructions for exercises provided prior visit. Hip add iso, hip abd, posterior pelvic tilt and gluteal sets    Assessment   Pt was able to tolerate progressions well and responded well to soft tissue massage to IT band and long-axis traction to R hip. Will benefit from continued PT services after surgery to promote maximal results and return to full independence, pain-free.      Shelbie is progressing well towards her goals.   Pt prognosis is Good.    Pt will continue to benefit from skilled outpatient physical therapy to address the deficits listed in the problem list box on initial evaluation, provide pt/family education and to maximize pt's level of independence in the home and community environment.     Pt's spiritual, cultural and educational needs considered and pt agreeable to plan of care and goals.     Anticipated barriers to physical therapy: None     Goals:     Short Term Goals: 4 weeks 8/18  1. Pt will tolerate HEP for improved strength, functional mobility, ROM, posture, and endurance. Met (8/11/2020)  2. Pt will report reduced R hip and back pain to </= 6/10 at worst for improved functional mobility and ability to participate in work activities/ADL's. Met (8/12/2020)  3. Pt will increase R hip flexion AROM to >/= 110 degrees for improved functional mobility and gait mechanics. Not met  4. Pt will demo >/= 3+/5 strength in R hip/LE for improved functional mobility, endurance, and posture.Met (8/12/2020)  5. Pt will report improved ability to sleep and perform bed mobility without increase in pain/symptoms for improved functional mobility met (8/12/2020)     Long Term Goals: 8 weeks   1. Pt  will be I with updated HEP for improved functional mobility, posture, strength, and endurance. Not met  2. Pt will report reduced R hip and back pain to </= 4/10 at worst for improved functional mobility and ability to participate in work activities/ADL's. Not met  3. Pt will increase R ER and IR AROM to WNL for improved functional mobility and gait mechanics. Not met  4. Pt will demo >/= 4/5 strength in R hip/LE for improved functional mobility, endurance, and posture. Not met  5. Pt will demo/report increased ability to perform standing activities ( ie cooking) without pain/symptoms for improved functional mobilityNot met    Plan   Pt will be discharged today due to pending R hip replacement surgery scheduled for next week. Will benefit from therapy services to promote full functional mobility of R hip after surgery.    Nida Cowan, PT

## 2020-08-11 NOTE — PROGRESS NOTES
Physical Therapy Daily Treatment Note     Name: Shelbie Ardon  Clinic Number: [id number]    Therapy Diagnosis:   Name    Hip pain, chronic, right    Muscle weakness    Chronic right-sided low back pain without sciatica    Impaired functional mobility, balance, gait, and endurance    Decreased range of motion of right lower extremity   Physician: Ric Hameed, *    Visit Date: 8/11/2020    Evaluation Date: 7/21/2020  Authorization Period Expiration: 12/31/20  Plan of Care Expiration: 9/18/20  Visit # / Visits authorized: 6 / 20  PTA Visit Number: 0    Time In: 12:19 PM    Time Out: 1:02 PM  Total Billable Time: 36minutes     Precautions: Standard     Subjective     Pt reports: No new complaints. She is having surgery next week. Still has lateral R knee pain.   She was compliant with home exercise program.  Response to previous treatment: Better.  Functional change: None     Pain: 2/10   Location: right hip and knee    Objective       Shelbie received therapeutic exercises to develop strength, endurance, ROM, flexibility and posture for 36 minutes including:      Upright bike x 6 min, level 2  Core isometric c/ physioball in hook-lying, 2 x 10 reps  Hip ABD isometric c/ Pilates ring, cue for pelvic tilt, 2 x 10 reps  Double knees to chest c/ orange swiss ball with UE pull down, blue theraband, 3 x 10 reps  Bridge on ball, 3 x 10 reps  Seated hamstring curl green band, RLE, 2 x 10 reps  Standing Hip ABD and Ext, 3 x 10 reps each, RLE only  RLE standing hip Flexion, SLR, 3 x 10 reps  Hip Abduction in hook-lying with green band, 30 reps x 3 second hold  R SAQ, 2 #, 3 x 10 reps  Bridge with ball squeeze, 3 x 10 reps          Not performed today:  Posterior pelvic tilts, 3 x 10 reps    Gluteal set (legs over bolster), 20 reps x 5 seconds  Hip abduction on slide board: 3 x 10 reps  Heel slides:  30 reps  Long axis distraction RLE : x 5 minutes for pain relief.      Home Exercises Provided and Patient  Education Provided     Education provided:   - Continue HEP, exercise technique.    Written Home Exercises Provided: Patient instructed to cont prior HEP (Exercises were reviewed and Shelbie was able to demonstrate them prior to the end of the session.  Shelbie demonstrated good  understanding of the education provided.     See EMR under Patient Instructions for exercises provided prior visit.    Assessment     Pt presents with antalgic gait pattern upon arrival (though improved overall). Pt continues to reports R knee pain despite continued use of heel lift. Progressed multiple exercises without issue including standing exercises. Will continue to progress as tolerated. Pt to be discharged at next session, as she is scheduled for surgery next week.      Shelbie is progressing well towards her goals.   Pt prognosis is Good.    Pt will continue to benefit from skilled outpatient physical therapy to address the deficits listed in the problem list box on initial evaluation, provide pt/family education and to maximize pt's level of independence in the home and community environment.     Pt's spiritual, cultural and educational needs considered and pt agreeable to plan of care and goals.     Anticipated barriers to physical therapy: None     Goals:     Short Term Goals: 4 weeks 8/18  1. Pt will tolerate HEP for improved strength, functional mobility, ROM, posture, and endurance. Met (8/11/2020)  2. Pt will report reduced R hip and back pain to </= 6/10 at worst for improved functional mobility and ability to participate in work activities/ADL's. (progressing, not met)  3. Pt will increase R hip flexion AROM to >/= 110 degrees for improved functional mobility and gait mechanics. (progressing, not met)  4. Pt will demo >/= 3+/5 strength in R hip/LE for improved functional mobility, endurance, and posture. (progressing, not met)  5. Pt will report improved ability to sleep and perform bed mobility without increase in  pain/symptoms for improved functional mobility (progressing, not met)     Long Term Goals: 8 weeks   1. Pt will be I with updated HEP for improved functional mobility, posture, strength, and endurance. (progressing, not met)  2. Pt will report reduced R hip and back pain to </= 4/10 at worst for improved functional mobility and ability to participate in work activities/ADL's. (progressing, not met)  3. Pt will increase R ER and IR AROM to WNL for improved functional mobility and gait mechanics. (progressing, not met)  4. Pt will demo >/= 4/5 strength in R hip/LE for improved functional mobility, endurance, and posture. (progressing, not met)  5. Pt will demo/report increased ability to perform standing activities ( ie cooking) without pain/symptoms for improved functional mobility (progressing, not met)    Plan   Outpatient Physical Therapy 2 times weekly for 8 weeks to include the following interventions: Gait Training, Manual Therapy, Moist Heat/ Ice, Neuromuscular Re-ed, Orthotic Management and Training, Patient Education, Self Care, Therapeutic Activites, Therapeutic Exercise and Ultrasound,  Modalities, taping, and dry needling as appropriate.        Estephanie Lopez, PT

## 2020-08-12 ENCOUNTER — CLINICAL SUPPORT (OUTPATIENT)
Dept: REHABILITATION | Facility: HOSPITAL | Age: 50
End: 2020-08-12
Payer: COMMERCIAL

## 2020-08-12 DIAGNOSIS — M25.661 DECREASED RANGE OF MOTION OF RIGHT LOWER EXTREMITY: ICD-10-CM

## 2020-08-12 DIAGNOSIS — G89.29 HIP PAIN, CHRONIC, RIGHT: ICD-10-CM

## 2020-08-12 DIAGNOSIS — Z74.09 IMPAIRED FUNCTIONAL MOBILITY, BALANCE, GAIT, AND ENDURANCE: ICD-10-CM

## 2020-08-12 DIAGNOSIS — M62.81 MUSCLE WEAKNESS: ICD-10-CM

## 2020-08-12 DIAGNOSIS — G89.29 CHRONIC RIGHT-SIDED LOW BACK PAIN WITHOUT SCIATICA: ICD-10-CM

## 2020-08-12 DIAGNOSIS — M25.551 HIP PAIN, CHRONIC, RIGHT: ICD-10-CM

## 2020-08-12 DIAGNOSIS — M54.50 CHRONIC RIGHT-SIDED LOW BACK PAIN WITHOUT SCIATICA: ICD-10-CM

## 2020-08-12 PROCEDURE — 97110 THERAPEUTIC EXERCISES: CPT | Mod: PO

## 2020-08-12 PROCEDURE — 97140 MANUAL THERAPY 1/> REGIONS: CPT | Mod: PO

## 2020-08-13 ENCOUNTER — LAB VISIT (OUTPATIENT)
Dept: URGENT CARE | Facility: CLINIC | Age: 50
End: 2020-08-13
Payer: COMMERCIAL

## 2020-08-13 VITALS — TEMPERATURE: 98 F | OXYGEN SATURATION: 97 % | HEART RATE: 83 BPM

## 2020-08-13 DIAGNOSIS — Z03.818 ENCOUNTER FOR OBSERVATION FOR SUSPECTED EXPOSURE TO OTHER BIOLOGICAL AGENTS RULED OUT: ICD-10-CM

## 2020-08-13 PROCEDURE — U0003 INFECTIOUS AGENT DETECTION BY NUCLEIC ACID (DNA OR RNA); SEVERE ACUTE RESPIRATORY SYNDROME CORONAVIRUS 2 (SARS-COV-2) (CORONAVIRUS DISEASE [COVID-19]), AMPLIFIED PROBE TECHNIQUE, MAKING USE OF HIGH THROUGHPUT TECHNOLOGIES AS DESCRIBED BY CMS-2020-01-R: HCPCS

## 2020-08-14 LAB — SARS-COV-2 RNA RESP QL NAA+PROBE: NOT DETECTED

## 2020-10-09 ENCOUNTER — TELEPHONE (OUTPATIENT)
Dept: INTERNAL MEDICINE | Facility: CLINIC | Age: 50
End: 2020-10-09

## 2020-10-14 ENCOUNTER — CLINICAL SUPPORT (OUTPATIENT)
Dept: INTERNAL MEDICINE | Facility: CLINIC | Age: 50
End: 2020-10-14
Payer: COMMERCIAL

## 2020-10-14 ENCOUNTER — TELEPHONE (OUTPATIENT)
Dept: INTERNAL MEDICINE | Facility: CLINIC | Age: 50
End: 2020-10-14

## 2020-10-14 PROCEDURE — 99999 PR PBB SHADOW E&M-EST. PATIENT-LVL I: ICD-10-PCS | Mod: PBBFAC,,,

## 2020-10-14 PROCEDURE — 99999 PR PBB SHADOW E&M-EST. PATIENT-LVL I: CPT | Mod: PBBFAC,,,

## 2020-10-14 NOTE — TELEPHONE ENCOUNTER
Hold for PCP to review upon her rtc sona as home readings have been normal. Defer adjusting meds vs having pt rtc for bp check to pcp
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
## 2020-10-15 ENCOUNTER — TELEPHONE (OUTPATIENT)
Dept: INTERNAL MEDICINE | Facility: CLINIC | Age: 50
End: 2020-10-15

## 2020-10-15 NOTE — TELEPHONE ENCOUNTER
Her reading in office was higher than I would like.  Ideally we would have her check a log in her own environment to make sure she is not just spiking when in the office.  Please encourage her to get a digital blood pressure cuff that she does not have to document that I will store in her phone.  Please set her up for another nurse visit in 4 weeks.

## 2020-11-05 ENCOUNTER — TELEPHONE (OUTPATIENT)
Dept: INTERNAL MEDICINE | Facility: CLINIC | Age: 50
End: 2020-11-05

## 2020-11-05 DIAGNOSIS — E78.2 MIXED HYPERLIPIDEMIA: ICD-10-CM

## 2020-11-05 DIAGNOSIS — I10 ESSENTIAL HYPERTENSION: ICD-10-CM

## 2020-11-05 RX ORDER — LOSARTAN POTASSIUM 100 MG/1
100 TABLET ORAL DAILY
Qty: 90 TABLET | Refills: 1 | Status: SHIPPED | OUTPATIENT
Start: 2020-11-05 | End: 2020-11-05 | Stop reason: SDUPTHER

## 2020-11-06 RX ORDER — LOSARTAN POTASSIUM 100 MG/1
100 TABLET ORAL DAILY
Qty: 90 TABLET | Refills: 1 | Status: SHIPPED | OUTPATIENT
Start: 2020-11-06 | End: 2020-11-09

## 2020-11-09 RX ORDER — LOSARTAN POTASSIUM 100 MG/1
100 TABLET ORAL DAILY
Qty: 90 TABLET | Refills: 1 | Status: SHIPPED | OUTPATIENT
Start: 2020-11-09 | End: 2020-12-30 | Stop reason: SDUPTHER

## 2020-11-16 ENCOUNTER — CLINICAL SUPPORT (OUTPATIENT)
Dept: REHABILITATION | Facility: HOSPITAL | Age: 50
End: 2020-11-16
Payer: COMMERCIAL

## 2020-11-16 DIAGNOSIS — R53.1 DECREASED STRENGTH: ICD-10-CM

## 2020-11-16 DIAGNOSIS — M25.552 BILATERAL HIP PAIN: ICD-10-CM

## 2020-11-16 DIAGNOSIS — M25.551 BILATERAL HIP PAIN: ICD-10-CM

## 2020-11-16 DIAGNOSIS — Z74.09 IMPAIRED FUNCTIONAL MOBILITY, BALANCE, GAIT, AND ENDURANCE: Primary | ICD-10-CM

## 2020-11-16 PROBLEM — M54.50 CHRONIC RIGHT-SIDED LOW BACK PAIN WITHOUT SCIATICA: Status: RESOLVED | Noted: 2020-07-21 | Resolved: 2020-11-16

## 2020-11-16 PROBLEM — G89.29 CHRONIC RIGHT-SIDED LOW BACK PAIN WITHOUT SCIATICA: Status: RESOLVED | Noted: 2020-07-21 | Resolved: 2020-11-16

## 2020-11-16 PROBLEM — M62.81 MUSCLE WEAKNESS: Status: RESOLVED | Noted: 2020-07-21 | Resolved: 2020-11-16

## 2020-11-16 PROBLEM — G89.29 HIP PAIN, CHRONIC, RIGHT: Status: RESOLVED | Noted: 2020-07-21 | Resolved: 2020-11-16

## 2020-11-16 PROBLEM — M25.661 DECREASED RANGE OF MOTION OF RIGHT LOWER EXTREMITY: Status: RESOLVED | Noted: 2020-07-21 | Resolved: 2020-11-16

## 2020-11-16 PROCEDURE — 97110 THERAPEUTIC EXERCISES: CPT | Mod: PO

## 2020-11-16 PROCEDURE — 97161 PT EVAL LOW COMPLEX 20 MIN: CPT | Mod: PO

## 2020-11-16 NOTE — PLAN OF CARE
[facility]  Physical Therapy Initial Evaluation    Date: 11/16/2020   Name: Shelbie Ardon  Clinic Number: [id number]    Therapy Diagnosis:   Encounter Diagnoses   Name Primary?    Bilateral hip pain     Decreased strength     Impaired functional mobility, balance, gait, and endurance Yes     Physician: Jojo VELIZ MD    Physician Orders: PT Eval and Treat - per pt, she is to follow anterior hip precautions and not use a heel lift x 6 months  Medical Diagnosis from Referral:   Z47.1 (ICD-10-CM) - Aftercare following joint replacement   Z96.641 (ICD-10-CM) - Presence of right artificial hip joint     Evaluation Date: 11/16/2020  Authorization Period Expiration: 12/31/20  Plan of Care Expiration: 1/22/21  Visit # / Visits authorized: 1/ 30    Time In: 9:53 (late arrival)  Time Out: 10:35 AM  Total Appointment Time (timed & untimed codes): 42 minutes    Precautions: Standard and Anterior hip precautions - no hip ext or ER, no crossing legs, Sleep on R side when side-lying.    Subjective   Date of onset: 8/19/20  History of current condition - Shelbie reports: pt underwent R anterior approach hip replacement on 8/19/20 in [place], [place]. Pt states she used a RW, then progressed to a cane. She had Home PT for ~ 1 week post-op. Pt stopped needing an assistive device after 3-4 weeks. Pt states they increased the length of her RLE (because her hip joint had collapsed). She states her biggest issue is that she still isn't used to this new configuration of her body- she has low back pain, L hip pain, and R lateral thigh pain.  Of note, pt has has multiple L knee surgeries  Incision site is numb still, tingling has resolved  Surgeon wants her to follow precautions for 6 months.    Medical History:   Past Medical History:   Diagnosis Date    Arthritis     Arthritis     Breast cyst     Cough     Female infertility NEC     Headache(784.0)     Hyperlipidemia     Hypertension      Joint pain     Tinnitus 12/17/2013       Surgical History:   Shelbie Ardon  has a past surgical history that includes Knee surgery and Dilation and curettage of uterus.    Medications:   Shelbie has a current medication list which includes the following prescription(s): chlorthalidone, estradiol-norethindrone, losartan, and vitamin d.    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 Had better mobility overall  Current Level of Function: pt can only walk 2 miles, lack of balance, tightness, increased low back and LLE pain. Pt doesn't do any heavy lifting around the house. Gets symptoms if she sits too long.  pt denies falls, but states walking on uneven ground is challenging. Sleep is uncomfortable     Pain:  Current 0/10, worst 7/10, best 0/10   Location: bilateral hips and low back   Description: Aching and Dull ( in R hip), pinching pain in L hip; aching in low back   Aggravating Factors: Sitting, Standing, Bending, Walking and see current level function  Easing Factors: lying down and rest    Pts goals: to be able to walk more than 2 miles and not have pain after    Objective     Observation: pt received amb independently, mild antalgic gait    Posture: rounded shoulders, decreased lumbar lordosis, R hip is notably higher than her left     Hip Range of Motion:  "TBA    Lower Extremity Strength (Seated)  Right LE  Left LE    Knee extension: 5/5 Knee extension: 5/5   Knee flexion: 4/5 Knee flexion: 5/5   Hip flexion: 4/5 Hip flexion: 4-/5   Hip Internal Rotation:  5/5    Hip Internal Rotation: 5/5      Hip External Rotation: NT due to precautions Hip External Rotation: 5/5      Hip extension:  NT due to precautions Hip extension: >/=4/5   Hip abduction: 4-/5 Hip abduction: 4/5   Hip adduction: 5/5 Hip adduction 5/5   Ankle dorsiflexion: 5/5 Ankle dorsiflexion: 5/5   Ankle plantarflexion: 5/5 Ankle plantarflexion: 5/5       Flexibility: tight iliopsoas noted on LLE       Palpation: tender across low back and outer left hip    Sensation: light touch intact    Edema: none noted    Leg length From ASIS to medial malleoli: RLE: 37" and LLE 35.5 "        Limitation/Restriction for FOTO Hip Survey    Limitation Score: TBA         TREATMENT   Treatment Time In: 10:20 AM  Treatment Time Out: 10:30 AM  Total Treatment time (time-based codes) separate from Evaluation: 10 minutes    Shelbie received therapeutic exercises to develop strength, ROM, flexibility, posture and core stabilization for 10 minutes including:    Posterior pelvic tilts x 10 reps  L hip flexor stretch off table, 1 min  Hip ABD isometric with blue band, 10 x 3s  Hip ADD isometric with ball x 10   Standing hip flexion, SLR, 5/LE- pt needs to stand on small step when performing exercise on RLE due to her leg length discrepancy      Home Exercises and Patient Education Provided    Education provided:   - HEP, POC, scheduling, review of hip precautions    Written Home Exercises Provided: yes.  Exercises were reviewed and Shelbie was able to demonstrate them prior to the end of the session.  Shelbie demonstrated good  understanding of the education provided.     See EMR under Patient Instructions for exercises provided 11/16/2020.    Assessment   Shelbie is a 50 y.o. female referred to outpatient Physical Therapy with " a medical diagnosis of   Z47.1 (ICD-10-CM) - Aftercare following joint replacement   Z96.641 (ICD-10-CM) - Presence of right artificial hip joint   . Pt presents with B hip and low back pain, decreased strength, poor posture, decreased ROM, impaired gait, decreased endurance, impaired functional mobility, and impaired balance. Pt also with leg length discrepancy which is contributing to her symptoms.      Pt prognosis is Good.   Pt will benefit from skilled outpatient Physical Therapy to address the deficits stated above and in the chart below, provide pt/family education, and to maximize pt's level of independence.     Plan of care discussed with patient: Yes  Pt's spiritual, cultural and educational needs considered and patient is agreeable to the plan of care and goals as stated below:     Anticipated Barriers for therapy: none    Medical Necessity is demonstrated by the following  History  Co-morbidities and personal factors that may impact the plan of care Co-morbidities:   hx of AVN    Personal Factors:   no deficits     moderate   Examination  Body Structures and Functions, activity limitations and participation restrictions that may impact the plan of care Body Regions:   back  lower extremities  trunk    Body Systems:    ROM  strength  gross coordinated movement  balance  gait  transfers  transitions  motor control  posture    Participation Restrictions:   None anticipated    Activity limitations:   Learning and applying knowledge  no deficits    General Tasks and Commands  no deficits    Communication  no deficits    Mobility  lifting and carrying objects  walking    Self care  no deficits    Domestic Life  shopping  cooking  doing house work (cleaning house, washing dishes, laundry)    Interactions/Relationships  no deficits    Life Areas  no deficits    Community and Social Life  community life  recreation and leisure         high   Clinical Presentation stable and uncomplicated low   Decision Making/  Complexity Score: low     Goals:  Short Term Goals: 4 weeks   1. Pt will tolerate HEP for improved strength, functional mobility, ROM, posture, and endurance. (progressing, not met)  2. Pt will report reduced back and B hip pain to </= 5/10 at worst for improved functional mobility and ability to participate in work activities/ADL's. (progressing, not met)  3. Pt will be compliant with hip precautions for improved functional mobility. (progressing, not met)  4. Pt will demo >/= 4+/5 strength in BLE's for improved functional mobility, endurance, and posture. (progressing, not met)  5. Pt will report improved ability to sleep through the night >/=4 nights out of the week without increase in pain/symptoms for improved functional mobility (progressing, not met)      Long Term Goals: 8 weeks   1. Pt will be I with updated HEP for improved functional mobility, posture, strength, and endurance. (progressing, not met)  2. Pt will report reduced B hip and back pain to </= 3/10 at worst for improved functional mobility and ability to participate in work activities/ADL's. (progressing, not met)  3. Pt will report feeling more steady and confident on uneven surfaces for improved functional mobility and decreased falls risk. (progressing, not met)  4. Pt will demo 5/5 strength in BLE's for improved functional mobility, endurance, and posture. (progressing, not met)  5. Pt will report increased ability to walk >/=2 miles without pain/symptoms for improved functional mobility/return to prior level of function (progressing, not met)    Plan   Plan of care Certification: 11/16/2020 to 1/22/21.    Outpatient Physical Therapy 2 times weekly for 8 weeks to include the following interventions: Gait Training, Manual Therapy, Moist Heat/ Ice, Neuromuscular Re-ed, Orthotic Management and Training, Patient Education, Self Care, Therapeutic Activites, Therapeutic Exercise and modalities as appropriate.     Estephanie Lopez, PT

## 2020-12-02 ENCOUNTER — CLINICAL SUPPORT (OUTPATIENT)
Dept: REHABILITATION | Facility: HOSPITAL | Age: 50
End: 2020-12-02
Payer: COMMERCIAL

## 2020-12-02 DIAGNOSIS — Z74.09 IMPAIRED FUNCTIONAL MOBILITY, BALANCE, GAIT, AND ENDURANCE: ICD-10-CM

## 2020-12-02 DIAGNOSIS — M25.551 BILATERAL HIP PAIN: ICD-10-CM

## 2020-12-02 DIAGNOSIS — M25.552 BILATERAL HIP PAIN: ICD-10-CM

## 2020-12-02 DIAGNOSIS — R53.1 DECREASED STRENGTH: ICD-10-CM

## 2020-12-02 PROCEDURE — 97110 THERAPEUTIC EXERCISES: CPT | Mod: PO

## 2020-12-02 NOTE — PROGRESS NOTES
"  Physical Therapy Treatment Note     Name: Shelbie Ardon  Clinic Number: [id number]    Therapy Diagnosis:   Encounter Diagnoses   Name Primary?    Bilateral hip pain     Decreased strength     Impaired functional mobility, balance, gait, and endurance      Physician: Jojo VELIZ MD    Visit Date: 12/2/2020    Physician Orders: PT Eval and Treat  Medical Diagnosis: Z47.1 (ICD-10-CM) - Aftercare following joint replacement  Z96.641 (ICD-10-CM) - Presence of right artificial hip joint  Evaluation Date: 11/16/20  Authorization Period Expiration: 12/31/20  Plan of Care Certification Period: 1/22/21  Visit #/Visits authorized: 2/ 30     Time In: 0900  Time Out: 0945  Total Billable Time: 45 minutes    Precautions: Standard and Anterior Hip Precautions - NO hip extension or ER, NO crossing legs, Sleep on R side when side-lying    Subjective     Pt reports: Doing well today. Has been keeping up with exercises  She was compliant with home exercise program.  Response to previous treatment: 1st treatment session  Functional change: no change    Pain: 0/10  Location: right hip      Objective     Shelbie received therapeutic exercises to develop strength, endurance, ROM, flexibility and core stabilization for 45 minutes including:  Posterior pelvic tilts x 10 reps  L hip flexor stretch off table, 1 min  Hip ABD isometric with blue band, 10 x 3s  Hip ADD isometric with ball x 10   Standing hip flexion, SLR, 5/LE- pt needs to stand on small step when performing exercise on RLE due to her leg length discrepancy  Bridging 3x10  Pelvic Tilt with marching 3x10  Standing Hip ABD on 2" step with orange theraband  Side Stepping with OTB 4x20 ft  STS using 8 lb (pink) medicine ball 2x10      Home Exercises Provided and Patient Education Provided     Education provided:   - HEP    Written Home Exercises Provided: Patient instructed to cont prior HEP.  Exercises were reviewed and Shelbie was able to demonstrate them prior " "to the end of the session.  Shelbie demonstrated good  understanding of the education provided.     See EMR under Patient Instructions for exercises provided prior visit.    Assessment     Pt was able to tolerate all exercises during her first treatment session without any c/o increased pain or discomfort. Pt did report mild fatigue and soreness after completing side stepping with orange theraband and hip abduction with orange theraband. Pt needs small 2" block while performing standing SLR/Hip ABD due to leg length discrepancy. Pt given print out of HEP again.      Shelbie is progressing well towards her goals.   Pt prognosis is Good.     Pt will continue to benefit from skilled outpatient physical therapy to address the deficits listed in the problem list box on initial evaluation, provide pt/family education and to maximize pt's level of independence in the home and community environment.     Pt's spiritual, cultural and educational needs considered and pt agreeable to plan of care and goals.     Anticipated barriers to physical therapy: none    Goals:   Short Term Goals: 4 weeks   1. Pt will tolerate HEP for improved strength, functional mobility, ROM, posture, and endurance. (progressing, not met)  2. Pt will report reduced back and B hip pain to </= 5/10 at worst for improved functional mobility and ability to participate in work activities/ADL's. (progressing, not met)  3. Pt will be compliant with hip precautions for improved functional mobility. (progressing, not met)  4. Pt will demo >/= 4+/5 strength in BLE's for improved functional mobility, endurance, and posture. (progressing, not met)  5. Pt will report improved ability to sleep through the night >/=4 nights out of the week without increase in pain/symptoms for improved functional mobility (progressing, not met)        Long Term Goals: 8 weeks   1. Pt will be I with updated HEP for improved functional mobility, posture, strength, and endurance. " (progressing, not met)  2. Pt will report reduced B hip and back pain to </= 3/10 at worst for improved functional mobility and ability to participate in work activities/ADL's. (progressing, not met)  3. Pt will report feeling more steady and confident on uneven surfaces for improved functional mobility and decreased falls risk. (progressing, not met)  4. Pt will demo 5/5 strength in BLE's for improved functional mobility, endurance, and posture. (progressing, not met)  5. Pt will report increased ability to walk >/=2 miles without pain/symptoms for improved functional mobility/return to prior level of function (progressing, not met)      Plan     Continue POC as tolerated.     Kevin Reyes, PT

## 2020-12-03 NOTE — PROGRESS NOTES
"  Physical Therapy Treatment Note     Name: Shelbie Ardon  Clinic Number: [id number]    Therapy Diagnosis:   Encounter Diagnoses   Name Primary?    Bilateral hip pain     Decreased strength     Impaired functional mobility, balance, gait, and endurance      Physician: Jojo VELIZ MD    Visit Date: 12/4/2020    Physician Orders: PT Eval and Treat  Medical Diagnosis: Z47.1 (ICD-10-CM) - Aftercare following joint replacement  Z96.641 (ICD-10-CM) - Presence of right artificial hip joint  Evaluation Date: 11/16/20  Authorization Period Expiration: 12/31/20  Plan of Care Certification Period: 1/22/21  Visit #/Visits authorized: 3/ 30     Time In: 1:15 pm  Time Out: 1:56 pm  Total Billable Time: 41 minutes    Precautions: Standard and Anterior Hip Precautions - NO hip extension or ER, NO crossing legs, Sleep on R side when side-lying    Subjective     Pt reports: she walks daily about a mile, reports she has pain if she attempts 2 miles  She was compliant with home exercise program.  Response to previous treatment: challenged by exercises but felt good  Functional change: no change    Pain: 1/10  Location: right hip      Objective     Shelbie received therapeutic exercises to develop strength, endurance, ROM, flexibility and core stabilization for 41 minutes including:  Upright bike 6' seat height 11  Posterior pelvic tilts x 10 reps  Pelvic Tilt with marching 3x10  Bridging 3x10  B knee to chest with LE on physioball 3x10 reps  L hip flexor stretch off table, 1 min   Hip ABD isometric with blue band, 10 x 3s  Hip ADD isometric with ball x 10 3s  Standing hip flexion, SLR, 2x10/LE- pt needs to stand on small step when performing exercise on RLE due to her leg length discrepancy  Standing Hip ABD on 2" step   Side Stepping with OTB 4x20 ft  Sit to stand using 8 lb (pink) medicine ball 2x10  Step ups with contralateral hip flexion x10 reps      Home Exercises Provided and Patient Education Provided "     Education provided:   - HEP    Written Home Exercises Provided: Patient instructed to cont prior HEP.  Exercises were reviewed and Shelbie was able to demonstrate them prior to the end of the session.  Shelbie demonstrated good  understanding of the education provided.     See EMR under Patient Instructions for exercises provided prior visit.    Assessment     Patient tolerated treatment well and was able to again progress with exercises. She reported 1/10 pain at start of treatment , but did not have any increased pain throughout. She is compliant with HEP and motivated to improve. Will continue to progress patient as tolerated.      Shelbie is progressing well towards her goals.   Pt prognosis is Good.     Pt will continue to benefit from skilled outpatient physical therapy to address the deficits listed in the problem list box on initial evaluation, provide pt/family education and to maximize pt's level of independence in the home and community environment.     Pt's spiritual, cultural and educational needs considered and pt agreeable to plan of care and goals.     Anticipated barriers to physical therapy: none    Goals:   Short Term Goals: 4 weeks   1. Pt will tolerate HEP for improved strength, functional mobility, ROM, posture, and endurance. (progressing, not met)  2. Pt will report reduced back and B hip pain to </= 5/10 at worst for improved functional mobility and ability to participate in work activities/ADL's. (progressing, not met)  3. Pt will be compliant with hip precautions for improved functional mobility. (progressing, not met)  4. Pt will demo >/= 4+/5 strength in BLE's for improved functional mobility, endurance, and posture. (progressing, not met)  5. Pt will report improved ability to sleep through the night >/=4 nights out of the week without increase in pain/symptoms for improved functional mobility (progressing, not met)        Long Term Goals: 8 weeks   1. Pt will be I with updated  HEP for improved functional mobility, posture, strength, and endurance. (progressing, not met)  2. Pt will report reduced B hip and back pain to </= 3/10 at worst for improved functional mobility and ability to participate in work activities/ADL's. (progressing, not met)  3. Pt will report feeling more steady and confident on uneven surfaces for improved functional mobility and decreased falls risk. (progressing, not met)  4. Pt will demo 5/5 strength in BLE's for improved functional mobility, endurance, and posture. (progressing, not met)  5. Pt will report increased ability to walk >/=2 miles without pain/symptoms for improved functional mobility/return to prior level of function (progressing, not met)      Plan     Continue POC as tolerated.     Laura Kathleen, PTA

## 2020-12-04 ENCOUNTER — CLINICAL SUPPORT (OUTPATIENT)
Dept: REHABILITATION | Facility: HOSPITAL | Age: 50
End: 2020-12-04
Payer: COMMERCIAL

## 2020-12-04 DIAGNOSIS — Z74.09 IMPAIRED FUNCTIONAL MOBILITY, BALANCE, GAIT, AND ENDURANCE: ICD-10-CM

## 2020-12-04 DIAGNOSIS — R53.1 DECREASED STRENGTH: ICD-10-CM

## 2020-12-04 DIAGNOSIS — M25.551 BILATERAL HIP PAIN: ICD-10-CM

## 2020-12-04 DIAGNOSIS — M25.552 BILATERAL HIP PAIN: ICD-10-CM

## 2020-12-04 PROCEDURE — 97110 THERAPEUTIC EXERCISES: CPT | Mod: PO,CQ

## 2020-12-07 ENCOUNTER — CLINICAL SUPPORT (OUTPATIENT)
Dept: REHABILITATION | Facility: HOSPITAL | Age: 50
End: 2020-12-07
Payer: COMMERCIAL

## 2020-12-07 DIAGNOSIS — M25.551 BILATERAL HIP PAIN: ICD-10-CM

## 2020-12-07 DIAGNOSIS — M25.552 BILATERAL HIP PAIN: ICD-10-CM

## 2020-12-07 DIAGNOSIS — Z74.09 IMPAIRED FUNCTIONAL MOBILITY, BALANCE, GAIT, AND ENDURANCE: ICD-10-CM

## 2020-12-07 DIAGNOSIS — R53.1 DECREASED STRENGTH: ICD-10-CM

## 2020-12-07 PROCEDURE — 97110 THERAPEUTIC EXERCISES: CPT | Mod: PO,CQ

## 2020-12-07 NOTE — PROGRESS NOTES
Physical Therapy Treatment Note     Name: Shelbie Ardon  Clinic Number: [id number]    Therapy Diagnosis:   No diagnosis found.  Physician: Jojo VELIZ MD    Visit Date: 12/7/2020    Physician Orders: PT Eval and Treat  Medical Diagnosis: Z47.1 (ICD-10-CM) - Aftercare following joint replacement  Z96.641 (ICD-10-CM) - Presence of right artificial hip joint  Evaluation Date: 11/16/20  Authorization Period Expiration: 12/31/20  Plan of Care Certification Period: 1/22/21  Visit #/Visits authorized: 4/ 30     Time In: 11:15 am  Time Out: 12:00 pm  Total Billable Time: 45 minutes    Precautions: Standard and Anterior Hip Precautions - NO hip extension or ER, NO crossing legs, Sleep on R side when side-lying    Subjective     Pt reports: just increased to walking 2 miles this morning and did not feel any pain.    She was compliant with home exercise program.  Response to previous treatment: challenged by exercises but felt good  Functional change: no change    Pain: 2/10   Location: low back       Objective     Shelbie received therapeutic exercises to develop strength, endurance, ROM, flexibility and core stabilization for 45 minutes including:    Upright bike 5' seat height 11    Posterior pelvic tilts with TA activation x 15 reps  Pelvic Tilt with marching 1x10  Alternating LE extensions in PPT/TA bracing: 2x10   Bridging + hip add with ball 2x10 with 5 sec hold  Bridging + hip abd with ball 2x10 with 5 sec hold  B knee to chest with LE on physioball 1x15 reps  Supine SLR 2x10/LE    L hip flexor stretch off table, 1 min   Shuttle: B leg press + iso hip abd c/ GTB, 3 black straps: 2x10   Shuttle: U leg press + iso hip abd c/ GTB, 3 black straps: 2x10/LE  Wall squats with green physio ball 2x10  Side Stepping with GTB in figure 8 pattern: 4x20 ft  Step ups on 6 inch step with contralateral hip flexion 2x10/LE  Single leg balance on Airex pad 2x30 sec/LE    Sit to stand using 8 lb (pink) medicine ball 2x10  "np  Hip ABD isometric with blue band, 10 x 3s np  Hip ADD isometric with ball x 10 3s np  Standing hip flexion, SLR, 2x10/LE- pt needs to stand on small step when performing exercise on RLE due to her leg length discrepancy np  Standing Hip ABD on 2" step np      Home Exercises Provided and Patient Education Provided    Education provided:   - on all therapeutic exercises initiated during today's tx visit.     Written Home Exercises Provided: Patient instructed to cont prior HEP.  Exercises were reviewed and Shelbie was able to demonstrate them prior to the end of the session.  Shelbie demonstrated good  understanding of the education provided.     See EMR under Patient Instructions for exercises provided prior visit.    Assessment   Patient tolerated treatment well with no R hip pain pre, during or post tx session. Tx focused on core activation and hip/BLE strengthening and BLE stability. She continues to be compliant with HEP and motivated to improve. Will continue to progress patient as tolerated.      Shelbie is progressing well towards her goals.   Pt prognosis is Good.     Pt will continue to benefit from skilled outpatient physical therapy to address the deficits listed in the problem list box on initial evaluation, provide pt/family education and to maximize pt's level of independence in the home and community environment.     Pt's spiritual, cultural and educational needs considered and pt agreeable to plan of care and goals.     Anticipated barriers to physical therapy: none    Goals:   Short Term Goals: 4 weeks   1. Pt will tolerate HEP for improved strength, functional mobility, ROM, posture, and endurance. (progressing, not met)  2. Pt will report reduced back and B hip pain to </= 5/10 at worst for improved functional mobility and ability to participate in work activities/ADL's. (progressing, not met)  3. Pt will be compliant with hip precautions for improved functional mobility. (progressing, not " met)  4. Pt will demo >/= 4+/5 strength in BLE's for improved functional mobility, endurance, and posture. (progressing, not met)  5. Pt will report improved ability to sleep through the night >/=4 nights out of the week without increase in pain/symptoms for improved functional mobility (progressing, not met)        Long Term Goals: 8 weeks   1. Pt will be I with updated HEP for improved functional mobility, posture, strength, and endurance. (progressing, not met)  2. Pt will report reduced B hip and back pain to </= 3/10 at worst for improved functional mobility and ability to participate in work activities/ADL's. (progressing, not met)  3. Pt will report feeling more steady and confident on uneven surfaces for improved functional mobility and decreased falls risk. (progressing, not met)  4. Pt will demo 5/5 strength in BLE's for improved functional mobility, endurance, and posture. (progressing, not met)  5. Pt will report increased ability to walk >/=2 miles without pain/symptoms for improved functional mobility/return to prior level of function (progressing, not met)      Plan     Continue POC as tolerated.     Lopez Ochoa, PTA

## 2020-12-09 ENCOUNTER — CLINICAL SUPPORT (OUTPATIENT)
Dept: REHABILITATION | Facility: HOSPITAL | Age: 50
End: 2020-12-09
Payer: COMMERCIAL

## 2020-12-09 DIAGNOSIS — R53.1 DECREASED STRENGTH: ICD-10-CM

## 2020-12-09 DIAGNOSIS — M25.552 BILATERAL HIP PAIN: ICD-10-CM

## 2020-12-09 DIAGNOSIS — Z74.09 IMPAIRED FUNCTIONAL MOBILITY, BALANCE, GAIT, AND ENDURANCE: ICD-10-CM

## 2020-12-09 DIAGNOSIS — M25.551 BILATERAL HIP PAIN: ICD-10-CM

## 2020-12-09 PROCEDURE — 97110 THERAPEUTIC EXERCISES: CPT | Mod: PO

## 2020-12-09 NOTE — PROGRESS NOTES
Physical Therapy Treatment Note     Name: Shelbie Ardon  Clinic Number: [id number]    Therapy Diagnosis:   Encounter Diagnoses   Name Primary?    Bilateral hip pain     Decreased strength     Impaired functional mobility, balance, gait, and endurance      Physician: Jojo VELIZ MD    Visit Date: 12/9/2020    Physician Orders: PT Eval and Treat  Medical Diagnosis: Z47.1 (ICD-10-CM) - Aftercare following joint replacement  Z96.641 (ICD-10-CM) - Presence of right artificial hip joint  Evaluation Date: 11/16/20  Authorization Period Expiration: 12/31/20  Plan of Care Certification Period: 1/22/21  Visit #/Visits authorized: 5/ 30     Time In: 1120  Time Out: 1200   Total Billable Time: 40 minutes    Precautions: Standard and Anterior Hip Precautions - NO hip extension or ER, NO crossing legs, Sleep on R side when side-lying    Subjective     Pt reports: was a little sore after her last visit but loved the challenge.     She was compliant with home exercise program.  Response to previous treatment: challenged by exercises but felt good  Functional change: no change    Pain: 2/10   Location: low back       Objective     Shelbie received therapeutic exercises to develop strength, endurance, ROM, flexibility and core stabilization for 40 minutes including:    Upright bike 5' seat height 11    Posterior pelvic tilts with TA activation x 15 reps  Pelvic Tilt with marching 1x10  Alternating LE extensions in PPT/TA bracing: 2x10   Bridging + hip add with ball 2x10 with 5 sec hold (pt stays in bridged position while performing)  Bridging + hip abd with green therband 2x10 with 5 sec hold (pt stays in bridged position while peforming)  Bridging + hip abd with green theraband 2x10 unilateral (pt stays in bridged position while stabilizing contralateral LE)  B knee to chest with LE on physioball 1x15 reps  Supine SLR 2x10/LE    L hip flexor stretch off table, 1 min   Shuttle: B leg press + iso hip abd c/ GTB, 3  "black straps: 2x10   Shuttle: U leg press, 2 black straps: 2x10, B  Wall squats with green physio ball 2x10  Side Stepping with GTB in figure 8 pattern: 4x20 ft  Step ups on 6 inch step with contralateral hip flexion 2x10/LE  Single leg balance on Airex pad 2x30 sec/LE - not today    Not performed today:  Sit to stand using 8 lb (pink) medicine ball 2x10 np  Hip ABD isometric with blue band, 10 x 3s np  Hip ADD isometric with ball x 10 3s np  Standing hip flexion, SLR, 2x10/LE- pt needs to stand on small step when performing exercise on RLE due to her leg length discrepancy np  Standing Hip ABD on 2" step np      Home Exercises Provided and Patient Education Provided    Education provided:   - on all therapeutic exercises initiated during today's tx visit.     Written Home Exercises Provided: Patient instructed to cont prior HEP.  Exercises were reviewed and Shelbie was able to demonstrate them prior to the end of the session.  Shelbie demonstrated good  understanding of the education provided.     See EMR under Patient Instructions for exercises provided prior visit.    Assessment   Patient tolerated treatment well without any c/o increased pain or discomfort. Pt did progress with bridging exercises holding the bridge while performing both ABD with green theraband and ADD with the ball. Pt did progress in resistance to green theraband for clamshells. Pt given green theraband for HEP.      Shelbie is progressing well towards her goals.   Pt prognosis is Good.     Pt will continue to benefit from skilled outpatient physical therapy to address the deficits listed in the problem list box on initial evaluation, provide pt/family education and to maximize pt's level of independence in the home and community environment.     Pt's spiritual, cultural and educational needs considered and pt agreeable to plan of care and goals.     Anticipated barriers to physical therapy: none    Goals:   Short Term Goals: 4 weeks "   1. Pt will tolerate HEP for improved strength, functional mobility, ROM, posture, and endurance. (progressing, not met)  2. Pt will report reduced back and B hip pain to </= 5/10 at worst for improved functional mobility and ability to participate in work activities/ADL's. (progressing, not met)  3. Pt will be compliant with hip precautions for improved functional mobility. (progressing, not met)  4. Pt will demo >/= 4+/5 strength in BLE's for improved functional mobility, endurance, and posture. (progressing, not met)  5. Pt will report improved ability to sleep through the night >/=4 nights out of the week without increase in pain/symptoms for improved functional mobility (progressing, not met)        Long Term Goals: 8 weeks   1. Pt will be I with updated HEP for improved functional mobility, posture, strength, and endurance. (progressing, not met)  2. Pt will report reduced B hip and back pain to </= 3/10 at worst for improved functional mobility and ability to participate in work activities/ADL's. (progressing, not met)  3. Pt will report feeling more steady and confident on uneven surfaces for improved functional mobility and decreased falls risk. (progressing, not met)  4. Pt will demo 5/5 strength in BLE's for improved functional mobility, endurance, and posture. (progressing, not met)  5. Pt will report increased ability to walk >/=2 miles without pain/symptoms for improved functional mobility/return to prior level of function (progressing, not met)      Plan     Continue POC as tolerated.     Kevin Reyes, PT

## 2020-12-14 ENCOUNTER — CLINICAL SUPPORT (OUTPATIENT)
Dept: REHABILITATION | Facility: HOSPITAL | Age: 50
End: 2020-12-14
Payer: COMMERCIAL

## 2020-12-14 DIAGNOSIS — R53.1 DECREASED STRENGTH: ICD-10-CM

## 2020-12-14 DIAGNOSIS — M25.552 BILATERAL HIP PAIN: ICD-10-CM

## 2020-12-14 DIAGNOSIS — Z74.09 IMPAIRED FUNCTIONAL MOBILITY, BALANCE, GAIT, AND ENDURANCE: ICD-10-CM

## 2020-12-14 DIAGNOSIS — M25.551 BILATERAL HIP PAIN: ICD-10-CM

## 2020-12-14 PROCEDURE — 97110 THERAPEUTIC EXERCISES: CPT | Mod: PO,CQ

## 2020-12-14 NOTE — PROGRESS NOTES
Physical Therapy Treatment Note     Name: Shelbie Del Valle [facility]  Clinic Number: [id number]    Therapy Diagnosis:   Encounter Diagnoses   Name Primary?    Bilateral hip pain     Decreased strength     Impaired functional mobility, balance, gait, and endurance      Physician: Jojo VELIZ MD    Visit Date: 12/14/2020    Physician Orders: PT Eval and Treat  Medical Diagnosis: Z47.1 (ICD-10-CM) - Aftercare following joint replacement  Z96.641 (ICD-10-CM) - Presence of right artificial hip joint  Evaluation Date: 11/16/20  Authorization Period Expiration: 12/31/20  Plan of Care Certification Period: 1/22/21  Visit #/Visits authorized: 6/ 30     Time In: 3:02 pm  Time Out: 3:44 pm   Total Billable Time: 42 minutes    Precautions: Standard and Anterior Hip Precautions - NO hip extension or ER, NO crossing legs, Sleep on R side when side-lying    Subjective     Pt reports: she thinks she might be doing too much with therapy 3x/wk and also doing the exercises and walking at home daily.   She was compliant with home exercise program.  Response to previous treatment: challenged by exercises but felt good  Functional change: no change    Pain: 3/10   Location: low back, B hips       Objective     Shelbie received therapeutic exercises to develop strength, endurance, ROM, flexibility and core stabilization for 42 minutes including:    Upright bike 5' seat height 11    Alternating LE extensions in PPT/TA bracing: 2x10   Bridging + hip add with ball 2x10 with 5 sec hold (pt stays in bridged position while performing)  Bridging + hip abd with green therband 2x10 with 5 sec hold (pt stays in bridged position while peforming)  B knee to chest with LE on physioball 2x10 reps  Supine SLR 2x10/LE    L hip flexor stretch off table, 1 min    Shuttle: B leg press + iso hip abd c/ GTB, 3 black straps: 2x10   Shuttle: U leg press, 2 black straps: 2x10, B   Wall squats with green physio ball 2x10  Side Stepping with GTB in figure 8  "pattern: 4x20 ft  Step ups on 6 inch step with contralateral hip flexion 2x10/LE - not today  Single leg balance on Airex pad 2x30 sec/LE - not today    Not performed today:  Posterior pelvic tilts with TA activation x 15 reps  Pelvic Tilt with marching 1x10   Sit to stand using 8 lb (pink) medicine ball 2x10 np  Hip ABD isometric with blue band, 10 x 3s np  Hip ADD isometric with ball x 10 3s np  Standing hip flexion, SLR, 2x10/LE- pt needs to stand on small step when performing exercise on RLE due to her leg length discrepancy np  Standing Hip ABD on 2" step np  Bridging + hip abd with green theraband 2x10 unilateral (pt stays in bridged position while stabilizing contralateral LE)      Home Exercises Provided and Patient Education Provided    Education provided:   - HEP    Written Home Exercises Provided: Patient instructed to cont prior HEP.  Exercises were reviewed and Shelbie was able to demonstrate them prior to the end of the session.  Shelbie demonstrated good  understanding of the education provided.     See EMR under Patient Instructions for exercises provided prior visit.    Assessment     Patient presents with increased soreness in hips and tightness in low back. She completed all exercises as noted with no increase in pain and reported a decrease in tightness following treatment. Will continue to progress as tolerated next session.      Shelbie is progressing well towards her goals.   Pt prognosis is Good.     Pt will continue to benefit from skilled outpatient physical therapy to address the deficits listed in the problem list box on initial evaluation, provide pt/family education and to maximize pt's level of independence in the home and community environment.     Pt's spiritual, cultural and educational needs considered and pt agreeable to plan of care and goals.     Anticipated barriers to physical therapy: none    Goals:   Short Term Goals: 4 weeks   1. Pt will tolerate HEP for improved " strength, functional mobility, ROM, posture, and endurance. (progressing, not met)  2. Pt will report reduced back and B hip pain to </= 5/10 at worst for improved functional mobility and ability to participate in work activities/ADL's. (progressing, not met)  3. Pt will be compliant with hip precautions for improved functional mobility. (progressing, not met)  4. Pt will demo >/= 4+/5 strength in BLE's for improved functional mobility, endurance, and posture. (progressing, not met)  5. Pt will report improved ability to sleep through the night >/=4 nights out of the week without increase in pain/symptoms for improved functional mobility (progressing, not met)        Long Term Goals: 8 weeks   1. Pt will be I with updated HEP for improved functional mobility, posture, strength, and endurance. (progressing, not met)  2. Pt will report reduced B hip and back pain to </= 3/10 at worst for improved functional mobility and ability to participate in work activities/ADL's. (progressing, not met)  3. Pt will report feeling more steady and confident on uneven surfaces for improved functional mobility and decreased falls risk. (progressing, not met)  4. Pt will demo 5/5 strength in BLE's for improved functional mobility, endurance, and posture. (progressing, not met)  5. Pt will report increased ability to walk >/=2 miles without pain/symptoms for improved functional mobility/return to prior level of function (progressing, not met)      Plan     Continue POC as tolerated.     Laura Kathleen, PTA

## 2020-12-16 ENCOUNTER — CLINICAL SUPPORT (OUTPATIENT)
Dept: REHABILITATION | Facility: HOSPITAL | Age: 50
End: 2020-12-16
Payer: COMMERCIAL

## 2020-12-16 DIAGNOSIS — M25.551 BILATERAL HIP PAIN: ICD-10-CM

## 2020-12-16 DIAGNOSIS — R53.1 DECREASED STRENGTH: ICD-10-CM

## 2020-12-16 DIAGNOSIS — Z74.09 IMPAIRED FUNCTIONAL MOBILITY, BALANCE, GAIT, AND ENDURANCE: ICD-10-CM

## 2020-12-16 DIAGNOSIS — M25.552 BILATERAL HIP PAIN: ICD-10-CM

## 2020-12-16 PROCEDURE — 97110 THERAPEUTIC EXERCISES: CPT | Mod: PO,CQ

## 2020-12-16 NOTE — PROGRESS NOTES
Physical Therapy Treatment Note     Name: Shelbie Ardon  Clinic Number: [id number]    Therapy Diagnosis:   No diagnosis found.  Physician: Jojo VELIZ MD    Visit Date: 12/16/2020    Physician Orders: PT Eval and Treat  Medical Diagnosis: Z47.1 (ICD-10-CM) - Aftercare following joint replacement  Z96.641 (ICD-10-CM) - Presence of right artificial hip joint  Evaluation Date: 11/16/20  Authorization Period Expiration: 12/31/20  Plan of Care Certification Period: 1/22/21  Visit #/Visits authorized: 7/ 30     Time In: 11:15 pm  Time Out: 12:00 pm   Total Billable Time: 45 minutes    Precautions: Standard and Anterior Hip Precautions - NO hip extension or ER, NO crossing legs, Sleep on R side when side-lying    Subjective     Pt reports: R hip pain sometimes keeps her up at night.   She was compliant with home exercise program.  Response to previous treatment: challenged by exercises but felt good  Functional change: no change    Pain: 3/10   Location: low back, B hips       Objective     Shelbie received therapeutic exercises to develop strength, endurance, ROM, flexibility and core stabilization for 45 minutes including:    Recumbent bike 5' seat height 11    Alternating LE extensions in PPT/TA bracing: 2x30 sec    Bridging + hip add with ball 2x10 with 5 sec hold (pt stays in bridged position while performing)  Bridging + hip abd with green therband 2x10 with 5 sec hold (pt stays in bridged position while peforming)  B knee to chest with LE on physioball x20 reps   Supine SLR 2x10/LE    L hip flexor stretch off table, 1 min   Shuttle: B leg press + iso hip abd c/ GTB, 3 black straps: 2x10   Shuttle: U leg press, 2 black straps: 2x10, B   Wall squats with green physio ball 2x10 np  Side Stepping with GTB in figure 8 pattern: 4x20 ft  Step ups on 6 inch step with contralateral hip flexion 2x10/LE np  Step downs on 6 inch step: 2x10/RLE  Single leg balance on Airex pad 2x30 sec/LE  Step overs on round side  "of MISTYU, with focus on eccetric control: x10/LE      Not performed today:  Posterior pelvic tilts with TA activation x 15 reps  Pelvic Tilt with marching 1x10   Sit to stand using 8 lb (pink) medicine ball 2x10   Hip ABD isometric with blue band, 10 x 3s   Hip ADD isometric with ball x 10 3s   Standing hip flexion, SLR, 2x10/LE- pt needs to stand on small step when performing exercise on RLE due to her leg length discrepancy   Standing Hip ABD on 2" step         Home Exercises Provided and Patient Education Provided    Education provided:   - HEP    Written Home Exercises Provided: Patient instructed to cont prior HEP.  Exercises were reviewed and Shelbie was able to demonstrate them prior to the end of the session.  Shelbie demonstrated good  understanding of the education provided.     See EMR under Patient Instructions for exercises provided prior visit.    Assessment   Pt presented with min R hip pain. Pt was advised to decrease exercise routine to every other day instead of every day in order to provide for rest required for muscle building. Step downs for hip/knee stability and step overs for balance/eccentric control were initiated during today's tx vist. Pt could also benefit from progressing to staggered stance bridges during future tx visits in order to move closer to single leg bridges. Will continue to progress as tolerated next session.      Shelbie is progressing well towards her goals.   Pt prognosis is Good.     Pt will continue to benefit from skilled outpatient physical therapy to address the deficits listed in the problem list box on initial evaluation, provide pt/family education and to maximize pt's level of independence in the home and community environment.     Pt's spiritual, cultural and educational needs considered and pt agreeable to plan of care and goals.     Anticipated barriers to physical therapy: none    Goals:   Short Term Goals: 4 weeks   1. Pt will tolerate HEP for improved " strength, functional mobility, ROM, posture, and endurance. (progressing, not met)  2. Pt will report reduced back and B hip pain to </= 5/10 at worst for improved functional mobility and ability to participate in work activities/ADL's. (progressing, not met)  3. Pt will be compliant with hip precautions for improved functional mobility. (progressing, not met)  4. Pt will demo >/= 4+/5 strength in BLE's for improved functional mobility, endurance, and posture. (progressing, not met)  5. Pt will report improved ability to sleep through the night >/=4 nights out of the week without increase in pain/symptoms for improved functional mobility (progressing, not met)        Long Term Goals: 8 weeks   1. Pt will be I with updated HEP for improved functional mobility, posture, strength, and endurance. (progressing, not met)  2. Pt will report reduced B hip and back pain to </= 3/10 at worst for improved functional mobility and ability to participate in work activities/ADL's. (progressing, not met)  3. Pt will report feeling more steady and confident on uneven surfaces for improved functional mobility and decreased falls risk. (progressing, not met)  4. Pt will demo 5/5 strength in BLE's for improved functional mobility, endurance, and posture. (progressing, not met)  5. Pt will report increased ability to walk >/=2 miles without pain/symptoms for improved functional mobility/return to prior level of function (progressing, not met)      Plan     Continue POC as tolerated.     Lopez Ochoa, PTA

## 2020-12-21 ENCOUNTER — CLINICAL SUPPORT (OUTPATIENT)
Dept: REHABILITATION | Facility: HOSPITAL | Age: 50
End: 2020-12-21
Payer: COMMERCIAL

## 2020-12-21 DIAGNOSIS — M25.551 BILATERAL HIP PAIN: ICD-10-CM

## 2020-12-21 DIAGNOSIS — R53.1 DECREASED STRENGTH: ICD-10-CM

## 2020-12-21 DIAGNOSIS — M25.552 BILATERAL HIP PAIN: ICD-10-CM

## 2020-12-21 DIAGNOSIS — Z74.09 IMPAIRED FUNCTIONAL MOBILITY, BALANCE, GAIT, AND ENDURANCE: ICD-10-CM

## 2020-12-21 PROCEDURE — 97110 THERAPEUTIC EXERCISES: CPT | Mod: PO

## 2020-12-21 NOTE — PROGRESS NOTES
Physical Therapy Treatment Note     Name: Shelbie Ardon  Clinic Number: [id number]    Therapy Diagnosis:   Encounter Diagnoses   Name Primary?    Bilateral hip pain     Decreased strength     Impaired functional mobility, balance, gait, and endurance      Physician: Jojo VELIZ MD    Visit Date: 12/21/2020    Physician Orders: PT Eval and Treat  Medical Diagnosis: Z47.1 (ICD-10-CM) - Aftercare following joint replacement  Z96.641 (ICD-10-CM) - Presence of right artificial hip joint  Evaluation Date: 11/16/20  Authorization Period Expiration: 12/31/20  Plan of Care Certification Period: 1/22/21  Visit #/Visits authorized: 8/ 30     Time In: 9:55 AM (late arrival)  Time Out: 10:30 AM   Total Billable Time: 29 minutes    Precautions: Standard and Anterior Hip Precautions - NO hip extension or ER, NO crossing legs, Sleep on R side when side-lying    Subjective     Pt reports: She is late because there was a train. Pain at worst recently has been 6-7/10  She was compliant with home exercise program.  Response to previous treatment: challenged by exercises but felt good  Functional change: sleeping better and can walk 2 miles without too much pain    Pain: 0/10   Location: low back, B hips       Objective     Shelbie received therapeutic exercises to develop strength, endurance, ROM, flexibility and core stabilization for 29 minutes including:    Lower Extremity Strength (Seated)  Right LE   Left LE     Knee extension: 5/5 Knee extension: 5/5   Knee flexion: 4/5 Knee flexion: 5/5   Hip flexion: 4/5 Hip flexion: 4/5   Hip Internal Rotation:  5/5    Hip Internal Rotation: 5/5      Hip External Rotation: Tested isometric (>/=4/5 Hip External Rotation: 5/5      Hip extension:  NT due to precautions Hip extension: >/=4/5   Hip abduction: 5/5 Hip abduction: 5/5   Hip adduction: 5/5 Hip adduction 5/5   Ankle dorsiflexion: 5/5 Ankle dorsiflexion: 5/5   Ankle plantarflexion: 5/5 Ankle plantarflexion: 5/5           Upright bike, Level 3, 6 min  Alternating LE extensions in PPT/TA bracing: 2 x 10 reps   Bridging + hip add with ball 2x10 with 5 sec hold   Bridging + hip abd with blue therband 2x10 with 5 sec hold  Hip rotary/matrix:  -Hip Flexion, 2 x 12 reps, 25#  -Hip ABD, 2 x 12/LE, 25#    Shuttle:  -Shuttle: B leg press + iso hip abd c/ blue band, 4 black straps: 3 x 10   - UE pulldown in 1/2 table top, 10/LE    Not performed today:  B knee to chest with LE on physioball x20 reps   Supine SLR 2x10/LE    L hip flexor stretch off table, 1 min   Shuttle: U leg press, 2 black straps: 2x10, B   Wall squats with green physio ball 2x10 np  Side Stepping with GTB in figure 8 pattern: 4x20 ft  Step ups on 6 inch step with contralateral hip flexion 2x10/LE np  Step downs on 6 inch step: 2x10/RLE  Single leg balance on Airex pad 2x30 sec/LE  Step overs on round side of BOSU, with focus on eccetric control: x10/LE            Home Exercises Provided and Patient Education Provided    Education provided:   - exercise technique    Written Home Exercises Provided: Patient instructed to cont prior HEP.  Exercises were reviewed and Shelbie was able to demonstrate them prior to the end of the session.  Shelbie demonstrated good  understanding of the education provided.     See EMR under Patient Instructions for exercises provided prior visit.    Assessment   Pt presented without pain today, but she still endorses max pain of 6-7/10. She demo's improved LE strength and has met goals as noted below.  Session slightly limited due to late arrival. pt could also benefit from progressing to staggered stance bridges during future tx visits in order to move closer to single leg bridges. Will continue to progress as tolerated next session.      Shelbie is progressing well towards her goals.   Pt prognosis is Good.     Pt will continue to benefit from skilled outpatient physical therapy to address the deficits listed in the problem list box  on initial evaluation, provide pt/family education and to maximize pt's level of independence in the home and community environment.     Pt's spiritual, cultural and educational needs considered and pt agreeable to plan of care and goals.     Anticipated barriers to physical therapy: none    Goals:   Short Term Goals: 4 weeks   1. Pt will tolerate HEP for improved strength, functional mobility, ROM, posture, and endurance. (Met, 12/21/20)  2. Pt will report reduced back and B hip pain to </= 5/10 at worst for improved functional mobility and ability to participate in work activities/ADL's. (progressing, not met)  3. Pt will be compliant with hip precautions for improved functional mobility. (Met, 12/21/20)  4. Pt will demo >/= 4+/5 strength in BLE's for improved functional mobility, endurance, and posture. (progressing, not met)  5. Pt will report improved ability to sleep through the night >/=4 nights out of the week without increase in pain/symptoms for improved functional mobility (Met, 12/21/20)        Long Term Goals: 8 weeks   1. Pt will be I with updated HEP for improved functional mobility, posture, strength, and endurance. (progressing, not met)  2. Pt will report reduced B hip and back pain to </= 3/10 at worst for improved functional mobility and ability to participate in work activities/ADL's. (progressing, not met)  3. Pt will report feeling more steady and confident on uneven surfaces for improved functional mobility and decreased falls risk. (progressing, not met)  4. Pt will demo 5/5 strength in BLE's for improved functional mobility, endurance, and posture. (progressing, not met)  5. Pt will report increased ability to walk >/=2 miles without pain/symptoms for improved functional mobility/return to prior level of function (Met, 12/21/20)      Plan     Continue POC as tolerated.     Estephanie Lopez, PT

## 2020-12-22 NOTE — PROGRESS NOTES
Physical Therapy Treatment Note     [facility]  [facility] [id number]    Therapy Diagnosis:   Encounter Diagnoses   Name Primary?    Bilateral hip pain     Decreased strength     Impaired functional mobility, balance, gait, and endurance      Physician: Jojo VELIZ MD    Visit Date: 12/23/2020    Physician Orders: PT Eval and Treat  Medical Diagnosis: Z47.1 (ICD-10-CM) - Aftercare following joint replacement  Z96.641 (ICD-10-CM) - Presence of right artificial hip joint  Evaluation Date: 11/16/20  Authorization Period Expiration: 12/31/20  Plan of Care Certification Period: 1/22/21  Visit #/Visits authorized: 9/30     Time In:  1115  Time Out:  1200  Total Billable Time: 45 minutes  3 TE    Precautions: Standard and Anterior Hip Precautions - NO hip extension or ER, NO crossing legs, Sleep on R side when side-lying    Subjective     Pt reports: says she is having a little bit of pain bc she went for a 2.25 mile walk this morning.    She was compliant with home exercise program.  Response to previous treatment: WNL  Functional change: sleeping better and can walk 2 miles without too much pain    Pain: 2/10   Location: low back, B hips       Objective     Shelbie received therapeutic exercises to develop strength, endurance, ROM, flexibility and core stabilization for 45 minutes including:  Upright bike, Level 3, 6 min  Cone taps fwd and lateral w/SLS on green pad for balance 2x15  STS butt taps to regular height chair, holding 10lb KB at chest 3x10  Hip rotary/matrix:  -Hip Flexion, 3x 10 reps, 25#  -Hip ABD, 3x 10/LE, 25#  Monster walks GTB loop at ankles 2x30ft  NP- Resisted side steps 2x30ft GTB  Shuttle:  NP-Shuttle: B leg press + iso hip abd c/ blue band, 4 black straps: 3 x 10   - UE pulldown in 1/2 table top, 10/LE  At mat table:  NP-Quadruped- Alternating LE extensions w/ PPT/TA bracing: 2 x 10 reps   Staggered Bridging + hip abd with green theraband 2x10   Bridging+ hip add with  ball 2x10 with 10 sec hold   Supine SLR 2x8/LE w/2# ankle weight    L hip flexor stretch off table, 1 min     NP- Wall squats with green physio ball 2x10 np  NP- Step ups on 6 inch step with contralateral hip flexion 2x10/LE np  Step downs on 6 inch step: 2x10/RLE  Single leg balance on Airex pad 2x30 sec/LE  Step overs on round side of BOSU, with focus on eccetric control: x10/LE       Home Exercises Provided and Patient Education Provided    Education provided:   - exercise technique    Written Home Exercises Provided: Patient instructed to cont prior HEP.  Exercises were reviewed and Shelbie was able to demonstrate them prior to the end of the session.  Shelbie demonstrated good  understanding of the education provided.     See EMR under Patient Instructions for exercises provided prior visit.    Assessment   Pt with good tolerance of advanced strengthening today.  Able to vocalize when she needed to lower the reps for SLR. Pt di dnot complain of increased pain or irritation in B hips this date.   Recommended applying cold pack to her hip later today.    Shelbie is progressing well towards her goals.   Pt prognosis is Good.     Pt will continue to benefit from skilled outpatient physical therapy to address the deficits listed in the problem list box on initial evaluation, provide pt/family education and to maximize pt's level of independence in the home and community environment.     Pt's spiritual, cultural and educational needs considered and pt agreeable to plan of care and goals.     Anticipated barriers to physical therapy: none    Goals:   Short Term Goals: 4 weeks   1. Pt will tolerate HEP for improved strength, functional mobility, ROM, posture, and endurance. (Met, 12/21/20)  2. Pt will report reduced back and B hip pain to </= 5/10 at worst for improved functional mobility and ability to participate in work activities/ADL's. (progressing, not met)  3. Pt will be compliant with hip precautions for  improved functional mobility. (Met, 12/21/20)  4. Pt will demo >/= 4+/5 strength in BLE's for improved functional mobility, endurance, and posture. (progressing, not met)  5. Pt will report improved ability to sleep through the night >/=4 nights out of the week without increase in pain/symptoms for improved functional mobility (Met, 12/21/20)        Long Term Goals: 8 weeks   1. Pt will be I with updated HEP for improved functional mobility, posture, strength, and endurance. (progressing, not met)  2. Pt will report reduced B hip and back pain to </= 3/10 at worst for improved functional mobility and ability to participate in work activities/ADL's. (progressing, not met)  3. Pt will report feeling more steady and confident on uneven surfaces for improved functional mobility and decreased falls risk. (progressing, not met)  4. Pt will demo 5/5 strength in BLE's for improved functional mobility, endurance, and posture. (progressing, not met)  5. Pt will report increased ability to walk >/=2 miles without pain/symptoms for improved functional mobility/return to prior level of function (Met, 12/21/20)      Plan     Continue POC as tolerated.     Ermelinda Garcia, PT

## 2020-12-23 ENCOUNTER — CLINICAL SUPPORT (OUTPATIENT)
Dept: REHABILITATION | Facility: HOSPITAL | Age: 50
End: 2020-12-23
Attending: FAMILY MEDICINE
Payer: COMMERCIAL

## 2020-12-23 DIAGNOSIS — R53.1 DECREASED STRENGTH: ICD-10-CM

## 2020-12-23 DIAGNOSIS — M25.551 BILATERAL HIP PAIN: ICD-10-CM

## 2020-12-23 DIAGNOSIS — Z74.09 IMPAIRED FUNCTIONAL MOBILITY, BALANCE, GAIT, AND ENDURANCE: ICD-10-CM

## 2020-12-23 DIAGNOSIS — M25.552 BILATERAL HIP PAIN: ICD-10-CM

## 2020-12-23 PROCEDURE — 97110 THERAPEUTIC EXERCISES: CPT | Mod: PO

## 2020-12-28 ENCOUNTER — CLINICAL SUPPORT (OUTPATIENT)
Dept: REHABILITATION | Facility: HOSPITAL | Age: 50
End: 2020-12-28
Payer: COMMERCIAL

## 2020-12-28 DIAGNOSIS — M25.552 BILATERAL HIP PAIN: ICD-10-CM

## 2020-12-28 DIAGNOSIS — M25.551 BILATERAL HIP PAIN: ICD-10-CM

## 2020-12-28 DIAGNOSIS — Z74.09 IMPAIRED FUNCTIONAL MOBILITY, BALANCE, GAIT, AND ENDURANCE: ICD-10-CM

## 2020-12-28 DIAGNOSIS — R53.1 DECREASED STRENGTH: ICD-10-CM

## 2020-12-28 PROCEDURE — 97110 THERAPEUTIC EXERCISES: CPT | Mod: PO,CQ

## 2020-12-28 NOTE — PROGRESS NOTES
Physical Therapy Treatment Note     Name: Shelbie Ardon  Clinic Number: [id number]    Therapy Diagnosis:   No diagnosis found.  Physician: Jojo VELIZ MD    Visit Date: 12/28/2020    Physician Orders: PT Eval and Treat  Medical Diagnosis: Z47.1 (ICD-10-CM) - Aftercare following joint replacement  Z96.641 (ICD-10-CM) - Presence of right artificial hip joint  Evaluation Date: 11/16/20  Authorization Period Expiration: 12/31/20  Plan of Care Certification Period: 1/22/21  Visit #/Visits authorized: 10/30     Time In: 1105  Time Out:  1150  Total Billable Time: 45 minutes  3 TE    Precautions: Standard and Anterior Hip Precautions - NO hip extension or ER, NO crossing legs, Sleep on R side when side-lying    Subjective     Pt reports: pt reports taking Celebrex three days ago and has not had any pain since. Today she walked 3 miles and does not have any pain.  She was compliant with home exercise program.  Response to previous treatment: WNL  Functional change: sleeping better and walked 3 miles without pain    Pain: 0/10   Location: low back, B hips       Objective     Shelbie received therapeutic exercises to develop strength, endurance, ROM, flexibility and core stabilization for 45 minutes including:    Upright bike, Level 3, 5 min    At mat table:  NP-Quadruped- Alternating LE extensions w/ PPT/TA bracing: 2 x 10 reps   Staggered Bridging + hip abd with green theraband 2x10   Bridging+ hip add with ball 2x10 with 10 sec hold   Supine SLR 2x8/LE w/2# ankle weight    L hip flexor stretch off table, 1 min     Hip rotary/matrix:  -Hip Flexion, 3x 10 reps, 25# NP  -Hip ABD, 3x 10/LE, 25# NP    Abd waks GTB loop at ankles 2x20ft  Monster walks GTB loop at ankles 2x20ft  Side- lying clams with 3 lbs and GTB: 2x12 (ROM is just slightly past neutral)    NP- Resisted side steps 2x30ft GTB  Shuttle:  NP-Shuttle: B leg press + iso hip abd c/ blue band, 4 black straps: 3 x 10   - UE pulldown in 1/2 table top,  10/LE    NP- Wall squats with green physio ball 2x10 np  NP- Step ups on 6 inch step with contralateral hip flexion 2x10/LE np    Step downs on 6 inch step: 2x10/RLE    Single leg balance on Airex pad 2x30 sec/LE  Cone taps fwd and lateral w/SLS on green pad for balance 2x15  STS butt taps to regular height chair, holding 10lb KB at chest 3x10 NP    BOSU:  Step overs on round side of BOSU, with focus on eccetric control: x10/LE  Static standing on round side of BOSU: 1x30 sec  Static standing on flat side of BOSU: 1x30 sec  Dynamic standing on round side of BOSU with ball toss and catch against wall: x20        Home Exercises Provided and Patient Education Provided    Education provided:   - exercise technique    Written Home Exercises Provided: Patient instructed to cont prior HEP.  Exercises were reviewed and Shelbie was able to demonstrate them prior to the end of the session.  Shelbie demonstrated good  understanding of the education provided.     See EMR under Patient Instructions for exercises provided prior visit.    Assessment   Pt with good tolerance of hip strengthening and balance activities. Will expand balance activities as tolerated.      Shelbie is progressing well towards her goals.   Pt prognosis is Good.     Pt will continue to benefit from skilled outpatient physical therapy to address the deficits listed in the problem list box on initial evaluation, provide pt/family education and to maximize pt's level of independence in the home and community environment.     Pt's spiritual, cultural and educational needs considered and pt agreeable to plan of care and goals.     Anticipated barriers to physical therapy: none    Goals:   Short Term Goals: 4 weeks   1. Pt will tolerate HEP for improved strength, functional mobility, ROM, posture, and endurance. (Met, 12/21/20)  2. Pt will report reduced back and B hip pain to </= 5/10 at worst for improved functional mobility and ability to participate in  work activities/ADL's. (progressing, not met)  3. Pt will be compliant with hip precautions for improved functional mobility. (Met, 12/21/20)  4. Pt will demo >/= 4+/5 strength in BLE's for improved functional mobility, endurance, and posture. (progressing, not met)  5. Pt will report improved ability to sleep through the night >/=4 nights out of the week without increase in pain/symptoms for improved functional mobility (Met, 12/21/20)        Long Term Goals: 8 weeks   1. Pt will be I with updated HEP for improved functional mobility, posture, strength, and endurance. (progressing, not met)  2. Pt will report reduced B hip and back pain to </= 3/10 at worst for improved functional mobility and ability to participate in work activities/ADL's. (progressing, not met)  3. Pt will report feeling more steady and confident on uneven surfaces for improved functional mobility and decreased falls risk. (progressing, not met)  4. Pt will demo 5/5 strength in BLE's for improved functional mobility, endurance, and posture. (progressing, not met)  5. Pt will report increased ability to walk >/=2 miles without pain/symptoms for improved functional mobility/return to prior level of function (Met, 12/21/20)      Plan     Continue POC as tolerated.     Lopez Ochoa, PTA

## 2020-12-30 ENCOUNTER — CLINICAL SUPPORT (OUTPATIENT)
Dept: REHABILITATION | Facility: HOSPITAL | Age: 50
End: 2020-12-30
Payer: COMMERCIAL

## 2020-12-30 ENCOUNTER — OFFICE VISIT (OUTPATIENT)
Dept: INTERNAL MEDICINE | Facility: CLINIC | Age: 50
End: 2020-12-30
Payer: COMMERCIAL

## 2020-12-30 VITALS
SYSTOLIC BLOOD PRESSURE: 122 MMHG | HEIGHT: 67 IN | BODY MASS INDEX: 27.46 KG/M2 | OXYGEN SATURATION: 98 % | WEIGHT: 174.94 LBS | DIASTOLIC BLOOD PRESSURE: 78 MMHG | HEART RATE: 88 BPM

## 2020-12-30 DIAGNOSIS — Z00.00 ANNUAL PHYSICAL EXAM: Primary | ICD-10-CM

## 2020-12-30 DIAGNOSIS — M25.552 BILATERAL HIP PAIN: ICD-10-CM

## 2020-12-30 DIAGNOSIS — M25.551 BILATERAL HIP PAIN: ICD-10-CM

## 2020-12-30 DIAGNOSIS — I10 ESSENTIAL HYPERTENSION: ICD-10-CM

## 2020-12-30 DIAGNOSIS — R53.1 DECREASED STRENGTH: ICD-10-CM

## 2020-12-30 DIAGNOSIS — E78.2 MIXED HYPERLIPIDEMIA: ICD-10-CM

## 2020-12-30 DIAGNOSIS — Z12.11 SCREEN FOR COLON CANCER: ICD-10-CM

## 2020-12-30 DIAGNOSIS — Z01.84 IMMUNITY STATUS TESTING: ICD-10-CM

## 2020-12-30 DIAGNOSIS — Z74.09 IMPAIRED FUNCTIONAL MOBILITY, BALANCE, GAIT, AND ENDURANCE: ICD-10-CM

## 2020-12-30 PROCEDURE — 99999 PR PBB SHADOW E&M-EST. PATIENT-LVL III: CPT | Mod: PBBFAC,,, | Performed by: FAMILY MEDICINE

## 2020-12-30 PROCEDURE — 97110 THERAPEUTIC EXERCISES: CPT | Mod: PO

## 2020-12-30 PROCEDURE — 99999 PR PBB SHADOW E&M-EST. PATIENT-LVL III: ICD-10-PCS | Mod: PBBFAC,,, | Performed by: FAMILY MEDICINE

## 2020-12-30 PROCEDURE — 1125F PR PAIN SEVERITY QUANTIFIED, PAIN PRESENT: ICD-10-PCS | Mod: S$GLB,,, | Performed by: FAMILY MEDICINE

## 2020-12-30 PROCEDURE — 99396 PR PREVENTIVE VISIT,EST,40-64: ICD-10-PCS | Mod: S$GLB,,, | Performed by: FAMILY MEDICINE

## 2020-12-30 PROCEDURE — 3008F PR BODY MASS INDEX (BMI) DOCUMENTED: ICD-10-PCS | Mod: CPTII,S$GLB,, | Performed by: FAMILY MEDICINE

## 2020-12-30 PROCEDURE — 3008F BODY MASS INDEX DOCD: CPT | Mod: CPTII,S$GLB,, | Performed by: FAMILY MEDICINE

## 2020-12-30 PROCEDURE — 1125F AMNT PAIN NOTED PAIN PRSNT: CPT | Mod: S$GLB,,, | Performed by: FAMILY MEDICINE

## 2020-12-30 PROCEDURE — 99396 PREV VISIT EST AGE 40-64: CPT | Mod: S$GLB,,, | Performed by: FAMILY MEDICINE

## 2020-12-30 RX ORDER — LOSARTAN POTASSIUM 100 MG/1
100 TABLET ORAL DAILY
Qty: 90 TABLET | Refills: 1 | Status: SHIPPED | OUTPATIENT
Start: 2020-12-30 | End: 2021-06-30 | Stop reason: SDUPTHER

## 2020-12-30 RX ORDER — CHLORTHALIDONE 25 MG/1
25 TABLET ORAL DAILY
Qty: 90 TABLET | Refills: 1 | Status: SHIPPED | OUTPATIENT
Start: 2020-12-30 | End: 2021-06-30 | Stop reason: SDUPTHER

## 2020-12-30 NOTE — PROGRESS NOTES
Physical Therapy Treatment Note     Name: Shelbie Ardon  Clinic Number: [id number]    Therapy Diagnosis:   Encounter Diagnoses   Name Primary?    Bilateral hip pain     Decreased strength     Impaired functional mobility, balance, gait, and endurance      Physician: Jojo VELIZ MD    Visit Date: 12/30/2020    Physician Orders: PT Eval and Treat  Medical Diagnosis: Z47.1 (ICD-10-CM) - Aftercare following joint replacement  Z96.641 (ICD-10-CM) - Presence of right artificial hip joint  Evaluation Date: 11/16/20  Authorization Period Expiration: 12/31/20  Plan of Care Certification Period: 1/22/21  Visit #/Visits authorized: 10/30     Time In: 1104 AM (late arrival)  Time Out:  11:48 AM  Total Billable Time: 38minutes    Precautions: Standard and Anterior Hip Precautions - NO hip extension or ER, NO crossing legs, Sleep on R side when side-lying    Subjective     Pt reports: No new complaints  She was compliant with home exercise program.  Response to previous treatment: WNL  Functional change: walked 3 miles once     Pain: 0/10   Location: low back, B hips       Objective     Shelbie received therapeutic exercises to develop strength, endurance, ROM, flexibility and core stabilization for 38 minutes including:    Upright bike, Level 3, 6min  Shuttle: B leg press + iso hip abd c/ blue band, 3.5 straps: 2 x 12 reps  -B leg press with hip ADD, 2 x 12 reps, 3.5 bands  - single leg press, 2 x 10/LE with 2 black bands    Hip Matrix, 2 x 10/LE:  -flexion, 25#  - ABD, 25#    BOSU:  Mini-squats, dome side, 2 x 10  Step overs on round side of BOSU, with focus on eccetric control: x10/LE  Mini-squats, flat side, 2 x 10   Static standing on flat side of BOSU: 2x30 sec    Abd waks green loop at ankles 2 laps x20ft and 5# lorenzo  Monster walks green loop at ankles 2 laps x20ft and 5# justina bell    Hamstring stretch on stairs, 1 min/LE    Not performed today:  NP-Quadruped- Alternating LE extensions w/ PPT/TA  bracing: 2 x 10 reps   Staggered Bridging + hip abd with green theraband 2x10   Bridging+ hip add with ball 2x10 with 10 sec hold   Supine SLR 2x8/LE w/2# ankle weight    L hip flexor stretch off table, 1 min   Side- lying clams with 3 lbs and GTB: 2x12 (ROM is just slightly past neutral   Step ups on 6 inch step with contralateral hip flexion 2x10/LE np  Step downs on 6 inch step: 2x10/RLE  Single leg balance on Airex pad 2x30 sec/LE  Cone taps fwd and lateral w/SLS on green pad for balance 2x15  STS butt taps to regular height chair, holding 10lb KB at chest 3x10 NP  Static standing on round side of BOSU: 1x30 sec  Dynamic standing on round side of BOSU with ball toss and catch against wall: x20        Home Exercises Provided and Patient Education Provided    Education provided:   - exercise technique    Written Home Exercises Provided: Patient instructed to cont prior HEP.  Exercises were reviewed and Shelbie was able to demonstrate them prior to the end of the session.  Shelbie demonstrated good  understanding of the education provided.     See EMR under Patient Instructions for exercises provided prior visit.    Assessment   Pt with good tolerance of hip strengthening and balance activities. She was able to progress reps on Shuttle.  Will expand balance activities as tolerated.      Shelbie is progressing well towards her goals.   Pt prognosis is Good.     Pt will continue to benefit from skilled outpatient physical therapy to address the deficits listed in the problem list box on initial evaluation, provide pt/family education and to maximize pt's level of independence in the home and community environment.     Pt's spiritual, cultural and educational needs considered and pt agreeable to plan of care and goals.     Anticipated barriers to physical therapy: none    Goals:   Short Term Goals: 4 weeks   1. Pt will tolerate HEP for improved strength, functional mobility, ROM, posture, and endurance. (Met,  12/21/20)  2. Pt will report reduced back and B hip pain to </= 5/10 at worst for improved functional mobility and ability to participate in work activities/ADL's. (progressing, not met)  3. Pt will be compliant with hip precautions for improved functional mobility. (Met, 12/21/20)  4. Pt will demo >/= 4+/5 strength in BLE's for improved functional mobility, endurance, and posture. (progressing, not met)  5. Pt will report improved ability to sleep through the night >/=4 nights out of the week without increase in pain/symptoms for improved functional mobility (Met, 12/21/20)        Long Term Goals: 8 weeks   1. Pt will be I with updated HEP for improved functional mobility, posture, strength, and endurance. (progressing, not met)  2. Pt will report reduced B hip and back pain to </= 3/10 at worst for improved functional mobility and ability to participate in work activities/ADL's. (progressing, not met)  3. Pt will report feeling more steady and confident on uneven surfaces for improved functional mobility and decreased falls risk. (progressing, not met)  4. Pt will demo 5/5 strength in BLE's for improved functional mobility, endurance, and posture. (progressing, not met)  5. Pt will report increased ability to walk >/=2 miles without pain/symptoms for improved functional mobility/return to prior level of function (Met, 12/21/20)      Plan     Continue POC as tolerated.     Estephanie Lopez, PT

## 2020-12-30 NOTE — PROGRESS NOTES
"Subjective:       Patient ID: Shelbie Ardon is a 50 y.o. female.    Chief Complaint: Annual Exam    HPI  51 y/o female with HTN is here for annual exam.      She is s/p R LISETH 8/2020, she is still doing PT and slowly getting back to normal, she is walking 1-2 miles most days. She is still trying to eat healthy with weight watchers, she denies f/n/v/d/constipation/cp/sob/urinary sx. She is sleeping ok.  Home bp 120's over 70's.      HTN: diagnosed about 2015, losartan 100 mg daily and chlorthalidone 25 mg daily, followed by cardiology,   OA hip s/p R LISETH 8/2020 Dr. Hamm and OA knee: off celebrex, mobic 15 mg daily, diclofenac topical  GYN: Combipatch, mmg 6/2020  Hx of LPRD, not needed since  Colonoscopy due declined scope, mother had a perforation with colonoscopy, will do Fit kit/cologuard   Derm utd  Eye exam utd  Dental utd  OTC: fish oil, Vitamin D 500 IU     Review of Systems  see HPI  Objective:      /78 (BP Location: Left arm, Patient Position: Sitting)   Pulse 88   Ht 5' 7" (1.702 m)   Wt 79.3 kg (174 lb 15 oz)   SpO2 98%   BMI 27.40 kg/m²   Physical Exam  Vitals signs and nursing note reviewed.   Constitutional:       Appearance: She is well-developed.   HENT:      Head: Normocephalic and atraumatic.   Neck:      Musculoskeletal: Normal range of motion and neck supple.      Thyroid: No thyromegaly.   Cardiovascular:      Rate and Rhythm: Normal rate and regular rhythm.      Heart sounds: Normal heart sounds.   Pulmonary:      Effort: Pulmonary effort is normal. No respiratory distress.      Breath sounds: Normal breath sounds.   Abdominal:      General: Abdomen is flat. Bowel sounds are normal. There is no distension.      Palpations: Abdomen is soft. There is no mass.      Tenderness: There is no abdominal tenderness.   Musculoskeletal:      Right lower leg: No edema.      Left lower leg: No edema.   Lymphadenopathy:      Cervical: No cervical adenopathy.   Skin:     General: Skin is " warm and dry.   Neurological:      Mental Status: She is alert.         Assessment:       1. Annual physical exam    2. Essential hypertension    3. Mixed hyperlipidemia    4. Screen for colon cancer    5. Immunity status testing        Plan:   Shelbie was seen today for annual exam.    Diagnoses and all orders for this visit:    Annual physical exam    Essential hypertension  -     Comprehensive Metabolic Panel; Future  -     losartan (COZAAR) 100 MG tablet; Take 1 tablet (100 mg total) by mouth once daily.  -     chlorthalidone (HYGROTEN) 25 MG Tab; Take 1 tablet (25 mg total) by mouth once daily.    Mixed hyperlipidemia  -     losartan (COZAAR) 100 MG tablet; Take 1 tablet (100 mg total) by mouth once daily.    Screen for colon cancer  -     Cologuard Screening (Multitarget Stool DNA); Future  -     Cologuard Screening (Multitarget Stool DNA)    Immunity status testing  -     COVID-19 (SARS CoV-2) IgG Antibody; Future

## 2021-01-04 ENCOUNTER — TELEPHONE (OUTPATIENT)
Dept: PRIMARY CARE CLINIC | Facility: CLINIC | Age: 51
End: 2021-01-04

## 2021-01-04 ENCOUNTER — CLINICAL SUPPORT (OUTPATIENT)
Dept: REHABILITATION | Facility: HOSPITAL | Age: 51
End: 2021-01-04
Payer: COMMERCIAL

## 2021-01-04 DIAGNOSIS — Z74.09 IMPAIRED FUNCTIONAL MOBILITY, BALANCE, GAIT, AND ENDURANCE: ICD-10-CM

## 2021-01-04 DIAGNOSIS — R53.1 DECREASED STRENGTH: ICD-10-CM

## 2021-01-04 DIAGNOSIS — M25.551 BILATERAL HIP PAIN: ICD-10-CM

## 2021-01-04 DIAGNOSIS — M25.552 BILATERAL HIP PAIN: ICD-10-CM

## 2021-01-04 PROCEDURE — 97110 THERAPEUTIC EXERCISES: CPT | Mod: PO

## 2021-01-06 ENCOUNTER — CLINICAL SUPPORT (OUTPATIENT)
Dept: REHABILITATION | Facility: HOSPITAL | Age: 51
End: 2021-01-06
Payer: COMMERCIAL

## 2021-01-06 DIAGNOSIS — M25.552 BILATERAL HIP PAIN: ICD-10-CM

## 2021-01-06 DIAGNOSIS — M25.551 BILATERAL HIP PAIN: ICD-10-CM

## 2021-01-06 DIAGNOSIS — Z74.09 IMPAIRED FUNCTIONAL MOBILITY, BALANCE, GAIT, AND ENDURANCE: ICD-10-CM

## 2021-01-06 DIAGNOSIS — R53.1 DECREASED STRENGTH: ICD-10-CM

## 2021-01-06 PROCEDURE — 97110 THERAPEUTIC EXERCISES: CPT | Mod: PO

## 2021-01-07 ENCOUNTER — LAB VISIT (OUTPATIENT)
Dept: INTERNAL MEDICINE | Facility: CLINIC | Age: 51
End: 2021-01-07
Payer: COMMERCIAL

## 2021-01-07 DIAGNOSIS — Z01.84 IMMUNITY STATUS TESTING: ICD-10-CM

## 2021-01-07 DIAGNOSIS — I10 ESSENTIAL HYPERTENSION: ICD-10-CM

## 2021-01-07 LAB
ALBUMIN SERPL BCP-MCNC: 3.8 G/DL (ref 3.5–5.2)
ALP SERPL-CCNC: 53 U/L (ref 55–135)
ALT SERPL W/O P-5'-P-CCNC: 17 U/L (ref 10–44)
ANION GAP SERPL CALC-SCNC: 9 MMOL/L (ref 8–16)
AST SERPL-CCNC: 19 U/L (ref 10–40)
BILIRUB SERPL-MCNC: 0.8 MG/DL (ref 0.1–1)
BUN SERPL-MCNC: 21 MG/DL (ref 6–20)
CALCIUM SERPL-MCNC: 9.4 MG/DL (ref 8.7–10.5)
CHLORIDE SERPL-SCNC: 103 MMOL/L (ref 95–110)
CO2 SERPL-SCNC: 27 MMOL/L (ref 23–29)
CREAT SERPL-MCNC: 0.8 MG/DL (ref 0.5–1.4)
EST. GFR  (AFRICAN AMERICAN): >60 ML/MIN/1.73 M^2
EST. GFR  (NON AFRICAN AMERICAN): >60 ML/MIN/1.73 M^2
GLUCOSE SERPL-MCNC: 98 MG/DL (ref 70–110)
POTASSIUM SERPL-SCNC: 3.7 MMOL/L (ref 3.5–5.1)
PROT SERPL-MCNC: 7 G/DL (ref 6–8.4)
SARS-COV-2 IGG SERPLBLD QL IA.RAPID: NEGATIVE
SODIUM SERPL-SCNC: 139 MMOL/L (ref 136–145)

## 2021-01-07 PROCEDURE — 86769 SARS-COV-2 COVID-19 ANTIBODY: CPT

## 2021-01-07 PROCEDURE — 80053 COMPREHEN METABOLIC PANEL: CPT

## 2021-01-21 ENCOUNTER — CLINICAL SUPPORT (OUTPATIENT)
Dept: REHABILITATION | Facility: HOSPITAL | Age: 51
End: 2021-01-21
Payer: COMMERCIAL

## 2021-01-21 DIAGNOSIS — M25.551 BILATERAL HIP PAIN: ICD-10-CM

## 2021-01-21 DIAGNOSIS — M25.552 BILATERAL HIP PAIN: ICD-10-CM

## 2021-01-21 DIAGNOSIS — Z74.09 IMPAIRED FUNCTIONAL MOBILITY, BALANCE, GAIT, AND ENDURANCE: ICD-10-CM

## 2021-01-21 DIAGNOSIS — R53.1 DECREASED STRENGTH: ICD-10-CM

## 2021-01-21 PROCEDURE — 97110 THERAPEUTIC EXERCISES: CPT | Mod: PO,CQ

## 2021-01-25 ENCOUNTER — CLINICAL SUPPORT (OUTPATIENT)
Dept: REHABILITATION | Facility: HOSPITAL | Age: 51
End: 2021-01-25
Payer: COMMERCIAL

## 2021-01-25 DIAGNOSIS — M25.552 BILATERAL HIP PAIN: ICD-10-CM

## 2021-01-25 DIAGNOSIS — Z74.09 IMPAIRED FUNCTIONAL MOBILITY, BALANCE, GAIT, AND ENDURANCE: ICD-10-CM

## 2021-01-25 DIAGNOSIS — M25.551 BILATERAL HIP PAIN: ICD-10-CM

## 2021-01-25 DIAGNOSIS — R53.1 DECREASED STRENGTH: ICD-10-CM

## 2021-01-25 PROCEDURE — 97110 THERAPEUTIC EXERCISES: CPT | Mod: PO

## 2021-01-27 ENCOUNTER — CLINICAL SUPPORT (OUTPATIENT)
Dept: REHABILITATION | Facility: HOSPITAL | Age: 51
End: 2021-01-27
Payer: COMMERCIAL

## 2021-01-27 DIAGNOSIS — M25.551 BILATERAL HIP PAIN: ICD-10-CM

## 2021-01-27 DIAGNOSIS — M25.552 BILATERAL HIP PAIN: ICD-10-CM

## 2021-01-27 DIAGNOSIS — Z74.09 IMPAIRED FUNCTIONAL MOBILITY, BALANCE, GAIT, AND ENDURANCE: ICD-10-CM

## 2021-01-27 DIAGNOSIS — R53.1 DECREASED STRENGTH: ICD-10-CM

## 2021-01-27 PROCEDURE — 97110 THERAPEUTIC EXERCISES: CPT | Mod: PO,CQ

## 2021-02-01 ENCOUNTER — CLINICAL SUPPORT (OUTPATIENT)
Dept: REHABILITATION | Facility: HOSPITAL | Age: 51
End: 2021-02-01
Payer: COMMERCIAL

## 2021-02-01 ENCOUNTER — PATIENT OUTREACH (OUTPATIENT)
Dept: ADMINISTRATIVE | Facility: OTHER | Age: 51
End: 2021-02-01

## 2021-02-01 DIAGNOSIS — M25.551 BILATERAL HIP PAIN: ICD-10-CM

## 2021-02-01 DIAGNOSIS — M25.552 BILATERAL HIP PAIN: ICD-10-CM

## 2021-02-01 DIAGNOSIS — Z74.09 IMPAIRED FUNCTIONAL MOBILITY, BALANCE, GAIT, AND ENDURANCE: ICD-10-CM

## 2021-02-01 DIAGNOSIS — R53.1 DECREASED STRENGTH: ICD-10-CM

## 2021-02-01 DIAGNOSIS — Z12.11 ENCOUNTER FOR FIT (FECAL IMMUNOCHEMICAL TEST) SCREENING: Primary | ICD-10-CM

## 2021-02-01 PROCEDURE — 97110 THERAPEUTIC EXERCISES: CPT | Mod: PO

## 2021-02-02 ENCOUNTER — OFFICE VISIT (OUTPATIENT)
Dept: OBSTETRICS AND GYNECOLOGY | Facility: CLINIC | Age: 51
End: 2021-02-02
Payer: COMMERCIAL

## 2021-02-02 VITALS
HEIGHT: 67 IN | SYSTOLIC BLOOD PRESSURE: 118 MMHG | BODY MASS INDEX: 28.25 KG/M2 | WEIGHT: 180 LBS | DIASTOLIC BLOOD PRESSURE: 68 MMHG

## 2021-02-02 DIAGNOSIS — Z01.419 ENCOUNTER FOR GYNECOLOGICAL EXAMINATION WITHOUT ABNORMAL FINDING: Primary | ICD-10-CM

## 2021-02-02 DIAGNOSIS — N95.1 MENOPAUSAL SYMPTOMS: ICD-10-CM

## 2021-02-02 PROCEDURE — 99396 PR PREVENTIVE VISIT,EST,40-64: ICD-10-PCS | Mod: S$GLB,,, | Performed by: OBSTETRICS & GYNECOLOGY

## 2021-02-02 PROCEDURE — 99999 PR PBB SHADOW E&M-EST. PATIENT-LVL III: CPT | Mod: PBBFAC,,, | Performed by: OBSTETRICS & GYNECOLOGY

## 2021-02-02 PROCEDURE — 99396 PREV VISIT EST AGE 40-64: CPT | Mod: S$GLB,,, | Performed by: OBSTETRICS & GYNECOLOGY

## 2021-02-02 PROCEDURE — 99999 PR PBB SHADOW E&M-EST. PATIENT-LVL III: ICD-10-PCS | Mod: PBBFAC,,, | Performed by: OBSTETRICS & GYNECOLOGY

## 2021-02-02 PROCEDURE — 3008F BODY MASS INDEX DOCD: CPT | Mod: CPTII,S$GLB,, | Performed by: OBSTETRICS & GYNECOLOGY

## 2021-02-02 PROCEDURE — 1126F AMNT PAIN NOTED NONE PRSNT: CPT | Mod: S$GLB,,, | Performed by: OBSTETRICS & GYNECOLOGY

## 2021-02-02 PROCEDURE — 3008F PR BODY MASS INDEX (BMI) DOCUMENTED: ICD-10-PCS | Mod: CPTII,S$GLB,, | Performed by: OBSTETRICS & GYNECOLOGY

## 2021-02-02 PROCEDURE — 1126F PR PAIN SEVERITY QUANTIFIED, NO PAIN PRESENT: ICD-10-PCS | Mod: S$GLB,,, | Performed by: OBSTETRICS & GYNECOLOGY

## 2021-02-02 RX ORDER — ESTRADIOL 0.5 MG/1
0.5 TABLET ORAL DAILY
Qty: 30 TABLET | Refills: 11 | Status: SHIPPED | OUTPATIENT
Start: 2021-02-02 | End: 2021-06-30

## 2021-02-02 RX ORDER — CLINDAMYCIN HYDROCHLORIDE 300 MG/1
300 CAPSULE ORAL
COMMUNITY
Start: 2021-01-25

## 2021-02-05 ENCOUNTER — CLINICAL SUPPORT (OUTPATIENT)
Dept: REHABILITATION | Facility: HOSPITAL | Age: 51
End: 2021-02-05
Attending: FAMILY MEDICINE
Payer: COMMERCIAL

## 2021-02-05 DIAGNOSIS — M25.551 BILATERAL HIP PAIN: ICD-10-CM

## 2021-02-05 DIAGNOSIS — R53.1 DECREASED STRENGTH: ICD-10-CM

## 2021-02-05 DIAGNOSIS — M25.552 BILATERAL HIP PAIN: ICD-10-CM

## 2021-02-05 DIAGNOSIS — Z74.09 IMPAIRED FUNCTIONAL MOBILITY, BALANCE, GAIT, AND ENDURANCE: ICD-10-CM

## 2021-02-05 PROCEDURE — 97110 THERAPEUTIC EXERCISES: CPT | Mod: PO

## 2021-02-18 ENCOUNTER — CLINICAL SUPPORT (OUTPATIENT)
Dept: REHABILITATION | Facility: HOSPITAL | Age: 51
End: 2021-02-18
Payer: COMMERCIAL

## 2021-02-18 DIAGNOSIS — R53.1 DECREASED STRENGTH: ICD-10-CM

## 2021-02-18 DIAGNOSIS — M25.552 BILATERAL HIP PAIN: ICD-10-CM

## 2021-02-18 DIAGNOSIS — Z74.09 IMPAIRED FUNCTIONAL MOBILITY, BALANCE, GAIT, AND ENDURANCE: ICD-10-CM

## 2021-02-18 DIAGNOSIS — M25.551 BILATERAL HIP PAIN: ICD-10-CM

## 2021-02-18 PROCEDURE — 97140 MANUAL THERAPY 1/> REGIONS: CPT | Mod: PO

## 2021-02-18 PROCEDURE — 97110 THERAPEUTIC EXERCISES: CPT | Mod: PO

## 2021-02-22 ENCOUNTER — CLINICAL SUPPORT (OUTPATIENT)
Dept: REHABILITATION | Facility: HOSPITAL | Age: 51
End: 2021-02-22
Payer: COMMERCIAL

## 2021-02-22 DIAGNOSIS — M25.551 BILATERAL HIP PAIN: ICD-10-CM

## 2021-02-22 DIAGNOSIS — M25.552 BILATERAL HIP PAIN: ICD-10-CM

## 2021-02-22 DIAGNOSIS — Z74.09 IMPAIRED FUNCTIONAL MOBILITY, BALANCE, GAIT, AND ENDURANCE: ICD-10-CM

## 2021-02-22 DIAGNOSIS — R53.1 DECREASED STRENGTH: ICD-10-CM

## 2021-02-22 PROCEDURE — 97140 MANUAL THERAPY 1/> REGIONS: CPT | Mod: PO,CQ

## 2021-02-22 PROCEDURE — 97110 THERAPEUTIC EXERCISES: CPT | Mod: PO,CQ

## 2021-03-01 ENCOUNTER — CLINICAL SUPPORT (OUTPATIENT)
Dept: REHABILITATION | Facility: HOSPITAL | Age: 51
End: 2021-03-01
Payer: COMMERCIAL

## 2021-03-01 DIAGNOSIS — R53.1 DECREASED STRENGTH: ICD-10-CM

## 2021-03-01 DIAGNOSIS — M25.551 BILATERAL HIP PAIN: ICD-10-CM

## 2021-03-01 DIAGNOSIS — M25.552 BILATERAL HIP PAIN: ICD-10-CM

## 2021-03-01 DIAGNOSIS — Z74.09 IMPAIRED FUNCTIONAL MOBILITY, BALANCE, GAIT, AND ENDURANCE: ICD-10-CM

## 2021-03-03 ENCOUNTER — CLINICAL SUPPORT (OUTPATIENT)
Dept: REHABILITATION | Facility: HOSPITAL | Age: 51
End: 2021-03-03
Payer: COMMERCIAL

## 2021-03-03 DIAGNOSIS — M25.552 BILATERAL HIP PAIN: ICD-10-CM

## 2021-03-03 DIAGNOSIS — M25.551 BILATERAL HIP PAIN: ICD-10-CM

## 2021-03-03 DIAGNOSIS — R53.1 DECREASED STRENGTH: ICD-10-CM

## 2021-03-03 DIAGNOSIS — Z74.09 IMPAIRED FUNCTIONAL MOBILITY, BALANCE, GAIT, AND ENDURANCE: ICD-10-CM

## 2021-03-03 PROCEDURE — 97110 THERAPEUTIC EXERCISES: CPT | Mod: PO

## 2021-03-15 ENCOUNTER — PATIENT MESSAGE (OUTPATIENT)
Dept: SPORTS MEDICINE | Facility: CLINIC | Age: 51
End: 2021-03-15

## 2021-03-25 ENCOUNTER — PATIENT MESSAGE (OUTPATIENT)
Dept: INTERNAL MEDICINE | Facility: CLINIC | Age: 51
End: 2021-03-25

## 2021-03-25 DIAGNOSIS — M25.569 KNEE PAIN, UNSPECIFIED CHRONICITY, UNSPECIFIED LATERALITY: ICD-10-CM

## 2021-03-25 DIAGNOSIS — Z74.09 IMPAIRED FUNCTIONAL MOBILITY, BALANCE, GAIT, AND ENDURANCE: ICD-10-CM

## 2021-03-25 DIAGNOSIS — M25.551 RIGHT HIP PAIN: ICD-10-CM

## 2021-03-25 DIAGNOSIS — M17.10 PRIMARY LOCALIZED OSTEOARTHROSIS OF LOWER LEG, UNSPECIFIED LATERALITY: Primary | ICD-10-CM

## 2021-03-25 DIAGNOSIS — M79.651 PAIN OF RIGHT THIGH: ICD-10-CM

## 2021-03-30 ENCOUNTER — PATIENT MESSAGE (OUTPATIENT)
Dept: INTERNAL MEDICINE | Facility: CLINIC | Age: 51
End: 2021-03-30

## 2021-03-30 DIAGNOSIS — G62.9 PERIPHERAL NERVE DISORDER: Primary | ICD-10-CM

## 2021-04-01 ENCOUNTER — PATIENT MESSAGE (OUTPATIENT)
Dept: INTERNAL MEDICINE | Facility: CLINIC | Age: 51
End: 2021-04-01

## 2021-04-05 ENCOUNTER — PATIENT MESSAGE (OUTPATIENT)
Dept: ADMINISTRATIVE | Facility: HOSPITAL | Age: 51
End: 2021-04-05

## 2021-04-08 ENCOUNTER — PATIENT MESSAGE (OUTPATIENT)
Dept: INTERNAL MEDICINE | Facility: CLINIC | Age: 51
End: 2021-04-08

## 2021-04-23 ENCOUNTER — CLINICAL SUPPORT (OUTPATIENT)
Dept: URGENT CARE | Facility: CLINIC | Age: 51
End: 2021-04-23
Payer: COMMERCIAL

## 2021-04-23 DIAGNOSIS — Z20.822 ENCOUNTER FOR LABORATORY TESTING FOR COVID-19 VIRUS: ICD-10-CM

## 2021-04-23 PROCEDURE — U0005 INFEC AGEN DETEC AMPLI PROBE: HCPCS | Performed by: PHYSICIAN ASSISTANT

## 2021-04-23 PROCEDURE — U0003 INFECTIOUS AGENT DETECTION BY NUCLEIC ACID (DNA OR RNA); SEVERE ACUTE RESPIRATORY SYNDROME CORONAVIRUS 2 (SARS-COV-2) (CORONAVIRUS DISEASE [COVID-19]), AMPLIFIED PROBE TECHNIQUE, MAKING USE OF HIGH THROUGHPUT TECHNOLOGIES AS DESCRIBED BY CMS-2020-01-R: HCPCS | Performed by: PHYSICIAN ASSISTANT

## 2021-04-24 LAB — SARS-COV-2 RNA RESP QL NAA+PROBE: NOT DETECTED

## 2021-06-16 ENCOUNTER — TELEPHONE (OUTPATIENT)
Dept: INTERNAL MEDICINE | Facility: CLINIC | Age: 51
End: 2021-06-16

## 2021-06-16 ENCOUNTER — PATIENT OUTREACH (OUTPATIENT)
Dept: ADMINISTRATIVE | Facility: HOSPITAL | Age: 51
End: 2021-06-16

## 2021-06-16 DIAGNOSIS — Z12.31 ENCOUNTER FOR SCREENING MAMMOGRAM FOR BREAST CANCER: ICD-10-CM

## 2021-06-16 DIAGNOSIS — Z11.59 NEED FOR HEPATITIS C SCREENING TEST: Primary | ICD-10-CM

## 2021-06-29 ENCOUNTER — HOSPITAL ENCOUNTER (OUTPATIENT)
Dept: RADIOLOGY | Facility: HOSPITAL | Age: 51
Discharge: HOME | End: 2021-06-29
Attending: FAMILY MEDICINE
Payer: COMMERCIAL

## 2021-06-29 DIAGNOSIS — Z12.31 ENCOUNTER FOR SCREENING MAMMOGRAM FOR BREAST CANCER: ICD-10-CM

## 2021-06-29 PROCEDURE — 77067 SCR MAMMO BI INCL CAD: CPT | Mod: 26,,, | Performed by: RADIOLOGY

## 2021-06-29 PROCEDURE — 77067 MAMMO DIGITAL SCREENING BILAT WITH TOMO: ICD-10-PCS | Mod: 26,,, | Performed by: RADIOLOGY

## 2021-06-29 PROCEDURE — 77063 BREAST TOMOSYNTHESIS BI: CPT | Mod: 26,,, | Performed by: RADIOLOGY

## 2021-06-29 PROCEDURE — 77063 MAMMO DIGITAL SCREENING BILAT WITH TOMO: ICD-10-PCS | Mod: 26,,, | Performed by: RADIOLOGY

## 2021-06-29 PROCEDURE — 77067 SCR MAMMO BI INCL CAD: CPT | Mod: TC,PO

## 2021-06-30 ENCOUNTER — TELEPHONE (OUTPATIENT)
Dept: INTERNAL MEDICINE | Facility: CLINIC | Age: 51
End: 2021-06-30

## 2021-06-30 ENCOUNTER — OFFICE VISIT (OUTPATIENT)
Dept: INTERNAL MEDICINE | Facility: CLINIC | Age: 51
End: 2021-06-30
Payer: COMMERCIAL

## 2021-06-30 VITALS
DIASTOLIC BLOOD PRESSURE: 78 MMHG | SYSTOLIC BLOOD PRESSURE: 100 MMHG | HEIGHT: 67 IN | HEART RATE: 72 BPM | TEMPERATURE: 98 F | WEIGHT: 174.63 LBS | BODY MASS INDEX: 27.41 KG/M2 | OXYGEN SATURATION: 98 %

## 2021-06-30 DIAGNOSIS — G57.11 MERALGIA PARESTHETICA OF RIGHT SIDE: ICD-10-CM

## 2021-06-30 DIAGNOSIS — Z96.641 STATUS POST TOTAL REPLACEMENT OF RIGHT HIP: ICD-10-CM

## 2021-06-30 DIAGNOSIS — Z00.00 ANNUAL PHYSICAL EXAM: ICD-10-CM

## 2021-06-30 DIAGNOSIS — Z11.59 ENCOUNTER FOR HEPATITIS C SCREENING TEST FOR LOW RISK PATIENT: ICD-10-CM

## 2021-06-30 DIAGNOSIS — E78.2 MIXED HYPERLIPIDEMIA: ICD-10-CM

## 2021-06-30 DIAGNOSIS — Z13.6 ENCOUNTER FOR LIPID SCREENING FOR CARDIOVASCULAR DISEASE: ICD-10-CM

## 2021-06-30 DIAGNOSIS — Z13.220 ENCOUNTER FOR LIPID SCREENING FOR CARDIOVASCULAR DISEASE: ICD-10-CM

## 2021-06-30 DIAGNOSIS — I10 ESSENTIAL HYPERTENSION: Primary | ICD-10-CM

## 2021-06-30 DIAGNOSIS — N95.0 POST-MENOPAUSAL BLEEDING: ICD-10-CM

## 2021-06-30 PROCEDURE — 3074F SYST BP LT 130 MM HG: CPT | Mod: CPTII,S$GLB,, | Performed by: FAMILY MEDICINE

## 2021-06-30 PROCEDURE — 1125F AMNT PAIN NOTED PAIN PRSNT: CPT | Mod: S$GLB,,, | Performed by: FAMILY MEDICINE

## 2021-06-30 PROCEDURE — 99214 OFFICE O/P EST MOD 30 MIN: CPT | Mod: S$GLB,,, | Performed by: FAMILY MEDICINE

## 2021-06-30 PROCEDURE — 3078F DIAST BP <80 MM HG: CPT | Mod: CPTII,S$GLB,, | Performed by: FAMILY MEDICINE

## 2021-06-30 PROCEDURE — 99999 PR PBB SHADOW E&M-EST. PATIENT-LVL III: ICD-10-PCS | Mod: PBBFAC,,, | Performed by: FAMILY MEDICINE

## 2021-06-30 PROCEDURE — 3078F PR MOST RECENT DIASTOLIC BLOOD PRESSURE < 80 MM HG: ICD-10-PCS | Mod: CPTII,S$GLB,, | Performed by: FAMILY MEDICINE

## 2021-06-30 PROCEDURE — 1125F PR PAIN SEVERITY QUANTIFIED, PAIN PRESENT: ICD-10-PCS | Mod: S$GLB,,, | Performed by: FAMILY MEDICINE

## 2021-06-30 PROCEDURE — 3008F PR BODY MASS INDEX (BMI) DOCUMENTED: ICD-10-PCS | Mod: CPTII,S$GLB,, | Performed by: FAMILY MEDICINE

## 2021-06-30 PROCEDURE — 3074F PR MOST RECENT SYSTOLIC BLOOD PRESSURE < 130 MM HG: ICD-10-PCS | Mod: CPTII,S$GLB,, | Performed by: FAMILY MEDICINE

## 2021-06-30 PROCEDURE — 99214 PR OFFICE/OUTPT VISIT, EST, LEVL IV, 30-39 MIN: ICD-10-PCS | Mod: S$GLB,,, | Performed by: FAMILY MEDICINE

## 2021-06-30 PROCEDURE — 99999 PR PBB SHADOW E&M-EST. PATIENT-LVL III: CPT | Mod: PBBFAC,,, | Performed by: FAMILY MEDICINE

## 2021-06-30 PROCEDURE — 3008F BODY MASS INDEX DOCD: CPT | Mod: CPTII,S$GLB,, | Performed by: FAMILY MEDICINE

## 2021-06-30 RX ORDER — CHLORTHALIDONE 25 MG/1
25 TABLET ORAL DAILY
Qty: 90 TABLET | Refills: 1 | Status: SHIPPED | OUTPATIENT
Start: 2021-06-30 | End: 2022-01-13 | Stop reason: SDUPTHER

## 2021-06-30 RX ORDER — PREGABALIN 25 MG/1
25 CAPSULE ORAL 3 TIMES DAILY
Qty: 90 CAPSULE | Refills: 2 | Status: SHIPPED | OUTPATIENT
Start: 2021-06-30 | End: 2022-05-18 | Stop reason: ALTCHOICE

## 2021-06-30 RX ORDER — AMOXICILLIN 500 MG
CAPSULE ORAL DAILY
COMMUNITY

## 2021-06-30 RX ORDER — CELECOXIB 100 MG/1
CAPSULE ORAL
COMMUNITY
End: 2021-07-28

## 2021-06-30 RX ORDER — LOSARTAN POTASSIUM 100 MG/1
100 TABLET ORAL DAILY
Qty: 90 TABLET | Refills: 1 | Status: SHIPPED | OUTPATIENT
Start: 2021-06-30 | End: 2022-03-16 | Stop reason: SDUPTHER

## 2021-07-01 ENCOUNTER — PATIENT OUTREACH (OUTPATIENT)
Dept: ADMINISTRATIVE | Facility: HOSPITAL | Age: 51
End: 2021-07-01

## 2021-07-06 ENCOUNTER — TELEPHONE (OUTPATIENT)
Dept: INTERNAL MEDICINE | Facility: CLINIC | Age: 51
End: 2021-07-06

## 2021-07-07 ENCOUNTER — CLINICAL SUPPORT (OUTPATIENT)
Dept: INTERNAL MEDICINE | Facility: CLINIC | Age: 51
End: 2021-07-07
Payer: COMMERCIAL

## 2021-07-07 ENCOUNTER — LAB VISIT (OUTPATIENT)
Dept: INTERNAL MEDICINE | Facility: CLINIC | Age: 51
End: 2021-07-07
Payer: COMMERCIAL

## 2021-07-07 DIAGNOSIS — Z11.59 ENCOUNTER FOR HEPATITIS C SCREENING TEST FOR LOW RISK PATIENT: ICD-10-CM

## 2021-07-07 DIAGNOSIS — Z13.220 ENCOUNTER FOR LIPID SCREENING FOR CARDIOVASCULAR DISEASE: ICD-10-CM

## 2021-07-07 DIAGNOSIS — Z13.6 ENCOUNTER FOR LIPID SCREENING FOR CARDIOVASCULAR DISEASE: ICD-10-CM

## 2021-07-07 DIAGNOSIS — Z00.00 ANNUAL PHYSICAL EXAM: ICD-10-CM

## 2021-07-07 DIAGNOSIS — I10 ESSENTIAL HYPERTENSION: ICD-10-CM

## 2021-07-07 DIAGNOSIS — Z11.59 NEED FOR HEPATITIS C SCREENING TEST: ICD-10-CM

## 2021-07-07 DIAGNOSIS — E78.2 MIXED HYPERLIPIDEMIA: ICD-10-CM

## 2021-07-07 LAB
25(OH)D3+25(OH)D2 SERPL-MCNC: 49 NG/ML (ref 30–96)
ALBUMIN SERPL BCP-MCNC: 3.8 G/DL (ref 3.5–5.2)
ALP SERPL-CCNC: 49 U/L (ref 55–135)
ALT SERPL W/O P-5'-P-CCNC: 13 U/L (ref 10–44)
ANION GAP SERPL CALC-SCNC: 9 MMOL/L (ref 8–16)
AST SERPL-CCNC: 19 U/L (ref 10–40)
BASOPHILS # BLD AUTO: 0.09 K/UL (ref 0–0.2)
BASOPHILS NFR BLD: 1.3 % (ref 0–1.9)
BILIRUB SERPL-MCNC: 0.7 MG/DL (ref 0.1–1)
BILIRUB UR QL STRIP: NEGATIVE
BUN SERPL-MCNC: 15 MG/DL (ref 6–20)
CALCIUM SERPL-MCNC: 9.1 MG/DL (ref 8.7–10.5)
CHLORIDE SERPL-SCNC: 104 MMOL/L (ref 95–110)
CHOLEST SERPL-MCNC: 251 MG/DL (ref 120–199)
CHOLEST/HDLC SERPL: 3.8 {RATIO} (ref 2–5)
CLARITY UR REFRACT.AUTO: CLEAR
CO2 SERPL-SCNC: 25 MMOL/L (ref 23–29)
COLOR UR AUTO: YELLOW
CREAT SERPL-MCNC: 0.8 MG/DL (ref 0.5–1.4)
DIFFERENTIAL METHOD: ABNORMAL
EOSINOPHIL # BLD AUTO: 0.2 K/UL (ref 0–0.5)
EOSINOPHIL NFR BLD: 2.8 % (ref 0–8)
ERYTHROCYTE [DISTWIDTH] IN BLOOD BY AUTOMATED COUNT: 11.9 % (ref 11.5–14.5)
EST. GFR  (AFRICAN AMERICAN): >60 ML/MIN/1.73 M^2
EST. GFR  (NON AFRICAN AMERICAN): >60 ML/MIN/1.73 M^2
ESTIMATED AVG GLUCOSE: 88 MG/DL (ref 68–131)
GLUCOSE SERPL-MCNC: 90 MG/DL (ref 70–110)
GLUCOSE UR QL STRIP: NEGATIVE
HBA1C MFR BLD: 4.7 % (ref 4–5.6)
HCT VFR BLD AUTO: 41.2 % (ref 37–48.5)
HDLC SERPL-MCNC: 66 MG/DL (ref 40–75)
HDLC SERPL: 26.3 % (ref 20–50)
HGB BLD-MCNC: 14.2 G/DL (ref 12–16)
HGB UR QL STRIP: ABNORMAL
IMM GRANULOCYTES # BLD AUTO: 0.03 K/UL (ref 0–0.04)
IMM GRANULOCYTES NFR BLD AUTO: 0.4 % (ref 0–0.5)
KETONES UR QL STRIP: NEGATIVE
LDLC SERPL CALC-MCNC: 166.6 MG/DL (ref 63–159)
LEUKOCYTE ESTERASE UR QL STRIP: NEGATIVE
LYMPHOCYTES # BLD AUTO: 2.1 K/UL (ref 1–4.8)
LYMPHOCYTES NFR BLD: 30.4 % (ref 18–48)
MCH RBC QN AUTO: 33.5 PG (ref 27–31)
MCHC RBC AUTO-ENTMCNC: 34.5 G/DL (ref 32–36)
MCV RBC AUTO: 97 FL (ref 82–98)
MICROSCOPIC COMMENT: NORMAL
MONOCYTES # BLD AUTO: 0.6 K/UL (ref 0.3–1)
MONOCYTES NFR BLD: 8.2 % (ref 4–15)
NEUTROPHILS # BLD AUTO: 3.9 K/UL (ref 1.8–7.7)
NEUTROPHILS NFR BLD: 56.9 % (ref 38–73)
NITRITE UR QL STRIP: NEGATIVE
NONHDLC SERPL-MCNC: 185 MG/DL
NRBC BLD-RTO: 0 /100 WBC
PH UR STRIP: 7 [PH] (ref 5–8)
PLATELET # BLD AUTO: 260 K/UL (ref 150–450)
PMV BLD AUTO: 10.1 FL (ref 9.2–12.9)
POTASSIUM SERPL-SCNC: 3.8 MMOL/L (ref 3.5–5.1)
PROT SERPL-MCNC: 6.9 G/DL (ref 6–8.4)
PROT UR QL STRIP: NEGATIVE
RBC # BLD AUTO: 4.24 M/UL (ref 4–5.4)
RBC #/AREA URNS AUTO: 1 /HPF (ref 0–4)
SODIUM SERPL-SCNC: 138 MMOL/L (ref 136–145)
SP GR UR STRIP: 1.02 (ref 1–1.03)
SQUAMOUS #/AREA URNS AUTO: 1 /HPF
TRIGL SERPL-MCNC: 92 MG/DL (ref 30–150)
TSH SERPL DL<=0.005 MIU/L-ACNC: 0.89 UIU/ML (ref 0.4–4)
URN SPEC COLLECT METH UR: ABNORMAL
WBC # BLD AUTO: 6.81 K/UL (ref 3.9–12.7)
WBC #/AREA URNS AUTO: 0 /HPF (ref 0–5)

## 2021-07-07 PROCEDURE — 80053 COMPREHEN METABOLIC PANEL: CPT | Performed by: FAMILY MEDICINE

## 2021-07-07 PROCEDURE — 36415 COLL VENOUS BLD VENIPUNCTURE: CPT | Performed by: FAMILY MEDICINE

## 2021-07-07 PROCEDURE — 86803 HEPATITIS C AB TEST: CPT | Performed by: FAMILY MEDICINE

## 2021-07-07 PROCEDURE — 80061 LIPID PANEL: CPT | Performed by: FAMILY MEDICINE

## 2021-07-07 PROCEDURE — 81001 URINALYSIS AUTO W/SCOPE: CPT | Performed by: FAMILY MEDICINE

## 2021-07-07 PROCEDURE — 85025 COMPLETE CBC W/AUTO DIFF WBC: CPT | Performed by: FAMILY MEDICINE

## 2021-07-07 PROCEDURE — 82306 VITAMIN D 25 HYDROXY: CPT | Performed by: FAMILY MEDICINE

## 2021-07-07 PROCEDURE — 83036 HEMOGLOBIN GLYCOSYLATED A1C: CPT | Performed by: FAMILY MEDICINE

## 2021-07-07 PROCEDURE — 84443 ASSAY THYROID STIM HORMONE: CPT | Performed by: FAMILY MEDICINE

## 2021-07-08 ENCOUNTER — PATIENT MESSAGE (OUTPATIENT)
Dept: INTERNAL MEDICINE | Facility: CLINIC | Age: 51
End: 2021-07-08

## 2021-07-08 LAB — HCV AB SERPL QL IA: NEGATIVE

## 2021-07-12 ENCOUNTER — HOSPITAL ENCOUNTER (OUTPATIENT)
Dept: RADIOLOGY | Facility: OTHER | Age: 51
Discharge: HOME | End: 2021-07-12
Attending: FAMILY MEDICINE
Payer: COMMERCIAL

## 2021-07-12 DIAGNOSIS — N95.0 POST-MENOPAUSAL BLEEDING: ICD-10-CM

## 2021-07-12 PROCEDURE — 76856 US EXAM PELVIC COMPLETE: CPT | Mod: 26,,, | Performed by: RADIOLOGY

## 2021-07-12 PROCEDURE — 76856 US EXAM PELVIC COMPLETE: CPT | Mod: TC

## 2021-07-12 PROCEDURE — 76856 US PELVIS COMP WITH TRANSVAG NON-OB (XPD): ICD-10-PCS | Mod: 26,,, | Performed by: RADIOLOGY

## 2021-07-12 PROCEDURE — 76830 TRANSVAGINAL US NON-OB: CPT | Mod: 26,,, | Performed by: RADIOLOGY

## 2021-07-12 PROCEDURE — 76830 US PELVIS COMP WITH TRANSVAG NON-OB (XPD): ICD-10-PCS | Mod: 26,,, | Performed by: RADIOLOGY

## 2021-07-14 ENCOUNTER — PATIENT MESSAGE (OUTPATIENT)
Dept: INTERNAL MEDICINE | Facility: CLINIC | Age: 51
End: 2021-07-14

## 2021-07-16 ENCOUNTER — OFFICE VISIT (OUTPATIENT)
Dept: OBSTETRICS AND GYNECOLOGY | Facility: CLINIC | Age: 51
End: 2021-07-16
Payer: COMMERCIAL

## 2021-07-16 VITALS
WEIGHT: 175.94 LBS | DIASTOLIC BLOOD PRESSURE: 70 MMHG | HEIGHT: 67 IN | BODY MASS INDEX: 27.61 KG/M2 | SYSTOLIC BLOOD PRESSURE: 124 MMHG

## 2021-07-16 DIAGNOSIS — N95.0 POSTMENOPAUSAL BLEEDING: Primary | ICD-10-CM

## 2021-07-16 LAB
B-HCG UR QL: NEGATIVE
CTP QC/QA: YES

## 2021-07-16 PROCEDURE — 81025 POCT URINE PREGNANCY: ICD-10-PCS | Mod: S$GLB,,, | Performed by: OBSTETRICS & GYNECOLOGY

## 2021-07-16 PROCEDURE — 99999 PR PBB SHADOW E&M-EST. PATIENT-LVL III: CPT | Mod: PBBFAC,,, | Performed by: OBSTETRICS & GYNECOLOGY

## 2021-07-16 PROCEDURE — 88305 TISSUE EXAM BY PATHOLOGIST: CPT | Performed by: PATHOLOGY

## 2021-07-16 PROCEDURE — 1126F PR PAIN SEVERITY QUANTIFIED, NO PAIN PRESENT: ICD-10-PCS | Mod: S$GLB,,, | Performed by: OBSTETRICS & GYNECOLOGY

## 2021-07-16 PROCEDURE — 58100 BIOPSY OF UTERUS LINING: CPT | Mod: S$GLB,,, | Performed by: OBSTETRICS & GYNECOLOGY

## 2021-07-16 PROCEDURE — 88305 TISSUE EXAM BY PATHOLOGIST: ICD-10-PCS | Mod: 26,,, | Performed by: PATHOLOGY

## 2021-07-16 PROCEDURE — 3008F PR BODY MASS INDEX (BMI) DOCUMENTED: ICD-10-PCS | Mod: CPTII,S$GLB,, | Performed by: OBSTETRICS & GYNECOLOGY

## 2021-07-16 PROCEDURE — 3008F BODY MASS INDEX DOCD: CPT | Mod: CPTII,S$GLB,, | Performed by: OBSTETRICS & GYNECOLOGY

## 2021-07-16 PROCEDURE — 58100 PR BIOPSY OF UTERUS LINING: ICD-10-PCS | Mod: S$GLB,,, | Performed by: OBSTETRICS & GYNECOLOGY

## 2021-07-16 PROCEDURE — 99999 PR PBB SHADOW E&M-EST. PATIENT-LVL III: ICD-10-PCS | Mod: PBBFAC,,, | Performed by: OBSTETRICS & GYNECOLOGY

## 2021-07-16 PROCEDURE — 99213 PR OFFICE/OUTPT VISIT, EST, LEVL III, 20-29 MIN: ICD-10-PCS | Mod: 25,S$GLB,, | Performed by: OBSTETRICS & GYNECOLOGY

## 2021-07-16 PROCEDURE — 99213 OFFICE O/P EST LOW 20 MIN: CPT | Mod: 25,S$GLB,, | Performed by: OBSTETRICS & GYNECOLOGY

## 2021-07-16 PROCEDURE — 81025 URINE PREGNANCY TEST: CPT | Mod: S$GLB,,, | Performed by: OBSTETRICS & GYNECOLOGY

## 2021-07-16 PROCEDURE — 88305 TISSUE EXAM BY PATHOLOGIST: CPT | Mod: 26,,, | Performed by: PATHOLOGY

## 2021-07-16 PROCEDURE — 1126F AMNT PAIN NOTED NONE PRSNT: CPT | Mod: S$GLB,,, | Performed by: OBSTETRICS & GYNECOLOGY

## 2021-07-22 LAB
FINAL PATHOLOGIC DIAGNOSIS: NORMAL
GROSS: NORMAL
Lab: NORMAL

## 2021-07-25 ENCOUNTER — TELEPHONE (OUTPATIENT)
Dept: OBSTETRICS AND GYNECOLOGY | Facility: HOSPITAL | Age: 51
End: 2021-07-25

## 2021-07-28 ENCOUNTER — OFFICE VISIT (OUTPATIENT)
Dept: INTERNAL MEDICINE | Facility: CLINIC | Age: 51
End: 2021-07-28
Payer: COMMERCIAL

## 2021-07-28 VITALS — DIASTOLIC BLOOD PRESSURE: 78 MMHG | WEIGHT: 175 LBS | SYSTOLIC BLOOD PRESSURE: 120 MMHG | BODY MASS INDEX: 27.41 KG/M2

## 2021-07-28 DIAGNOSIS — M17.10 PRIMARY LOCALIZED OSTEOARTHROSIS OF LOWER LEG, UNSPECIFIED LATERALITY: ICD-10-CM

## 2021-07-28 DIAGNOSIS — G57.11 MERALGIA PARESTHETICA OF RIGHT SIDE: Primary | ICD-10-CM

## 2021-07-28 DIAGNOSIS — I10 ESSENTIAL HYPERTENSION: ICD-10-CM

## 2021-07-28 DIAGNOSIS — Z74.09 IMPAIRED FUNCTIONAL MOBILITY, BALANCE, GAIT, AND ENDURANCE: ICD-10-CM

## 2021-07-28 PROCEDURE — 3074F PR MOST RECENT SYSTOLIC BLOOD PRESSURE < 130 MM HG: ICD-10-PCS | Mod: CPTII,,, | Performed by: FAMILY MEDICINE

## 2021-07-28 PROCEDURE — 1159F PR MEDICATION LIST DOCUMENTED IN MEDICAL RECORD: ICD-10-PCS | Mod: CPTII,,, | Performed by: FAMILY MEDICINE

## 2021-07-28 PROCEDURE — 1126F PR PAIN SEVERITY QUANTIFIED, NO PAIN PRESENT: ICD-10-PCS | Mod: CPTII,,, | Performed by: FAMILY MEDICINE

## 2021-07-28 PROCEDURE — 99214 PR OFFICE/OUTPT VISIT, EST, LEVL IV, 30-39 MIN: ICD-10-PCS | Mod: 95,,, | Performed by: FAMILY MEDICINE

## 2021-07-28 PROCEDURE — 3008F PR BODY MASS INDEX (BMI) DOCUMENTED: ICD-10-PCS | Mod: CPTII,,, | Performed by: FAMILY MEDICINE

## 2021-07-28 PROCEDURE — 1160F PR REVIEW ALL MEDS BY PRESCRIBER/CLIN PHARMACIST DOCUMENTED: ICD-10-PCS | Mod: CPTII,,, | Performed by: FAMILY MEDICINE

## 2021-07-28 PROCEDURE — 1126F AMNT PAIN NOTED NONE PRSNT: CPT | Mod: CPTII,,, | Performed by: FAMILY MEDICINE

## 2021-07-28 PROCEDURE — 1159F MED LIST DOCD IN RCRD: CPT | Mod: CPTII,,, | Performed by: FAMILY MEDICINE

## 2021-07-28 PROCEDURE — 99214 OFFICE O/P EST MOD 30 MIN: CPT | Mod: 95,,, | Performed by: FAMILY MEDICINE

## 2021-07-28 PROCEDURE — 1160F RVW MEDS BY RX/DR IN RCRD: CPT | Mod: CPTII,,, | Performed by: FAMILY MEDICINE

## 2021-07-28 PROCEDURE — 3074F SYST BP LT 130 MM HG: CPT | Mod: CPTII,,, | Performed by: FAMILY MEDICINE

## 2021-07-28 PROCEDURE — 3008F BODY MASS INDEX DOCD: CPT | Mod: CPTII,,, | Performed by: FAMILY MEDICINE

## 2021-07-28 PROCEDURE — 3078F DIAST BP <80 MM HG: CPT | Mod: CPTII,,, | Performed by: FAMILY MEDICINE

## 2021-07-28 PROCEDURE — 3078F PR MOST RECENT DIASTOLIC BLOOD PRESSURE < 80 MM HG: ICD-10-PCS | Mod: CPTII,,, | Performed by: FAMILY MEDICINE

## 2021-07-28 RX ORDER — MELOXICAM 15 MG/1
15 TABLET ORAL DAILY
COMMUNITY
End: 2022-05-18

## 2021-10-04 ENCOUNTER — IMMUNIZATION (OUTPATIENT)
Dept: PHARMACY | Facility: CLINIC | Age: 51
End: 2021-10-04
Payer: COMMERCIAL

## 2021-10-04 DIAGNOSIS — Z23 NEED FOR VACCINATION: Primary | ICD-10-CM

## 2021-12-17 ENCOUNTER — CLINICAL SUPPORT (OUTPATIENT)
Dept: URGENT CARE | Facility: CLINIC | Age: 51
End: 2021-12-17
Payer: COMMERCIAL

## 2021-12-17 DIAGNOSIS — Z11.59 ENCOUNTER FOR SCREENING FOR OTHER VIRAL DISEASES: Primary | ICD-10-CM

## 2021-12-17 LAB
CTP QC/QA: YES
SARS-COV-2 RDRP RESP QL NAA+PROBE: NEGATIVE

## 2021-12-17 PROCEDURE — U0002 COVID-19 LAB TEST NON-CDC: HCPCS | Mod: QW,S$GLB,, | Performed by: FAMILY MEDICINE

## 2021-12-17 PROCEDURE — U0002: ICD-10-PCS | Mod: QW,S$GLB,, | Performed by: FAMILY MEDICINE

## 2022-01-13 DIAGNOSIS — I10 ESSENTIAL HYPERTENSION: ICD-10-CM

## 2022-01-13 RX ORDER — CHLORTHALIDONE 25 MG/1
25 TABLET ORAL DAILY
Qty: 90 TABLET | Refills: 1 | Status: SHIPPED | OUTPATIENT
Start: 2022-01-13 | End: 2022-07-06

## 2022-01-13 NOTE — TELEPHONE ENCOUNTER
No new care gaps identified.  Powered by [employer]. Reference number: [id number].   1/13/2022 8:37:55 AM CST

## 2022-01-13 NOTE — TELEPHONE ENCOUNTER
No new care gaps identified.  Powered by [employer] by [employer]. Reference number: [id number].   1/13/2022 12:22:33 PM CST

## 2022-01-24 RX ORDER — CHLORTHALIDONE 25 MG/1
TABLET ORAL
Qty: 90 TABLET | Refills: 1 | OUTPATIENT
Start: 2022-01-24

## 2022-01-25 NOTE — TELEPHONE ENCOUNTER
From: Elizabeth Campos  To: Best Quiros  Sent: 12/22/2021 2:04 PM CST  Subject: J&J Booster    Dr. Quiros    Do you think I should be getting the booster? Do you know where the J&J booster is available?    Thank you  Elizabeth Campos   Quick DC. Request already responded to by other means (e.g. phone or fax)   Ordering Encounter Report    Associated Reports   View Encounter

## 2022-03-16 DIAGNOSIS — E78.2 MIXED HYPERLIPIDEMIA: ICD-10-CM

## 2022-03-16 DIAGNOSIS — I10 ESSENTIAL HYPERTENSION: ICD-10-CM

## 2022-03-16 RX ORDER — LOSARTAN POTASSIUM 100 MG/1
100 TABLET ORAL DAILY
Qty: 90 TABLET | Refills: 1 | Status: SHIPPED | OUTPATIENT
Start: 2022-03-16 | End: 2022-06-23 | Stop reason: SDUPTHER

## 2022-03-16 NOTE — TELEPHONE ENCOUNTER
No new care gaps identified.  Powered by [employer]. Reference number: [id number].   3/16/2022 9:23:14 AM CDT

## 2022-05-18 ENCOUNTER — OFFICE VISIT (OUTPATIENT)
Dept: PRIMARY CARE CLINIC | Facility: CLINIC | Age: 52
End: 2022-05-18
Payer: COMMERCIAL

## 2022-05-18 ENCOUNTER — LAB VISIT (OUTPATIENT)
Dept: LAB | Facility: HOSPITAL | Age: 52
End: 2022-05-18
Attending: FAMILY MEDICINE
Payer: COMMERCIAL

## 2022-05-18 VITALS
OXYGEN SATURATION: 100 % | BODY MASS INDEX: 28.16 KG/M2 | HEIGHT: 67 IN | RESPIRATION RATE: 18 BRPM | DIASTOLIC BLOOD PRESSURE: 72 MMHG | HEART RATE: 91 BPM | TEMPERATURE: 99 F | WEIGHT: 179.44 LBS | SYSTOLIC BLOOD PRESSURE: 136 MMHG

## 2022-05-18 DIAGNOSIS — Z00.00 ANNUAL PHYSICAL EXAM: Primary | ICD-10-CM

## 2022-05-18 DIAGNOSIS — T84.498D MECHANICAL COMPLICATION OF INTERNAL ORTHOPEDIC DEVICE, IMPLANT OR GRAFT, SUBSEQUENT ENCOUNTER: ICD-10-CM

## 2022-05-18 DIAGNOSIS — N83.202 CYST OF LEFT OVARY: ICD-10-CM

## 2022-05-18 DIAGNOSIS — Z00.00 ANNUAL PHYSICAL EXAM: ICD-10-CM

## 2022-05-18 DIAGNOSIS — Z12.31 SCREENING MAMMOGRAM FOR BREAST CANCER: ICD-10-CM

## 2022-05-18 DIAGNOSIS — I10 PRIMARY HYPERTENSION: ICD-10-CM

## 2022-05-18 DIAGNOSIS — Z13.6 ENCOUNTER FOR LIPID SCREENING FOR CARDIOVASCULAR DISEASE: ICD-10-CM

## 2022-05-18 DIAGNOSIS — Z13.220 ENCOUNTER FOR LIPID SCREENING FOR CARDIOVASCULAR DISEASE: ICD-10-CM

## 2022-05-18 LAB
ALBUMIN SERPL BCP-MCNC: 4.2 G/DL (ref 3.5–5.2)
ALP SERPL-CCNC: 65 U/L (ref 55–135)
ALT SERPL W/O P-5'-P-CCNC: 17 U/L (ref 10–44)
ANION GAP SERPL CALC-SCNC: 10 MMOL/L (ref 8–16)
AST SERPL-CCNC: 22 U/L (ref 10–40)
BILIRUB SERPL-MCNC: 0.6 MG/DL (ref 0.1–1)
BUN SERPL-MCNC: 15 MG/DL (ref 6–20)
CALCIUM SERPL-MCNC: 9.8 MG/DL (ref 8.7–10.5)
CHLORIDE SERPL-SCNC: 98 MMOL/L (ref 95–110)
CO2 SERPL-SCNC: 26 MMOL/L (ref 23–29)
CREAT SERPL-MCNC: 0.8 MG/DL (ref 0.5–1.4)
EST. GFR  (AFRICAN AMERICAN): >60 ML/MIN/1.73 M^2
EST. GFR  (NON AFRICAN AMERICAN): >60 ML/MIN/1.73 M^2
GLUCOSE SERPL-MCNC: 90 MG/DL (ref 70–110)
POTASSIUM SERPL-SCNC: 4.2 MMOL/L (ref 3.5–5.1)
PROT SERPL-MCNC: 7.6 G/DL (ref 6–8.4)
SODIUM SERPL-SCNC: 134 MMOL/L (ref 136–145)

## 2022-05-18 PROCEDURE — 1160F RVW MEDS BY RX/DR IN RCRD: CPT | Mod: CPTII,S$GLB,, | Performed by: FAMILY MEDICINE

## 2022-05-18 PROCEDURE — 4010F ACE/ARB THERAPY RXD/TAKEN: CPT | Mod: CPTII,S$GLB,, | Performed by: FAMILY MEDICINE

## 2022-05-18 PROCEDURE — 99396 PREV VISIT EST AGE 40-64: CPT | Mod: S$GLB,,, | Performed by: FAMILY MEDICINE

## 2022-05-18 PROCEDURE — 3078F DIAST BP <80 MM HG: CPT | Mod: CPTII,S$GLB,, | Performed by: FAMILY MEDICINE

## 2022-05-18 PROCEDURE — 1159F MED LIST DOCD IN RCRD: CPT | Mod: CPTII,S$GLB,, | Performed by: FAMILY MEDICINE

## 2022-05-18 PROCEDURE — 36415 COLL VENOUS BLD VENIPUNCTURE: CPT | Mod: PN | Performed by: FAMILY MEDICINE

## 2022-05-18 PROCEDURE — 1159F PR MEDICATION LIST DOCUMENTED IN MEDICAL RECORD: ICD-10-PCS | Mod: CPTII,S$GLB,, | Performed by: FAMILY MEDICINE

## 2022-05-18 PROCEDURE — 99999 PR PBB SHADOW E&M-EST. PATIENT-LVL IV: CPT | Mod: PBBFAC,,, | Performed by: FAMILY MEDICINE

## 2022-05-18 PROCEDURE — 80053 COMPREHEN METABOLIC PANEL: CPT | Performed by: FAMILY MEDICINE

## 2022-05-18 PROCEDURE — 1160F PR REVIEW ALL MEDS BY PRESCRIBER/CLIN PHARMACIST DOCUMENTED: ICD-10-PCS | Mod: CPTII,S$GLB,, | Performed by: FAMILY MEDICINE

## 2022-05-18 PROCEDURE — 99999 PR PBB SHADOW E&M-EST. PATIENT-LVL IV: ICD-10-PCS | Mod: PBBFAC,,, | Performed by: FAMILY MEDICINE

## 2022-05-18 PROCEDURE — 3008F PR BODY MASS INDEX (BMI) DOCUMENTED: ICD-10-PCS | Mod: CPTII,S$GLB,, | Performed by: FAMILY MEDICINE

## 2022-05-18 PROCEDURE — 3075F SYST BP GE 130 - 139MM HG: CPT | Mod: CPTII,S$GLB,, | Performed by: FAMILY MEDICINE

## 2022-05-18 PROCEDURE — 3078F PR MOST RECENT DIASTOLIC BLOOD PRESSURE < 80 MM HG: ICD-10-PCS | Mod: CPTII,S$GLB,, | Performed by: FAMILY MEDICINE

## 2022-05-18 PROCEDURE — 3008F BODY MASS INDEX DOCD: CPT | Mod: CPTII,S$GLB,, | Performed by: FAMILY MEDICINE

## 2022-05-18 PROCEDURE — 3075F PR MOST RECENT SYSTOLIC BLOOD PRESS GE 130-139MM HG: ICD-10-PCS | Mod: CPTII,S$GLB,, | Performed by: FAMILY MEDICINE

## 2022-05-18 PROCEDURE — 4010F PR ACE/ARB THEARPY RXD/TAKEN: ICD-10-PCS | Mod: CPTII,S$GLB,, | Performed by: FAMILY MEDICINE

## 2022-05-18 PROCEDURE — 99396 PR PREVENTIVE VISIT,EST,40-64: ICD-10-PCS | Mod: S$GLB,,, | Performed by: FAMILY MEDICINE

## 2022-05-18 NOTE — PROGRESS NOTES
"Subjective:       Patient ID: Shelbie Ardon is a 52 y.o. female.    Chief Complaint: No chief complaint on file.    HPI   51 y/o female with HTN, R sided thigh pain post op R LISETH 8/2020 is here for annual exam.     She reports her meralgia paresthetica has resolved a few months ago, she continues to have pain in R thigh "deep in the bone", she is limited with activity secondary to the pain, she is taking Aleve in 3 days spurts as needed, she tries to do some walking when she can. She feels she is gaining weight.  She is trying to eat healthy. She denies f/n/v/d/constipation/cp/sob/urinary sx. Sleeping ok has occasional hot flashes. She is frustrated and depressed about her chronic pain that limits her activity.    HTN: diagnosed about 2015, losartan 100 mg daily and chlorthalidone 25 mg daily, followed by cardiology  OA hip s/p R LISETH 8/2020 Dr. Hamm and OA knee: off celebrex, cymbalta caused sexual side effects, off mobic, off lyrica, tylenol not helpful  GYN:following with Dr. Mcneil, off Combipatch, mmg 6/2021, irregular bleeding EMB negative, US 7/2021   Hx of LPRD, not needed since  Colonoscopy due declined scope, mother had a perforation with colonoscopy, cologuard 6/2021  Derm last visit 2019 with Dr. Boo  Eye exam utd  Dental utd  OTC: fish oil, mvi    Review of Systems  see HPI  Objective:      /72 (BP Location: Right arm, Patient Position: Sitting, BP Method: Small (Manual))   Pulse 91   Temp 98.8 °F (37.1 °C) (Oral)   Resp 18   Ht 5' 7" (1.702 m)   Wt 81.4 kg (179 lb 7.3 oz)   LMP  (LMP Unknown)   SpO2 100%   BMI 28.11 kg/m²   Physical Exam  Vitals and nursing note reviewed.   Constitutional:       Appearance: She is well-developed.   HENT:      Head: Normocephalic and atraumatic.   Neck:      Thyroid: No thyromegaly.   Cardiovascular:      Rate and Rhythm: Normal rate and regular rhythm.      Heart sounds: Normal heart sounds.   Pulmonary:      Effort: Pulmonary effort is " normal. No respiratory distress.      Breath sounds: Normal breath sounds.   Abdominal:      General: Abdomen is flat. Bowel sounds are normal. There is no distension.      Palpations: Abdomen is soft. There is no mass.      Tenderness: There is no abdominal tenderness.   Musculoskeletal:      Cervical back: Normal range of motion and neck supple.      Right lower leg: No edema.      Left lower leg: No edema.   Lymphadenopathy:      Cervical: No cervical adenopathy.   Skin:     General: Skin is warm and dry.   Neurological:      Mental Status: She is alert.         Assessment:       1. Annual physical exam    2. Primary hypertension    3. Encounter for lipid screening for cardiovascular disease    4. Cyst of left ovary    5. Screening mammogram for breast cancer    6. Mechanical complication of internal orthopedic device, implant or graft, subsequent encounter    7. BMI 28.0-28.9,adult        Plan:   Diagnoses and all orders for this visit:    Annual physical exam  -     CBC Auto Differential; Future  -     Comprehensive Metabolic Panel; Future  -     Hemoglobin A1C; Future  -     Lipid Panel; Future  -     TSH; Future  -     Urinalysis; Future    Primary hypertension  -     CBC Auto Differential; Future  -     Comprehensive Metabolic Panel; Future  -     TSH; Future  -     semaglutide (OZEMPIC) 0.25 mg or 0.5 mg(2 mg/1.5 mL) pen injector; Inject 0.25 mg into the skin every 7 days.    Encounter for lipid screening for cardiovascular disease  -     Lipid Panel; Future    Cyst of left ovary  -     US Pelvis Complete Non OB; Future    Screening mammogram for breast cancer  -     Mammo Digital Screening Bilat w/ Charlie; Future    Mechanical complication of internal orthopedic device, implant or graft, subsequent encounter  -     semaglutide (OZEMPIC) 0.25 mg or 0.5 mg(2 mg/1.5 mL) pen injector; Inject 0.25 mg into the skin every 7 days.    BMI 28.0-28.9,adult  -     semaglutide (OZEMPIC) 0.25 mg or 0.5 mg(2 mg/1.5 mL) pen  injector; Inject 0.25 mg into the skin every 7 days.

## 2022-05-19 ENCOUNTER — PATIENT MESSAGE (OUTPATIENT)
Dept: PRIMARY CARE CLINIC | Facility: CLINIC | Age: 52
End: 2022-05-19
Payer: COMMERCIAL

## 2022-05-20 ENCOUNTER — TELEPHONE (OUTPATIENT)
Dept: PRIMARY CARE CLINIC | Facility: CLINIC | Age: 52
End: 2022-05-20
Payer: COMMERCIAL

## 2022-05-20 DIAGNOSIS — I10 PRIMARY HYPERTENSION: ICD-10-CM

## 2022-05-20 DIAGNOSIS — T84.498D MECHANICAL COMPLICATION OF INTERNAL ORTHOPEDIC DEVICE, IMPLANT OR GRAFT, SUBSEQUENT ENCOUNTER: ICD-10-CM

## 2022-05-20 NOTE — TELEPHONE ENCOUNTER
PA for ozempic was done & DENIED.  Please advise re: different medication    DENIAL FROM COVER MY MEDS:  PA was already submitted for this patient and drug which was denied.;CaseId:[id number];Status:Denied;Appeal Information: Attention:[facility] MEDICAL APPEALS [facility] [address] [address],[place] [facility],[address] Phone:[phone] Fax:[phone];

## 2022-05-24 ENCOUNTER — PATIENT MESSAGE (OUTPATIENT)
Dept: PRIMARY CARE CLINIC | Facility: CLINIC | Age: 52
End: 2022-05-24
Payer: COMMERCIAL

## 2022-05-24 ENCOUNTER — TELEPHONE (OUTPATIENT)
Dept: PRIMARY CARE CLINIC | Facility: CLINIC | Age: 52
End: 2022-05-24
Payer: COMMERCIAL

## 2022-05-24 NOTE — TELEPHONE ENCOUNTER
Please let her know.  Also, she can reach out to her insurance and see if Trulicity or Victoza would be covered as these are similar, the Trulicity is once a week and the Victoza is daily

## 2022-06-07 ENCOUNTER — PATIENT MESSAGE (OUTPATIENT)
Dept: PRIMARY CARE CLINIC | Facility: CLINIC | Age: 52
End: 2022-06-07
Payer: COMMERCIAL

## 2022-06-17 ENCOUNTER — OFFICE VISIT (OUTPATIENT)
Dept: OBSTETRICS AND GYNECOLOGY | Facility: CLINIC | Age: 52
End: 2022-06-17
Payer: COMMERCIAL

## 2022-06-17 VITALS
SYSTOLIC BLOOD PRESSURE: 120 MMHG | WEIGHT: 178.56 LBS | HEIGHT: 67 IN | BODY MASS INDEX: 28.02 KG/M2 | DIASTOLIC BLOOD PRESSURE: 76 MMHG

## 2022-06-17 DIAGNOSIS — N95.1 MENOPAUSAL SYMPTOMS: ICD-10-CM

## 2022-06-17 DIAGNOSIS — Z01.419 ENCOUNTER FOR GYNECOLOGICAL EXAMINATION WITHOUT ABNORMAL FINDING: Primary | ICD-10-CM

## 2022-06-17 PROCEDURE — 3078F DIAST BP <80 MM HG: CPT | Mod: CPTII,S$GLB,, | Performed by: OBSTETRICS & GYNECOLOGY

## 2022-06-17 PROCEDURE — 99999 PR PBB SHADOW E&M-EST. PATIENT-LVL III: CPT | Mod: PBBFAC,,, | Performed by: OBSTETRICS & GYNECOLOGY

## 2022-06-17 PROCEDURE — 1160F PR REVIEW ALL MEDS BY PRESCRIBER/CLIN PHARMACIST DOCUMENTED: ICD-10-PCS | Mod: CPTII,S$GLB,, | Performed by: OBSTETRICS & GYNECOLOGY

## 2022-06-17 PROCEDURE — 3008F BODY MASS INDEX DOCD: CPT | Mod: CPTII,S$GLB,, | Performed by: OBSTETRICS & GYNECOLOGY

## 2022-06-17 PROCEDURE — 1159F PR MEDICATION LIST DOCUMENTED IN MEDICAL RECORD: ICD-10-PCS | Mod: CPTII,S$GLB,, | Performed by: OBSTETRICS & GYNECOLOGY

## 2022-06-17 PROCEDURE — 4010F ACE/ARB THERAPY RXD/TAKEN: CPT | Mod: CPTII,S$GLB,, | Performed by: OBSTETRICS & GYNECOLOGY

## 2022-06-17 PROCEDURE — 4010F PR ACE/ARB THEARPY RXD/TAKEN: ICD-10-PCS | Mod: CPTII,S$GLB,, | Performed by: OBSTETRICS & GYNECOLOGY

## 2022-06-17 PROCEDURE — 1160F RVW MEDS BY RX/DR IN RCRD: CPT | Mod: CPTII,S$GLB,, | Performed by: OBSTETRICS & GYNECOLOGY

## 2022-06-17 PROCEDURE — 3078F PR MOST RECENT DIASTOLIC BLOOD PRESSURE < 80 MM HG: ICD-10-PCS | Mod: CPTII,S$GLB,, | Performed by: OBSTETRICS & GYNECOLOGY

## 2022-06-17 PROCEDURE — 88175 CYTOPATH C/V AUTO FLUID REDO: CPT | Performed by: OBSTETRICS & GYNECOLOGY

## 2022-06-17 PROCEDURE — 3074F PR MOST RECENT SYSTOLIC BLOOD PRESSURE < 130 MM HG: ICD-10-PCS | Mod: CPTII,S$GLB,, | Performed by: OBSTETRICS & GYNECOLOGY

## 2022-06-17 PROCEDURE — 3008F PR BODY MASS INDEX (BMI) DOCUMENTED: ICD-10-PCS | Mod: CPTII,S$GLB,, | Performed by: OBSTETRICS & GYNECOLOGY

## 2022-06-17 PROCEDURE — 99396 PREV VISIT EST AGE 40-64: CPT | Mod: S$GLB,,, | Performed by: OBSTETRICS & GYNECOLOGY

## 2022-06-17 PROCEDURE — 99396 PR PREVENTIVE VISIT,EST,40-64: ICD-10-PCS | Mod: S$GLB,,, | Performed by: OBSTETRICS & GYNECOLOGY

## 2022-06-17 PROCEDURE — 3074F SYST BP LT 130 MM HG: CPT | Mod: CPTII,S$GLB,, | Performed by: OBSTETRICS & GYNECOLOGY

## 2022-06-17 PROCEDURE — 1159F MED LIST DOCD IN RCRD: CPT | Mod: CPTII,S$GLB,, | Performed by: OBSTETRICS & GYNECOLOGY

## 2022-06-17 PROCEDURE — 99999 PR PBB SHADOW E&M-EST. PATIENT-LVL III: ICD-10-PCS | Mod: PBBFAC,,, | Performed by: OBSTETRICS & GYNECOLOGY

## 2022-06-17 NOTE — PROGRESS NOTES
CC: Well woman exam    Shelbie Ardon is a 52 y.o. female  presents for a well woman exam.  LMP: No LMP recorded (lmp unknown). Patient is postmenopausal..  No issues, problems, or complaints.      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 No    Breast-feeding No   Social History Narrative    .      and her are in ortho research, he is an PHD in biomedical      Family History   Problem Relation Age of Onset    Hypertension Mother     Hyperlipidemia Mother     COPD Mother     Stroke Mother     Cancer Father         lung smoker    Lung cancer Father     Cancer Paternal Grandmother         unknown GYN cancer     Lung cancer Paternal Grandmother     Cancer Paternal Grandfather     Lung cancer Paternal Grandfather     Psoriasis Sister     Psoriasis Sister     No Known Problems Brother     No Known Problems Maternal Aunt     No Known Problems Maternal Uncle     No Known Problems Paternal Aunt     No Known Problems Paternal Uncle   "   No Known Problems Maternal Grandmother     No Known Problems Maternal Grandfather     Anemia Neg Hx     Arrhythmia Neg Hx     Asthma Neg Hx     Clotting disorder Neg Hx     Fainting Neg Hx     Heart attack Neg Hx     Heart disease Neg Hx     Heart failure Neg Hx     Stroke Neg Hx     Atrial Septal Defect Neg Hx     Breast cancer Neg Hx     Colon cancer Neg Hx     Ovarian cancer Neg Hx     Melanoma Neg Hx      OB History        0    Para   0    Term   0       0    AB   0    Living   0       SAB   0    IAB   0    Ectopic   0    Multiple   0    Live Births   0                 /76 (BP Location: Left arm, Patient Position: Sitting)   Ht 5' 7" (1.702 m)   Wt 81 kg (178 lb 9.2 oz)   LMP  (LMP Unknown)   BMI 27.97 kg/m²       ROS:    ROS:  GENERAL: Denies weight gain or weight loss. Feeling well overall.   SKIN: Denies rash or lesions.   HEAD: Denies head injury or headache.   NODES: Denies enlarged lymph nodes.   CHEST: Denies chest pain or shortness of breath.   CARDIOVASCULAR: Denies palpitations or left sided chest pain.   ABDOMEN: No abdominal pain, constipation, diarrhea, nausea, vomiting or rectal bleeding.   URINARY: No frequency, dysuria, hematuria, or burning on urination.  REPRODUCTIVE: See HPI.   BREASTS: The patient performs breast self-examination and denies pain, lumps, or nipple discharge.   HEMATOLOGIC: No easy bruisability or excessive bleeding.   MUSCULOSKELETAL: Denies joint pain or swelling.   NEUROLOGIC: Denies syncope or weakness.   PSYCHIATRIC: Denies depression, anxiety or mood swings.    PHYSICAL EXAM:    APPEARANCE: Well nourished, well developed, in no acute distress.  AFFECT: WNL, alert and oriented x 3  SKIN: No acne or hirsutism  NECK: Neck symmetric without masses or thyromegaly  NODES: No inguinal, cervical, axillary, or femoral lymph node enlargement  CHEST: Good respiratory effect  ABDOMEN: Soft.  No tenderness or masses.  No hepatosplenomegaly.  " No hernias.  BREASTS: Symmetrical, no skin changes or visible lesions.  No palpable masses, nipple discharge bilaterally.  PELVIC: Normal external genitalia without lesions.  Normal hair distribution.  Adequate perineal body, normal urethral meatus.  Vagina moist and well rugated without lesions or discharge.  Cervix pink, without lesions, discharge or tenderness.  No significant cystocele or rectocele.  Bimanual exam shows uterus to be normal size, regular, mobile and nontender.  Adnexa without masses or tenderness.    RECTAL: Rectovaginal exam confirms above with normal sphincter tone, no masses.  EXTREMITIES: No edema.    Diagnosis      ICD-10-CM ICD-9-CM    1. Encounter for gynecological examination without abnormal finding  Z01.419 V72.31 Liquid-Based Pap Smear, Screening   2. Menopausal symptoms  N95.1 627.2 estradiol-norethindrone (COMBIPATCH) 0.05-0.14 mg/24 hr       A full discussion of the benefit-risk ratio of hormonal replacement therapy was carried out. Improvement in vasomotor and other climacteric symptoms is discussed, including possible improvements in sleep and mood. Reduction of risk for osteoporosis was explained. We discussed the study data showing increased risk of thrombo-embolic events such as myocardial infarction, stroke and also possibly breast cancer with estrogen replacement, and how this might affect her. The range of side effects such as breast tenderness, weight gain and including possible increases in lifetime risk of breast cancer and possible thrombotic complications was discussed. We also discussed ACOG's recommendation to use hormone replacement therapy for the relief of hot flashes alone and to be on the lowest dose possible for the shortest amount of time.  Alternative such as herbal and soy-based products were reviewed. All of her questions about this therapy were answered.    Patient was counseled today on A.C.S. Pap guidelines and recommendations for yearly pelvic exams,  mammograms and monthly self breast exams; to see her PCP for other health maintenance.     Follow up in about 1 year (around 6/17/2023), or if symptoms worsen or fail to improve.

## 2022-06-23 ENCOUNTER — OFFICE VISIT (OUTPATIENT)
Dept: PRIMARY CARE CLINIC | Facility: CLINIC | Age: 52
End: 2022-06-23
Payer: COMMERCIAL

## 2022-06-23 ENCOUNTER — PATIENT MESSAGE (OUTPATIENT)
Dept: PRIMARY CARE CLINIC | Facility: CLINIC | Age: 52
End: 2022-06-23

## 2022-06-23 DIAGNOSIS — T84.498D MECHANICAL COMPLICATION OF INTERNAL ORTHOPEDIC DEVICE, IMPLANT OR GRAFT, SUBSEQUENT ENCOUNTER: ICD-10-CM

## 2022-06-23 DIAGNOSIS — E78.2 MIXED HYPERLIPIDEMIA: ICD-10-CM

## 2022-06-23 DIAGNOSIS — I10 PRIMARY HYPERTENSION: ICD-10-CM

## 2022-06-23 DIAGNOSIS — E56.9 VITAMIN DEFICIENCY: ICD-10-CM

## 2022-06-23 DIAGNOSIS — I10 ESSENTIAL HYPERTENSION: Primary | ICD-10-CM

## 2022-06-23 PROCEDURE — 99214 PR OFFICE/OUTPT VISIT, EST, LEVL IV, 30-39 MIN: ICD-10-PCS | Mod: 95,,, | Performed by: FAMILY MEDICINE

## 2022-06-23 PROCEDURE — 1160F RVW MEDS BY RX/DR IN RCRD: CPT | Mod: CPTII,95,, | Performed by: FAMILY MEDICINE

## 2022-06-23 PROCEDURE — 1160F PR REVIEW ALL MEDS BY PRESCRIBER/CLIN PHARMACIST DOCUMENTED: ICD-10-PCS | Mod: CPTII,95,, | Performed by: FAMILY MEDICINE

## 2022-06-23 PROCEDURE — 4010F ACE/ARB THERAPY RXD/TAKEN: CPT | Mod: CPTII,95,, | Performed by: FAMILY MEDICINE

## 2022-06-23 PROCEDURE — 1159F PR MEDICATION LIST DOCUMENTED IN MEDICAL RECORD: ICD-10-PCS | Mod: CPTII,95,, | Performed by: FAMILY MEDICINE

## 2022-06-23 PROCEDURE — 4010F PR ACE/ARB THEARPY RXD/TAKEN: ICD-10-PCS | Mod: CPTII,95,, | Performed by: FAMILY MEDICINE

## 2022-06-23 PROCEDURE — 99214 OFFICE O/P EST MOD 30 MIN: CPT | Mod: 95,,, | Performed by: FAMILY MEDICINE

## 2022-06-23 PROCEDURE — 1159F MED LIST DOCD IN RCRD: CPT | Mod: CPTII,95,, | Performed by: FAMILY MEDICINE

## 2022-06-23 RX ORDER — LOSARTAN POTASSIUM 100 MG/1
100 TABLET ORAL DAILY
Qty: 90 TABLET | Refills: 1 | Status: SHIPPED | OUTPATIENT
Start: 2022-06-23 | End: 2022-09-27 | Stop reason: SDUPTHER

## 2022-06-23 NOTE — PROGRESS NOTES
Subjective:       Patient ID: Shelbie Ardon is a 52 y.o. female.    Chief Complaint: Weight Loss    HPI  53 y/o female with HTN, R sided thigh pain post op R LISETH 8/2020 is here to follow up weight loss after starting Ozempic.     She has been on Ozempic for 4 weeks, tolerating well, had one episode of transient nausea, she has lost about 3 pounds. She is walking 2-3 miles daily. She is trying to eat healthy. She denies f/n/v/d/constipation/cp/sob/urinary sx. Sleeping ok.      HTN: diagnosed about 2015, losartan 100 mg daily and chlorthalidone 25 mg daily, followed by cardiology  OA hip s/p R LISETH 8/2020 Dr. Hamm and OA knee: off celebrex, cymbalta caused sexual side effects, off mobic, off lyrica, tylenol not helpful  GYN:following with Dr. Mcneil, off Combipatch, mmg 6/2021 repeat set, irregular bleeding EMB negative, US 7/2021   Hx of LPRD, not needed since  Colonoscopy due declined scope, mother had a perforation with colonoscopy, cologuard 6/2021  Vitamin D def: not on supplement  Derm last visit 2019 with Dr. Boo  Eye exam utd  Dental utd  OTC: fish oil, mvi    Review of Systems   Constitutional: Negative for activity change and unexpected weight change.   HENT: Negative for hearing loss, rhinorrhea and trouble swallowing.    Eyes: Negative for discharge and visual disturbance.   Respiratory: Negative for chest tightness and wheezing.    Cardiovascular: Negative for chest pain and palpitations.   Gastrointestinal: Negative for blood in stool, constipation, diarrhea and vomiting.   Endocrine: Negative for polydipsia and polyuria.   Genitourinary: Negative for difficulty urinating, dysuria, hematuria and menstrual problem.   Musculoskeletal: Negative for arthralgias, joint swelling and neck pain.   Neurological: Negative for weakness and headaches.   Psychiatric/Behavioral: Negative for confusion and dysphoric mood.       Objective:      LMP  (LMP Unknown)   Physical Exam  Constitutional:        General: She is not in acute distress.     Appearance: She is well-developed. She is not diaphoretic.   HENT:      Head: Normocephalic and atraumatic.   Pulmonary:      Effort: No respiratory distress.   Neurological:      Mental Status: She is alert.         Assessment:       1. Essential hypertension    2. Mixed hyperlipidemia    3. Primary hypertension    4. Mechanical complication of internal orthopedic device, implant or graft, subsequent encounter    5. BMI 28.0-28.9,adult    6. Vitamin deficiency        Plan:   Shelbie was seen today for weight loss.    Diagnoses and all orders for this visit:    Essential hypertension  -     losartan (COZAAR) 100 MG tablet; Take 1 tablet (100 mg total) by mouth once daily.  -     Comprehensive Metabolic Panel; Future    Mixed hyperlipidemia  -     losartan (COZAAR) 100 MG tablet; Take 1 tablet (100 mg total) by mouth once daily.  -     semaglutide (OZEMPIC) 0.25 mg or 0.5 mg(2 mg/1.5 mL) pen injector; Inject 0.5 mg into the skin every 7 days.  -     Comprehensive Metabolic Panel; Future    Primary hypertension  -     semaglutide (OZEMPIC) 0.25 mg or 0.5 mg(2 mg/1.5 mL) pen injector; Inject 0.5 mg into the skin every 7 days.    Mechanical complication of internal orthopedic device, implant or graft, subsequent encounter    BMI 28.0-28.9,adult  -     semaglutide (OZEMPIC) 0.25 mg or 0.5 mg(2 mg/1.5 mL) pen injector; Inject 0.5 mg into the skin every 7 days.    Vitamin deficiency    [place] location is: [place]   The chief complaint leading to consultation is: weight loss, htn    Visit type: audiovisual    Face to Face time with patient: 15 minutes of total time spent on the encounter, which includes face to face time and non-face to face time preparing to see the patient (eg, review of tests), Obtaining and/or reviewing separately obtained history, Documenting clinical information in the electronic or other health record, Independently interpreting results (not separately  reported) and communicating results to the patient/family/caregiver, or Care coordination (not separately reported).         Each patient to whom he or she provides medical services by telemedicine is:  (1) informed of the relationship between the physician and patient and the respective role of any other health care provider with respect to management of the patient; and (2) notified that he or she may decline to receive medical services by telemedicine and may withdraw from such care at any time.    Notes:

## 2022-06-27 LAB
FINAL PATHOLOGIC DIAGNOSIS: NORMAL
Lab: NORMAL

## 2022-07-06 DIAGNOSIS — I10 ESSENTIAL HYPERTENSION: ICD-10-CM

## 2022-07-06 RX ORDER — CHLORTHALIDONE 25 MG/1
TABLET ORAL
Qty: 90 TABLET | Refills: 3 | Status: SHIPPED | OUTPATIENT
Start: 2022-07-06 | End: 2023-10-09 | Stop reason: SDUPTHER

## 2022-07-06 NOTE — TELEPHONE ENCOUNTER
Care Due:                  Date            Visit Type   Department     Provider  --------------------------------------------------------------------------------                                ESTABLISHED                              PATIENT -    [facility]  Last Visit: 06-      VIRTUAL      Care           Kendra Carrasquillo                               -                              PRIMARY      [facility]  Next Visit: 11-      CARE ([facility])   Care           Kendra Carrasquillo                                                            Last  Test          Frequency    Reason                     Performed    Due Date  --------------------------------------------------------------------------------    HBA1C.......  6 months...  semaglutide..............  07- 01-    [facility] Embedded Care Gaps. Reference number: [id number]. 7/06/2022   8:47:08 AM CDT

## 2022-08-08 ENCOUNTER — HOSPITAL ENCOUNTER (OUTPATIENT)
Dept: RADIOLOGY | Facility: HOSPITAL | Age: 52
Discharge: HOME | End: 2022-08-08
Attending: FAMILY MEDICINE
Payer: COMMERCIAL

## 2022-08-08 DIAGNOSIS — Z12.31 SCREENING MAMMOGRAM FOR BREAST CANCER: ICD-10-CM

## 2022-08-08 DIAGNOSIS — N83.202 CYST OF LEFT OVARY: ICD-10-CM

## 2022-08-08 PROCEDURE — 77067 MAMMO DIGITAL SCREENING BILAT WITH TOMO: ICD-10-PCS | Mod: 26,,, | Performed by: RADIOLOGY

## 2022-08-08 PROCEDURE — 77067 SCR MAMMO BI INCL CAD: CPT | Mod: TC

## 2022-08-08 PROCEDURE — 76830 TRANSVAGINAL US NON-OB: CPT | Mod: 26,,, | Performed by: RADIOLOGY

## 2022-08-08 PROCEDURE — 77063 BREAST TOMOSYNTHESIS BI: CPT | Mod: 26,,, | Performed by: RADIOLOGY

## 2022-08-08 PROCEDURE — 77063 MAMMO DIGITAL SCREENING BILAT WITH TOMO: ICD-10-PCS | Mod: 26,,, | Performed by: RADIOLOGY

## 2022-08-08 PROCEDURE — 76856 US PELVIS COMP WITH TRANSVAG NON-OB (XPD): ICD-10-PCS | Mod: 26,,, | Performed by: RADIOLOGY

## 2022-08-08 PROCEDURE — 76830 TRANSVAGINAL US NON-OB: CPT | Mod: TC

## 2022-08-08 PROCEDURE — 76830 US PELVIS COMP WITH TRANSVAG NON-OB (XPD): ICD-10-PCS | Mod: 26,,, | Performed by: RADIOLOGY

## 2022-08-08 PROCEDURE — 76856 US EXAM PELVIC COMPLETE: CPT | Mod: 26,,, | Performed by: RADIOLOGY

## 2022-08-08 PROCEDURE — 77067 SCR MAMMO BI INCL CAD: CPT | Mod: 26,,, | Performed by: RADIOLOGY

## 2022-08-09 ENCOUNTER — PATIENT MESSAGE (OUTPATIENT)
Dept: PRIMARY CARE CLINIC | Facility: CLINIC | Age: 52
End: 2022-08-09
Payer: COMMERCIAL

## 2022-08-10 ENCOUNTER — PATIENT MESSAGE (OUTPATIENT)
Dept: PRIMARY CARE CLINIC | Facility: CLINIC | Age: 52
End: 2022-08-10
Payer: COMMERCIAL

## 2022-08-10 DIAGNOSIS — E87.6 HYPOKALEMIA: Primary | ICD-10-CM

## 2022-08-10 RX ORDER — POTASSIUM CHLORIDE 750 MG/1
10 TABLET, EXTENDED RELEASE ORAL ONCE
Qty: 1 TABLET | Refills: 0 | Status: SHIPPED | OUTPATIENT
Start: 2022-08-10 | End: 2022-08-10

## 2022-08-10 NOTE — TELEPHONE ENCOUNTER
No new care gaps identified.  [facility] Embedded Care Gaps. Reference number: [id number]. 8/10/2022   10:51:50 AM GERALDINET

## 2022-08-12 RX ORDER — SEMAGLUTIDE 1.34 MG/ML
1 INJECTION, SOLUTION SUBCUTANEOUS
Qty: 1 PEN | Refills: 0 | Status: SHIPPED | OUTPATIENT
Start: 2022-08-12 | End: 2022-08-15 | Stop reason: SDUPTHER

## 2022-08-15 RX ORDER — SEMAGLUTIDE 1.34 MG/ML
1 INJECTION, SOLUTION SUBCUTANEOUS
Qty: 1 PEN | Refills: 3 | Status: SHIPPED | OUTPATIENT
Start: 2022-08-15 | End: 2022-09-12

## 2022-08-16 ENCOUNTER — TELEPHONE (OUTPATIENT)
Dept: PRIMARY CARE CLINIC | Facility: CLINIC | Age: 52
End: 2022-08-16
Payer: COMMERCIAL

## 2022-08-22 ENCOUNTER — LAB VISIT (OUTPATIENT)
Dept: LAB | Facility: HOSPITAL | Age: 52
End: 2022-08-22
Attending: FAMILY MEDICINE
Payer: COMMERCIAL

## 2022-08-22 ENCOUNTER — PATIENT MESSAGE (OUTPATIENT)
Dept: PRIMARY CARE CLINIC | Facility: CLINIC | Age: 52
End: 2022-08-22
Payer: COMMERCIAL

## 2022-08-22 DIAGNOSIS — E87.6 HYPOKALEMIA: ICD-10-CM

## 2022-08-22 LAB — POTASSIUM SERPL-SCNC: 4.1 MMOL/L (ref 3.5–5.1)

## 2022-08-22 PROCEDURE — 36415 COLL VENOUS BLD VENIPUNCTURE: CPT | Mod: PN | Performed by: FAMILY MEDICINE

## 2022-08-22 PROCEDURE — 84132 ASSAY OF SERUM POTASSIUM: CPT | Performed by: FAMILY MEDICINE

## 2022-10-12 DIAGNOSIS — I10 PRIMARY HYPERTENSION: Primary | ICD-10-CM

## 2022-10-14 ENCOUNTER — PATIENT MESSAGE (OUTPATIENT)
Dept: PRIMARY CARE CLINIC | Facility: CLINIC | Age: 52
End: 2022-10-14
Payer: COMMERCIAL

## 2022-10-14 RX ORDER — SEMAGLUTIDE 2.68 MG/ML
2 INJECTION, SOLUTION SUBCUTANEOUS
Qty: 3 ML | Refills: 6 | Status: SHIPPED | OUTPATIENT
Start: 2022-10-14 | End: 2022-10-19 | Stop reason: SDUPTHER

## 2022-10-14 NOTE — TELEPHONE ENCOUNTER
No new care gaps identified.  [facility] Embedded Care Gaps. Reference number: [id number]. 10/14/2022   2:27:30 PM CDT

## 2022-10-19 RX ORDER — SEMAGLUTIDE 2.68 MG/ML
2 INJECTION, SOLUTION SUBCUTANEOUS
Qty: 3 ML | Refills: 6 | Status: SHIPPED | OUTPATIENT
Start: 2022-10-19 | End: 2022-11-28 | Stop reason: SDUPTHER

## 2022-10-19 NOTE — TELEPHONE ENCOUNTER
It says RX was printed but I never received RX to fax over to pharmacy.    Another RX pended below, RX needs to be faxed to fax number provided

## 2022-11-22 ENCOUNTER — LAB VISIT (OUTPATIENT)
Dept: LAB | Facility: HOSPITAL | Age: 52
End: 2022-11-22
Payer: COMMERCIAL

## 2022-11-22 DIAGNOSIS — I10 PRIMARY HYPERTENSION: ICD-10-CM

## 2022-11-22 LAB
ALBUMIN SERPL BCP-MCNC: 4 G/DL (ref 3.5–5.2)
ALP SERPL-CCNC: 54 U/L (ref 55–135)
ALT SERPL W/O P-5'-P-CCNC: 14 U/L (ref 10–44)
ANION GAP SERPL CALC-SCNC: 8 MMOL/L (ref 8–16)
AST SERPL-CCNC: 20 U/L (ref 10–40)
BASOPHILS # BLD AUTO: 0.08 K/UL (ref 0–0.2)
BASOPHILS NFR BLD: 0.8 % (ref 0–1.9)
BILIRUB SERPL-MCNC: 0.8 MG/DL (ref 0.1–1)
BUN SERPL-MCNC: 13 MG/DL (ref 6–20)
CALCIUM SERPL-MCNC: 9.6 MG/DL (ref 8.7–10.5)
CHLORIDE SERPL-SCNC: 102 MMOL/L (ref 95–110)
CO2 SERPL-SCNC: 26 MMOL/L (ref 23–29)
CREAT SERPL-MCNC: 0.8 MG/DL (ref 0.5–1.4)
DIFFERENTIAL METHOD: ABNORMAL
EOSINOPHIL # BLD AUTO: 0.1 K/UL (ref 0–0.5)
EOSINOPHIL NFR BLD: 1.3 % (ref 0–8)
ERYTHROCYTE [DISTWIDTH] IN BLOOD BY AUTOMATED COUNT: 11.7 % (ref 11.5–14.5)
EST. GFR  (NO RACE VARIABLE): >60 ML/MIN/1.73 M^2
GLUCOSE SERPL-MCNC: 107 MG/DL (ref 70–110)
HCT VFR BLD AUTO: 41.9 % (ref 37–48.5)
HGB BLD-MCNC: 14.5 G/DL (ref 12–16)
IMM GRANULOCYTES # BLD AUTO: 0.05 K/UL (ref 0–0.04)
IMM GRANULOCYTES NFR BLD AUTO: 0.5 % (ref 0–0.5)
LYMPHOCYTES # BLD AUTO: 1.7 K/UL (ref 1–4.8)
LYMPHOCYTES NFR BLD: 17.5 % (ref 18–48)
MCH RBC QN AUTO: 33 PG (ref 27–31)
MCHC RBC AUTO-ENTMCNC: 34.6 G/DL (ref 32–36)
MCV RBC AUTO: 95 FL (ref 82–98)
MONOCYTES # BLD AUTO: 0.5 K/UL (ref 0.3–1)
MONOCYTES NFR BLD: 5 % (ref 4–15)
NEUTROPHILS # BLD AUTO: 7.5 K/UL (ref 1.8–7.7)
NEUTROPHILS NFR BLD: 74.9 % (ref 38–73)
NRBC BLD-RTO: 0 /100 WBC
PLATELET # BLD AUTO: 289 K/UL (ref 150–450)
PMV BLD AUTO: 9.2 FL (ref 9.2–12.9)
POTASSIUM SERPL-SCNC: 4.1 MMOL/L (ref 3.5–5.1)
PROT SERPL-MCNC: 7 G/DL (ref 6–8.4)
RBC # BLD AUTO: 4.39 M/UL (ref 4–5.4)
SODIUM SERPL-SCNC: 136 MMOL/L (ref 136–145)
WBC # BLD AUTO: 9.96 K/UL (ref 3.9–12.7)

## 2022-11-22 PROCEDURE — 85025 COMPLETE CBC W/AUTO DIFF WBC: CPT | Performed by: FAMILY MEDICINE

## 2022-11-22 PROCEDURE — 80053 COMPREHEN METABOLIC PANEL: CPT | Performed by: FAMILY MEDICINE

## 2022-11-22 PROCEDURE — 36415 COLL VENOUS BLD VENIPUNCTURE: CPT | Mod: PN | Performed by: FAMILY MEDICINE

## 2022-11-25 ENCOUNTER — OFFICE VISIT (OUTPATIENT)
Dept: URGENT CARE | Facility: CLINIC | Age: 52
End: 2022-11-25
Payer: COMMERCIAL

## 2022-11-25 VITALS
RESPIRATION RATE: 17 BRPM | SYSTOLIC BLOOD PRESSURE: 143 MMHG | OXYGEN SATURATION: 97 % | HEART RATE: 92 BPM | HEIGHT: 67 IN | TEMPERATURE: 99 F | WEIGHT: 178 LBS | DIASTOLIC BLOOD PRESSURE: 88 MMHG | BODY MASS INDEX: 27.94 KG/M2

## 2022-11-25 DIAGNOSIS — J06.9 VIRAL URI: Primary | ICD-10-CM

## 2022-11-25 DIAGNOSIS — J02.9 SORE THROAT: ICD-10-CM

## 2022-11-25 LAB
CTP QC/QA: YES
CTP QC/QA: YES
MOLECULAR STREP A: NEGATIVE
SARS-COV-2 AG RESP QL IA.RAPID: NEGATIVE

## 2022-11-25 PROCEDURE — 87651 STREP A DNA AMP PROBE: CPT | Mod: QW,S$GLB,, | Performed by: NURSE PRACTITIONER

## 2022-11-25 PROCEDURE — 3044F PR MOST RECENT HEMOGLOBIN A1C LEVEL <7.0%: ICD-10-PCS | Mod: CPTII,S$GLB,, | Performed by: NURSE PRACTITIONER

## 2022-11-25 PROCEDURE — 3077F PR MOST RECENT SYSTOLIC BLOOD PRESSURE >= 140 MM HG: ICD-10-PCS | Mod: CPTII,S$GLB,, | Performed by: NURSE PRACTITIONER

## 2022-11-25 PROCEDURE — 1160F PR REVIEW ALL MEDS BY PRESCRIBER/CLIN PHARMACIST DOCUMENTED: ICD-10-PCS | Mod: CPTII,S$GLB,, | Performed by: NURSE PRACTITIONER

## 2022-11-25 PROCEDURE — 3079F DIAST BP 80-89 MM HG: CPT | Mod: CPTII,S$GLB,, | Performed by: NURSE PRACTITIONER

## 2022-11-25 PROCEDURE — 87651 POCT STREP A MOLECULAR: ICD-10-PCS | Mod: QW,S$GLB,, | Performed by: NURSE PRACTITIONER

## 2022-11-25 PROCEDURE — 99204 OFFICE O/P NEW MOD 45 MIN: CPT | Mod: S$GLB,,, | Performed by: NURSE PRACTITIONER

## 2022-11-25 PROCEDURE — 4010F ACE/ARB THERAPY RXD/TAKEN: CPT | Mod: CPTII,S$GLB,, | Performed by: NURSE PRACTITIONER

## 2022-11-25 PROCEDURE — 4010F PR ACE/ARB THEARPY RXD/TAKEN: ICD-10-PCS | Mod: CPTII,S$GLB,, | Performed by: NURSE PRACTITIONER

## 2022-11-25 PROCEDURE — 3008F BODY MASS INDEX DOCD: CPT | Mod: CPTII,S$GLB,, | Performed by: NURSE PRACTITIONER

## 2022-11-25 PROCEDURE — 3077F SYST BP >= 140 MM HG: CPT | Mod: CPTII,S$GLB,, | Performed by: NURSE PRACTITIONER

## 2022-11-25 PROCEDURE — 3044F HG A1C LEVEL LT 7.0%: CPT | Mod: CPTII,S$GLB,, | Performed by: NURSE PRACTITIONER

## 2022-11-25 PROCEDURE — 87811 SARS CORONAVIRUS 2 ANTIGEN POCT, MANUAL READ: ICD-10-PCS | Mod: QW,S$GLB,, | Performed by: NURSE PRACTITIONER

## 2022-11-25 PROCEDURE — 3079F PR MOST RECENT DIASTOLIC BLOOD PRESSURE 80-89 MM HG: ICD-10-PCS | Mod: CPTII,S$GLB,, | Performed by: NURSE PRACTITIONER

## 2022-11-25 PROCEDURE — 3008F PR BODY MASS INDEX (BMI) DOCUMENTED: ICD-10-PCS | Mod: CPTII,S$GLB,, | Performed by: NURSE PRACTITIONER

## 2022-11-25 PROCEDURE — 1159F MED LIST DOCD IN RCRD: CPT | Mod: CPTII,S$GLB,, | Performed by: NURSE PRACTITIONER

## 2022-11-25 PROCEDURE — 1159F PR MEDICATION LIST DOCUMENTED IN MEDICAL RECORD: ICD-10-PCS | Mod: CPTII,S$GLB,, | Performed by: NURSE PRACTITIONER

## 2022-11-25 PROCEDURE — 87811 SARS-COV-2 COVID19 W/OPTIC: CPT | Mod: QW,S$GLB,, | Performed by: NURSE PRACTITIONER

## 2022-11-25 PROCEDURE — 99204 PR OFFICE/OUTPT VISIT, NEW, LEVL IV, 45-59 MIN: ICD-10-PCS | Mod: S$GLB,,, | Performed by: NURSE PRACTITIONER

## 2022-11-25 PROCEDURE — 1160F RVW MEDS BY RX/DR IN RCRD: CPT | Mod: CPTII,S$GLB,, | Performed by: NURSE PRACTITIONER

## 2022-11-25 RX ORDER — METFORMIN HYDROCHLORIDE 500 MG/1
TABLET ORAL
COMMUNITY
Start: 2022-08-03 | End: 2022-11-28

## 2022-11-25 RX ORDER — IPRATROPIUM BROMIDE 21 UG/1
1 SPRAY, METERED NASAL 2 TIMES DAILY
Qty: 30 ML | Refills: 0 | Status: SHIPPED | OUTPATIENT
Start: 2022-11-25 | End: 2022-12-02

## 2022-11-25 NOTE — PROGRESS NOTES
"Subjective:       Patient ID: Shelbie Ardon is a 52 y.o. female.    Vitals:  height is 5' 7" (1.702 m) and weight is 80.7 kg (178 lb). Her temperature is 98.5 °F (36.9 °C). Her blood pressure is 143/88 (abnormal) and her pulse is 92. Her respiration is 17 and oxygen saturation is 97%.     Chief Complaint: Sore Throat    Pt states advil this morning.     Provider note begins below:      Patient comes to the clinic complaint of sore throat, cough, congestion, ear pain and headache x4 day.  No difficulty breathing shortness of breath.  No known sick contacts.    Recent air travel.Patient is immunized against COVID-19.    Sore Throat   This is a new problem. The current episode started in the past 7 days. The problem has been unchanged. Neither side of throat is experiencing more pain than the other. There has been no fever. The pain is at a severity of 7/10. The pain is moderate. Associated symptoms include congestion, coughing, ear pain, headaches, a hoarse voice, neck pain, swollen glands and trouble swallowing. Pertinent negatives include no abdominal pain, diarrhea, drooling, ear discharge, plugged ear sensation, shortness of breath, stridor or vomiting. She has had no exposure to strep or mono. She has tried NSAIDs for the symptoms. The treatment provided mild relief.     HENT:  Positive for ear pain, congestion, sore throat and trouble swallowing. Negative for ear discharge and drooling.    Neck: Positive for neck pain.   Respiratory:  Positive for cough. Negative for shortness of breath and stridor.    Gastrointestinal:  Negative for abdominal pain, vomiting and diarrhea.   Neurological:  Positive for headaches.     Objective:      Physical Exam   Constitutional: She is oriented to person, place, and time. She appears well-developed. She is cooperative.  Non-toxic appearance. She does not appear ill. No distress.      Comments:  Appears uncomfortable     HENT:   Head: Normocephalic and atraumatic.   Ears: "   Right Ear: Hearing, tympanic membrane, external ear and ear canal normal.   Left Ear: Hearing, tympanic membrane, external ear and ear canal normal.   Nose: Rhinorrhea present. No mucosal edema or nasal deformity. No epistaxis. Right sinus exhibits no maxillary sinus tenderness and no frontal sinus tenderness. Left sinus exhibits no maxillary sinus tenderness and no frontal sinus tenderness.   Mouth/Throat: Uvula is midline, oropharynx is clear and moist and mucous membranes are normal. No trismus in the jaw. Normal dentition. No uvula swelling. Cobblestoning present. No oropharyngeal exudate, posterior oropharyngeal edema or posterior oropharyngeal erythema.   Eyes: Conjunctivae and lids are normal. No scleral icterus.   Neck: Trachea normal and phonation normal. Neck supple. No edema present. No erythema present. No neck rigidity present.   Cardiovascular: Normal rate, regular rhythm, normal heart sounds and normal pulses.   Pulmonary/Chest: Effort normal and breath sounds normal. No stridor. No respiratory distress. She has no decreased breath sounds. She has no wheezes. She has no rhonchi. She has no rales.   Abdominal: Normal appearance.   Musculoskeletal: Normal range of motion.         General: No deformity. Normal range of motion.   Neurological: She is alert and oriented to person, place, and time. She exhibits normal muscle tone. Coordination normal.   Skin: Skin is warm, dry, intact, not diaphoretic and not pale.   Psychiatric: Her speech is normal and behavior is normal. Judgment and thought content normal.   Nursing note and vitals reviewed.      Results for orders placed or performed in visit on 11/25/22   POCT Strep A, Molecular   Result Value Ref Range    Molecular Strep A, POC Negative Negative     Acceptable Yes    SARS Coronavirus 2 Antigen, POCT Manual Read   Result Value Ref Range    SARS Coronavirus 2 Antigen Negative Negative     Acceptable Yes         Assessment:       1. Viral URI    2. Sore throat          Plan:       Labs ordered at this visit reviewed.     Viral URI  -     SARS Coronavirus 2 Antigen, POCT Manual Read  -     ipratropium (ATROVENT) 21 mcg (0.03 %) nasal spray; 1 spray by Each Nostril route 2 (two) times daily. for 7 days  Dispense: 30 mL; Refill: 0    Sore throat  -     POCT Strep A, Molecular  -     SARS Coronavirus 2 Antigen, POCT Manual Read  -     (Magic mouthwash) 1:1:1 diphenhydrAMINE(Benadryl) 12.5mg/5ml liq, aluminum & magnesium hydroxide-simethicone (Maalox), LIDOcaine viscous 2%; Swish and spit 5 mLs every 4 (four) hours as needed (gargle and spit for sore throat). for mouth sores  Dispense: 360 mL; Refill: 0

## 2022-11-28 ENCOUNTER — PATIENT MESSAGE (OUTPATIENT)
Dept: PRIMARY CARE CLINIC | Facility: CLINIC | Age: 52
End: 2022-11-28

## 2022-11-28 ENCOUNTER — OFFICE VISIT (OUTPATIENT)
Dept: PRIMARY CARE CLINIC | Facility: CLINIC | Age: 52
End: 2022-11-28
Payer: COMMERCIAL

## 2022-11-28 VITALS — WEIGHT: 165 LBS | BODY MASS INDEX: 25.9 KG/M2 | HEIGHT: 67 IN

## 2022-11-28 DIAGNOSIS — T84.498S MECHANICAL COMPLICATION OF INTERNAL ORTHOPEDIC DEVICE, IMPLANT OR GRAFT, SEQUELA: ICD-10-CM

## 2022-11-28 DIAGNOSIS — I10 PRIMARY HYPERTENSION: ICD-10-CM

## 2022-11-28 DIAGNOSIS — R05.9 COUGH, UNSPECIFIED TYPE: ICD-10-CM

## 2022-11-28 DIAGNOSIS — I10 PRIMARY HYPERTENSION: Primary | ICD-10-CM

## 2022-11-28 DIAGNOSIS — M25.551 RIGHT HIP PAIN: ICD-10-CM

## 2022-11-28 PROCEDURE — 1159F PR MEDICATION LIST DOCUMENTED IN MEDICAL RECORD: ICD-10-PCS | Mod: CPTII,95,, | Performed by: FAMILY MEDICINE

## 2022-11-28 PROCEDURE — 4010F ACE/ARB THERAPY RXD/TAKEN: CPT | Mod: CPTII,95,, | Performed by: FAMILY MEDICINE

## 2022-11-28 PROCEDURE — 99214 PR OFFICE/OUTPT VISIT, EST, LEVL IV, 30-39 MIN: ICD-10-PCS | Mod: 95,,, | Performed by: FAMILY MEDICINE

## 2022-11-28 PROCEDURE — 3008F BODY MASS INDEX DOCD: CPT | Mod: CPTII,95,, | Performed by: FAMILY MEDICINE

## 2022-11-28 PROCEDURE — 3044F PR MOST RECENT HEMOGLOBIN A1C LEVEL <7.0%: ICD-10-PCS | Mod: CPTII,95,, | Performed by: FAMILY MEDICINE

## 2022-11-28 PROCEDURE — 3044F HG A1C LEVEL LT 7.0%: CPT | Mod: CPTII,95,, | Performed by: FAMILY MEDICINE

## 2022-11-28 PROCEDURE — 3008F PR BODY MASS INDEX (BMI) DOCUMENTED: ICD-10-PCS | Mod: CPTII,95,, | Performed by: FAMILY MEDICINE

## 2022-11-28 PROCEDURE — 4010F PR ACE/ARB THEARPY RXD/TAKEN: ICD-10-PCS | Mod: CPTII,95,, | Performed by: FAMILY MEDICINE

## 2022-11-28 PROCEDURE — 1159F MED LIST DOCD IN RCRD: CPT | Mod: CPTII,95,, | Performed by: FAMILY MEDICINE

## 2022-11-28 PROCEDURE — 99214 OFFICE O/P EST MOD 30 MIN: CPT | Mod: 95,,, | Performed by: FAMILY MEDICINE

## 2022-11-28 RX ORDER — SEMAGLUTIDE 2.68 MG/ML
2 INJECTION, SOLUTION SUBCUTANEOUS
Qty: 3 ML | Refills: 6 | Status: SHIPPED | OUTPATIENT
Start: 2022-11-28 | End: 2023-10-09

## 2022-11-28 RX ORDER — PROMETHAZINE HYDROCHLORIDE AND DEXTROMETHORPHAN HYDROBROMIDE 6.25; 15 MG/5ML; MG/5ML
5 SYRUP ORAL EVERY 4 HOURS PRN
Qty: 118 ML | Refills: 0 | Status: SHIPPED | OUTPATIENT
Start: 2022-11-28 | End: 2022-12-08

## 2022-11-28 RX ORDER — SEMAGLUTIDE 2.68 MG/ML
2 INJECTION, SOLUTION SUBCUTANEOUS
Qty: 3 ML | Refills: 6 | Status: SHIPPED | OUTPATIENT
Start: 2022-11-28 | End: 2022-11-28 | Stop reason: SDUPTHER

## 2022-11-28 RX ORDER — BENZONATATE 200 MG/1
200 CAPSULE ORAL 3 TIMES DAILY PRN
Qty: 30 CAPSULE | Refills: 0 | Status: SHIPPED | OUTPATIENT
Start: 2022-11-28 | End: 2022-12-08

## 2022-11-28 NOTE — TELEPHONE ENCOUNTER
No new care gaps identified.  [facility] Embedded Care Gaps. Reference number: [id number]. 11/28/2022   2:27:51 PM CST  
Pt would lke this RX printed due to her being self pay. She doesn't use [employer] for this.    RX pended and set to print  
We already sent this however I did it again just in case there was an issue because she did not get the 2 milligram dose for what ever reason  
No

## 2022-11-28 NOTE — PROGRESS NOTES
Subjective:       Patient ID: Shelbie Ardon is a 52 y.o. female.    Chief Complaint: Hypertension    HPI       53 y/o female with HTN, R sided thigh pain post op R LISETH 8/2020 is here to follow up HTN, weight loss and cold symptoms.    She has worsening cold sx for over 6 days, she cough productive, she has some nasal congestion and sore throat, she is using mucinex is not helpful. Not sleeping well secondary to cough.  She was seen in urgent care and COVID swab and strep swab were negative, flu swab was not completed, she was given Atrovent nasal spray which has helped with her sinus symptoms.    She is tolerating Ozempic 1 mg weekly, has not started the 2 mg dose. She is walking 3 miles daily 7 days a week, weight is down 15 pounds, she feels the weight loss has helped with her chronic hip pain. She is trying to eat healthy. She denies f/n/v/d/constipation/cp/sob/urinary sx.      HTN: diagnosed about 2015, losartan 100 mg daily and chlorthalidone 25 mg daily, followed by cardiology  OA hip s/p R LISETH 8/2020 Dr. Hamm and OA knee:  After surgery she had some nerve pain that she reports has resolved, she also notes some discomfort from the hardware that she was told would improve with weight loss, off celebrex, cymbalta caused sexual side effects, off mobic, off lyrica, tylenol not helpful  GYN:following with Dr. Mcneil, off Combipatch, mmg 8/2022, irregular bleeding EMB negative, US 7/2021   Hx of LPRD, not needed since  Colonoscopy due declined scope, mother had a perforation with colonoscopy, cologuard 6/2021  Vitamin D def: not on supplement  Derm last visit 2019 with Dr. Boo  Eye exam utd  Dental utd  OTC: fish oil, mvi    Labs from 11/2022 reviewed      Review of Systems   Constitutional:  Negative for activity change and unexpected weight change.   HENT:  Positive for rhinorrhea. Negative for hearing loss and trouble swallowing.    Eyes:  Negative for discharge and visual disturbance.  "  Respiratory:  Negative for chest tightness and wheezing.    Cardiovascular:  Negative for chest pain and palpitations.   Gastrointestinal:  Negative for blood in stool, constipation, diarrhea and vomiting.   Endocrine: Negative for polydipsia and polyuria.   Genitourinary:  Negative for difficulty urinating, dysuria, hematuria and menstrual problem.   Musculoskeletal:  Negative for arthralgias, joint swelling and neck pain.   Neurological:  Negative for weakness and headaches.   Psychiatric/Behavioral:  Negative for confusion and dysphoric mood.      Objective:      Ht 5' 7" (1.702 m)   Wt 74.8 kg (165 lb)   LMP  (LMP Unknown)   BMI 25.84 kg/m²   Physical Exam  Constitutional:       General: She is not in acute distress.     Appearance: She is well-developed. She is not diaphoretic.   HENT:      Head: Normocephalic and atraumatic.   Pulmonary:      Effort: No respiratory distress.   Neurological:      Mental Status: She is alert.       Assessment:       1. Primary hypertension    2. Cough, unspecified type    3. Right hip pain    4. Mechanical complication of internal orthopedic device, implant or graft, sequela    5. BMI 28.0-28.9,adult        Plan:   Shelbie was seen today for hypertension.    Diagnoses and all orders for this visit:    Primary hypertension  -     semaglutide (OZEMPIC) 2 mg/dose (8 mg/3 mL) PnIj; Inject 2 mg into the skin every 7 days.    Cough, unspecified type  -     benzonatate (TESSALON) 200 MG capsule; Take 1 capsule (200 mg total) by mouth 3 (three) times daily as needed for Cough.  -     promethazine-dextromethorphan (PROMETHAZINE-DM) 6.25-15 mg/5 mL Syrp; Take 5 mLs by mouth every 4 (four) hours as needed.    Right hip pain  -     semaglutide (OZEMPIC) 2 mg/dose (8 mg/3 mL) PnIj; Inject 2 mg into the skin every 7 days.    Mechanical complication of internal orthopedic device, implant or graft, sequela  -     semaglutide (OZEMPIC) 2 mg/dose (8 mg/3 mL) PnIj; Inject 2 mg into the skin " every 7 days.    BMI 28.0-28.9,adult  -     semaglutide (OZEMPIC) 2 mg/dose (8 mg/3 mL) PnIj; Inject 2 mg into the skin every 7 days.    [place] location is: [place]    The chief complaint leading to consultation is:  Hypertension, cough, weight loss    Visit type: audiovisual    Face to Face time with patient: 20 minutes of total time spent on the encounter, which includes face to face time and non-face to face time preparing to see the patient (eg, review of tests), Obtaining and/or reviewing separately obtained history, Documenting clinical information in the electronic or other health record, Independently interpreting results (not separately reported) and communicating results to the patient/family/caregiver, or Care coordination (not separately reported).         Each patient to whom he or she provides medical services by telemedicine is:  (1) informed of the relationship between the physician and patient and the respective role of any other health care provider with respect to management of the patient; and (2) notified that he or she may decline to receive medical services by telemedicine and may withdraw from such care at any time.    Notes:

## 2022-11-29 ENCOUNTER — TELEPHONE (OUTPATIENT)
Dept: PRIMARY CARE CLINIC | Facility: CLINIC | Age: 52
End: 2022-11-29
Payer: COMMERCIAL

## 2022-11-30 ENCOUNTER — PATIENT MESSAGE (OUTPATIENT)
Dept: PRIMARY CARE CLINIC | Facility: CLINIC | Age: 52
End: 2022-11-30
Payer: COMMERCIAL

## 2022-11-30 DIAGNOSIS — J32.9 SINUSITIS, UNSPECIFIED CHRONICITY, UNSPECIFIED LOCATION: ICD-10-CM

## 2022-11-30 DIAGNOSIS — J34.89 SINUS PRESSURE: ICD-10-CM

## 2022-11-30 DIAGNOSIS — R05.9 COUGH, UNSPECIFIED TYPE: Primary | ICD-10-CM

## 2022-11-30 RX ORDER — AZITHROMYCIN 250 MG/1
TABLET, FILM COATED ORAL
Qty: 6 TABLET | Refills: 0 | Status: SHIPPED | OUTPATIENT
Start: 2022-11-30 | End: 2022-12-05

## 2022-11-30 NOTE — TELEPHONE ENCOUNTER
Pt states you told her to reach out for antibiotics if shes not feeling better. She would like to start on antibiotics    Please see my chart message

## 2023-03-16 ENCOUNTER — OFFICE VISIT (OUTPATIENT)
Dept: PODIATRY | Facility: CLINIC | Age: 53
End: 2023-03-16
Payer: COMMERCIAL

## 2023-03-16 VITALS
BODY MASS INDEX: 25.9 KG/M2 | SYSTOLIC BLOOD PRESSURE: 129 MMHG | HEIGHT: 67 IN | WEIGHT: 165 LBS | HEART RATE: 69 BPM | DIASTOLIC BLOOD PRESSURE: 88 MMHG

## 2023-03-16 DIAGNOSIS — D36.10 NEUROMA: Primary | ICD-10-CM

## 2023-03-16 PROCEDURE — 3074F SYST BP LT 130 MM HG: CPT | Mod: CPTII,S$GLB,, | Performed by: PODIATRIST

## 2023-03-16 PROCEDURE — 1159F MED LIST DOCD IN RCRD: CPT | Mod: CPTII,S$GLB,, | Performed by: PODIATRIST

## 2023-03-16 PROCEDURE — 99999 PR PBB SHADOW E&M-EST. PATIENT-LVL III: CPT | Mod: PBBFAC,,, | Performed by: PODIATRIST

## 2023-03-16 PROCEDURE — 99999 PR PBB SHADOW E&M-EST. PATIENT-LVL III: ICD-10-PCS | Mod: PBBFAC,,, | Performed by: PODIATRIST

## 2023-03-16 PROCEDURE — 1159F PR MEDICATION LIST DOCUMENTED IN MEDICAL RECORD: ICD-10-PCS | Mod: CPTII,S$GLB,, | Performed by: PODIATRIST

## 2023-03-16 PROCEDURE — 3074F PR MOST RECENT SYSTOLIC BLOOD PRESSURE < 130 MM HG: ICD-10-PCS | Mod: CPTII,S$GLB,, | Performed by: PODIATRIST

## 2023-03-16 PROCEDURE — 99203 OFFICE O/P NEW LOW 30 MIN: CPT | Mod: 25,S$GLB,, | Performed by: PODIATRIST

## 2023-03-16 PROCEDURE — 3079F PR MOST RECENT DIASTOLIC BLOOD PRESSURE 80-89 MM HG: ICD-10-PCS | Mod: CPTII,S$GLB,, | Performed by: PODIATRIST

## 2023-03-16 PROCEDURE — 3008F PR BODY MASS INDEX (BMI) DOCUMENTED: ICD-10-PCS | Mod: CPTII,S$GLB,, | Performed by: PODIATRIST

## 2023-03-16 PROCEDURE — 1160F PR REVIEW ALL MEDS BY PRESCRIBER/CLIN PHARMACIST DOCUMENTED: ICD-10-PCS | Mod: CPTII,S$GLB,, | Performed by: PODIATRIST

## 2023-03-16 PROCEDURE — 1160F RVW MEDS BY RX/DR IN RCRD: CPT | Mod: CPTII,S$GLB,, | Performed by: PODIATRIST

## 2023-03-16 PROCEDURE — 3079F DIAST BP 80-89 MM HG: CPT | Mod: CPTII,S$GLB,, | Performed by: PODIATRIST

## 2023-03-16 PROCEDURE — 64455 PR INJECT ANES/STEROID PLANTAR COMMON DIGITAL NERVE: ICD-10-PCS | Mod: LT,S$GLB,, | Performed by: PODIATRIST

## 2023-03-16 PROCEDURE — 64455 NJX AA&/STRD PLTR COM DG NRV: CPT | Mod: LT,S$GLB,, | Performed by: PODIATRIST

## 2023-03-16 PROCEDURE — 99203 PR OFFICE/OUTPT VISIT, NEW, LEVL III, 30-44 MIN: ICD-10-PCS | Mod: 25,S$GLB,, | Performed by: PODIATRIST

## 2023-03-16 PROCEDURE — 3008F BODY MASS INDEX DOCD: CPT | Mod: CPTII,S$GLB,, | Performed by: PODIATRIST

## 2023-03-16 RX ADMIN — TRIAMCINOLONE ACETONIDE 40 MG: 40 INJECTION, SUSPENSION INTRA-ARTICULAR; INTRAMUSCULAR at 03:03

## 2023-03-16 RX ADMIN — DEXAMETHASONE SODIUM PHOSPHATE 4 MG: 4 INJECTION, SOLUTION INTRA-ARTICULAR; INTRALESIONAL; INTRAMUSCULAR; INTRAVENOUS; SOFT TISSUE at 03:03

## 2023-03-16 NOTE — PROGRESS NOTES
Chief Complaint   Patient presents with    Foot Pain     Left Foot             HPI:      Shelbie Ardon is a 53 y.o. female with concerns of pain to left  foot. Patient reports pain has been present for many years.  Worse recently.    No history of  trauma to the area.  Symptoms:   parasthesias      Patient Active Problem List   Diagnosis    Recurrent pregnancy loss, antepartum condition or complication    Unspecified mechanical complication of internal orthopedic device, implant, and graft    Chondromalacia of patella    Knee pain    HTN (hypertension)    Tinnitus    Chondromalacia of medial femoral condyle    Primary localized osteoarthrosis, lower leg    Femoral acetabular impingement    Right hip pain    Acquired dysplasia of right hip    Degenerative tear of acetabular labrum of right hip    Bilateral hip pain    Decreased strength    Impaired functional mobility, balance, gait, and endurance             Current Outpatient Medications on File Prior to Visit   Medication Sig Dispense Refill    chlorthalidone (HYGROTEN) 25 MG Tab TAKE 1 TABLET(25 MG) BY MOUTH EVERY DAY 90 tablet 3    chlorthalidone (HYGROTEN) 25 MG Tab Take 1 tablet (25 mg total) by mouth once daily. 90 tablet 1    clindamycin (CLEOCIN) 300 MG capsule Take 300 mg by mouth. Prior to dental appointment      estradiol-norethindrone (COMBIPATCH) 0.05-0.14 mg/24 hr Apply patch twice weekly 8 patch 12    losartan (COZAAR) 100 MG tablet Take 1 tablet (100 mg total) by mouth once daily. 90 tablet 1    multivit with calcium,iron,min (MULTIVITAMIN-CA-IRON-MINERALS ORAL) once daily.       omega-3 fatty acids/fish oil (FISH OIL-OMEGA-3 FATTY ACIDS) 300-1,000 mg capsule Take by mouth once daily.      semaglutide (OZEMPIC) 2 mg/dose (8 mg/3 mL) PnIj Inject 2 mg into the skin every 7 days. 3 mL 6    (Magic mouthwash) 1:1:1 diphenhydrAMINE(Benadryl) 12.5mg/5ml liq, aluminum & magnesium hydroxide-simethicone (Maalox), LIDOcaine viscous 2% Swish and spit 5  "mLs every 4 (four) hours as needed (gargle and spit for sore throat). for mouth sores 360 mL 0     No current facility-administered medications on file prior to visit.           Review of patient's allergies indicates:   Allergen Reactions    Cephalexin Hives, Anaphylaxis, Itching, Rash and Shortness Of Breath    Methohexital Shortness Of Breath     Other reaction(s): Difficulty breathing  Other reaction(s): Difficulty breathing    Penicillins Hives, Itching, Rash and Shortness Of Breath    Amlodipine Edema     Edema, palpitations  Edema, palpitations    Sulfa (sulfonamide antibiotics) Hives, Itching, Photosensitivity and Rash                 ROS:  General ROS: negative for  chills, fatigue or fever  Cardiovascular ROS: no chest pain or dyspnea on exertion  Musculoskeletal ROS: negative for joint pain or joint stiffness.  Negative for loss of strength.  Positive for foot pain.   Neuro ROS: Negative for syncope, numbness, or muscle weakness  Skin ROS: Negative for rash, itching or nail/hair changes.       EXAM:  Vitals:    03/16/23 1108   BP: 129/88   Pulse: 69   Weight: 74.8 kg (165 lb)   Height: 5' 7" (1.702 m)       General:  in no acute distress, alert and oriented x 3.    LOWER EXTREMITY EXAM:   Vascular: Dorsalis pedis and posterior tibial pulses are 2/4 .    toes are warm to touch.     Present digital hair.       Edema:  Absent on left    Neurological:   Pain in the left metatarsal interspace between 2nd and 3rd digits.   Pain with metarsal compression  No pain with ROM of the second, third, fourth MPJ's     Dermatological:    There is normal skin tone, turgor, and temperature.  There is no presence of hyperkeratotic lesions.  There is no ecchymoses.  no erythema noted.     Musculoskeletal:  Muscle strength is 5/5.  Metatarsophalangeal, subtalar, and ankle  range of motion is intact, full.              ASSESSMENT/PLAN:    Problem List Items Addressed This Visit    None  Visit Diagnoses       Neuroma    -  " Primary    Relevant Orders    MRI Foot (Forefoot) Left Without Contrast              I counseled the patient on the patient's conditions, their implications and medical management.   MRI further evaluation.   Cortisone injection in the meantime.     With patient's permission,  a cortisone injection was performed at the Left 2nd intermetatarsal space, using 2.5ccs of mixture of (1cc 2% plain Lidocaine : 1cc 0.5% bupivicaine : 0.5cc kenalog-40 : 0.5cc dexamethasone).  Patient tolerated it well without immediate complications.

## 2023-03-17 RX ORDER — TRIAMCINOLONE ACETONIDE 40 MG/ML
40 INJECTION, SUSPENSION INTRA-ARTICULAR; INTRAMUSCULAR ONCE
Status: COMPLETED | OUTPATIENT
Start: 2023-03-16 | End: 2023-03-16

## 2023-03-17 RX ORDER — DEXAMETHASONE SODIUM PHOSPHATE 4 MG/ML
4 INJECTION, SOLUTION INTRA-ARTICULAR; INTRALESIONAL; INTRAMUSCULAR; INTRAVENOUS; SOFT TISSUE ONCE
Status: COMPLETED | OUTPATIENT
Start: 2023-03-16 | End: 2023-03-16

## 2023-04-01 ENCOUNTER — HOSPITAL ENCOUNTER (OUTPATIENT)
Dept: RADIOLOGY | Facility: HOSPITAL | Age: 53
Discharge: HOME | End: 2023-04-01
Attending: PODIATRIST
Payer: COMMERCIAL

## 2023-04-01 DIAGNOSIS — D36.10 NEUROMA: ICD-10-CM

## 2023-04-01 PROCEDURE — 73718 MRI LOWER EXTREMITY W/O DYE: CPT | Mod: 26,LT,, | Performed by: RADIOLOGY

## 2023-04-01 PROCEDURE — 73718 MRI FOOT (FOREFOOT) LEFT WITHOUT CONTRAST: ICD-10-PCS | Mod: 26,LT,, | Performed by: RADIOLOGY

## 2023-04-01 PROCEDURE — 73718 MRI LOWER EXTREMITY W/O DYE: CPT | Mod: TC,LT

## 2023-05-17 ENCOUNTER — HOSPITAL ENCOUNTER (OUTPATIENT)
Dept: RADIOLOGY | Facility: HOSPITAL | Age: 53
Discharge: HOME | End: 2023-05-17
Attending: FAMILY MEDICINE
Payer: COMMERCIAL

## 2023-05-17 ENCOUNTER — PATIENT MESSAGE (OUTPATIENT)
Dept: ORTHOPEDICS | Facility: CLINIC | Age: 53
End: 2023-05-17
Payer: COMMERCIAL

## 2023-05-17 ENCOUNTER — OFFICE VISIT (OUTPATIENT)
Dept: PRIMARY CARE CLINIC | Facility: CLINIC | Age: 53
End: 2023-05-17
Payer: COMMERCIAL

## 2023-05-17 ENCOUNTER — LAB VISIT (OUTPATIENT)
Dept: LAB | Facility: HOSPITAL | Age: 53
End: 2023-05-17
Attending: FAMILY MEDICINE
Payer: COMMERCIAL

## 2023-05-17 VITALS
HEART RATE: 85 BPM | SYSTOLIC BLOOD PRESSURE: 118 MMHG | HEIGHT: 67 IN | TEMPERATURE: 98 F | RESPIRATION RATE: 18 BRPM | WEIGHT: 168.19 LBS | OXYGEN SATURATION: 98 % | DIASTOLIC BLOOD PRESSURE: 64 MMHG | BODY MASS INDEX: 26.4 KG/M2

## 2023-05-17 DIAGNOSIS — M79.641 RIGHT HAND PAIN: Primary | ICD-10-CM

## 2023-05-17 DIAGNOSIS — R11.0 NAUSEA: ICD-10-CM

## 2023-05-17 DIAGNOSIS — M79.642 LEFT HAND PAIN: Primary | ICD-10-CM

## 2023-05-17 DIAGNOSIS — R10.11 RIGHT UPPER QUADRANT ABDOMINAL PAIN: ICD-10-CM

## 2023-05-17 DIAGNOSIS — I10 ESSENTIAL HYPERTENSION: Primary | ICD-10-CM

## 2023-05-17 DIAGNOSIS — Z12.31 SCREENING MAMMOGRAM FOR BREAST CANCER: ICD-10-CM

## 2023-05-17 DIAGNOSIS — I10 ESSENTIAL HYPERTENSION: ICD-10-CM

## 2023-05-17 LAB
ALBUMIN SERPL BCP-MCNC: 4.1 G/DL (ref 3.5–5.2)
ALP SERPL-CCNC: 53 U/L (ref 55–135)
ALT SERPL W/O P-5'-P-CCNC: 14 U/L (ref 10–44)
AMYLASE SERPL-CCNC: 52 U/L (ref 20–110)
ANION GAP SERPL CALC-SCNC: 8 MMOL/L (ref 8–16)
AST SERPL-CCNC: 19 U/L (ref 10–40)
BASOPHILS # BLD AUTO: 0.08 K/UL (ref 0–0.2)
BASOPHILS NFR BLD: 0.8 % (ref 0–1.9)
BILIRUB SERPL-MCNC: 0.7 MG/DL (ref 0.1–1)
BUN SERPL-MCNC: 16 MG/DL (ref 6–20)
CALCIUM SERPL-MCNC: 9.3 MG/DL (ref 8.7–10.5)
CHLORIDE SERPL-SCNC: 101 MMOL/L (ref 95–110)
CO2 SERPL-SCNC: 29 MMOL/L (ref 23–29)
CREAT SERPL-MCNC: 0.8 MG/DL (ref 0.5–1.4)
DIFFERENTIAL METHOD: ABNORMAL
EOSINOPHIL # BLD AUTO: 0.2 K/UL (ref 0–0.5)
EOSINOPHIL NFR BLD: 1.7 % (ref 0–8)
ERYTHROCYTE [DISTWIDTH] IN BLOOD BY AUTOMATED COUNT: 11.7 % (ref 11.5–14.5)
EST. GFR  (NO RACE VARIABLE): >60 ML/MIN/1.73 M^2
ESTIMATED AVG GLUCOSE: 88 MG/DL (ref 68–131)
GLUCOSE SERPL-MCNC: 95 MG/DL (ref 70–110)
HBA1C MFR BLD: 4.7 % (ref 4–5.6)
HCT VFR BLD AUTO: 43.1 % (ref 37–48.5)
HGB BLD-MCNC: 14.6 G/DL (ref 12–16)
IMM GRANULOCYTES # BLD AUTO: 0.02 K/UL (ref 0–0.04)
IMM GRANULOCYTES NFR BLD AUTO: 0.2 % (ref 0–0.5)
LIPASE SERPL-CCNC: 23 U/L (ref 4–60)
LYMPHOCYTES # BLD AUTO: 2 K/UL (ref 1–4.8)
LYMPHOCYTES NFR BLD: 20.4 % (ref 18–48)
MCH RBC QN AUTO: 31.9 PG (ref 27–31)
MCHC RBC AUTO-ENTMCNC: 33.9 G/DL (ref 32–36)
MCV RBC AUTO: 94 FL (ref 82–98)
MONOCYTES # BLD AUTO: 0.8 K/UL (ref 0.3–1)
MONOCYTES NFR BLD: 7.7 % (ref 4–15)
NEUTROPHILS # BLD AUTO: 6.7 K/UL (ref 1.8–7.7)
NEUTROPHILS NFR BLD: 69.2 % (ref 38–73)
NRBC BLD-RTO: 0 /100 WBC
PLATELET # BLD AUTO: 317 K/UL (ref 150–450)
PMV BLD AUTO: 9.2 FL (ref 9.2–12.9)
POTASSIUM SERPL-SCNC: 3.8 MMOL/L (ref 3.5–5.1)
PROT SERPL-MCNC: 7.3 G/DL (ref 6–8.4)
RBC # BLD AUTO: 4.57 M/UL (ref 4–5.4)
SODIUM SERPL-SCNC: 138 MMOL/L (ref 136–145)
WBC # BLD AUTO: 9.75 K/UL (ref 3.9–12.7)

## 2023-05-17 PROCEDURE — 80053 COMPREHEN METABOLIC PANEL: CPT | Performed by: FAMILY MEDICINE

## 2023-05-17 PROCEDURE — 1159F MED LIST DOCD IN RCRD: CPT | Mod: CPTII,S$GLB,, | Performed by: FAMILY MEDICINE

## 2023-05-17 PROCEDURE — 76700 US EXAM ABDOM COMPLETE: CPT | Mod: TC

## 2023-05-17 PROCEDURE — 3074F SYST BP LT 130 MM HG: CPT | Mod: CPTII,S$GLB,, | Performed by: FAMILY MEDICINE

## 2023-05-17 PROCEDURE — 36415 COLL VENOUS BLD VENIPUNCTURE: CPT | Mod: PN | Performed by: FAMILY MEDICINE

## 2023-05-17 PROCEDURE — 1159F PR MEDICATION LIST DOCUMENTED IN MEDICAL RECORD: ICD-10-PCS | Mod: CPTII,S$GLB,, | Performed by: FAMILY MEDICINE

## 2023-05-17 PROCEDURE — 83690 ASSAY OF LIPASE: CPT | Performed by: FAMILY MEDICINE

## 2023-05-17 PROCEDURE — 83036 HEMOGLOBIN GLYCOSYLATED A1C: CPT | Performed by: FAMILY MEDICINE

## 2023-05-17 PROCEDURE — 76700 US EXAM ABDOM COMPLETE: CPT | Mod: 26,,, | Performed by: RADIOLOGY

## 2023-05-17 PROCEDURE — 99214 PR OFFICE/OUTPT VISIT, EST, LEVL IV, 30-39 MIN: ICD-10-PCS | Mod: S$GLB,,, | Performed by: FAMILY MEDICINE

## 2023-05-17 PROCEDURE — 85025 COMPLETE CBC W/AUTO DIFF WBC: CPT | Performed by: FAMILY MEDICINE

## 2023-05-17 PROCEDURE — 3008F PR BODY MASS INDEX (BMI) DOCUMENTED: ICD-10-PCS | Mod: CPTII,S$GLB,, | Performed by: FAMILY MEDICINE

## 2023-05-17 PROCEDURE — 3008F BODY MASS INDEX DOCD: CPT | Mod: CPTII,S$GLB,, | Performed by: FAMILY MEDICINE

## 2023-05-17 PROCEDURE — 1160F PR REVIEW ALL MEDS BY PRESCRIBER/CLIN PHARMACIST DOCUMENTED: ICD-10-PCS | Mod: CPTII,S$GLB,, | Performed by: FAMILY MEDICINE

## 2023-05-17 PROCEDURE — 99999 PR PBB SHADOW E&M-EST. PATIENT-LVL IV: CPT | Mod: PBBFAC,,, | Performed by: FAMILY MEDICINE

## 2023-05-17 PROCEDURE — 1160F RVW MEDS BY RX/DR IN RCRD: CPT | Mod: CPTII,S$GLB,, | Performed by: FAMILY MEDICINE

## 2023-05-17 PROCEDURE — 82150 ASSAY OF AMYLASE: CPT | Performed by: FAMILY MEDICINE

## 2023-05-17 PROCEDURE — 3078F PR MOST RECENT DIASTOLIC BLOOD PRESSURE < 80 MM HG: ICD-10-PCS | Mod: CPTII,S$GLB,, | Performed by: FAMILY MEDICINE

## 2023-05-17 PROCEDURE — 99214 OFFICE O/P EST MOD 30 MIN: CPT | Mod: S$GLB,,, | Performed by: FAMILY MEDICINE

## 2023-05-17 PROCEDURE — 3074F PR MOST RECENT SYSTOLIC BLOOD PRESSURE < 130 MM HG: ICD-10-PCS | Mod: CPTII,S$GLB,, | Performed by: FAMILY MEDICINE

## 2023-05-17 PROCEDURE — 3078F DIAST BP <80 MM HG: CPT | Mod: CPTII,S$GLB,, | Performed by: FAMILY MEDICINE

## 2023-05-17 PROCEDURE — 4010F ACE/ARB THERAPY RXD/TAKEN: CPT | Mod: CPTII,S$GLB,, | Performed by: FAMILY MEDICINE

## 2023-05-17 PROCEDURE — 99999 PR PBB SHADOW E&M-EST. PATIENT-LVL IV: ICD-10-PCS | Mod: PBBFAC,,, | Performed by: FAMILY MEDICINE

## 2023-05-17 PROCEDURE — 4010F PR ACE/ARB THEARPY RXD/TAKEN: ICD-10-PCS | Mod: CPTII,S$GLB,, | Performed by: FAMILY MEDICINE

## 2023-05-17 PROCEDURE — 76700 US ABDOMEN COMPLETE: ICD-10-PCS | Mod: 26,,, | Performed by: RADIOLOGY

## 2023-05-17 RX ORDER — ONDANSETRON 4 MG/1
4 TABLET, ORALLY DISINTEGRATING ORAL 2 TIMES DAILY
Qty: 30 TABLET | Refills: 0 | Status: SHIPPED | OUTPATIENT
Start: 2023-05-17 | End: 2023-12-04

## 2023-05-17 NOTE — PROGRESS NOTES
Subjective:       Patient ID: Shelbie Ardon is a 53 y.o. female.    Chief Complaint: Abdominal Pain (Pain under right rib /Last week she felt pressure and pain that would not subside pain that wrap around from the from the the back of her right side  )    HPI  y/o female with HTN, R sided thigh pain post op R LISETH 8/2020 is here to follow up HTN and abdominal pain.    She had R upper quadrant abdominal pain off and on for over 3 years, for the past few weeks she has a constant fullness that aches and sometimes its stabbing, worse when bending over sometimes, 4-7/10, no associated with meals, she is eating healthy, not associated with stooling. She denies f, she reports last week she had nausea and a full sensation, that has resolved, she denies heartburn/d/constipation/sob/cp/urinary sx. She is active and walks 3 miles 6 days a week. Sleeping ok but when she lays on her stomach she feels its worse.     She has increased stress lately caring for her mom and . She has been on Ozempic for a year, now on 1 mg weekly as she is having trouble getting the 2 mg dose. Last dose was 2 days ago. Weight down 10 pounds in the past year.       HTN: following with cardiology; losartan 100 mg daily and chlorthalidone 25 mg daily  OA hip s/p R LISETH 8/2020 Dr. Hamm and OA knee:  After surgery she had some nerve pain that she reports has resolved, she also notes some discomfort from the hardware that she was told would improve with weight loss, off celebrex, cymbalta caused sexual side effects, off mobic, off lyrica, tylenol not helpful  GYN:following with Dr. Mcneil, Combipatch, mmg 8/2022, irregular bleeding EMB negative, US 8/2022   Hx of LPRD, not needed since  Colonoscopy due declined scope, mother had a perforation with colonoscopy, cologuard 6/2021  Vitamin D def: not on supplement  Derm last visit 2019 with Dr. Boo  Eye exam utd  Dental utd  OTC: fish oil, mvi, calcium and d     Review of Systems  see  "HPI  Objective:      /64 (BP Location: Right arm, Patient Position: Sitting, BP Method: Small (Manual))   Pulse 85   Temp 98 °F (36.7 °C) (Oral)   Resp 18   Ht 5' 7" (1.702 m)   Wt 76.3 kg (168 lb 3.4 oz)   LMP  (LMP Unknown)   SpO2 98%   BMI 26.35 kg/m²   Physical Exam  Vitals and nursing note reviewed.   Constitutional:       Appearance: She is well-developed.   HENT:      Head: Normocephalic and atraumatic.   Neck:      Thyroid: No thyromegaly.   Cardiovascular:      Rate and Rhythm: Normal rate and regular rhythm.      Heart sounds: Normal heart sounds.   Pulmonary:      Effort: Pulmonary effort is normal. No respiratory distress.      Breath sounds: Normal breath sounds.   Abdominal:      General: Abdomen is flat. Bowel sounds are normal. There is no distension.      Palpations: Abdomen is soft. There is no mass.      Tenderness: There is no abdominal tenderness.   Musculoskeletal:      Cervical back: Normal range of motion and neck supple.      Right lower leg: No edema.      Left lower leg: No edema.   Lymphadenopathy:      Cervical: No cervical adenopathy.   Skin:     General: Skin is warm and dry.   Neurological:      Mental Status: She is alert.       Assessment:       1. Essential hypertension    2. Screening mammogram for breast cancer    3. Right upper quadrant abdominal pain    4. Nausea        Plan:   Shelbie was seen today for abdominal pain.    Diagnoses and all orders for this visit:    Essential hypertension    Screening mammogram for breast cancer  -     Mammo Digital Screening Bilat w/ Charlie; Future    Right upper quadrant abdominal pain  -     CBC Auto Differential; Future  -     Comprehensive Metabolic Panel; Future  -     Hemoglobin A1C; Future  -     AMYLASE; Future  -     LIPASE; Future  -     US Abdomen Complete; Future  -     ondansetron (ZOFRAN-ODT) 4 MG TbDL; Take 1 tablet (4 mg total) by mouth 2 (two) times daily.    Nausea  -     CBC Auto Differential; Future  -     " Comprehensive Metabolic Panel; Future  -     Hemoglobin A1C; Future  -     AMYLASE; Future  -     LIPASE; Future  -     US Abdomen Complete; Future  -     ondansetron (ZOFRAN-ODT) 4 MG TbDL; Take 1 tablet (4 mg total) by mouth 2 (two) times daily.

## 2023-05-18 ENCOUNTER — HOSPITAL ENCOUNTER (OUTPATIENT)
Dept: RADIOLOGY | Facility: OTHER | Age: 53
Discharge: HOME | End: 2023-05-18
Attending: ORTHOPAEDIC SURGERY
Payer: COMMERCIAL

## 2023-05-18 ENCOUNTER — OFFICE VISIT (OUTPATIENT)
Dept: ORTHOPEDICS | Facility: CLINIC | Age: 53
End: 2023-05-18
Payer: COMMERCIAL

## 2023-05-18 VITALS — HEIGHT: 67 IN | WEIGHT: 168 LBS | BODY MASS INDEX: 26.37 KG/M2

## 2023-05-18 DIAGNOSIS — M79.642 PAIN OF LEFT HAND: ICD-10-CM

## 2023-05-18 DIAGNOSIS — M79.641 RIGHT HAND PAIN: ICD-10-CM

## 2023-05-18 DIAGNOSIS — M79.642 LEFT HAND PAIN: ICD-10-CM

## 2023-05-18 DIAGNOSIS — M25.50 ARTHRALGIA, UNSPECIFIED JOINT: Primary | ICD-10-CM

## 2023-05-18 PROCEDURE — 4010F PR ACE/ARB THEARPY RXD/TAKEN: ICD-10-PCS | Mod: CPTII,S$GLB,, | Performed by: ORTHOPAEDIC SURGERY

## 2023-05-18 PROCEDURE — 3044F HG A1C LEVEL LT 7.0%: CPT | Mod: CPTII,S$GLB,, | Performed by: ORTHOPAEDIC SURGERY

## 2023-05-18 PROCEDURE — 1159F MED LIST DOCD IN RCRD: CPT | Mod: CPTII,S$GLB,, | Performed by: ORTHOPAEDIC SURGERY

## 2023-05-18 PROCEDURE — 99999 PR PBB SHADOW E&M-EST. PATIENT-LVL III: CPT | Mod: PBBFAC,,, | Performed by: ORTHOPAEDIC SURGERY

## 2023-05-18 PROCEDURE — 73130 X-RAY EXAM OF HAND: CPT | Mod: 26,LT,, | Performed by: RADIOLOGY

## 2023-05-18 PROCEDURE — 1159F PR MEDICATION LIST DOCUMENTED IN MEDICAL RECORD: ICD-10-PCS | Mod: CPTII,S$GLB,, | Performed by: ORTHOPAEDIC SURGERY

## 2023-05-18 PROCEDURE — 99204 OFFICE O/P NEW MOD 45 MIN: CPT | Mod: S$GLB,,, | Performed by: ORTHOPAEDIC SURGERY

## 2023-05-18 PROCEDURE — 99204 PR OFFICE/OUTPT VISIT, NEW, LEVL IV, 45-59 MIN: ICD-10-PCS | Mod: S$GLB,,, | Performed by: ORTHOPAEDIC SURGERY

## 2023-05-18 PROCEDURE — 73130 XR HAND COMPLETE 3 VIEW LEFT: ICD-10-PCS | Mod: 26,LT,, | Performed by: RADIOLOGY

## 2023-05-18 PROCEDURE — 99999 PR PBB SHADOW E&M-EST. PATIENT-LVL III: ICD-10-PCS | Mod: PBBFAC,,, | Performed by: ORTHOPAEDIC SURGERY

## 2023-05-18 PROCEDURE — 3044F PR MOST RECENT HEMOGLOBIN A1C LEVEL <7.0%: ICD-10-PCS | Mod: CPTII,S$GLB,, | Performed by: ORTHOPAEDIC SURGERY

## 2023-05-18 PROCEDURE — 3008F PR BODY MASS INDEX (BMI) DOCUMENTED: ICD-10-PCS | Mod: CPTII,S$GLB,, | Performed by: ORTHOPAEDIC SURGERY

## 2023-05-18 PROCEDURE — 4010F ACE/ARB THERAPY RXD/TAKEN: CPT | Mod: CPTII,S$GLB,, | Performed by: ORTHOPAEDIC SURGERY

## 2023-05-18 PROCEDURE — 3008F BODY MASS INDEX DOCD: CPT | Mod: CPTII,S$GLB,, | Performed by: ORTHOPAEDIC SURGERY

## 2023-05-18 PROCEDURE — 73130 X-RAY EXAM OF HAND: CPT | Mod: TC,FY,LT

## 2023-05-18 NOTE — PROGRESS NOTES
H&P  Orthopedics    SUBJECTIVE:     CC: left index finger pain    History of Present Illness:  Shelbie Ardon is a 53 y.o. female who presents with left index finger pain. She localizes the pain to the ulnar aspect of the PIP joint of the index finger. No traumatic events. Has been trying voltaren gel and NSAIDs and rest without much improvement. She has a family history of psoriatic arthritis, but she has never had this diagnosis. She has recently had pain in multiple PIP joints in bilateral hands that has resolved except for this left index finger.       Review of patient's allergies indicates:   Allergen Reactions    Cephalexin Hives, Anaphylaxis, Itching, Rash and Shortness Of Breath    Methohexital Shortness Of Breath     Other reaction(s): Difficulty breathing  Other reaction(s): Difficulty breathing    Penicillins Hives, Itching, Rash and Shortness Of Breath    Amlodipine Edema     Edema, palpitations  Edema, palpitations    Sulfa (sulfonamide antibiotics) Hives, Itching, Photosensitivity and Rash       Past Medical History:   Diagnosis Date    Arthritis     Arthritis     Breast cyst     Cough     Female infertility NEC     Headache(784.0)     Hyperlipidemia     Hypertension     Joint pain     Tinnitus 12/17/2013     Past Surgical History:   Procedure Laterality Date    DILATION AND CURETTAGE OF UTERUS      JOINT REPLACEMENT  08/19/20    Right hip, anterior approach    KNEE SURGERY      acl repair as child/ revision 2013     Family History   Problem Relation Age of Onset    Hypertension Mother     Hyperlipidemia Mother     COPD Mother     Stroke Mother     Cancer Father         lung smoker    Lung cancer Father     Cancer Paternal Grandmother         unknown GYN cancer     Lung cancer Paternal Grandmother     Cancer Paternal Grandfather     Lung cancer Paternal Grandfather     Psoriasis Sister     Psoriasis Sister     No Known Problems Brother     No Known Problems Maternal Aunt     No Known Problems  Maternal Uncle     No Known Problems Paternal Aunt     No Known Problems Paternal Uncle     No Known Problems Maternal Grandmother     No Known Problems Maternal Grandfather     Anemia Neg Hx     Arrhythmia Neg Hx     Asthma Neg Hx     Clotting disorder Neg Hx     Fainting Neg Hx     Heart attack Neg Hx     Heart disease Neg Hx     Heart failure Neg Hx     Stroke Neg Hx     Atrial Septal Defect Neg Hx     Breast cancer Neg Hx     Colon cancer Neg Hx     Ovarian cancer Neg Hx     Melanoma Neg Hx      Social History     Tobacco Use    Smoking status: Never    Smokeless tobacco: Never   Substance Use Topics    Alcohol use: Yes     Alcohol/week: 4.0 standard drinks     Types: 4 Glasses of wine per week     Comment: socially    Drug use: No        Review of Systems:  Patient denies constitutional symptoms, cardiac symptoms, respiratory symptoms, GI symptoms.  The remainder of the musculoskeletal ROS is included in the HPI.      OBJECTIVE:     Vital Signs (Most Recent)       Physical Exam:  Gen:  No acute distress  CV:  Peripherally well-perfused.  Pulses 2+ bilaterally.  Lungs:  Normal respiratory effort.  Abdomen:  Soft, non-tender, non-distended  Head/Neck:  Normocephalic.  Atraumatic. No TTP, AROM and PROM intact without pain  Neuro:  CN intact without deficit, SILT throughout B/L Upper & Lower Extremities    MSK:  LUE:  - TTP ulnar aspect of PIPJ of ring finger  - AROM and PROM of the index finger full without pain  - no PIPJ laxity with v/v stressing   - AIN/PIN/Radial/Median/Ulnar Nerves assessed in isolation without deficit  - SILT throughout  - Radial & Ulnar arteries palpated 2+  - Capillary Refill <3s      Diagnostic Results:  Xray of the left hand  Impression:  No acute fractures, dislocations, or bony lesion.     EMG - none    ASSESSMENT/PLAN:     A/P: Shelbie Ardon is a 53 y.o. female  with left index finger PIP joint pain  Both surgical and non-surgical options were discussed with the patient today  including NSAIDs, topical compound cream, therapy, injection, and surgical intervention. At this time, we will proceed with a Rheum workup with labs and MRI. Return to clinic to review the results.

## 2023-05-22 ENCOUNTER — TELEPHONE (OUTPATIENT)
Dept: PRIMARY CARE CLINIC | Facility: CLINIC | Age: 53
End: 2023-05-22

## 2023-05-22 DIAGNOSIS — R18.8 ASCITES OF LIVER: ICD-10-CM

## 2023-05-22 DIAGNOSIS — R16.0 HEPATOMEGALY: Primary | ICD-10-CM

## 2023-05-22 NOTE — TELEPHONE ENCOUNTER
----- Message from Kendra Carrasquillo MD sent at 5/22/2023  4:15 PM CDT -----  Your abdominal ultrasound shows a mildly enlarged liver and a small amount of free fluid.  I recommend we get an opinion from hepatology.  I will place a referral and have my staff give you a call.

## 2023-05-23 ENCOUNTER — PATIENT MESSAGE (OUTPATIENT)
Dept: PRIMARY CARE CLINIC | Facility: CLINIC | Age: 53
End: 2023-05-23
Payer: COMMERCIAL

## 2023-05-31 ENCOUNTER — HOSPITAL ENCOUNTER (OUTPATIENT)
Dept: RADIOLOGY | Facility: HOSPITAL | Age: 53
Discharge: HOME | End: 2023-05-31
Attending: ORTHOPAEDIC SURGERY
Payer: COMMERCIAL

## 2023-05-31 DIAGNOSIS — M79.642 PAIN OF LEFT HAND: ICD-10-CM

## 2023-05-31 PROCEDURE — 73220 MRI UPPR EXTREMITY W/O&W/DYE: CPT | Mod: TC,LT

## 2023-05-31 PROCEDURE — 73220 MRI UPPR EXTREMITY W/O&W/DYE: CPT | Mod: 26,LT,, | Performed by: RADIOLOGY

## 2023-05-31 PROCEDURE — 25500020 PHARM REV CODE 255: Performed by: ORTHOPAEDIC SURGERY

## 2023-05-31 PROCEDURE — 73220 MRI HAND FINGERS W WO CONTRAST LEFT: ICD-10-PCS | Mod: 26,LT,, | Performed by: RADIOLOGY

## 2023-05-31 PROCEDURE — A9585 GADOBUTROL INJECTION: HCPCS | Performed by: ORTHOPAEDIC SURGERY

## 2023-05-31 RX ORDER — GADOBUTROL 604.72 MG/ML
8 INJECTION INTRAVENOUS
Status: COMPLETED | OUTPATIENT
Start: 2023-05-31 | End: 2023-05-31

## 2023-05-31 RX ADMIN — GADOBUTROL 8 ML: 604.72 INJECTION INTRAVENOUS at 11:05

## 2023-05-31 NOTE — PROGRESS NOTES
I have personally taken the history and examined this patient. I agree with the resident's note as stated above.        A/P: Shelbie Ardon is a 53 y.o. female  with left index finger PIP joint pain  Both surgical and non-surgical options were discussed with the patient today including NSAIDs, topical compound cream, therapy, injection, and surgical intervention. At this time, we will proceed with a Rheum workup with labs and MRI. Return to clinic to review the results.

## 2023-06-06 ENCOUNTER — PATIENT MESSAGE (OUTPATIENT)
Dept: ORTHOPEDICS | Facility: CLINIC | Age: 53
End: 2023-06-06
Payer: COMMERCIAL

## 2023-06-15 ENCOUNTER — OFFICE VISIT (OUTPATIENT)
Dept: GASTROENTEROLOGY | Facility: CLINIC | Age: 53
End: 2023-06-15
Payer: COMMERCIAL

## 2023-06-15 VITALS
DIASTOLIC BLOOD PRESSURE: 86 MMHG | HEIGHT: 67 IN | BODY MASS INDEX: 25.11 KG/M2 | WEIGHT: 160 LBS | SYSTOLIC BLOOD PRESSURE: 137 MMHG | HEART RATE: 59 BPM

## 2023-06-15 DIAGNOSIS — R11.2 NAUSEA AND VOMITING, UNSPECIFIED VOMITING TYPE: ICD-10-CM

## 2023-06-15 DIAGNOSIS — R10.9 ABDOMINAL PAIN, UNSPECIFIED ABDOMINAL LOCATION: Primary | ICD-10-CM

## 2023-06-15 DIAGNOSIS — R10.11 RIGHT UPPER QUADRANT ABDOMINAL PAIN: ICD-10-CM

## 2023-06-15 DIAGNOSIS — R16.0 HEPATOMEGALY: ICD-10-CM

## 2023-06-15 PROCEDURE — 3079F DIAST BP 80-89 MM HG: CPT | Mod: CPTII,S$GLB,, | Performed by: STUDENT IN AN ORGANIZED HEALTH CARE EDUCATION/TRAINING PROGRAM

## 2023-06-15 PROCEDURE — 99204 OFFICE O/P NEW MOD 45 MIN: CPT | Mod: S$GLB,,, | Performed by: STUDENT IN AN ORGANIZED HEALTH CARE EDUCATION/TRAINING PROGRAM

## 2023-06-15 PROCEDURE — 3044F HG A1C LEVEL LT 7.0%: CPT | Mod: CPTII,S$GLB,, | Performed by: STUDENT IN AN ORGANIZED HEALTH CARE EDUCATION/TRAINING PROGRAM

## 2023-06-15 PROCEDURE — 3079F PR MOST RECENT DIASTOLIC BLOOD PRESSURE 80-89 MM HG: ICD-10-PCS | Mod: CPTII,S$GLB,, | Performed by: STUDENT IN AN ORGANIZED HEALTH CARE EDUCATION/TRAINING PROGRAM

## 2023-06-15 PROCEDURE — 3075F PR MOST RECENT SYSTOLIC BLOOD PRESS GE 130-139MM HG: ICD-10-PCS | Mod: CPTII,S$GLB,, | Performed by: STUDENT IN AN ORGANIZED HEALTH CARE EDUCATION/TRAINING PROGRAM

## 2023-06-15 PROCEDURE — 4010F PR ACE/ARB THEARPY RXD/TAKEN: ICD-10-PCS | Mod: CPTII,S$GLB,, | Performed by: STUDENT IN AN ORGANIZED HEALTH CARE EDUCATION/TRAINING PROGRAM

## 2023-06-15 PROCEDURE — 1159F MED LIST DOCD IN RCRD: CPT | Mod: CPTII,S$GLB,, | Performed by: STUDENT IN AN ORGANIZED HEALTH CARE EDUCATION/TRAINING PROGRAM

## 2023-06-15 PROCEDURE — 3008F BODY MASS INDEX DOCD: CPT | Mod: CPTII,S$GLB,, | Performed by: STUDENT IN AN ORGANIZED HEALTH CARE EDUCATION/TRAINING PROGRAM

## 2023-06-15 PROCEDURE — 99204 PR OFFICE/OUTPT VISIT, NEW, LEVL IV, 45-59 MIN: ICD-10-PCS | Mod: S$GLB,,, | Performed by: STUDENT IN AN ORGANIZED HEALTH CARE EDUCATION/TRAINING PROGRAM

## 2023-06-15 PROCEDURE — 3008F PR BODY MASS INDEX (BMI) DOCUMENTED: ICD-10-PCS | Mod: CPTII,S$GLB,, | Performed by: STUDENT IN AN ORGANIZED HEALTH CARE EDUCATION/TRAINING PROGRAM

## 2023-06-15 PROCEDURE — 1159F PR MEDICATION LIST DOCUMENTED IN MEDICAL RECORD: ICD-10-PCS | Mod: CPTII,S$GLB,, | Performed by: STUDENT IN AN ORGANIZED HEALTH CARE EDUCATION/TRAINING PROGRAM

## 2023-06-15 PROCEDURE — 99999 PR PBB SHADOW E&M-EST. PATIENT-LVL IV: CPT | Mod: PBBFAC,,, | Performed by: STUDENT IN AN ORGANIZED HEALTH CARE EDUCATION/TRAINING PROGRAM

## 2023-06-15 PROCEDURE — 4010F ACE/ARB THERAPY RXD/TAKEN: CPT | Mod: CPTII,S$GLB,, | Performed by: STUDENT IN AN ORGANIZED HEALTH CARE EDUCATION/TRAINING PROGRAM

## 2023-06-15 PROCEDURE — 99999 PR PBB SHADOW E&M-EST. PATIENT-LVL IV: ICD-10-PCS | Mod: PBBFAC,,, | Performed by: STUDENT IN AN ORGANIZED HEALTH CARE EDUCATION/TRAINING PROGRAM

## 2023-06-15 PROCEDURE — 3075F SYST BP GE 130 - 139MM HG: CPT | Mod: CPTII,S$GLB,, | Performed by: STUDENT IN AN ORGANIZED HEALTH CARE EDUCATION/TRAINING PROGRAM

## 2023-06-15 PROCEDURE — 3044F PR MOST RECENT HEMOGLOBIN A1C LEVEL <7.0%: ICD-10-PCS | Mod: CPTII,S$GLB,, | Performed by: STUDENT IN AN ORGANIZED HEALTH CARE EDUCATION/TRAINING PROGRAM

## 2023-06-15 RX ORDER — OMEPRAZOLE 40 MG/1
40 CAPSULE, DELAYED RELEASE ORAL DAILY
Qty: 30 CAPSULE | Refills: 11 | Status: SHIPPED | OUTPATIENT
Start: 2023-06-15 | End: 2023-10-09

## 2023-06-15 NOTE — PROGRESS NOTES
"    [facility] Consultation Note    Reason for Consult:  The primary encounter diagnosis was Abdominal pain, unspecified abdominal location. Diagnoses of Nausea and vomiting, unspecified vomiting type, Hepatomegaly, and Right upper quadrant abdominal pain were also pertinent to this visit.    PCP:   Kendra Carrasquillo       Referring MD:  No referring provider defined for this encounter.    HPI:  This is a 53 y.o. female here for evaluation of hepatomegaly.    The patient tells me that for many years she has intermittent pain in her RUQ.  The pain is located under her ribs and is generally pin point in location.  However, recently, she had a flare of her pain and that radiated around her back.  She had associated nausea and discomfort prompting her to see her PCP.  She had basic labs done, all of which were normal.  As well, an ultrasound was notable for mild hepatomegaly.      At present, she cannot clearly comment when the pain occurs.  It does not necessarily correlate with foods or a certain time of day.    She does notes that she was taking an increased amounts of NSAIDs recently. She does not take any PPI therapy.    She drinks wine with dinner - generally 1-2 glasses nightly.  Denies any tobacco or drug use.    Ozempic is on her MAR, but she is not currently taking this medication due to insurance/cost.    No prior EGD or colonoscopy.  She does stool based colo guard.      Objective Findings:  Vital Signs:  /86   Pulse (!) 59   Ht 5' 7" (1.702 m)   Wt 72.6 kg (160 lb)   LMP  (LMP Unknown)   BMI 25.06 kg/m²   Body mass index is 25.06 kg/m².    Physical Exam:  General Appearance: Well appearing in no acute distress    Abdomen: Soft, non tender, non distended with positive bowel sounds in all four quadrants. No hepatosplenomegaly, ascites, or mass        Imaging:  Ultrasound 5/17/23  FINDINGS:  Pancreas: Mostly obscured by overlying bowel gas with limited visualized portions of the " pancreas appearing unremarkable..     Aorta: No aneurysm.     Liver: 16.6 cm, mildly enlarged.  Homogeneous parenchymal echotexture. No focal lesions.  HRI measures 1.25, suggesting less than 5% steatosis.     Gallbladder: No calculi, wall thickening, or pericholecystic fluid.  Negative sonographic Louie's sign.     Biliary system: 4 mm common bile duct.  No intrahepatic ductal dilatation.     Inferior vena cava: Normal in appearance.     Right kidney: 11.4 cm. No hydronephrosis.     Left kidney: 11.9 cm. No hydronephrosis.     Spleen: 9.2 x 3.1 cm.  Upper limits of normal in size with homogeneous echotexture.     Miscellaneous: Trace free fluid adjacent to the inferior aspect of the right hepatic lobe.     Impression:     1. Mild hepatomegaly.  2. trace free fluid or ascites along the inferior aspect of the right hepatic lobe.      Assessment:  1. Abdominal pain, unspecified abdominal location    2. Nausea and vomiting, unspecified vomiting type    3. Hepatomegaly    4. Right upper quadrant abdominal pain       The patient is a 53-year-old female who presents to GI clinic for initial consultation in the setting incidentally noted hepatomegaly and abdominal pain.    Regarding the patient's hepatomegaly, this was incidentally noted on an ultrasound.  Her hepatobiliary labs have been completely normal and I am relatively reassuring.  I discussed with her that the significance of this finding meals.  We will continue to monitor her liver labs in ensure there are no abnormalities.  Additionally, I have recommended decrease alcohol consumption encouraged healthy lifestyle habits (healthy culinary choices and exercise).    Interestingly, she does have worsening pain in her right upper quadrant.  Her abdominal exam is relatively benign.  She did report frequent NSAID use and I have recommended we schedule an EGD to rule out gastric or duodenal ulcer.  I will also start her on a once daily PPI.    Finally, she reported a  family history of pancreatic cancer in her grandfather.  She was very concerned given her ongoing pain in the setting of a family history.  I provided reassurance, but we will proceed with a CT scan to assess for any pancreatic abnormalities.  I think this is reasonable given her ongoing pain and we can further characterize her hepatomegaly.        Follow up if symptoms worsen or fail to improve.      Order summary:  Orders Placed This Encounter    CT Abdomen Pelvis With Contrast    omeprazole (PRILOSEC) 40 MG capsule         Thank you so much for allowing me to participate in the care of Shelbie Walker MD

## 2023-06-19 ENCOUNTER — PATIENT MESSAGE (OUTPATIENT)
Dept: ENDOSCOPY | Facility: HOSPITAL | Age: 53
End: 2023-06-19
Payer: COMMERCIAL

## 2023-06-19 ENCOUNTER — TELEPHONE (OUTPATIENT)
Dept: ENDOSCOPY | Facility: HOSPITAL | Age: 53
End: 2023-06-19
Payer: COMMERCIAL

## 2023-06-19 DIAGNOSIS — R10.9 ABDOMINAL PAIN, UNSPECIFIED ABDOMINAL LOCATION: Primary | ICD-10-CM

## 2023-06-19 NOTE — TELEPHONE ENCOUNTER
"    Endo Case Request  Received: 4 days ago  MD Britany Ya, YAIR  Procedure: EGD     Diagnosis: Abdominal pain     Procedure Timin-4 weeks     *If within 4 weeks selected, please irvin as high priority*     *If greater than 12 weeks, please select "5-12 weeks" and delay sending until 2 months prior to requested date*     Provider: Myself     Location: [facility]     Additional Scheduling Information: No scheduling concerns - not taking ozempic     Prep Specifications:N/A     Have you attached a patient to this message: yes    "

## 2023-06-23 ENCOUNTER — HOSPITAL ENCOUNTER (OUTPATIENT)
Dept: RADIOLOGY | Facility: HOSPITAL | Age: 53
Discharge: HOME | End: 2023-06-23
Attending: STUDENT IN AN ORGANIZED HEALTH CARE EDUCATION/TRAINING PROGRAM
Payer: COMMERCIAL

## 2023-06-23 DIAGNOSIS — R10.11 RIGHT UPPER QUADRANT ABDOMINAL PAIN: ICD-10-CM

## 2023-06-23 PROCEDURE — 25500020 PHARM REV CODE 255: Performed by: STUDENT IN AN ORGANIZED HEALTH CARE EDUCATION/TRAINING PROGRAM

## 2023-06-23 PROCEDURE — 74177 CT ABD & PELVIS W/CONTRAST: CPT | Mod: TC

## 2023-06-23 PROCEDURE — 74177 CT ABDOMEN PELVIS WITH CONTRAST: ICD-10-PCS | Mod: 26,,, | Performed by: RADIOLOGY

## 2023-06-23 PROCEDURE — 74177 CT ABD & PELVIS W/CONTRAST: CPT | Mod: 26,,, | Performed by: RADIOLOGY

## 2023-06-23 RX ADMIN — IOHEXOL 75 ML: 350 INJECTION, SOLUTION INTRAVENOUS at 01:06

## 2023-06-28 ENCOUNTER — ANESTHESIA EVENT (OUTPATIENT)
Dept: ENDOSCOPY | Facility: HOSPITAL | Age: 53
End: 2023-06-28
Payer: COMMERCIAL

## 2023-06-28 NOTE — ANESTHESIA PREPROCEDURE EVALUATION
06/28/2023  Shelbie Ardon is a 53 y.o., female.  Past Medical History:   Diagnosis Date    Arthritis     Arthritis     Breast cyst     Cough     Female infertility NEC     Headache(784.0)     Hyperlipidemia     Hypertension     Joint pain     Tinnitus 12/17/2013     Patient Active Problem List   Diagnosis    Recurrent pregnancy loss, antepartum condition or complication    Unspecified mechanical complication of internal orthopedic device, implant, and graft    Chondromalacia of patella    Knee pain    HTN (hypertension)    Tinnitus    Chondromalacia of medial femoral condyle    Primary localized osteoarthrosis, lower leg    Femoral acetabular impingement    Right hip pain    Acquired dysplasia of right hip    Degenerative tear of acetabular labrum of right hip    Bilateral hip pain    Decreased strength    Impaired functional mobility, balance, gait, and endurance     Past Surgical History:   Procedure Laterality Date    DILATION AND CURETTAGE OF UTERUS      JOINT REPLACEMENT  08/19/20    Right hip, anterior approach    KNEE SURGERY      acl repair as child/ revision 2013       Pre-op Assessment    I have reviewed the Patient Summary Reports.    I have reviewed the NPO Status.   I have reviewed the Medications.     Review of Systems  Anesthesia Hx:  Denies Family Hx of Anesthesia complications.   Denies Personal Hx of Anesthesia complications.   Hematology/Oncology:  Hematology Normal   Oncology Normal     EENT/Dental:EENT/Dental Normal   Cardiovascular:   Hypertension hyperlipidemia    Pulmonary:  Pulmonary Normal    Renal/:  Renal/ Normal     Hepatic/GI:  Hepatic/GI Normal    Musculoskeletal:   Arthritis     Neurological:  Neurology Normal    Endocrine:  Endocrine Normal    Dermatological:  Skin Normal    Psych:  Psychiatric Normal              Anesthesia Plan  Type of  Anesthesia, risks & benefits discussed:    Anesthesia Type: Gen Natural Airway  Intra-op Monitoring Plan: Standard ASA Monitors  Post Op Pain Control Plan: multimodal analgesia  Induction:  IV  Informed Consent: Informed consent signed with the Patient and all parties understand the risks and agree with anesthesia plan.  All questions answered.   ASA Score: 2  Day of Surgery Review of History & Physical: H&P Update referred to the surgeon/provider.    Ready For Surgery From Anesthesia Perspective.     .

## 2023-06-29 ENCOUNTER — ANESTHESIA (OUTPATIENT)
Dept: ENDOSCOPY | Facility: HOSPITAL | Age: 53
End: 2023-06-29
Payer: COMMERCIAL

## 2023-06-29 ENCOUNTER — HOSPITAL ENCOUNTER (OUTPATIENT)
Facility: HOSPITAL | Age: 53
Discharge: HOME | End: 2023-06-29
Attending: STUDENT IN AN ORGANIZED HEALTH CARE EDUCATION/TRAINING PROGRAM | Admitting: STUDENT IN AN ORGANIZED HEALTH CARE EDUCATION/TRAINING PROGRAM
Payer: COMMERCIAL

## 2023-06-29 VITALS
SYSTOLIC BLOOD PRESSURE: 138 MMHG | BODY MASS INDEX: 25.11 KG/M2 | HEIGHT: 67 IN | RESPIRATION RATE: 17 BRPM | HEART RATE: 79 BPM | OXYGEN SATURATION: 98 % | WEIGHT: 160 LBS | TEMPERATURE: 98 F | DIASTOLIC BLOOD PRESSURE: 81 MMHG

## 2023-06-29 DIAGNOSIS — R10.9 ABDOMINAL PAIN, UNSPECIFIED ABDOMINAL LOCATION: Primary | ICD-10-CM

## 2023-06-29 DIAGNOSIS — R10.9 ABDOMINAL PAIN: ICD-10-CM

## 2023-06-29 PROCEDURE — 43239 PR EGD, FLEX, W/BIOPSY, SGL/MULTI: ICD-10-PCS | Mod: ,,, | Performed by: STUDENT IN AN ORGANIZED HEALTH CARE EDUCATION/TRAINING PROGRAM

## 2023-06-29 PROCEDURE — 88305 TISSUE EXAM BY PATHOLOGIST: CPT | Performed by: PATHOLOGY

## 2023-06-29 PROCEDURE — 94761 N-INVAS EAR/PLS OXIMETRY MLT: CPT

## 2023-06-29 PROCEDURE — 63600175 PHARM REV CODE 636 W HCPCS: Performed by: NURSE ANESTHETIST, CERTIFIED REGISTERED

## 2023-06-29 PROCEDURE — 43239 EGD BIOPSY SINGLE/MULTIPLE: CPT | Performed by: STUDENT IN AN ORGANIZED HEALTH CARE EDUCATION/TRAINING PROGRAM

## 2023-06-29 PROCEDURE — 37000009 HC ANESTHESIA EA ADD 15 MINS: Performed by: STUDENT IN AN ORGANIZED HEALTH CARE EDUCATION/TRAINING PROGRAM

## 2023-06-29 PROCEDURE — 37000008 HC ANESTHESIA 1ST 15 MINUTES: Performed by: STUDENT IN AN ORGANIZED HEALTH CARE EDUCATION/TRAINING PROGRAM

## 2023-06-29 PROCEDURE — 88305 TISSUE EXAM BY PATHOLOGIST: ICD-10-PCS | Mod: 26,,, | Performed by: PATHOLOGY

## 2023-06-29 PROCEDURE — D9220A PRA ANESTHESIA: ICD-10-PCS | Mod: ,,, | Performed by: NURSE ANESTHETIST, CERTIFIED REGISTERED

## 2023-06-29 PROCEDURE — 88305 TISSUE EXAM BY PATHOLOGIST: CPT | Mod: 26,,, | Performed by: PATHOLOGY

## 2023-06-29 PROCEDURE — 27201012 HC FORCEPS, HOT/COLD, DISP: Performed by: STUDENT IN AN ORGANIZED HEALTH CARE EDUCATION/TRAINING PROGRAM

## 2023-06-29 PROCEDURE — 43239 EGD BIOPSY SINGLE/MULTIPLE: CPT | Mod: ,,, | Performed by: STUDENT IN AN ORGANIZED HEALTH CARE EDUCATION/TRAINING PROGRAM

## 2023-06-29 PROCEDURE — D9220A PRA ANESTHESIA: Mod: ,,, | Performed by: NURSE ANESTHETIST, CERTIFIED REGISTERED

## 2023-06-29 PROCEDURE — 25000003 PHARM REV CODE 250: Performed by: NURSE ANESTHETIST, CERTIFIED REGISTERED

## 2023-06-29 PROCEDURE — 99900035 HC TECH TIME PER 15 MIN (STAT)

## 2023-06-29 RX ORDER — LIDOCAINE HYDROCHLORIDE 10 MG/ML
INJECTION, SOLUTION INTRAVENOUS
Status: DISCONTINUED | OUTPATIENT
Start: 2023-06-29 | End: 2023-06-29

## 2023-06-29 RX ORDER — SODIUM CHLORIDE 9 MG/ML
INJECTION, SOLUTION INTRAVENOUS CONTINUOUS
Status: DISCONTINUED | OUTPATIENT
Start: 2023-06-29 | End: 2023-06-29 | Stop reason: HOSPADM

## 2023-06-29 RX ORDER — PROPOFOL 10 MG/ML
VIAL (ML) INTRAVENOUS
Status: DISCONTINUED | OUTPATIENT
Start: 2023-06-29 | End: 2023-06-29

## 2023-06-29 RX ORDER — PROPOFOL 10 MG/ML
VIAL (ML) INTRAVENOUS CONTINUOUS PRN
Status: DISCONTINUED | OUTPATIENT
Start: 2023-06-29 | End: 2023-06-29

## 2023-06-29 RX ADMIN — SODIUM CHLORIDE: 0.9 INJECTION, SOLUTION INTRAVENOUS at 09:06

## 2023-06-29 RX ADMIN — PROPOFOL 100 MG: 10 INJECTION, EMULSION INTRAVENOUS at 09:06

## 2023-06-29 RX ADMIN — LIDOCAINE HYDROCHLORIDE 100 MG: 10 INJECTION, SOLUTION INTRAVENOUS at 09:06

## 2023-06-29 RX ADMIN — Medication 125 MCG/KG/MIN: at 09:06

## 2023-06-29 NOTE — PROVATION PATIENT INSTRUCTIONS
Discharge Summary/Instructions after an Endoscopic Procedure  Patient Name: Shelbie Ardon  Patient MRN: [id number]  Patient YOB: 1970 Thursday, June 29, 2023  Eleazar Walker MD  Dear patient,  As a result of recent federal legislation (The Federal Cures Act), you may   receive lab or pathology results from your procedure in your [facility]   account before your physician is able to contact you. Your physician or   their representative will relay the results to you with their   recommendations at their soonest availability.  Thank you,  RESTRICTIONS:  During your procedure today, you received medications for sedation.  These   medications may affect your judgment, balance and coordination.  Therefore,   for 24 hours, you have the following restrictions:   - DO NOT drive a car, operate machinery, make legal/financial decisions,   sign important papers or drink alcohol.    ACTIVITY:  Today: no heavy lifting, straining or running due to procedural   sedation/anesthesia.  The following day: return to full activity including work.  DIET:  Eat and drink normally unless instructed otherwise.     TREATMENT FOR COMMON SIDE EFFECTS:  - Mild abdominal pain, nausea, belching, bloating or excessive gas:  rest,   eat lightly and use a heating pad.  - Sore Throat: treat with throat lozenges and/or gargle with warm salt   water.  - Because air was used during the procedure, expelling large amounts of air   from your rectum or belching is normal.  - If a bowel prep was taken, you may not have a bowel movement for 1-3 days.    This is normal.  SYMPTOMS TO WATCH FOR AND REPORT TO YOUR PHYSICIAN:  1. Abdominal pain or bloating, other than gas cramps.  2. Chest pain.  3. Back pain.  4. Signs of infection such as: chills or fever occurring within 24 hours   after the procedure.  5. Rectal bleeding, which would show as bright red, maroon, or black stools.   (A tablespoon of blood from the rectum is not serious,  especially if   hemorrhoids are present.)  6. Vomiting.  7. Weakness or dizziness.  GO DIRECTLY TO THE NEAREST EMERGENCY ROOM IF YOU HAVE ANY OF THE FOLLOWING:      Difficulty breathing              Chills and/or fever over 101 F   Persistent vomiting and/or vomiting blood   Severe abdominal pain   Severe chest pain   Black, tarry stools   Bleeding- more than one tablespoon   Any other symptom or condition that you feel may need urgent attention  Your doctor recommends these additional instructions:  If any biopsies were taken, your doctors clinic will contact you in 1 to 2   weeks with any results.  - Discharge patient to home.   - Await pathology results.   - The findings and recommendations were discussed with the patient.   - Patient has a contact number available for emergencies.  The signs and   symptoms of potential delayed complications were discussed with the   patient.  Return to normal activities tomorrow.  Written discharge   instructions were provided to the patient.  For questions, problems or results please call your physician - Eleazar Walker MD at Work:  [phone].  [facility], EMERGENCY ROOM PHONE NUMBER: [phone]  IF A COMPLICATION OR EMERGENCY SITUATION ARISES AND YOU ARE UNABLE TO REACH   YOUR PHYSICIAN - GO DIRECTLY TO THE EMERGENCY ROOM.  Eleazar Walker MD  6/29/2023 9:30:46 AM  This report has been verified and signed electronically.  Dear patient,  As a result of recent federal legislation (The Federal Cures Act), you may   receive lab or pathology results from your procedure in your [facility]   account before your physician is able to contact you. Your physician or   their representative will relay the results to you with their   recommendations at their soonest availability.  Thank you,  PROVATION

## 2023-06-29 NOTE — PLAN OF CARE
Pt arrived to recovery dosc via stretcher per ENDO team. Bedside report received. Pt attached to bedside monitor. VSS. Pt sedated post procedure. Pt on room air; oxygen sats 98%. Pt IV access saline locked. Warm blanket applied.Will continue to monitor.

## 2023-06-29 NOTE — H&P
Short Stay Endoscopy History and Physical    PCP - Kendra Carrasquillo MD  Referring Physician - PRE-ADMIT, ENDO -[facility]  No address on file    Procedure - EGD  ASA - per anesthesia  Mallampati - per anesthesia  History of Anesthesia problems - no  Family history Anesthesia problems -  no   Plan of anesthesia - General    HPI  53 y.o. female  Reason for procedure:  Abdominal pain, unspecified abdominal location [R10.9]      ROS:  Constitutional: No fevers, chills, No weight loss  CV: No chest pain  Pulm: No cough, No shortness of breath  GI: see HPI    Medical History:  has a past medical history of Arthritis, Arthritis, Breast cyst, Cough, Female infertility NEC, Headache(784.0), Hyperlipidemia, Hypertension, Joint pain, and Tinnitus (12/17/2013).    Surgical History:  has a past surgical history that includes Knee surgery; Dilation and curettage of uterus; and Joint replacement (08/19/20).    Family History: family history includes COPD in her mother; Cancer in her father, paternal grandfather, and paternal grandmother; Hyperlipidemia in her mother; Hypertension in her mother; Lung cancer in her father, paternal grandfather, and paternal grandmother; No Known Problems in her brother, maternal aunt, maternal grandfather, maternal grandmother, maternal uncle, paternal aunt, and paternal uncle; Psoriasis in her sister and sister; Stroke in her mother..    Social History:  reports that she has never smoked. She has never used smokeless tobacco. She reports current alcohol use of about 4.0 standard drinks per week. She reports that she does not use drugs.    Review of patient's allergies indicates:   Allergen Reactions    Cephalexin Hives, Anaphylaxis, Itching, Rash and Shortness Of Breath    Methohexital Shortness Of Breath     Other reaction(s): Difficulty breathing  Other reaction(s): Difficulty breathing    Penicillins Hives, Itching, Rash and Shortness Of Breath    Amlodipine Edema     Edema,  palpitations  Edema, palpitations    Sulfa (sulfonamide antibiotics) Hives, Itching, Photosensitivity and Rash       Medications:   Medications Prior to Admission   Medication Sig Dispense Refill Last Dose    chlorthalidone (HYGROTEN) 25 MG Tab TAKE 1 TABLET(25 MG) BY MOUTH EVERY DAY 90 tablet 3     chlorthalidone (HYGROTEN) 25 MG Tab Take 1 tablet (25 mg total) by mouth once daily. 90 tablet 1     clindamycin (CLEOCIN) 300 MG capsule Take 300 mg by mouth. Prior to dental appointment       estradiol-norethindrone (COMBIPATCH) 0.05-0.14 mg/24 hr Apply patch twice weekly 8 patch 12     losartan (COZAAR) 100 MG tablet Take 1 tablet (100 mg total) by mouth once daily. 90 tablet 1     multivit with calcium,iron,min (MULTIVITAMIN-CA-IRON-MINERALS ORAL) once daily.        omega-3 fatty acids/fish oil (FISH OIL-OMEGA-3 FATTY ACIDS) 300-1,000 mg capsule Take by mouth once daily.       omeprazole (PRILOSEC) 40 MG capsule Take 1 capsule (40 mg total) by mouth once daily. 30 capsule 11     ondansetron (ZOFRAN-ODT) 4 MG TbDL Take 1 tablet (4 mg total) by mouth 2 (two) times daily. 30 tablet 0     semaglutide (OZEMPIC) 2 mg/dose (8 mg/3 mL) PnIj Inject 2 mg into the skin every 7 days. 3 mL 6        Physical Exam:    Vital Signs: There were no vitals filed for this visit.    General Appearance: Well appearing in no acute distress  Abdomen: Soft, non tender, non distended with normal bowel sounds, no masses      Labs:  Lab Results   Component Value Date    WBC 10.14 05/18/2023    HGB 15.1 05/18/2023    HCT 43.9 05/18/2023     05/18/2023    CHOL 245 (H) 08/08/2022    TRIG 75 08/08/2022    HDL 66 08/08/2022    ALT 16 05/18/2023    AST 20 05/18/2023     05/18/2023    K 4.1 05/18/2023     05/18/2023    CREATININE 0.9 05/18/2023    BUN 14 05/18/2023    CO2 26 05/18/2023    TSH 1.255 08/08/2022    HGBA1C 4.7 05/17/2023       I have explained the risks and benefits of this endoscopic procedure to the patient including  but not limited to bleeding, inflammation, infection, perforation, and death.      Eleazar Walker MD

## 2023-06-29 NOTE — ANESTHESIA POSTPROCEDURE EVALUATION
Anesthesia Post Evaluation    Patient: Shelbie Ardon    Procedure(s) Performed: Procedure(s) (LRB):  EGD (ESOPHAGOGASTRODUODENOSCOPY) (N/A)    Final Anesthesia Type: general      Patient location during evaluation: PACU  Patient participation: Yes- Able to Participate  Level of consciousness: awake and alert  Post-procedure vital signs: reviewed and stable  Pain management: adequate  Airway patency: patent    PONV status at discharge: No PONV  Anesthetic complications: no      Cardiovascular status: stable  Respiratory status: spontaneous ventilation  Hydration status: euvolemic  Follow-up not needed.          Vitals Value Taken Time   /81 06/29/23 1005   Temp 36.8 °C (98.2 °F) 06/29/23 0934   Pulse 79 06/29/23 1005   Resp 17 06/29/23 1005   SpO2 98 % 06/29/23 1005         Event Time   Out of Recovery 09:49:00         Pain/Sohail Score: Sohail Score: 10 (6/29/2023  9:49 AM)

## 2023-06-29 NOTE — TRANSFER OF CARE
"Anesthesia Transfer of Care Note    Patient: Shelbie Ardon    Procedure(s) Performed: Procedure(s) (LRB):  EGD (ESOPHAGOGASTRODUODENOSCOPY) (N/A)    Patient location: PACU    Anesthesia Type: general    Transport from OR: Transported from OR on room air with adequate spontaneous ventilation    Post pain: adequate analgesia    Post assessment: no apparent anesthetic complications    Post vital signs: stable    Level of consciousness: awake    Nausea/Vomiting: no nausea/vomiting    Complications: none    Transfer of care protocol was followed      Last vitals:   Visit Vitals  /86 (BP Location: Left arm, Patient Position: Lying)   Pulse 76   Temp 36.7 °C (98.1 °F) (Temporal)   Resp 16   Ht 5' 7" (1.702 m)   Wt 72.6 kg (160 lb)   LMP  (LMP Unknown)   SpO2 99%   Breastfeeding No   BMI 25.06 kg/m²     "

## 2023-07-05 DIAGNOSIS — E78.2 MIXED HYPERLIPIDEMIA: ICD-10-CM

## 2023-07-05 DIAGNOSIS — I10 ESSENTIAL HYPERTENSION: ICD-10-CM

## 2023-07-05 LAB
FINAL PATHOLOGIC DIAGNOSIS: NORMAL
GROSS: NORMAL
Lab: NORMAL

## 2023-07-05 RX ORDER — LOSARTAN POTASSIUM 100 MG/1
TABLET ORAL
Qty: 90 TABLET | Refills: 3 | Status: SHIPPED | OUTPATIENT
Start: 2023-07-05

## 2023-07-05 NOTE — TELEPHONE ENCOUNTER
Refill Routing Note   Medication(s) are not appropriate for processing by [facility] for the following reason(s):      Drug-disease interaction    [facility] action(s):  Defer None identified     Medication Therapy Plan: Drug-Disease: losartan and Recurrent pregnancy loss, antepartum condition or complication      Appointments  past 12m or future 3m with PCP    Date Provider   Last Visit   5/17/2023 Kendra Carrasquillo MD   Next Visit   8/17/2023 Kendra Carrasquillo MD   ED visits in past 90 days: 0        Note composed:3:44 PM 07/05/2023

## 2023-07-05 NOTE — TELEPHONE ENCOUNTER
Refill Decision Note   Shelbie Ardon  is requesting a refill authorization.  Brief Assessment and Rationale for Refill:  Approve     Medication Therapy Plan:         Alert overridden per protocol: Yes   Pharmacist review requested: Yes   Comments:     No Care Gaps recommended.     Note composed:5:18 PM 07/05/2023

## 2023-07-05 NOTE — TELEPHONE ENCOUNTER
No care due was identified.  [facility] Embedded Care Due Messages. Reference number: [id number].   7/05/2023 11:13:58 AM CDT

## 2023-07-13 DIAGNOSIS — N95.1 MENOPAUSAL SYMPTOMS: ICD-10-CM

## 2023-07-24 DIAGNOSIS — I10 ESSENTIAL HYPERTENSION: ICD-10-CM

## 2023-07-24 RX ORDER — CHLORTHALIDONE 25 MG/1
TABLET ORAL
Qty: 90 TABLET | Refills: 3 | Status: SHIPPED | OUTPATIENT
Start: 2023-07-24

## 2023-07-24 NOTE — TELEPHONE ENCOUNTER
Refill Decision Note   Shelbie Ardon  is requesting a refill authorization.  Brief Assessment and Rationale for Refill:  Approve     Medication Therapy Plan:       Medication Reconciliation Completed: No   Comments:     No Care Gaps recommended.     Note composed:12:51 PM 07/24/2023

## 2023-07-24 NOTE — TELEPHONE ENCOUNTER
No care due was identified.  [facility] Embedded Care Due Messages. Reference number: [id number].   7/24/2023 10:17:45 AM CDT

## 2023-08-31 ENCOUNTER — HOSPITAL ENCOUNTER (OUTPATIENT)
Dept: RADIOLOGY | Facility: HOSPITAL | Age: 53
Discharge: HOME | End: 2023-08-31
Attending: FAMILY MEDICINE
Payer: COMMERCIAL

## 2023-08-31 VITALS — WEIGHT: 160 LBS | BODY MASS INDEX: 25.06 KG/M2

## 2023-08-31 DIAGNOSIS — Z12.31 SCREENING MAMMOGRAM FOR BREAST CANCER: ICD-10-CM

## 2023-08-31 PROCEDURE — 77063 BREAST TOMOSYNTHESIS BI: CPT | Mod: 26,,, | Performed by: RADIOLOGY

## 2023-08-31 PROCEDURE — 77067 SCR MAMMO BI INCL CAD: CPT | Mod: 26,,, | Performed by: RADIOLOGY

## 2023-08-31 PROCEDURE — 77067 SCR MAMMO BI INCL CAD: CPT | Mod: TC

## 2023-08-31 PROCEDURE — 77063 MAMMO DIGITAL SCREENING BILAT WITH TOMO: ICD-10-PCS | Mod: 26,,, | Performed by: RADIOLOGY

## 2023-08-31 PROCEDURE — 77067 MAMMO DIGITAL SCREENING BILAT WITH TOMO: ICD-10-PCS | Mod: 26,,, | Performed by: RADIOLOGY

## 2023-10-09 ENCOUNTER — OFFICE VISIT (OUTPATIENT)
Dept: PRIMARY CARE CLINIC | Facility: CLINIC | Age: 53
End: 2023-10-09
Payer: COMMERCIAL

## 2023-10-09 VITALS
SYSTOLIC BLOOD PRESSURE: 120 MMHG | HEART RATE: 84 BPM | OXYGEN SATURATION: 98 % | BODY MASS INDEX: 27.57 KG/M2 | DIASTOLIC BLOOD PRESSURE: 80 MMHG | WEIGHT: 175.69 LBS | HEIGHT: 67 IN

## 2023-10-09 DIAGNOSIS — Z13.6 ENCOUNTER FOR LIPID SCREENING FOR CARDIOVASCULAR DISEASE: ICD-10-CM

## 2023-10-09 DIAGNOSIS — Z00.00 ANNUAL PHYSICAL EXAM: Primary | ICD-10-CM

## 2023-10-09 DIAGNOSIS — I10 PRIMARY HYPERTENSION: ICD-10-CM

## 2023-10-09 DIAGNOSIS — L73.9 FOLLICULITIS: ICD-10-CM

## 2023-10-09 DIAGNOSIS — R16.0 HEPATOMEGALY: ICD-10-CM

## 2023-10-09 DIAGNOSIS — Z13.220 ENCOUNTER FOR LIPID SCREENING FOR CARDIOVASCULAR DISEASE: ICD-10-CM

## 2023-10-09 DIAGNOSIS — L30.8 PRURITIC DERMATITIS: ICD-10-CM

## 2023-10-09 DIAGNOSIS — Z23 NEED FOR PNEUMOCOCCAL 20-VALENT CONJUGATE VACCINATION: ICD-10-CM

## 2023-10-09 PROCEDURE — 3074F SYST BP LT 130 MM HG: CPT | Mod: CPTII,S$GLB,, | Performed by: FAMILY MEDICINE

## 2023-10-09 PROCEDURE — 3044F HG A1C LEVEL LT 7.0%: CPT | Mod: CPTII,S$GLB,, | Performed by: FAMILY MEDICINE

## 2023-10-09 PROCEDURE — 90677 PCV20 VACCINE IM: CPT | Mod: S$GLB,,, | Performed by: FAMILY MEDICINE

## 2023-10-09 PROCEDURE — 3008F PR BODY MASS INDEX (BMI) DOCUMENTED: ICD-10-PCS | Mod: CPTII,S$GLB,, | Performed by: FAMILY MEDICINE

## 2023-10-09 PROCEDURE — 90686 IIV4 VACC NO PRSV 0.5 ML IM: CPT | Mod: S$GLB,,, | Performed by: FAMILY MEDICINE

## 2023-10-09 PROCEDURE — 99999 PR PBB SHADOW E&M-EST. PATIENT-LVL V: CPT | Mod: PBBFAC,,, | Performed by: FAMILY MEDICINE

## 2023-10-09 PROCEDURE — 99396 PR PREVENTIVE VISIT,EST,40-64: ICD-10-PCS | Mod: 25,S$GLB,, | Performed by: FAMILY MEDICINE

## 2023-10-09 PROCEDURE — 4010F ACE/ARB THERAPY RXD/TAKEN: CPT | Mod: CPTII,S$GLB,, | Performed by: FAMILY MEDICINE

## 2023-10-09 PROCEDURE — 3074F PR MOST RECENT SYSTOLIC BLOOD PRESSURE < 130 MM HG: ICD-10-PCS | Mod: CPTII,S$GLB,, | Performed by: FAMILY MEDICINE

## 2023-10-09 PROCEDURE — 90472 IMMUNIZATION ADMIN EACH ADD: CPT | Mod: S$GLB,,, | Performed by: FAMILY MEDICINE

## 2023-10-09 PROCEDURE — 99396 PREV VISIT EST AGE 40-64: CPT | Mod: 25,S$GLB,, | Performed by: FAMILY MEDICINE

## 2023-10-09 PROCEDURE — 1159F MED LIST DOCD IN RCRD: CPT | Mod: CPTII,S$GLB,, | Performed by: FAMILY MEDICINE

## 2023-10-09 PROCEDURE — 1159F PR MEDICATION LIST DOCUMENTED IN MEDICAL RECORD: ICD-10-PCS | Mod: CPTII,S$GLB,, | Performed by: FAMILY MEDICINE

## 2023-10-09 PROCEDURE — 3079F DIAST BP 80-89 MM HG: CPT | Mod: CPTII,S$GLB,, | Performed by: FAMILY MEDICINE

## 2023-10-09 PROCEDURE — 90471 IMMUNIZATION ADMIN: CPT | Mod: S$GLB,,, | Performed by: FAMILY MEDICINE

## 2023-10-09 PROCEDURE — 90471 PNEUMOCOCCAL CONJUGATE VACCINE 20-VALENT: ICD-10-PCS | Mod: S$GLB,,, | Performed by: FAMILY MEDICINE

## 2023-10-09 PROCEDURE — 90677 PNEUMOCOCCAL CONJUGATE VACCINE 20-VALENT: ICD-10-PCS | Mod: S$GLB,,, | Performed by: FAMILY MEDICINE

## 2023-10-09 PROCEDURE — 3044F PR MOST RECENT HEMOGLOBIN A1C LEVEL <7.0%: ICD-10-PCS | Mod: CPTII,S$GLB,, | Performed by: FAMILY MEDICINE

## 2023-10-09 PROCEDURE — 90472 FLU VACCINE (QUAD) GREATER THAN OR EQUAL TO 3YO PRESERVATIVE FREE IM: ICD-10-PCS | Mod: S$GLB,,, | Performed by: FAMILY MEDICINE

## 2023-10-09 PROCEDURE — 3079F PR MOST RECENT DIASTOLIC BLOOD PRESSURE 80-89 MM HG: ICD-10-PCS | Mod: CPTII,S$GLB,, | Performed by: FAMILY MEDICINE

## 2023-10-09 PROCEDURE — 3008F BODY MASS INDEX DOCD: CPT | Mod: CPTII,S$GLB,, | Performed by: FAMILY MEDICINE

## 2023-10-09 PROCEDURE — 99999 PR PBB SHADOW E&M-EST. PATIENT-LVL V: ICD-10-PCS | Mod: PBBFAC,,, | Performed by: FAMILY MEDICINE

## 2023-10-09 PROCEDURE — 90686 FLU VACCINE (QUAD) GREATER THAN OR EQUAL TO 3YO PRESERVATIVE FREE IM: ICD-10-PCS | Mod: S$GLB,,, | Performed by: FAMILY MEDICINE

## 2023-10-09 PROCEDURE — 4010F PR ACE/ARB THEARPY RXD/TAKEN: ICD-10-PCS | Mod: CPTII,S$GLB,, | Performed by: FAMILY MEDICINE

## 2023-10-09 RX ORDER — DOXYCYCLINE HYCLATE 100 MG
100 TABLET ORAL 2 TIMES DAILY
Qty: 14 TABLET | Refills: 0 | Status: SHIPPED | OUTPATIENT
Start: 2023-10-09 | End: 2023-10-16

## 2023-10-09 RX ORDER — TRIAMCINOLONE ACETONIDE 1 MG/G
OINTMENT TOPICAL 2 TIMES DAILY
Qty: 453.6 G | Refills: 0 | Status: SHIPPED | OUTPATIENT
Start: 2023-10-09 | End: 2023-11-14

## 2023-10-09 NOTE — PATIENT INSTRUCTIONS
Take allegra or another antihistamine during the day along with the hydroxyzine at night    Do trial of antihistamine twice daily plus the topical triamcinolone twice daily x 5-7 days and if not improvement go ahead and take the course of oral streoids

## 2023-10-09 NOTE — PROGRESS NOTES
Subjective:       Patient ID: Shelbie Ardon is a 53 y.o. female.    Chief Complaint: Annual Exam and Rash    Rash     54 y/o female with HTN, R sided thigh pain post op R LISETH 8/2020, hx of Covid is here for annual exam.    She is feeling good in general, since last visit her abdominal pain has resolved, she has seen GI and CT abdomen was reassuring.     She had a rash that started mid August around that time she had Covid and took Paxlovid and heat rash that has resolved. The rash started over her trunk and has spread to back arms and upper thighs, its itchy, she taking benadryl at night sometimes or hydroxyzine and using topical cortisone cream which helps some. She denies f/n/v/heartburn/d/constipation/cp/sob/urinary sx. Sleeping ok but does itch a lot at night. She is active and walks most days.    She has not been able to get insurance to cover ozempic, she is interested in trying a compounded version, no personal or family hx of pancreatitis or med thyroid cancer or med  Her weight is up 7 pounds, she would like to get back on       Hx of Covid 8/2023  HTN: following with cardiology; losartan 100 mg daily and chlorthalidone 25 mg daily  OA hip s/p R LISETH 8/2020 Dr. Hamm and OA knee:  After surgery she had some nerve pain that she reports has resolved, she also notes some discomfort from the hardware that she was told would improve with weight loss, off celebrex, cymbalta caused sexual side effects, off mobic, off lyrica, tylenol not helpful  GYN:following with Dr. Mcneil, Combipatch on hold, mmg 8/2023, irregular bleeding EMB negative, US 8/2022   Hx of LPRD, not needed since  Colonoscopy due declined scope, mother had a perforation with colonoscopy, cologuard 6/2021  Vitamin D def: not on supplement  Derm last visit 2019 with Dr. Boo  Eye exam utd  Dental utd  OTC: off all supplements secondary to rash: fish oil, mvi, calcium and d              Review of Systems   Skin:  Positive for rash.      "see HPI  Objective:      /80 (BP Location: Left arm, Patient Position: Sitting, BP Method: Small (Manual))   Pulse 84   Ht 5' 7" (1.702 m)   Wt 79.7 kg (175 lb 11.3 oz)   LMP  (LMP Unknown)   SpO2 98%   BMI 27.52 kg/m²   Physical Exam  Vitals and nursing note reviewed.   Constitutional:       Appearance: She is well-developed.   HENT:      Head: Normocephalic and atraumatic.      Mouth/Throat:      Pharynx: No oropharyngeal exudate or posterior oropharyngeal erythema.   Neck:      Thyroid: No thyromegaly.   Cardiovascular:      Rate and Rhythm: Normal rate and regular rhythm.      Heart sounds: Normal heart sounds.   Pulmonary:      Effort: Pulmonary effort is normal. No respiratory distress.      Breath sounds: Normal breath sounds.   Abdominal:      General: Bowel sounds are normal. There is no distension.      Palpations: Abdomen is soft. There is no mass.      Tenderness: There is no abdominal tenderness.   Musculoskeletal:      Cervical back: Normal range of motion and neck supple.      Right lower leg: No edema.      Left lower leg: No edema.   Lymphadenopathy:      Cervical: No cervical adenopathy.   Skin:     General: Skin is warm and dry.   Neurological:      Mental Status: She is alert.         Assessment:       1. Annual physical exam    2. Primary hypertension    3. BMI 28.0-28.9,adult    4. Hepatomegaly    5. Need for pneumococcal 20-valent conjugate vaccination    6. Encounter for lipid screening for cardiovascular disease    7. Pruritic dermatitis    8. Folliculitis        Plan:   Shelbie was seen today for annual exam and rash.    Diagnoses and all orders for this visit:    Annual physical exam  -     CBC Auto Differential; Future  -     Comprehensive Metabolic Panel; Future  -     Hemoglobin A1C; Future  -     Lipid Panel; Future  -     TSH; Future    Primary hypertension  -     semaglutide (OZEMPIC) 0.25 mg or 0.5 mg (2 mg/3 mL) pen injector; Inject 0.5 mg into the skin every 7 " days.  -     CBC Auto Differential; Future  -     Comprehensive Metabolic Panel; Future    BMI 28.0-28.9,adult  -     semaglutide (OZEMPIC) 0.25 mg or 0.5 mg (2 mg/3 mL) pen injector; Inject 0.5 mg into the skin every 7 days.    Hepatomegaly  -     Comprehensive Metabolic Panel; Future    Need for pneumococcal 20-valent conjugate vaccination  -     (In Office Administered) Pneumococcal Conjugate Vaccine (20 Valent) (IM) (Preferred)    Encounter for lipid screening for cardiovascular disease  -     Lipid Panel; Future    Pruritic dermatitis  -     triamcinolone acetonide 0.1% (KENALOG) 0.1 % ointment; Apply topically 2 (two) times daily. Use Good Rx coupon  -     doxycycline (VIBRA-TABS) 100 MG tablet; Take 1 tablet (100 mg total) by mouth 2 (two) times daily. for 7 days  -     Ambulatory referral/consult to Dermatology; Future    Folliculitis  -     doxycycline (VIBRA-TABS) 100 MG tablet; Take 1 tablet (100 mg total) by mouth 2 (two) times daily. for 7 days  -     Ambulatory referral/consult to Dermatology; Future

## 2023-10-10 ENCOUNTER — LAB VISIT (OUTPATIENT)
Dept: LAB | Facility: HOSPITAL | Age: 53
End: 2023-10-10
Attending: FAMILY MEDICINE
Payer: COMMERCIAL

## 2023-10-10 DIAGNOSIS — Z13.220 ENCOUNTER FOR LIPID SCREENING FOR CARDIOVASCULAR DISEASE: ICD-10-CM

## 2023-10-10 DIAGNOSIS — Z13.6 ENCOUNTER FOR LIPID SCREENING FOR CARDIOVASCULAR DISEASE: ICD-10-CM

## 2023-10-10 DIAGNOSIS — I10 PRIMARY HYPERTENSION: ICD-10-CM

## 2023-10-10 DIAGNOSIS — Z00.00 ANNUAL PHYSICAL EXAM: ICD-10-CM

## 2023-10-10 DIAGNOSIS — R16.0 HEPATOMEGALY: ICD-10-CM

## 2023-10-10 LAB
ALBUMIN SERPL BCP-MCNC: 4 G/DL (ref 3.5–5.2)
ALP SERPL-CCNC: 48 U/L (ref 55–135)
ALT SERPL W/O P-5'-P-CCNC: 16 U/L (ref 10–44)
ANION GAP SERPL CALC-SCNC: 11 MMOL/L (ref 8–16)
AST SERPL-CCNC: 21 U/L (ref 10–40)
BASOPHILS # BLD AUTO: 0.09 K/UL (ref 0–0.2)
BASOPHILS NFR BLD: 1.2 % (ref 0–1.9)
BILIRUB SERPL-MCNC: 0.8 MG/DL (ref 0.1–1)
BUN SERPL-MCNC: 17 MG/DL (ref 6–20)
CALCIUM SERPL-MCNC: 9.5 MG/DL (ref 8.7–10.5)
CHLORIDE SERPL-SCNC: 102 MMOL/L (ref 95–110)
CHOLEST SERPL-MCNC: 288 MG/DL (ref 120–199)
CHOLEST/HDLC SERPL: 4.5 {RATIO} (ref 2–5)
CO2 SERPL-SCNC: 25 MMOL/L (ref 23–29)
CREAT SERPL-MCNC: 0.9 MG/DL (ref 0.5–1.4)
DIFFERENTIAL METHOD: ABNORMAL
EOSINOPHIL # BLD AUTO: 0.2 K/UL (ref 0–0.5)
EOSINOPHIL NFR BLD: 2.6 % (ref 0–8)
ERYTHROCYTE [DISTWIDTH] IN BLOOD BY AUTOMATED COUNT: 11.7 % (ref 11.5–14.5)
EST. GFR  (NO RACE VARIABLE): >60 ML/MIN/1.73 M^2
ESTIMATED AVG GLUCOSE: 94 MG/DL (ref 68–131)
GLUCOSE SERPL-MCNC: 73 MG/DL (ref 70–110)
HBA1C MFR BLD: 4.9 % (ref 4–5.6)
HCT VFR BLD AUTO: 42.7 % (ref 37–48.5)
HDLC SERPL-MCNC: 64 MG/DL (ref 40–75)
HDLC SERPL: 22.2 % (ref 20–50)
HGB BLD-MCNC: 14.7 G/DL (ref 12–16)
IMM GRANULOCYTES # BLD AUTO: 0.02 K/UL (ref 0–0.04)
IMM GRANULOCYTES NFR BLD AUTO: 0.3 % (ref 0–0.5)
LDLC SERPL CALC-MCNC: 197.8 MG/DL (ref 63–159)
LYMPHOCYTES # BLD AUTO: 1.4 K/UL (ref 1–4.8)
LYMPHOCYTES NFR BLD: 19.6 % (ref 18–48)
MCH RBC QN AUTO: 32.7 PG (ref 27–31)
MCHC RBC AUTO-ENTMCNC: 34.4 G/DL (ref 32–36)
MCV RBC AUTO: 95 FL (ref 82–98)
MONOCYTES # BLD AUTO: 0.5 K/UL (ref 0.3–1)
MONOCYTES NFR BLD: 7.3 % (ref 4–15)
NEUTROPHILS # BLD AUTO: 5.1 K/UL (ref 1.8–7.7)
NEUTROPHILS NFR BLD: 69 % (ref 38–73)
NONHDLC SERPL-MCNC: 224 MG/DL
NRBC BLD-RTO: 0 /100 WBC
PLATELET # BLD AUTO: 273 K/UL (ref 150–450)
PMV BLD AUTO: 9.2 FL (ref 9.2–12.9)
POTASSIUM SERPL-SCNC: 4.3 MMOL/L (ref 3.5–5.1)
PROT SERPL-MCNC: 6.9 G/DL (ref 6–8.4)
RBC # BLD AUTO: 4.49 M/UL (ref 4–5.4)
SODIUM SERPL-SCNC: 138 MMOL/L (ref 136–145)
TRIGL SERPL-MCNC: 131 MG/DL (ref 30–150)
TSH SERPL DL<=0.005 MIU/L-ACNC: 1.09 UIU/ML (ref 0.4–4)
WBC # BLD AUTO: 7.31 K/UL (ref 3.9–12.7)

## 2023-10-10 PROCEDURE — 36415 COLL VENOUS BLD VENIPUNCTURE: CPT | Mod: PN | Performed by: FAMILY MEDICINE

## 2023-10-10 PROCEDURE — 84443 ASSAY THYROID STIM HORMONE: CPT | Performed by: FAMILY MEDICINE

## 2023-10-10 PROCEDURE — 80061 LIPID PANEL: CPT | Performed by: FAMILY MEDICINE

## 2023-10-10 PROCEDURE — 83036 HEMOGLOBIN GLYCOSYLATED A1C: CPT | Performed by: FAMILY MEDICINE

## 2023-10-10 PROCEDURE — 85025 COMPLETE CBC W/AUTO DIFF WBC: CPT | Performed by: FAMILY MEDICINE

## 2023-10-10 PROCEDURE — 80053 COMPREHEN METABOLIC PANEL: CPT | Performed by: FAMILY MEDICINE

## 2023-10-11 ENCOUNTER — TELEPHONE (OUTPATIENT)
Dept: PRIMARY CARE CLINIC | Facility: CLINIC | Age: 53
End: 2023-10-11
Payer: COMMERCIAL

## 2023-10-11 DIAGNOSIS — E78.2 MIXED HYPERLIPIDEMIA: Primary | ICD-10-CM

## 2023-10-11 RX ORDER — ROSUVASTATIN CALCIUM 5 MG/1
5 TABLET, COATED ORAL DAILY
Qty: 90 TABLET | Refills: 3 | Status: SHIPPED | OUTPATIENT
Start: 2023-10-11 | End: 2023-10-25

## 2023-10-11 NOTE — TELEPHONE ENCOUNTER
----- Message from Kendra Carrasquillo MD sent at 10/11/2023 11:51 AM CDT -----  Your cholesterol is higher than I would like and has increased since previous check, the rest of your labs look good, I recommend you start Crestor and repeat your lipid panel in 2-3 months to make sure your numbers improve

## 2023-10-17 ENCOUNTER — OFFICE VISIT (OUTPATIENT)
Dept: DERMATOLOGY | Facility: CLINIC | Age: 53
End: 2023-10-17
Payer: COMMERCIAL

## 2023-10-17 DIAGNOSIS — B86 SCABIES: ICD-10-CM

## 2023-10-17 DIAGNOSIS — L73.9 FOLLICULITIS: ICD-10-CM

## 2023-10-17 PROCEDURE — 1160F RVW MEDS BY RX/DR IN RCRD: CPT | Mod: CPTII,S$GLB,, | Performed by: STUDENT IN AN ORGANIZED HEALTH CARE EDUCATION/TRAINING PROGRAM

## 2023-10-17 PROCEDURE — 3044F HG A1C LEVEL LT 7.0%: CPT | Mod: CPTII,S$GLB,, | Performed by: STUDENT IN AN ORGANIZED HEALTH CARE EDUCATION/TRAINING PROGRAM

## 2023-10-17 PROCEDURE — 99203 PR OFFICE/OUTPT VISIT, NEW, LEVL III, 30-44 MIN: ICD-10-PCS | Mod: S$GLB,,, | Performed by: STUDENT IN AN ORGANIZED HEALTH CARE EDUCATION/TRAINING PROGRAM

## 2023-10-17 PROCEDURE — 4010F ACE/ARB THERAPY RXD/TAKEN: CPT | Mod: CPTII,S$GLB,, | Performed by: STUDENT IN AN ORGANIZED HEALTH CARE EDUCATION/TRAINING PROGRAM

## 2023-10-17 PROCEDURE — 99203 OFFICE O/P NEW LOW 30 MIN: CPT | Mod: S$GLB,,, | Performed by: STUDENT IN AN ORGANIZED HEALTH CARE EDUCATION/TRAINING PROGRAM

## 2023-10-17 PROCEDURE — 1159F MED LIST DOCD IN RCRD: CPT | Mod: CPTII,S$GLB,, | Performed by: STUDENT IN AN ORGANIZED HEALTH CARE EDUCATION/TRAINING PROGRAM

## 2023-10-17 PROCEDURE — 99999 PR PBB SHADOW E&M-EST. PATIENT-LVL III: ICD-10-PCS | Mod: PBBFAC,,, | Performed by: STUDENT IN AN ORGANIZED HEALTH CARE EDUCATION/TRAINING PROGRAM

## 2023-10-17 PROCEDURE — 99999 PR PBB SHADOW E&M-EST. PATIENT-LVL III: CPT | Mod: PBBFAC,,, | Performed by: STUDENT IN AN ORGANIZED HEALTH CARE EDUCATION/TRAINING PROGRAM

## 2023-10-17 PROCEDURE — 3044F PR MOST RECENT HEMOGLOBIN A1C LEVEL <7.0%: ICD-10-PCS | Mod: CPTII,S$GLB,, | Performed by: STUDENT IN AN ORGANIZED HEALTH CARE EDUCATION/TRAINING PROGRAM

## 2023-10-17 PROCEDURE — 1159F PR MEDICATION LIST DOCUMENTED IN MEDICAL RECORD: ICD-10-PCS | Mod: CPTII,S$GLB,, | Performed by: STUDENT IN AN ORGANIZED HEALTH CARE EDUCATION/TRAINING PROGRAM

## 2023-10-17 PROCEDURE — 1160F PR REVIEW ALL MEDS BY PRESCRIBER/CLIN PHARMACIST DOCUMENTED: ICD-10-PCS | Mod: CPTII,S$GLB,, | Performed by: STUDENT IN AN ORGANIZED HEALTH CARE EDUCATION/TRAINING PROGRAM

## 2023-10-17 PROCEDURE — 4010F PR ACE/ARB THEARPY RXD/TAKEN: ICD-10-PCS | Mod: CPTII,S$GLB,, | Performed by: STUDENT IN AN ORGANIZED HEALTH CARE EDUCATION/TRAINING PROGRAM

## 2023-10-17 RX ORDER — IVERMECTIN 3 MG/1
15 TABLET ORAL
Qty: 10 TABLET | Refills: 0 | Status: SHIPPED | OUTPATIENT
Start: 2023-10-17 | End: 2023-10-25

## 2023-10-17 RX ORDER — PERMETHRIN 50 MG/G
CREAM TOPICAL
Qty: 120 G | Refills: 0 | Status: SHIPPED | OUTPATIENT
Start: 2023-10-17 | End: 2023-11-21

## 2023-10-17 NOTE — PROGRESS NOTES
Subjective:      Patient ID:  Shelbie Ardon is a 53 y.o. female who presents for   Chief Complaint   Patient presents with    Folliculitis     Upper body     Patient with new complaint of lesion(s)  Location: upper body  Duration: 2 months  Symptoms: itchy  Relieving factors/Previous treatments: Steroids, antibiotic      No nmsc or mm        Folliculitis      Saw PCP about 1 week ago--thought it was hot tub folliculitis. Given doxy po x 1 week and triamcinolone ointment. Also taking hydroxyzine qhs--helping. The rash started around the umbilicus and started shortly after a trip to [place]    Rash is slightly pruritic. Primarily scratching in sleep    Review of Systems   Skin:  Positive for daily sunscreen use, activity-related sunscreen use and wears hat. Negative for recent sunburn.   Hematologic/Lymphatic: Does not bruise/bleed easily.       Objective:   Physical Exam   Constitutional: She appears well-developed and well-nourished. No distress.   Neurological: She is alert and oriented to person, place, and time. She is not disoriented.   Psychiatric: She has a normal mood and affect.   Skin:   Areas Examined (abnormalities noted in diagram):   Head / Face Inspection Performed  Neck Inspection Performed  Chest / Axilla Inspection Performed  Abdomen Inspection Performed  Back Inspection Performed  RUE Inspected  LUE Inspection Performed  RLE Inspected  LLE Inspection Performed            Diagram Legend     Erythematous scaling macule/papule c/w actinic keratosis       Vascular papule c/w angioma      Pigmented verrucoid papule/plaque c/w seborrheic keratosis      Yellow umbilicated papule c/w sebaceous hyperplasia      Irregularly shaped tan macule c/w lentigo     1-2 mm smooth white papules consistent with Milia      Movable subcutaneous cyst with punctum c/w epidermal inclusion cyst      Subcutaneous movable cyst c/w pilar cyst      Firm pink to brown papule c/w dermatofibroma      Pedunculated fleshy  papule(s) c/w skin tag(s)      Evenly pigmented macule c/w junctional nevus     Mildly variegated pigmented, slightly irregular-bordered macule c/w mildly atypical nevus      Flesh colored to evenly pigmented papule c/w intradermal nevus       Pink pearly papule/plaque c/w basal cell carcinoma      Erythematous hyperkeratotic cursted plaque c/w SCC      Surgical scar with no sign of skin cancer recurrence      Open and closed comedones      Inflammatory papules and pustules      Verrucoid papule consistent consistent with wart     Erythematous eczematous patches and plaques     Dystrophic onycholytic nail with subungual debris c/w onychomycosis     Umbilicated papule    Erythematous-base heme-crusted tan verrucoid plaque consistent with inflamed seborrheic keratosis     Erythematous Silvery Scaling Plaque c/w Psoriasis     See annotation      Assessment / Plan:        Scabies--diffusely pruritic eruption x about 1 month. Will treat empirically for scabies given acute onset, history after traveling. Distribution somewhat suggestive (umbilicus and buttocks)   but sparing some typical areas (not in finger webs or on hands but some lesions on wrists). Mineral oil prep negative today    - reviewed diagnosis, etiology, and contagious nature  - mineral oil prep performed: negative  -  permethrin 5% cream to  body/from neck down at bedtime, washed off in the morning, and repeated in 7 days  -  PO ivermectin 200 mcg / kg (weight 170lb) and repeated in 7 days  - wash all linens and clothes in hot water, non-washable items to be kept in sealed plastic bag for one week  - recommended all members of household to see their PMD to obtain treatment  -  TAC for prutitus: Triamcinolone 0.1% ointment BID to affected areas for up to 2 weeks then take 1 week break or wean to TIW PRN. Avoid use on face and groin. Counseled on RBSE including atrophy and hypopigmentation   - Counseled that pruritus can take up to 1 month to resolve  following treatment    -     Ambulatory referral/consult to Dermatology  -     ivermectin (STROMECTOL) 3 mg Tab; Take 5 tablets (15 mg total) by mouth every 7 days. Take 5 pills together then repeat in 1 week for 2 doses  Dispense: 10 tablet; Refill: 0  -     permethrin (ELIMITE) 5 % cream; Apply neck down at night x 1 application. Wash off in a.m. Repeat in 1 week  Dispense: 120 g; Refill: 0               Follow up in about 6 weeks (around 11/28/2023).

## 2023-10-17 NOTE — PATIENT INSTRUCTIONS
What is scabies?    Scabies is a common skin problem caused by the human itch mite. People of any age, race and social group can get scabies, regardless of personal hygiene.    HOW DO PEOPLE GET SCABIES?    The mite is transmitted by close skin-to-skin contact. The mite burrows into the skin, where it feeds and lays eggs. The mite only lives in the upper layers of the skin; it does not go into the bloodstream or other body organs. After a few weeks, the patient develops an allergic reaction causing the very itchy scabies rash.    WHAT DOES SCABIES LOOK LIKE?    The rash can look like hives, pimples, blisters or scaly and crusted bumps. Any body area can be affected, but it is common to see the rash on the hands, feet, underarms, belly button and genitals. In children less than 2 years old, the rash can be all over the body. The rash tends to be worse in the elderly or in people with a weakened immune system.    The rash and itching can be very mild or very severe; it depends on how the immune system responds to the mite. Not everyone reacts in the same way. This is why some people may have the mite but do not yet have a rash. It is common to see that only one or two people in the house have the rash, even though everyone has been exposed to the mite. It is important to treat all close contacts, not only those who have the rash.    HOW IS SCABIES DIAGNOSED?    Your doctor can diagnose scabies by doing a careful head-to-toe skin exam. Special tests are not always needed to make the diagnosis. Your doctor may perform a skin scraping to look for the mite or other clues under the microscope.    HOW IS SCABIES TREATED?    There are different medications that can be used to treat scabies. 5% permethrin cream is the most commonly used and is the first line treatment for most patients. This cream needs to be applied on the entire skin surface, from neck to toes, making sure it covers all body folds and the space between  fingers and toes. The face is usually not affected in children and adults and doesn't usually need the cream unless specified by your doctor. However, in children less than 2 years old, permethrin cream should be applied to the whole body, from head to toe, as the head and neck areas can also be affected in this age group. Permethrin cream is left on the skin overnight for 8-14 hours before it is rinsed off the next day. The treatment needs to be repeated in one week.    There are other creams and oral medicines that can be used in special situations. These include specially made sulfur cream or ointment, other topical creams and oral ivermectin. Not all medications can be used in young infants and pregnant women. Ask your doctor which medication is safe for you and your family.    Your doctor may also prescribe other creams and oral medicines to help calm the itch and irritation from the rash. The itch and rash may persist for several weeks after treating scabies. If you are getting new bumps after one month, you should be evaluated again by your doctor.    In addition to the person with the rash, treatment is required for all household members and close contacts, such as grandparents or babysitters. Everyone should be treated at the same time to prevent re-infestation, even if contacts don't have a rash.    The mite lives in the skin, but it can also survive outside of the body in clothes and bed linens. Therefore, careful cleaning of bed linens, clothing, towels, strollers, car seats, etc. following the skin treatment is very important to help eradicate the infestation.    STEPS FOR SUCCESSFUL TREATMENT OF SCABIES    Follow the medication instructions carefully.  Repeat the treatment when instructed by your doctor (usually in 7 days).  Treat all close contacts and household members.  Treat everyone at the same time.  Wash clothing, bed linens and towels using hot water and dry using the hot cycle the day after  skin treatment.  Items that cannot be washed can be decontaminated by dry-cleaning or placing in a sealed plastic bag for at least 72 hours.  Vacuum furniture, carpets, car seats and strollers.  Fumigation of living areas is not necessary.  Pets do not need to be treated.      Contributing SPD Members:  Jorge A Carr MD, Theresa Lazo MD    Committee Reviewers:  Tricia Bunch MD, Shilpa Bates MD    Expert Reviewer:  Harini Roman MD

## 2023-10-19 ENCOUNTER — PATIENT MESSAGE (OUTPATIENT)
Dept: PRIMARY CARE CLINIC | Facility: CLINIC | Age: 53
End: 2023-10-19
Payer: COMMERCIAL

## 2023-10-25 ENCOUNTER — TELEPHONE (OUTPATIENT)
Dept: PRIMARY CARE CLINIC | Facility: CLINIC | Age: 53
End: 2023-10-25
Payer: COMMERCIAL

## 2023-10-25 DIAGNOSIS — E78.2 MIXED HYPERLIPIDEMIA: ICD-10-CM

## 2023-10-25 NOTE — TELEPHONE ENCOUNTER
3rd attempt. Able to review lab results with pt from 10/10/23. Pt aware an expresses understanding.    Pt declines restarting Crestor. States she will try to improve cholesterol on her own. Repeat lipid panel scheduled January 2024.     LEN

## 2023-10-30 ENCOUNTER — PATIENT MESSAGE (OUTPATIENT)
Dept: DERMATOLOGY | Facility: CLINIC | Age: 53
End: 2023-10-30
Payer: COMMERCIAL

## 2023-11-07 ENCOUNTER — OFFICE VISIT (OUTPATIENT)
Dept: DERMATOLOGY | Facility: CLINIC | Age: 53
End: 2023-11-07
Payer: COMMERCIAL

## 2023-11-07 DIAGNOSIS — L43.9 LICHEN PLANUS: Primary | ICD-10-CM

## 2023-11-07 PROCEDURE — 3044F PR MOST RECENT HEMOGLOBIN A1C LEVEL <7.0%: ICD-10-PCS | Mod: CPTII,S$GLB,, | Performed by: STUDENT IN AN ORGANIZED HEALTH CARE EDUCATION/TRAINING PROGRAM

## 2023-11-07 PROCEDURE — 99213 OFFICE O/P EST LOW 20 MIN: CPT | Mod: 25,S$GLB,, | Performed by: STUDENT IN AN ORGANIZED HEALTH CARE EDUCATION/TRAINING PROGRAM

## 2023-11-07 PROCEDURE — 4010F PR ACE/ARB THEARPY RXD/TAKEN: ICD-10-PCS | Mod: CPTII,S$GLB,, | Performed by: STUDENT IN AN ORGANIZED HEALTH CARE EDUCATION/TRAINING PROGRAM

## 2023-11-07 PROCEDURE — 99999 PR PBB SHADOW E&M-EST. PATIENT-LVL III: CPT | Mod: PBBFAC,,, | Performed by: STUDENT IN AN ORGANIZED HEALTH CARE EDUCATION/TRAINING PROGRAM

## 2023-11-07 PROCEDURE — 88305 TISSUE EXAM BY PATHOLOGIST: ICD-10-PCS | Mod: 26,,, | Performed by: PATHOLOGY

## 2023-11-07 PROCEDURE — 11104 PUNCH BX SKIN SINGLE LESION: CPT | Mod: S$GLB,,, | Performed by: STUDENT IN AN ORGANIZED HEALTH CARE EDUCATION/TRAINING PROGRAM

## 2023-11-07 PROCEDURE — 1160F RVW MEDS BY RX/DR IN RCRD: CPT | Mod: CPTII,S$GLB,, | Performed by: STUDENT IN AN ORGANIZED HEALTH CARE EDUCATION/TRAINING PROGRAM

## 2023-11-07 PROCEDURE — 1160F PR REVIEW ALL MEDS BY PRESCRIBER/CLIN PHARMACIST DOCUMENTED: ICD-10-PCS | Mod: CPTII,S$GLB,, | Performed by: STUDENT IN AN ORGANIZED HEALTH CARE EDUCATION/TRAINING PROGRAM

## 2023-11-07 PROCEDURE — 99213 PR OFFICE/OUTPT VISIT, EST, LEVL III, 20-29 MIN: ICD-10-PCS | Mod: 25,S$GLB,, | Performed by: STUDENT IN AN ORGANIZED HEALTH CARE EDUCATION/TRAINING PROGRAM

## 2023-11-07 PROCEDURE — 4010F ACE/ARB THERAPY RXD/TAKEN: CPT | Mod: CPTII,S$GLB,, | Performed by: STUDENT IN AN ORGANIZED HEALTH CARE EDUCATION/TRAINING PROGRAM

## 2023-11-07 PROCEDURE — 1159F PR MEDICATION LIST DOCUMENTED IN MEDICAL RECORD: ICD-10-PCS | Mod: CPTII,S$GLB,, | Performed by: STUDENT IN AN ORGANIZED HEALTH CARE EDUCATION/TRAINING PROGRAM

## 2023-11-07 PROCEDURE — 3044F HG A1C LEVEL LT 7.0%: CPT | Mod: CPTII,S$GLB,, | Performed by: STUDENT IN AN ORGANIZED HEALTH CARE EDUCATION/TRAINING PROGRAM

## 2023-11-07 PROCEDURE — 11104 PR PUNCH BIOPSY, SKIN, SINGLE LESION: ICD-10-PCS | Mod: S$GLB,,, | Performed by: STUDENT IN AN ORGANIZED HEALTH CARE EDUCATION/TRAINING PROGRAM

## 2023-11-07 PROCEDURE — 1159F MED LIST DOCD IN RCRD: CPT | Mod: CPTII,S$GLB,, | Performed by: STUDENT IN AN ORGANIZED HEALTH CARE EDUCATION/TRAINING PROGRAM

## 2023-11-07 PROCEDURE — 88305 TISSUE EXAM BY PATHOLOGIST: CPT | Mod: 26,,, | Performed by: PATHOLOGY

## 2023-11-07 PROCEDURE — 88305 TISSUE EXAM BY PATHOLOGIST: CPT | Performed by: PATHOLOGY

## 2023-11-07 PROCEDURE — 99999 PR PBB SHADOW E&M-EST. PATIENT-LVL III: ICD-10-PCS | Mod: PBBFAC,,, | Performed by: STUDENT IN AN ORGANIZED HEALTH CARE EDUCATION/TRAINING PROGRAM

## 2023-11-07 RX ORDER — PREDNISONE 10 MG/1
TABLET ORAL
Qty: 91 TABLET | Refills: 0 | Status: SHIPPED | OUTPATIENT
Start: 2023-11-07 | End: 2023-12-05

## 2023-11-07 NOTE — PATIENT INSTRUCTIONS

## 2023-11-07 NOTE — PROGRESS NOTES
Subjective:      Patient ID:  Shelbie Ardon is a 53 y.o. female who presents for   Chief Complaint   Patient presents with    Rash     Follow-up     Pt is here today for follow-up for scabies. Tx with permethrin and ivermectin, patient reports that the severity of her symptoms has lessened but that the infestation has spread to additional areas - hands, feet.    Rash        Seen initially 10/17/23. Initial history as below  At that visit, empiric thought most likely scabies given diffuse erythematous pruritic papules, acute onset after travel, started in umbilicus.  S/p treating herself and  with 2 rounds of PO ivermectin and topical permethrin. Completed treatment about 2 weeks ago. Continues to get new spots and now affecting hands and feet    Initial history: Saw PCP about 1 week ago--thought it was hot tub folliculitis. Given doxy po x 1 week and triamcinolone ointment. Also taking hydroxyzine qhs--helping. The rash started around the umbilicus and started shortly after a trip to [place]  Rash is slightly pruritic. Primarily scratching in sleep    Review of Systems   Skin:  Positive for itching and rash.       Objective:   Physical Exam   Constitutional: She appears well-developed and well-nourished. No distress.   Neurological: She is alert and oriented to person, place, and time. She is not disoriented.   Psychiatric: She has a normal mood and affect.   Skin:   Areas Examined (abnormalities noted in diagram):   Head / Face Inspection Performed  Neck Inspection Performed  Chest / Axilla Inspection Performed  Abdomen Inspection Performed  Back Inspection Performed  RUE Inspected  LUE Inspection Performed  RLE Inspected  LLE Inspection Performed            Diagram Legend     Erythematous scaling macule/papule c/w actinic keratosis       Vascular papule c/w angioma      Pigmented verrucoid papule/plaque c/w seborrheic keratosis      Yellow umbilicated papule c/w sebaceous hyperplasia       Irregularly shaped tan macule c/w lentigo     1-2 mm smooth white papules consistent with Milia      Movable subcutaneous cyst with punctum c/w epidermal inclusion cyst      Subcutaneous movable cyst c/w pilar cyst      Firm pink to brown papule c/w dermatofibroma      Pedunculated fleshy papule(s) c/w skin tag(s)      Evenly pigmented macule c/w junctional nevus     Mildly variegated pigmented, slightly irregular-bordered macule c/w mildly atypical nevus      Flesh colored to evenly pigmented papule c/w intradermal nevus       Pink pearly papule/plaque c/w basal cell carcinoma      Erythematous hyperkeratotic cursted plaque c/w SCC      Surgical scar with no sign of skin cancer recurrence      Open and closed comedones      Inflammatory papules and pustules      Verrucoid papule consistent consistent with wart     Erythematous eczematous patches and plaques     Dystrophic onycholytic nail with subungual debris c/w onychomycosis     Umbilicated papule    Erythematous-base heme-crusted tan verrucoid plaque consistent with inflamed seborrheic keratosis     Erythematous Silvery Scaling Plaque c/w Psoriasis     See annotation            Assessment / Plan:      Pathology Orders:       Normal Orders This Visit    Specimen to Pathology, Dermatology     Comments:    Number of Specimens:->1  ------------------------->-------------------------  Spec 1 Procedure:->Biopsy  Spec 1 Clinical Impression:->r/o lichen planus vs scabies vs  other  Spec 1 Source:->right dorsal hand  Release to patient->Immediate    Questions:    Procedure Type: Dermatology and skin neoplasms    Number of Specimens: 1    ------------------------: -------------------------    Spec 1 Procedure: Biopsy    Spec 1 Clinical Impression: r/o lichen planus vs scabies vs other    Spec 1 Source: right dorsal hand    Release to patient: Immediate          Diffuse rash--Lichen planus vs scabies vs other  -diffusely pruritic eruption x about 1 month. Treated  empirically for scabies given acute onset, history after traveling. Distribution somewhat suggestive (umbilicus and buttocks, wrists, one papule on finger web). Mineral oil prep negative. Today, some lesions more c/w lichen planus (flat topped papules, sergio striae on dermoscopy)  - S/p po ivermectin and permethrin on 10/17. Still getting new lesions but it is possible to continue to have symptoms for up to 1 month after treatment  - punch biopsy to further evaluate  - pred taper for symptomatic relief in the meantime. Counseled on  RBSE of pred including HTN, hyperglycemia, insomnia, delirium. Patinet counseled to check BP at home    Punch biopsy procedure note:  Punch biopsy performed after verbal consent obtained. Area marked and prepped with alcohol. Approximately 1cc of 1% lidocaine with epinephrine injected. 3 mm disposable punch used to remove lesion. Hemostasis obtained and biopsy site closed with 1 - 2 Prolene sutures. Wound care instructions reviewed with patient and handout given.    -     predniSONE (DELTASONE) 10 MG tablet; Take 6 tablets (60 mg total) by mouth once daily for 7 days, THEN 4 tablets (40 mg total) once daily for 7 days, THEN 2 tablets (20 mg total) once daily for 7 days, THEN 1 tablet (10 mg total) once daily for 7 days.  Dispense: 91 tablet; Refill: 0  -     Specimen to Pathology, Dermatology         Follow up in about 2 weeks (around 11/21/2023).

## 2023-11-14 ENCOUNTER — PATIENT MESSAGE (OUTPATIENT)
Dept: DERMATOLOGY | Facility: CLINIC | Age: 53
End: 2023-11-14
Payer: COMMERCIAL

## 2023-11-14 DIAGNOSIS — L43.9 LICHEN PLANUS: Primary | ICD-10-CM

## 2023-11-14 LAB
FINAL PATHOLOGIC DIAGNOSIS: NORMAL
GROSS: NORMAL
Lab: NORMAL
MICROSCOPIC EXAM: NORMAL

## 2023-11-14 RX ORDER — TRIAMCINOLONE ACETONIDE 1 MG/G
CREAM TOPICAL 2 TIMES DAILY
Qty: 454 G | Refills: 1 | Status: SHIPPED | OUTPATIENT
Start: 2023-11-14

## 2023-11-14 NOTE — TELEPHONE ENCOUNTER
Biopsy c/w lichen planus. Patient already on pred taper. Will rx TAC. Widespread involvement, will discuss next steps in therapy at follow up

## 2023-11-21 ENCOUNTER — OFFICE VISIT (OUTPATIENT)
Dept: DERMATOLOGY | Facility: CLINIC | Age: 53
End: 2023-11-21
Payer: COMMERCIAL

## 2023-11-21 DIAGNOSIS — L43.9 LICHEN PLANUS: Primary | ICD-10-CM

## 2023-11-21 PROCEDURE — 1160F RVW MEDS BY RX/DR IN RCRD: CPT | Mod: CPTII,S$GLB,, | Performed by: STUDENT IN AN ORGANIZED HEALTH CARE EDUCATION/TRAINING PROGRAM

## 2023-11-21 PROCEDURE — 1159F PR MEDICATION LIST DOCUMENTED IN MEDICAL RECORD: ICD-10-PCS | Mod: CPTII,S$GLB,, | Performed by: STUDENT IN AN ORGANIZED HEALTH CARE EDUCATION/TRAINING PROGRAM

## 2023-11-21 PROCEDURE — 99213 PR OFFICE/OUTPT VISIT, EST, LEVL III, 20-29 MIN: ICD-10-PCS | Mod: S$GLB,,, | Performed by: STUDENT IN AN ORGANIZED HEALTH CARE EDUCATION/TRAINING PROGRAM

## 2023-11-21 PROCEDURE — 1159F MED LIST DOCD IN RCRD: CPT | Mod: CPTII,S$GLB,, | Performed by: STUDENT IN AN ORGANIZED HEALTH CARE EDUCATION/TRAINING PROGRAM

## 2023-11-21 PROCEDURE — 99999 PR PBB SHADOW E&M-EST. PATIENT-LVL II: CPT | Mod: PBBFAC,,, | Performed by: STUDENT IN AN ORGANIZED HEALTH CARE EDUCATION/TRAINING PROGRAM

## 2023-11-21 PROCEDURE — 4010F PR ACE/ARB THEARPY RXD/TAKEN: ICD-10-PCS | Mod: CPTII,S$GLB,, | Performed by: STUDENT IN AN ORGANIZED HEALTH CARE EDUCATION/TRAINING PROGRAM

## 2023-11-21 PROCEDURE — 3044F HG A1C LEVEL LT 7.0%: CPT | Mod: CPTII,S$GLB,, | Performed by: STUDENT IN AN ORGANIZED HEALTH CARE EDUCATION/TRAINING PROGRAM

## 2023-11-21 PROCEDURE — 1160F PR REVIEW ALL MEDS BY PRESCRIBER/CLIN PHARMACIST DOCUMENTED: ICD-10-PCS | Mod: CPTII,S$GLB,, | Performed by: STUDENT IN AN ORGANIZED HEALTH CARE EDUCATION/TRAINING PROGRAM

## 2023-11-21 PROCEDURE — 3044F PR MOST RECENT HEMOGLOBIN A1C LEVEL <7.0%: ICD-10-PCS | Mod: CPTII,S$GLB,, | Performed by: STUDENT IN AN ORGANIZED HEALTH CARE EDUCATION/TRAINING PROGRAM

## 2023-11-21 PROCEDURE — 4010F ACE/ARB THERAPY RXD/TAKEN: CPT | Mod: CPTII,S$GLB,, | Performed by: STUDENT IN AN ORGANIZED HEALTH CARE EDUCATION/TRAINING PROGRAM

## 2023-11-21 PROCEDURE — 99213 OFFICE O/P EST LOW 20 MIN: CPT | Mod: S$GLB,,, | Performed by: STUDENT IN AN ORGANIZED HEALTH CARE EDUCATION/TRAINING PROGRAM

## 2023-11-21 PROCEDURE — 99999 PR PBB SHADOW E&M-EST. PATIENT-LVL II: ICD-10-PCS | Mod: PBBFAC,,, | Performed by: STUDENT IN AN ORGANIZED HEALTH CARE EDUCATION/TRAINING PROGRAM

## 2023-11-21 NOTE — PROGRESS NOTES
Subjective:      Patient ID:  Shelbie Ardon is a 53 y.o. female who presents for   Chief Complaint   Patient presents with    Follow-up     Follow-up - Follow-up  Symptom course: improving  Currently using: prednisone.  Affected locations: diffuse  Signs / symptoms: asymptomatic        Problem List Items Addressed This Visit          Derm    Lichen planus - Primary    Overview     On repeat eval, morphology more c/w lichen planus and biopsy confirmed a diagnosis of lichen planus. Started on a 1 month pred taper (60->40->20->10)  - Overall doing much better on prednisone taper    Only new medication prior to onset was ozempic--started about 1 year prior to onset. All other meds she has been on for years (at least 4 years)  HCV ab negative 5/2023  Has a glass of wine about 5 nights weekly    Seen initially 10/17/23  At that visit, empiric thought most likely scabies given diffuse erythematous pruritic papules, acute onset after travel, started in umbilicus. Had failed treatment with topical triamcinolone and hydroxyzine  S/p treating herself and  with 2 rounds of PO ivermectin and topical permethrin. Continued to get new spots and spread to affecting hands and feet  Rash was pruritic. Primarily scratching in sleep                Review of Systems    Objective:   Physical Exam   Constitutional: She appears well-developed and well-nourished. No distress.   Neurological: She is alert and oriented to person, place, and time. She is not disoriented.   Psychiatric: She has a normal mood and affect.   Skin:   Areas Examined (abnormalities noted in diagram):   Head / Face Inspection Performed  Neck Inspection Performed  Chest / Axilla Inspection Performed  Abdomen Inspection Performed  Back Inspection Performed  RUE Inspected  LUE Inspection Performed  RLE Inspected  LLE Inspection Performed            Diagram Legend     Erythematous scaling macule/papule c/w actinic keratosis       Vascular papule c/w  angioma      Pigmented verrucoid papule/plaque c/w seborrheic keratosis      Yellow umbilicated papule c/w sebaceous hyperplasia      Irregularly shaped tan macule c/w lentigo     1-2 mm smooth white papules consistent with Milia      Movable subcutaneous cyst with punctum c/w epidermal inclusion cyst      Subcutaneous movable cyst c/w pilar cyst      Firm pink to brown papule c/w dermatofibroma      Pedunculated fleshy papule(s) c/w skin tag(s)      Evenly pigmented macule c/w junctional nevus     Mildly variegated pigmented, slightly irregular-bordered macule c/w mildly atypical nevus      Flesh colored to evenly pigmented papule c/w intradermal nevus       Pink pearly papule/plaque c/w basal cell carcinoma      Erythematous hyperkeratotic cursted plaque c/w SCC      Surgical scar with no sign of skin cancer recurrence      Open and closed comedones      Inflammatory papules and pustules      Verrucoid papule consistent consistent with wart     Erythematous eczematous patches and plaques     Dystrophic onycholytic nail with subungual debris c/w onychomycosis     Umbilicated papule    Erythematous-base heme-crusted tan verrucoid plaque consistent with inflamed seborrheic keratosis     Erythematous Silvery Scaling Plaque c/w Psoriasis     See annotation      Assessment / Plan:        Lichen planus    - widespread lichen planus of about 10-15% BSA. No oral or genital involvement at this time. Low suspicion for medications induced given no new meds prior to onset. Most recent was ozempic (about 1 year prior), so would recommend holding just in case. Too extensive for topical therapy only. Discussed treatment options including nbUVB, MTX, other immunosuppression, plaquenil, etc. Currently controlled on pred taper so will complete taper and reassess. If LP recurs then will likely start methotrexate. RBSE discussed         Follow up in about 4 weeks (around 12/19/2023).

## 2023-12-04 ENCOUNTER — OFFICE VISIT (OUTPATIENT)
Dept: PRIMARY CARE CLINIC | Facility: CLINIC | Age: 53
End: 2023-12-04
Payer: COMMERCIAL

## 2023-12-04 VITALS — HEIGHT: 67 IN | BODY MASS INDEX: 27.52 KG/M2

## 2023-12-04 DIAGNOSIS — E78.2 MIXED HYPERLIPIDEMIA: ICD-10-CM

## 2023-12-04 DIAGNOSIS — L43.9 LICHEN PLANUS: ICD-10-CM

## 2023-12-04 DIAGNOSIS — I10 ESSENTIAL HYPERTENSION: Primary | ICD-10-CM

## 2023-12-04 PROCEDURE — 3008F BODY MASS INDEX DOCD: CPT | Mod: CPTII,95,, | Performed by: FAMILY MEDICINE

## 2023-12-04 PROCEDURE — 99213 PR OFFICE/OUTPT VISIT, EST, LEVL III, 20-29 MIN: ICD-10-PCS | Mod: 95,,, | Performed by: FAMILY MEDICINE

## 2023-12-04 PROCEDURE — 99213 OFFICE O/P EST LOW 20 MIN: CPT | Mod: 95,,, | Performed by: FAMILY MEDICINE

## 2023-12-04 PROCEDURE — 1159F MED LIST DOCD IN RCRD: CPT | Mod: CPTII,95,, | Performed by: FAMILY MEDICINE

## 2023-12-04 PROCEDURE — 3044F HG A1C LEVEL LT 7.0%: CPT | Mod: CPTII,95,, | Performed by: FAMILY MEDICINE

## 2023-12-04 PROCEDURE — 3008F PR BODY MASS INDEX (BMI) DOCUMENTED: ICD-10-PCS | Mod: CPTII,95,, | Performed by: FAMILY MEDICINE

## 2023-12-04 PROCEDURE — 1159F PR MEDICATION LIST DOCUMENTED IN MEDICAL RECORD: ICD-10-PCS | Mod: CPTII,95,, | Performed by: FAMILY MEDICINE

## 2023-12-04 PROCEDURE — 4010F PR ACE/ARB THEARPY RXD/TAKEN: ICD-10-PCS | Mod: CPTII,95,, | Performed by: FAMILY MEDICINE

## 2023-12-04 PROCEDURE — 4010F ACE/ARB THERAPY RXD/TAKEN: CPT | Mod: CPTII,95,, | Performed by: FAMILY MEDICINE

## 2023-12-04 PROCEDURE — 3044F PR MOST RECENT HEMOGLOBIN A1C LEVEL <7.0%: ICD-10-PCS | Mod: CPTII,95,, | Performed by: FAMILY MEDICINE

## 2023-12-04 NOTE — PROGRESS NOTES
Subjective:       Patient ID: Shelbie Ardon is a 53 y.o. female.    Chief Complaint: Follow-up (8 week virtual Semaglutide) and Health Maintenance    Follow-up  Pertinent negatives include no arthralgias, chest pain, headaches, joint swelling, neck pain, vomiting or weakness.     52 y/o female with HTN, R sided thigh pain post op R LISETH 8/2020, hx of Covid is here for follow up rash, htn and weight loss.    She is following with derm for lichen planus, she is on a prednisone taper which has helped but she has gained 12 pounds, she takes her last dose of prednisone tomorrow.  Home bp 130's/80's. She has been off NSAIDs and off Semaglutide to wait and see if her rash completely resolves. She denies f/n/v/heartburn/d/constipation/cp/sob/urinary sx. Sleeping ok. She had a trip and fall last week and has R knee pain, has not been able to exercise lately, has appt with Dr. Carrasquillo for further evaluation.     Hx of Covid 8/2023  HTN: following with cardiology; losartan 100 mg daily and chlorthalidone 25 mg daily  Elevated LDL: trial of diet and exercise, has repeat lipids set for Jan 2024  OA hip s/p R LISETH 8/2020 Dr. Hamm and OA knee:  After surgery she had some nerve pain that she reports has resolved, she also notes some discomfort from the hardware that she was told would improve with weight loss, off celebrex, cymbalta caused sexual side effects, off mobic, off lyrica, tylenol not helpful  GYN:following with Dr. Mcneil, Combipatch on hold, mmg 8/2023, irregular bleeding EMB negative, US 8/2022   Hx of LPRD, not needed since  Colonoscopy due declined scope, mother had a perforation with colonoscopy, cologuard 6/2021  Vitamin D def: not on supplement  Eye exam utd  Dental utd  OTC: fish oil, mvi, calcium and d    Review of Systems   Constitutional:  Positive for activity change and unexpected weight change.   HENT:  Negative for hearing loss, rhinorrhea and trouble swallowing.    Eyes:  Negative for discharge and  "visual disturbance.   Respiratory:  Negative for chest tightness and wheezing.    Cardiovascular:  Negative for chest pain and palpitations.   Gastrointestinal:  Negative for blood in stool, constipation, diarrhea and vomiting.   Endocrine: Negative for polydipsia and polyuria.   Genitourinary:  Negative for difficulty urinating, dysuria, hematuria and menstrual problem.   Musculoskeletal:  Negative for arthralgias, joint swelling and neck pain.   Neurological:  Negative for weakness and headaches.   Psychiatric/Behavioral:  Negative for confusion and dysphoric mood.        Objective:      Ht 5' 7" (1.702 m)   LMP  (LMP Unknown)   BMI 27.52 kg/m²   Physical Exam  Constitutional:       General: She is not in acute distress.     Appearance: She is well-developed. She is not diaphoretic.   HENT:      Head: Normocephalic and atraumatic.   Pulmonary:      Effort: No respiratory distress.   Neurological:      Mental Status: She is alert and oriented to person, place, and time.         Assessment:       1. Essential hypertension    2. Mixed hyperlipidemia    3. BMI 28.0-28.9,adult    4. Lichen planus        Plan:   Shelbie was seen today for follow-up and health maintenance.    Diagnoses and all orders for this visit:    Essential hypertension    Mixed hyperlipidemia    BMI 28.0-28.9,adult    Lichen planus    The patient location is: [place]  The chief complaint leading to consultation is: rash, weight loss, htn    Visit type: audiovisual    Face to Face time with patient: 20 minutes of total time spent on the encounter, which includes face to face time and non-face to face time preparing to see the patient (eg, review of tests), Obtaining and/or reviewing separately obtained history, Documenting clinical information in the electronic or other health record, Independently interpreting results (not separately reported) and communicating results to the patient/family/caregiver, or Care coordination (not separately reported). "         Each patient to whom he or she provides medical services by telemedicine is:  (1) informed of the relationship between the physician and patient and the respective role of any other health care provider with respect to management of the patient; and (2) notified that he or she may decline to receive medical services by telemedicine and may withdraw from such care at any time.    Notes:

## 2023-12-08 NOTE — PROGRESS NOTES
Subjective:          Chief Complaint: Shelbie Ardon is a 53 y.o. female who had concerns including Pain of the Left Knee.    Shelbie Ardon comes to clinic for left knee pain. She is a previous patient of mine, undergone the below procedures 10 years ago. She was doing well until recently when she fell while walking 5 weeks ago. Fell onto her left knee with acute onset of pain. 3 weeks ago, she was walking and has severe medial knee pain.     DATE OF PROCEDURE:  08/27/2013      ATTENDING SURGEON:  Vern Carrasquillo M.D.      FIRST ASSISTANT:  Giles Hamlin M.D. (RES).     SECOND ASSISTANT:  Sarkis Salazar.     PREOPERATIVE DIAGNOSIS:  Left knee painful hardware.     POSTOPERATIVE DIAGNOSES:  1.  Left knee medial meniscus tear.  2.  Left knee chondromalacia.  3.  Left knee painful hardware.     OPERATIVE PROCEDURES PERFORMED:  1.  Left knee arthroscopic medial meniscectomy.  2.  Left knee arthroscopic chondroplasty.  3.  Left knee deep complex hardware removal.             Review of Systems   Constitutional: Negative for fever and night sweats.   HENT:  Negative for hearing loss.    Eyes:  Negative for blurred vision and visual disturbance.   Cardiovascular:  Negative for chest pain and leg swelling.   Respiratory:  Negative for shortness of breath.    Endocrine: Negative for polyuria.   Hematologic/Lymphatic: Negative for bleeding problem.   Skin:  Negative for rash.   Musculoskeletal:  Positive for joint pain and joint swelling. Negative for back pain, muscle cramps and muscle weakness.   Gastrointestinal:  Negative for melena.   Genitourinary:  Negative for hematuria.   Neurological:  Negative for loss of balance, numbness and paresthesias.   Psychiatric/Behavioral:  Negative for altered mental status.        Pain Related Questions  Over the past 3 days, what was your highest pain level?: 6  Over the past 3 days, what was your lowest pain level? : 2    Other  How many nights a week are you awakened by your  affected body part?: 2  Was the patient's HEIGHT measured or patient reported?: Patient Reported  Was the patient's WEIGHT measured or patient reported?: Measured      Objective:        General: Shelbie is well-developed, well-nourished, appears stated age, in no acute distress, alert and oriented to time, place and person.     General    Vitals reviewed.  Constitutional: She is oriented to person, place, and time. She appears well-developed and well-nourished. No distress.   HENT:   Head: Atraumatic.   Mouth/Throat: No oropharyngeal exudate.   Eyes: Right eye exhibits no discharge. Left eye exhibits no discharge.   Cardiovascular:  Normal rate.            Pulmonary/Chest: Effort normal and breath sounds normal. No respiratory distress.   Neurological: She is alert and oriented to person, place, and time. She has normal reflexes. No cranial nerve deficit. Coordination normal.   Psychiatric: She has a normal mood and affect. Her behavior is normal. Judgment and thought content normal.     General Musculoskeletal Exam   Gait: antalgic and abnormal       Right Knee Exam     Inspection   Erythema: absent  Scars: absent  Swelling: absent  Effusion: absent  Deformity: absent  Bruising: absent    Tenderness   The patient is experiencing no tenderness.     Range of Motion   Extension:  -5   Flexion:  140     Tests   Meniscus   Greg:  Medial - negative Lateral - negative  Ligament Examination   Lachman: normal (-1 to 2mm)   PCL-Posterior Drawer: normal (0 to 2mm)     MCL - Valgus: normal (0 to 2mm)  LCL - Varus: normal  Pivot Shift: normal (Equal)  Reverse Pivot Shift: normal (Equal)  Dial Test at 30 degrees: normal (< 5 degrees)  Dial Test at 90 degrees: normal (< 5 degrees)  Posterior Sag Test: negative  Posterolateral Corner: stable  Patella   Patellar apprehension: negative  Passive Patellar Tilt: neutral  Patellar Tracking: normal  Patellar Glide (quadrants): Lateral - 1   Medial - 2  Q-Angle at 90 degrees:  normal  Patellar Grind: negative  J-Sign: none    Other   Meniscal Cyst: absent  Popliteal (Baker's) Cyst: absent  Sensation: normal    Left Knee Exam     Inspection   Erythema: absent  Scars: present  Swelling: present  Effusion: absent  Deformity: absent  Bruising: absent    Tenderness   The patient tender to palpation of the medial joint line and medial retinaculum.    Crepitus   The patient has crepitus of the patella (mild medial facet).    Range of Motion   Extension:  5   Flexion:  140     Tests   Meniscus   Greg:  Medial - positive Lateral - negative  Stability   Lachman: normal (-1 to 2mm)   PCL-Posterior Drawer: normal (0 to 2mm)  MCL - Valgus: normal (0 to 2mm)  LCL - Varus: normal (0 to 2mm)  Pivot Shift: normal (Equal)  Reverse Pivot Shift: normal (Equal)  Dial Test at 30 degrees: normal (< 5 degrees)  Dial Test at 90 degrees: normal (< 5 degrees)  Posterior Sag Test: negative  Posterolateral Corner: stable  Patella   Patellar apprehension: negative  Passive Patellar Tilt: neutral  Patellar Tracking: normal  Patellar Glide (Quadrants): Lateral - 1 Medial - 2  Q-Angle at 90 degrees: normal  Patellar Grind: negative  J-Sign: J sign absent    Other   Meniscal Cyst: absent  Popliteal (Baker's) Cyst: absent  Sensation: normal    Right Hip Exam     Tests   Baylee: negative  Left Hip Exam     Tests   Baylee: negative          Reflexes     Left Side  Achilles:  2+  Quadriceps:  2+    Right Side   Achilles:  2+  Quadriceps:  2+    Vascular Exam     Right Pulses  Dorsalis Pedis:      2+  Posterior Tibial:      2+        Left Pulses  Dorsalis Pedis:      2+  Posterior Tibial:      2+                  Assessment:       Encounter Diagnoses   Name Primary?    Left knee pain, unspecified chronicity Yes    Primary osteoarthritis of left knee     Genu varum of left lower extremity           Plan:       1. RTC in 1 weeks with Dr. Vern Carrasquillo for virtual MRI review. IKDC, SF-12 and KOOS was filled out today in clinic.  Patient will fill out IKDC, SF-12 and KOOS on return.    2. Medications: Refills of the following Rx were sent to patients preferred Pharmacy:  No Refills Needed Today    3. Physical Therapy: Continue/Begin: Begin at [facility]       4. HEP: N/A    5. Procedures/Procedural Planning:   N/A    6. DME: Medial     7. Work/Sport Status: N/A    8. Visit Summary: 53 year old F 10 years s/p L knee scope, medial meniscectomy with new onset medial knee pain. MRI ordered, medial  ordered today. Follow up to review MRI.                           [facility] patient questionnaires have been collected today.

## 2023-12-11 ENCOUNTER — OFFICE VISIT (OUTPATIENT)
Dept: SPORTS MEDICINE | Facility: CLINIC | Age: 53
End: 2023-12-11
Payer: COMMERCIAL

## 2023-12-11 ENCOUNTER — HOSPITAL ENCOUNTER (OUTPATIENT)
Dept: RADIOLOGY | Facility: HOSPITAL | Age: 53
Discharge: HOME | End: 2023-12-11
Attending: ORTHOPAEDIC SURGERY
Payer: COMMERCIAL

## 2023-12-11 VITALS
WEIGHT: 183 LBS | SYSTOLIC BLOOD PRESSURE: 115 MMHG | BODY MASS INDEX: 28.66 KG/M2 | DIASTOLIC BLOOD PRESSURE: 74 MMHG | HEART RATE: 81 BPM

## 2023-12-11 DIAGNOSIS — M21.162 GENU VARUM OF LEFT LOWER EXTREMITY: ICD-10-CM

## 2023-12-11 DIAGNOSIS — M17.12 PRIMARY OSTEOARTHRITIS OF LEFT KNEE: ICD-10-CM

## 2023-12-11 DIAGNOSIS — M25.562 LEFT KNEE PAIN, UNSPECIFIED CHRONICITY: ICD-10-CM

## 2023-12-11 DIAGNOSIS — M25.562 LEFT KNEE PAIN, UNSPECIFIED CHRONICITY: Primary | ICD-10-CM

## 2023-12-11 PROCEDURE — 1159F MED LIST DOCD IN RCRD: CPT | Mod: CPTII,S$GLB,, | Performed by: ORTHOPAEDIC SURGERY

## 2023-12-11 PROCEDURE — 73564 XR KNEE ORTHO BILAT WITH FLEXION: ICD-10-PCS | Mod: 26,,, | Performed by: RADIOLOGY

## 2023-12-11 PROCEDURE — 99999 PR PBB SHADOW E&M-EST. PATIENT-LVL IV: ICD-10-PCS | Mod: PBBFAC,,, | Performed by: ORTHOPAEDIC SURGERY

## 2023-12-11 PROCEDURE — 3074F PR MOST RECENT SYSTOLIC BLOOD PRESSURE < 130 MM HG: ICD-10-PCS | Mod: CPTII,S$GLB,, | Performed by: ORTHOPAEDIC SURGERY

## 2023-12-11 PROCEDURE — 1160F RVW MEDS BY RX/DR IN RCRD: CPT | Mod: CPTII,S$GLB,, | Performed by: ORTHOPAEDIC SURGERY

## 2023-12-11 PROCEDURE — 1159F PR MEDICATION LIST DOCUMENTED IN MEDICAL RECORD: ICD-10-PCS | Mod: CPTII,S$GLB,, | Performed by: ORTHOPAEDIC SURGERY

## 2023-12-11 PROCEDURE — 3008F PR BODY MASS INDEX (BMI) DOCUMENTED: ICD-10-PCS | Mod: CPTII,S$GLB,, | Performed by: ORTHOPAEDIC SURGERY

## 2023-12-11 PROCEDURE — 3074F SYST BP LT 130 MM HG: CPT | Mod: CPTII,S$GLB,, | Performed by: ORTHOPAEDIC SURGERY

## 2023-12-11 PROCEDURE — 4010F ACE/ARB THERAPY RXD/TAKEN: CPT | Mod: CPTII,S$GLB,, | Performed by: ORTHOPAEDIC SURGERY

## 2023-12-11 PROCEDURE — 3044F PR MOST RECENT HEMOGLOBIN A1C LEVEL <7.0%: ICD-10-PCS | Mod: CPTII,S$GLB,, | Performed by: ORTHOPAEDIC SURGERY

## 2023-12-11 PROCEDURE — 99999 PR PBB SHADOW E&M-EST. PATIENT-LVL IV: CPT | Mod: PBBFAC,,, | Performed by: ORTHOPAEDIC SURGERY

## 2023-12-11 PROCEDURE — 4010F PR ACE/ARB THEARPY RXD/TAKEN: ICD-10-PCS | Mod: CPTII,S$GLB,, | Performed by: ORTHOPAEDIC SURGERY

## 2023-12-11 PROCEDURE — 3078F PR MOST RECENT DIASTOLIC BLOOD PRESSURE < 80 MM HG: ICD-10-PCS | Mod: CPTII,S$GLB,, | Performed by: ORTHOPAEDIC SURGERY

## 2023-12-11 PROCEDURE — 73564 X-RAY EXAM KNEE 4 OR MORE: CPT | Mod: TC,50

## 2023-12-11 PROCEDURE — 99204 PR OFFICE/OUTPT VISIT, NEW, LEVL IV, 45-59 MIN: ICD-10-PCS | Mod: S$GLB,,, | Performed by: ORTHOPAEDIC SURGERY

## 2023-12-11 PROCEDURE — 99204 OFFICE O/P NEW MOD 45 MIN: CPT | Mod: S$GLB,,, | Performed by: ORTHOPAEDIC SURGERY

## 2023-12-11 PROCEDURE — 3078F DIAST BP <80 MM HG: CPT | Mod: CPTII,S$GLB,, | Performed by: ORTHOPAEDIC SURGERY

## 2023-12-11 PROCEDURE — 1160F PR REVIEW ALL MEDS BY PRESCRIBER/CLIN PHARMACIST DOCUMENTED: ICD-10-PCS | Mod: CPTII,S$GLB,, | Performed by: ORTHOPAEDIC SURGERY

## 2023-12-11 PROCEDURE — 3008F BODY MASS INDEX DOCD: CPT | Mod: CPTII,S$GLB,, | Performed by: ORTHOPAEDIC SURGERY

## 2023-12-11 PROCEDURE — 73564 X-RAY EXAM KNEE 4 OR MORE: CPT | Mod: 26,,, | Performed by: RADIOLOGY

## 2023-12-11 PROCEDURE — 3044F HG A1C LEVEL LT 7.0%: CPT | Mod: CPTII,S$GLB,, | Performed by: ORTHOPAEDIC SURGERY

## 2023-12-21 ENCOUNTER — PATIENT MESSAGE (OUTPATIENT)
Dept: DERMATOLOGY | Facility: CLINIC | Age: 53
End: 2023-12-21
Payer: COMMERCIAL

## 2023-12-26 ENCOUNTER — HOSPITAL ENCOUNTER (OUTPATIENT)
Dept: RADIOLOGY | Facility: HOSPITAL | Age: 53
Discharge: HOME | End: 2023-12-26
Attending: ORTHOPAEDIC SURGERY
Payer: COMMERCIAL

## 2023-12-26 DIAGNOSIS — M25.562 LEFT KNEE PAIN, UNSPECIFIED CHRONICITY: ICD-10-CM

## 2023-12-26 PROCEDURE — 73721 MRI JNT OF LWR EXTRE W/O DYE: CPT | Mod: TC,LT

## 2023-12-26 PROCEDURE — 73721 MRI KNEE WITHOUT CONTRAST LEFT: ICD-10-PCS | Mod: 26,LT,, | Performed by: INTERNAL MEDICINE

## 2023-12-26 PROCEDURE — 73721 MRI JNT OF LWR EXTRE W/O DYE: CPT | Mod: 26,LT,, | Performed by: INTERNAL MEDICINE

## 2024-01-03 ENCOUNTER — PATIENT MESSAGE (OUTPATIENT)
Dept: SPORTS MEDICINE | Facility: CLINIC | Age: 54
End: 2024-01-03
Payer: COMMERCIAL

## 2024-01-08 ENCOUNTER — OFFICE VISIT (OUTPATIENT)
Dept: SPORTS MEDICINE | Facility: CLINIC | Age: 54
End: 2024-01-08
Payer: COMMERCIAL

## 2024-01-08 ENCOUNTER — CLINICAL SUPPORT (OUTPATIENT)
Dept: REHABILITATION | Facility: HOSPITAL | Age: 54
End: 2024-01-08
Attending: ORTHOPAEDIC SURGERY
Payer: COMMERCIAL

## 2024-01-08 ENCOUNTER — OFFICE VISIT (OUTPATIENT)
Dept: DERMATOLOGY | Facility: CLINIC | Age: 54
End: 2024-01-08
Payer: COMMERCIAL

## 2024-01-08 ENCOUNTER — LAB VISIT (OUTPATIENT)
Dept: LAB | Facility: HOSPITAL | Age: 54
End: 2024-01-08
Attending: STUDENT IN AN ORGANIZED HEALTH CARE EDUCATION/TRAINING PROGRAM
Payer: COMMERCIAL

## 2024-01-08 DIAGNOSIS — L43.9 LICHEN PLANUS: Primary | ICD-10-CM

## 2024-01-08 DIAGNOSIS — M25.562 LEFT KNEE PAIN, UNSPECIFIED CHRONICITY: ICD-10-CM

## 2024-01-08 DIAGNOSIS — M25.551 BILATERAL HIP PAIN: Primary | ICD-10-CM

## 2024-01-08 DIAGNOSIS — M25.552 BILATERAL HIP PAIN: Primary | ICD-10-CM

## 2024-01-08 DIAGNOSIS — G89.29 CHRONIC PAIN OF LEFT KNEE: ICD-10-CM

## 2024-01-08 DIAGNOSIS — M25.562 CHRONIC PAIN OF LEFT KNEE: ICD-10-CM

## 2024-01-08 DIAGNOSIS — L43.9 LICHEN PLANUS: ICD-10-CM

## 2024-01-08 DIAGNOSIS — M17.12 PRIMARY OSTEOARTHRITIS OF LEFT KNEE: Primary | ICD-10-CM

## 2024-01-08 DIAGNOSIS — R53.1 DECREASED STRENGTH: ICD-10-CM

## 2024-01-08 DIAGNOSIS — Z74.09 IMPAIRED FUNCTIONAL MOBILITY, BALANCE, GAIT, AND ENDURANCE: ICD-10-CM

## 2024-01-08 DIAGNOSIS — M21.162 GENU VARUM OF LEFT LOWER EXTREMITY: ICD-10-CM

## 2024-01-08 LAB
ALBUMIN SERPL BCP-MCNC: 4.1 G/DL (ref 3.5–5.2)
ALP SERPL-CCNC: 61 U/L (ref 55–135)
ALT SERPL W/O P-5'-P-CCNC: 18 U/L (ref 10–44)
ANION GAP SERPL CALC-SCNC: 12 MMOL/L (ref 8–16)
AST SERPL-CCNC: 20 U/L (ref 10–40)
BASOPHILS # BLD AUTO: 0.09 K/UL (ref 0–0.2)
BASOPHILS NFR BLD: 1.1 % (ref 0–1.9)
BILIRUB SERPL-MCNC: 0.7 MG/DL (ref 0.1–1)
BUN SERPL-MCNC: 15 MG/DL (ref 6–20)
CALCIUM SERPL-MCNC: 9.9 MG/DL (ref 8.7–10.5)
CHLORIDE SERPL-SCNC: 99 MMOL/L (ref 95–110)
CO2 SERPL-SCNC: 27 MMOL/L (ref 23–29)
CREAT SERPL-MCNC: 0.8 MG/DL (ref 0.5–1.4)
DIFFERENTIAL METHOD BLD: ABNORMAL
EOSINOPHIL # BLD AUTO: 0.1 K/UL (ref 0–0.5)
EOSINOPHIL NFR BLD: 1.2 % (ref 0–8)
ERYTHROCYTE [DISTWIDTH] IN BLOOD BY AUTOMATED COUNT: 11.6 % (ref 11.5–14.5)
EST. GFR  (NO RACE VARIABLE): >60 ML/MIN/1.73 M^2
GLUCOSE SERPL-MCNC: 83 MG/DL (ref 70–110)
HAV IGG SER QL IA: NORMAL
HBV CORE AB SERPL QL IA: NORMAL
HBV SURFACE AB SER-ACNC: 74.06 MIU/ML
HBV SURFACE AB SER-ACNC: REACTIVE M[IU]/ML
HBV SURFACE AG SERPL QL IA: NORMAL
HCT VFR BLD AUTO: 41.5 % (ref 37–48.5)
HCV AB SERPL QL IA: NORMAL
HGB BLD-MCNC: 14.7 G/DL (ref 12–16)
HIV 1+2 AB+HIV1 P24 AG SERPL QL IA: NORMAL
IMM GRANULOCYTES # BLD AUTO: 0.02 K/UL (ref 0–0.04)
IMM GRANULOCYTES NFR BLD AUTO: 0.2 % (ref 0–0.5)
LYMPHOCYTES # BLD AUTO: 2.7 K/UL (ref 1–4.8)
LYMPHOCYTES NFR BLD: 33.4 % (ref 18–48)
MCH RBC QN AUTO: 32.8 PG (ref 27–31)
MCHC RBC AUTO-ENTMCNC: 35.4 G/DL (ref 32–36)
MCV RBC AUTO: 93 FL (ref 82–98)
MONOCYTES # BLD AUTO: 0.6 K/UL (ref 0.3–1)
MONOCYTES NFR BLD: 7.4 % (ref 4–15)
NEUTROPHILS # BLD AUTO: 4.6 K/UL (ref 1.8–7.7)
NEUTROPHILS NFR BLD: 56.7 % (ref 38–73)
NRBC BLD-RTO: 0 /100 WBC
PLATELET # BLD AUTO: 327 K/UL (ref 150–450)
PMV BLD AUTO: 9 FL (ref 9.2–12.9)
POTASSIUM SERPL-SCNC: 3.1 MMOL/L (ref 3.5–5.1)
PROT SERPL-MCNC: 7.6 G/DL (ref 6–8.4)
RBC # BLD AUTO: 4.48 M/UL (ref 4–5.4)
SODIUM SERPL-SCNC: 138 MMOL/L (ref 136–145)
WBC # BLD AUTO: 8.2 K/UL (ref 3.9–12.7)

## 2024-01-08 PROCEDURE — 1159F MED LIST DOCD IN RCRD: CPT | Mod: CPTII,S$GLB,, | Performed by: STUDENT IN AN ORGANIZED HEALTH CARE EDUCATION/TRAINING PROGRAM

## 2024-01-08 PROCEDURE — 99214 OFFICE O/P EST MOD 30 MIN: CPT | Mod: S$GLB,,, | Performed by: STUDENT IN AN ORGANIZED HEALTH CARE EDUCATION/TRAINING PROGRAM

## 2024-01-08 PROCEDURE — 86704 HEP B CORE ANTIBODY TOTAL: CPT | Performed by: STUDENT IN AN ORGANIZED HEALTH CARE EDUCATION/TRAINING PROGRAM

## 2024-01-08 PROCEDURE — 86790 VIRUS ANTIBODY NOS: CPT | Performed by: STUDENT IN AN ORGANIZED HEALTH CARE EDUCATION/TRAINING PROGRAM

## 2024-01-08 PROCEDURE — 87340 HEPATITIS B SURFACE AG IA: CPT | Performed by: STUDENT IN AN ORGANIZED HEALTH CARE EDUCATION/TRAINING PROGRAM

## 2024-01-08 PROCEDURE — 99999 PR PBB SHADOW E&M-EST. PATIENT-LVL II: CPT | Mod: PBBFAC,,, | Performed by: STUDENT IN AN ORGANIZED HEALTH CARE EDUCATION/TRAINING PROGRAM

## 2024-01-08 PROCEDURE — 1160F RVW MEDS BY RX/DR IN RCRD: CPT | Mod: CPTII,S$GLB,, | Performed by: STUDENT IN AN ORGANIZED HEALTH CARE EDUCATION/TRAINING PROGRAM

## 2024-01-08 PROCEDURE — 80053 COMPREHEN METABOLIC PANEL: CPT | Performed by: STUDENT IN AN ORGANIZED HEALTH CARE EDUCATION/TRAINING PROGRAM

## 2024-01-08 PROCEDURE — 85025 COMPLETE CBC W/AUTO DIFF WBC: CPT | Performed by: STUDENT IN AN ORGANIZED HEALTH CARE EDUCATION/TRAINING PROGRAM

## 2024-01-08 PROCEDURE — 99214 OFFICE O/P EST MOD 30 MIN: CPT | Mod: 95,,, | Performed by: ORTHOPAEDIC SURGERY

## 2024-01-08 PROCEDURE — 86706 HEP B SURFACE ANTIBODY: CPT | Performed by: STUDENT IN AN ORGANIZED HEALTH CARE EDUCATION/TRAINING PROGRAM

## 2024-01-08 PROCEDURE — 97110 THERAPEUTIC EXERCISES: CPT | Performed by: PHYSICAL THERAPIST

## 2024-01-08 PROCEDURE — 86803 HEPATITIS C AB TEST: CPT | Performed by: STUDENT IN AN ORGANIZED HEALTH CARE EDUCATION/TRAINING PROGRAM

## 2024-01-08 PROCEDURE — 97161 PT EVAL LOW COMPLEX 20 MIN: CPT | Performed by: PHYSICAL THERAPIST

## 2024-01-08 PROCEDURE — 36415 COLL VENOUS BLD VENIPUNCTURE: CPT | Performed by: STUDENT IN AN ORGANIZED HEALTH CARE EDUCATION/TRAINING PROGRAM

## 2024-01-08 PROCEDURE — 97140 MANUAL THERAPY 1/> REGIONS: CPT | Performed by: PHYSICAL THERAPIST

## 2024-01-08 PROCEDURE — 4010F ACE/ARB THERAPY RXD/TAKEN: CPT | Mod: CPTII,95,, | Performed by: ORTHOPAEDIC SURGERY

## 2024-01-08 PROCEDURE — 86787 VARICELLA-ZOSTER ANTIBODY: CPT | Performed by: STUDENT IN AN ORGANIZED HEALTH CARE EDUCATION/TRAINING PROGRAM

## 2024-01-08 PROCEDURE — 4010F ACE/ARB THERAPY RXD/TAKEN: CPT | Mod: CPTII,S$GLB,, | Performed by: STUDENT IN AN ORGANIZED HEALTH CARE EDUCATION/TRAINING PROGRAM

## 2024-01-08 PROCEDURE — 87389 HIV-1 AG W/HIV-1&-2 AB AG IA: CPT | Performed by: STUDENT IN AN ORGANIZED HEALTH CARE EDUCATION/TRAINING PROGRAM

## 2024-01-08 PROCEDURE — 86592 SYPHILIS TEST NON-TREP QUAL: CPT | Performed by: STUDENT IN AN ORGANIZED HEALTH CARE EDUCATION/TRAINING PROGRAM

## 2024-01-08 PROCEDURE — 86682 HELMINTH ANTIBODY: CPT | Performed by: STUDENT IN AN ORGANIZED HEALTH CARE EDUCATION/TRAINING PROGRAM

## 2024-01-08 NOTE — PROGRESS NOTES
Subjective:      Patient ID:  Shelbie Ardon is a 53 y.o. female who presents for   Chief Complaint   Patient presents with    Rash     F/u     Pt last seen on 11/21/2023 for LP - was on predniose 60mg taper  Pt dx with COVID mid December - reflared - some improvement since last visit.     Follow-up - Follow-up  Symptom course: unchanged  Affected locations: diffuse  Signs / symptoms: asymptomatic      Problem List Items Addressed This Visit          Derm    Lichen planus - Primary    Overview     - Overall did much better on pred taper. About 2 weeks after stopping, she got COVID and 1 week later her LP flared again    Initial history  Seen initially 10/17/23 with several month history of new onset diffuse pruritic rash. Biopsy confirmed diagnosis of lichen planus. Started on a 1 month pred taper (60->40->20->10) 11/2023  Only new medication prior to onset was ozempic--started about 1 year prior to onset. All other meds she has been on for years (at least 4 years)  HCV ab negative 5/2023  Has a glass of wine about 5 nights weekly  No oral or genital involvement           Relevant Orders    HIV 1/2 Ag/Ab (4th Gen)    Hepatitis B surface antigen    HBcAB    Hepatitis B surface antibody    Hepatitis C antibody    Hepatitis A antibody, IgG    Strongyloides IgG Antibodies    Quantiferon Gold TB    RPR    Varicella zoster antibody, IgG    CBC Auto Differential    Comprehensive Metabolic Panel       Review of Systems   Skin:  Positive for itching and rash.       Objective:   Physical Exam   Constitutional: She appears well-developed and well-nourished. No distress.   Neurological: She is alert and oriented to person, place, and time. She is not disoriented.   Psychiatric: She has a normal mood and affect.   Skin:   Areas Examined (abnormalities noted in diagram):   Head / Face Inspection Performed  Neck Inspection Performed  Chest / Axilla Inspection Performed  Abdomen Inspection Performed  Back Inspection  Performed  RUE Inspected  LUE Inspection Performed  RLE Inspected  LLE Inspection Performed            Diagram Legend     Erythematous scaling macule/papule c/w actinic keratosis       Vascular papule c/w angioma      Pigmented verrucoid papule/plaque c/w seborrheic keratosis      Yellow umbilicated papule c/w sebaceous hyperplasia      Irregularly shaped tan macule c/w lentigo     1-2 mm smooth white papules consistent with Milia      Movable subcutaneous cyst with punctum c/w epidermal inclusion cyst      Subcutaneous movable cyst c/w pilar cyst      Firm pink to brown papule c/w dermatofibroma      Pedunculated fleshy papule(s) c/w skin tag(s)      Evenly pigmented macule c/w junctional nevus     Mildly variegated pigmented, slightly irregular-bordered macule c/w mildly atypical nevus      Flesh colored to evenly pigmented papule c/w intradermal nevus       Pink pearly papule/plaque c/w basal cell carcinoma      Erythematous hyperkeratotic cursted plaque c/w SCC      Surgical scar with no sign of skin cancer recurrence      Open and closed comedones      Inflammatory papules and pustules      Verrucoid papule consistent consistent with wart     Erythematous eczematous patches and plaques     Dystrophic onycholytic nail with subungual debris c/w onychomycosis     Umbilicated papule    Erythematous-base heme-crusted tan verrucoid plaque consistent with inflamed seborrheic keratosis     Erythematous Silvery Scaling Plaque c/w Psoriasis     See annotation      Assessment / Plan:        Lichen planus--diffuse and flaring  - widespread lichen planus of about 10-15% BSA. No oral or genital involvement at this time. Low suspicion for medications induced given no new meds prior to onset. Most recent was ozempic (about 1 year prior), so would recommend holding just in case. Too extensive for topical therapy only. S/p 1 month pred taper with improvement but relapse about 3 weeks after stopping.   - Discussed systemic  treatment options including nbUVB, MTX, MMF, plaquenil, retinoids, otezla, flagyl, Alissa, etc. All treatments are off label and have various possible side effects. She would like to consider the different options and will let me know when she decides which she would like to proceed with. Pre-immunosuppression labs in the meantime.  -     HIV 1/2 Ag/Ab (4th Gen); Future; Expected date: 01/08/2024  -     Hepatitis B surface antigen; Future; Expected date: 01/08/2024  -     HBcAB; Future; Expected date: 01/08/2024  -     Hepatitis B surface antibody; Future; Expected date: 01/08/2024  -     Hepatitis C antibody; Future; Expected date: 01/08/2024  -     Hepatitis A antibody, IgG; Future; Expected date: 01/08/2024  -     Strongyloides IgG Antibodies; Future; Expected date: 01/08/2024  -     Quantiferon Gold TB; Future; Expected date: 01/08/2024  -     RPR; Future; Expected date: 01/08/2024  -     Varicella zoster antibody, IgG; Future; Expected date: 01/08/2024  -     CBC Auto Differential; Future; Expected date: 01/08/2024  -     Comprehensive Metabolic Panel; Future; Expected date: 01/08/2024         F/u about 1 month after initiation of new medicatoin

## 2024-01-08 NOTE — PROGRESS NOTES
Telemedicine/Virtual Visit Documentation:     The patient location is: home    The chief complaint leading to consultation is: see HPI    VISIT TYPE X   Virtual visit with synchronous audio and video    Telephone E/M service      Total time spent with patient: see X irvin on chart below.   More than half of the time was spent counseling or coordinating care including prognosis, differential diagnosis, risks and benefits of treatment, instructions, compliance risk reductions     EST MINUTES X   42374 5    10448 10    [id number] 15    [id number] 25 X   [id number] 40    NEW     71052 10    30505 20    [id number] 30    [id number] 45    [id number] 60    PHONE      5-10    49601 11-20    98862 21-30      H&P  Orthopaedics      SUBJECTIVE:     History of Present Illness:  Patient is a 53 y.o. female with Left knee MRI review with mechanical symptoms.    Review of patient's allergies indicates:   Allergen Reactions    Cephalexin Hives, Anaphylaxis, Itching, Rash and Shortness Of Breath    Methohexital Shortness Of Breath     Other reaction(s): Difficulty breathing  Other reaction(s): Difficulty breathing    Penicillins Hives, Itching, Rash and Shortness Of Breath    Amlodipine Edema     Edema, palpitations  Edema, palpitations    Sulfa (sulfonamide antibiotics) Hives, Itching, Photosensitivity and Rash       Past Medical History:   Diagnosis Date    Arthritis     Arthritis     Breast cyst     Cough     Female infertility NEC     Headache(784.0)     Hyperlipidemia     Hypertension     Joint pain     Tinnitus 12/17/2013     Past Surgical History:   Procedure Laterality Date    DILATION AND CURETTAGE OF UTERUS      ESOPHAGOGASTRODUODENOSCOPY N/A 6/29/2023    Procedure: EGD (ESOPHAGOGASTRODUODENOSCOPY);  Surgeon: Eleazar Walker MD;  Location: [facility]COPY;  Service: Endoscopy;  Laterality: N/A;  pt last dose ozempic 6.16.23; not taking regularly; pt instructed to hold off on further ozempic inj until after procedure; pt verbalized understanding;  instr via portal; AP  pre call attempted -CE    JOINT REPLACEMENT  08/19/20    Right hip, anterior approach    KNEE SURGERY      acl repair as child/ revision 2013     Family History   Problem Relation Age of Onset    Hypertension Mother     Hyperlipidemia Mother     COPD Mother     Stroke Mother     Cancer Father         lung smoker    Lung cancer Father     Cancer Paternal Grandmother         unknown GYN cancer     Lung cancer Paternal Grandmother     Cancer Paternal Grandfather     Lung cancer Paternal Grandfather     Psoriasis Sister     Psoriasis Sister     No Known Problems Brother     No Known Problems Maternal Aunt     No Known Problems Maternal Uncle     No Known Problems Paternal Aunt     No Known Problems Paternal Uncle     No Known Problems Maternal Grandmother     No Known Problems Maternal Grandfather     Anemia Neg Hx     Arrhythmia Neg Hx     Asthma Neg Hx     Clotting disorder Neg Hx     Fainting Neg Hx     Heart attack Neg Hx     Heart disease Neg Hx     Heart failure Neg Hx     Stroke Neg Hx     Atrial Septal Defect Neg Hx     Breast cancer Neg Hx     Colon cancer Neg Hx     Ovarian cancer Neg Hx     Melanoma Neg Hx      Social History     Tobacco Use    Smoking status: Never    Smokeless tobacco: Never   Substance Use Topics    Alcohol use: Yes     Alcohol/week: 4.0 standard drinks of alcohol     Types: 4 Glasses of wine per week     Comment: socially    Drug use: No        Review of Systems:  Patient denies constitutional symptoms, cardiac symptoms, respiratory symptoms, GI symptoms.  The remainder of the musculoskeletal ROS is included in the HPI.      OBJECTIVE:     Physical Exam:  Gen:  No acute distress  CV:  Peripherally well-perfused.  Pulses 2+ bilaterally.  Lungs:  Normal respiratory effort.  Abdomen:  Soft, non-tender, non-distended  Head/Neck:  Normocephalic.  Atraumatic. No TTP, AROM and PROM intact without pain  Neuro:  CN intact without deficit, SILT throughout B/L Upper & Lower  Extremities    MSK:  No interval change    MRI Knee Without Contrast Left  Narrative: EXAMINATION:  MRI KNEE WITHOUT CONTRAST LEFT    CLINICAL HISTORY:  Knee pain, chronic, degenerative disease on xray (Age >= 5y);Pain in left knee    TECHNIQUE:  Multiplanar, multisequence images were performed about the knee.    COMPARISON:  Bilateral knee radiograph 12/11/2023.    FINDINGS:  Medial compartment: Diminutive appearance of the body segment and posterior horn of medial meniscus, compatible with history of partial meniscectomy.  However, there is a displaced fragment protruding into the joint space (5:16 and 6:11) suggesting a parrot beak tear.  Large areas of full-thickness cartilage loss over the weight-bearing femoral condyle and tibial plateau, with questionable subchondral bone marrow edema although this may be artifactual.    Lateral compartment: No meniscal tear. Moderate-sized partial to full-thickness cartilage defect over the posterior tibial plateau.  No subchondral bone marrow edema.    Patellofemoral compartment: Moderate-sized partial-thickness cartilage defect over the median patellar ridge and lateral facet.  Trochlear cartilage is maintained.  No subchondral bone marrow edema.    Tendons: Quadriceps, patellar, and popliteus tendons are intact.    Ligaments: Postoperative changes from remote ACL reconstruction.  Attenuation of the ACL graft with bowing at the roof of the intercondylar notch and indistinctness at the tibial attachment, concerning for partial tear.  PCL is intact.  Collateral ligaments are intact, noting postoperative changes from remote MCL reconstruction.    Bone: No fracture, osteonecrosis, or focal lesion.    Joint: Small joint effusion/synovitis.  Large Baker's cyst.  There is a focal region of susceptibility artifact in the Baker's cyst, suggesting a metallic loose body.  Impression: Postoperative changes from medial meniscectomy.  Parrot-beak tear involving the body segment and  posterior horn of medial meniscus.    Postoperative changes from remote ACL reconstruction, with partial tear of the ACL graft.    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    Small joint effusion/synovitis.    Large Baker's cyst containing a metallic loose body.    Electronically signed by resident: Estephanie Jackson  Date:    12/26/2023  Time:    13:58    Electronically signed by: Reno Harmon  Date:    12/26/2023  Time:    15:24        ASSESSMENT/PLAN:     A/P: Shelbie Ardon is a 53 y.o. Left knee with large popliteal cyst; the metal artifact from the previous staple removal is seen but I am not seeing the metal within the cyst. Discussed the possibility of aspiration of the cyst and concomitant steroid injection in the region as well as simultaneous Synvisc one injection with concomitant toradol injection (30mg). She does not want TKA at this time but does want to consider later knee arthroscopy for mechanical symptoms.    Plan:  Above plan with possible later Depuy pressfit TKA

## 2024-01-09 LAB — RPR SER QL: NORMAL

## 2024-01-09 NOTE — PLAN OF CARE
[facility]   Physical Therapy Initial Evaluation      Name: Shelbie Ardon  Clinic Number: [id number]    Therapy Diagnosis:   Encounter Diagnosis   Name Primary?    Left knee pain, unspecified chronicity         Physician: Vern Carrasquillo MD    Physician Orders: PT Eval and Treat L knee pain  Medical Diagnosis from Referral: Left knee pain, unspecified chronicity [M25.562]   Evaluation Date: 1/8/2024  Authorization Period Expiration: 12/31/24  Plan of Care Expiration: 4/8/24  Progress Note Due: 4/8/24  Visit # / Visits authorized: 1/ 1   FOTO: 1/3    Precautions: Standard     Time In: 1230  Time Out: 130  Total Appointment Time (timed & untimed codes): 60 minutes    Subjective     Date of onset: a few months ago     History of current condition - Shelbie reports: acute onset of L knee pain a few months ago after sustaining a fall on the knee. Pt reports after about a week she attempted to walk 1/2 mile, had inc in pain. Pt reports walking 3 miles/day 7 days/week. Pt also does yoga. Pt reports stairs has been painful descending>ascending and any direct pressure on the knee is painful. Pt has f/u with Dr. Carrasquillo to review MRI findings later today and discuss next steps. Pt would like to get back to walking and performing ADL's at [facility]    Falls: only one recently     Imaging: MRI studies: 12/26/28:   Impression:     Postoperative changes from medial meniscectomy.  Parrot-beak tear involving the body segment and posterior horn of medial meniscus.     Postoperative changes from remote ACL reconstruction, with partial tear of the ACL graft.     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  Small joint effusion/synovitis.  Large Baker's cyst containing a metallic loose body    Prior Therapy: PT following surgeries  Social History: Pt lives with their family  Occupation: Medical researcher   Prior Level of Function: Pt  independent with all ADLs, job responsibilities and participating in regular physical activity   Current Level of Function: Pt limited with ADL's, walking routine and avoiding routine physical therapy at [facility]    Pain:  Current 0/10, worst 8/10, best 0/10   Location: right knee   Description: Aching, Dull, Sharp, and Shooting  Aggravating Factors: Standing, Walking, Lifting, and Getting out of bed/chair  Easing Factors: pain medication and ice    Patients goals:      Medical History:   Past Medical History:   Diagnosis Date    Arthritis     Arthritis     Breast cyst     Cough     Female infertility NEC     Headache(784.0)     Hyperlipidemia     Hypertension     Joint pain     Tinnitus 12/17/2013       Surgical History:   Shelbie Ardon  has a past surgical history that includes Knee surgery; Dilation and curettage of uterus; Joint replacement (08/19/20); and Esophagogastroduodenoscopy (N/A, 6/29/2023).    Medications:   Shelbie has a current medication list which includes the following prescription(s): chlorthalidone, clindamycin, losartan, multivit with calcium,iron,min, fish oil-omega-3 fatty acids, and triamcinolone acetonide 0.1%.    Allergies:   Review of patient's allergies indicates:   Allergen Reactions    Cephalexin Hives, Anaphylaxis, Itching, Rash and Shortness Of Breath    Methohexital Shortness Of Breath     Other reaction(s): Difficulty breathing  Other reaction(s): Difficulty breathing    Penicillins Hives, Itching, Rash and Shortness Of Breath    Amlodipine Edema     Edema, palpitations  Edema, palpitations    Sulfa (sulfonamide antibiotics) Hives, Itching, Photosensitivity and Rash        Objective      Range of Motion:   Knee Right Left   Active 3-0-142 0-2-120   Passive 3-0-142 0-2-120     Lower Extremity Strength  Right LE  Left LE    Knee extension: 5/5 Knee extension: 3+/5   Knee flexion: 5/5 Knee flexion: 4/5   Hip flexion: 4/5 Hip flexion: 4/5   Hip extension:  4-/5 Hip extension: 3+/5  "  Hip abduction: 3+/5 Hip abduction: 3+/5   Hip adduction: 4/5 Hip adduction 4/5   Ankle dorsiflexion: 5/5 Ankle dorsiflexion: 5/5   Ankle plantarflexion: 5/5 Ankle plantarflexion: 4+/5     Special Tests:   Left Right   Valgus Stress Test - -   Varus Stress test - -   Lachman's test - -   Posterior Lachman - -   Kalpesh's Test - -   Apley's Compression - -   Patellar Grind Test + -     Function:    - SL Squat R: squats to 50 deg  - SL Squat L: squat to 30 deg 6/10 P!  - Squat: squats 50 deg shifts towards R  - Sit <--> Stand: shifts towards R      Joint Mobility: Restricted on L    Palpation: Pt TTP to lateral patella    Sensation: WNL    Edema: min on L    Intake Outcome Measure for FOTO Knee Survey    Therapist reviewed FOTO scores for Shelbie Ardon on 1/8/2024.   FOTO documents entered into [employer] - see Media section.         Treatment     Total Treatment time (time-based codes) separate from Evaluation: 31 minutes     Shelbie received the treatments listed below:      therapeutic exercises to develop strength, endurance, ROM, flexibility, posture, and core stabilization for 20 minutes including:    Quad sets 5" hold 2x20  SLR 3x10 3" hold  SL hip abduction 3x15  DL bridge 3x10  SL bridge x10  Pt education on activity modification, return to activity, POC, prognosis    manual therapy techniques: Joint mobilizations were applied to the: L knee for 11 minutes, including:    Patellar mobs all directions  Fat pad mob  Medial tib femoral rotational gapping       Patient Education and Home Exercises     Education provided:   - activity modification, return to activity, POC, prognosis    Written Home Exercises Provided: yes. Exercises were reviewed and Shelbie was able to demonstrate them prior to the end of the session.  Shelbie demonstrated good  understanding of the education provided. See EMR under Patient Instructions for exercises provided during therapy sessions.    Assessment     Shelbie is a 53 y.o. " female referred to outpatient Physical Therapy with a medical diagnosis of L knee pain. Pt presents with decreased strength, decreased ROM, decreased flexibility, faulty posture,, and increased pain. Due to impairments, pt is unable to ambulate, perform ADL's or tolerate physical actiivity.    Patient prognosis is Good.   Patient will benefit from skilled outpatient Physical Therapy to address the deficits stated above and in the chart below, provide patient /family education, and to maximize patientt's level of independence.     Plan of care discussed with patient: Yes  Patient's spiritual, cultural and educational needs considered and patient is agreeable to the plan of care and goals as stated below:     Anticipated Barriers for therapy: work schedule    Medical Necessity is demonstrated by the following  History  Co-morbidities and personal factors that may impact the plan of care [x] LOW: no personal factors / co-morbidities  [] MODERATE: 1-2 personal factors / co-morbidities  [] HIGH: 3+ personal factors / co-morbidities    Moderate / High Support Documentation:   Co-morbidities affecting plan of care: See medical chart    Personal Factors:   no deficits     Examination  Body Structures and Functions, activity limitations and participation restrictions that may impact the plan of care [x] LOW: addressing 1-2 elements  [] MODERATE: 3+ elements  [] HIGH: 4+ elements (please support below)    Moderate / High Support Documentation:      Clinical Presentation [x] LOW: stable  [] MODERATE: Evolving  [] HIGH: Unstable     Decision Making/ Complexity Score: low       Goals:  Short Term Goals: 3 weeks   Pt will be independent with HEP and report compliance at least 4 days/week  Pt will demonstrate improved knee flexion by 10 deg reporting no pain at end range  Pt will be able to perform 3x15 SLR without lag to demonstrate sufficient strength for amb    Long Term Goals: 12 weeks   Pt will be able to amb 3 miles on level  surface reporting no pain  Pt will be able to perform 15 SL squats w/ sound form reporting no pain  Pt will be able to perform all ADL's/ job duties at [facility] reporting no pain    Plan     Plan of care Certification: 1/8/2024 to 4/8/24.    Outpatient Physical Therapy 1 times weekly for 12 weeks to include the following interventions: Aquatic Therapy, Electrical Stimulation TENS/NMES, Gait Training, Manual Therapy, Moist Heat/ Ice, Neuromuscular Re-ed, Patient Education, Self Care, Therapeutic Activities, Therapeutic Exercise, and Dry Needling.     Sarkis Zayas, PT

## 2024-01-10 LAB — STRONGYLOIDES ANTIBODY IGG: NEGATIVE

## 2024-01-11 LAB
VARICELLA INTERPRETATION: POSITIVE
VARICELLA ZOSTER IGG: 542 AU/ML

## 2024-01-12 ENCOUNTER — PATIENT MESSAGE (OUTPATIENT)
Dept: DERMATOLOGY | Facility: CLINIC | Age: 54
End: 2024-01-12
Payer: COMMERCIAL

## 2024-01-17 ENCOUNTER — CLINICAL SUPPORT (OUTPATIENT)
Dept: REHABILITATION | Facility: HOSPITAL | Age: 54
End: 2024-01-17
Payer: COMMERCIAL

## 2024-01-17 DIAGNOSIS — M25.562 CHRONIC PAIN OF LEFT KNEE: ICD-10-CM

## 2024-01-17 DIAGNOSIS — M25.551 BILATERAL HIP PAIN: Primary | ICD-10-CM

## 2024-01-17 DIAGNOSIS — M25.552 BILATERAL HIP PAIN: Primary | ICD-10-CM

## 2024-01-17 DIAGNOSIS — Z74.09 IMPAIRED FUNCTIONAL MOBILITY, BALANCE, GAIT, AND ENDURANCE: ICD-10-CM

## 2024-01-17 DIAGNOSIS — G89.29 CHRONIC PAIN OF LEFT KNEE: ICD-10-CM

## 2024-01-17 DIAGNOSIS — R53.1 DECREASED STRENGTH: ICD-10-CM

## 2024-01-17 PROCEDURE — 97112 NEUROMUSCULAR REEDUCATION: CPT | Performed by: PHYSICAL THERAPIST

## 2024-01-17 PROCEDURE — 97530 THERAPEUTIC ACTIVITIES: CPT | Performed by: PHYSICAL THERAPIST

## 2024-01-17 PROCEDURE — 97140 MANUAL THERAPY 1/> REGIONS: CPT | Performed by: PHYSICAL THERAPIST

## 2024-01-17 NOTE — PROGRESS NOTES
"[facility]   Physical Therapy Treatment Note      Name: Shelbie Ardon  Clinic Number: [id number]    Therapy Diagnosis:   Encounter Diagnoses   Name Primary?    Bilateral hip pain Yes    Decreased strength     Impaired functional mobility, balance, gait, and endurance     Chronic pain of left knee      Physician: Vern Carrasquillo MD    Visit Date: 1/17/2024    Physician Orders: PT Eval and Treat L knee pain  Medical Diagnosis from Referral: Left knee pain, unspecified chronicity [M25.562]   Evaluation Date: 1/8/2024  Authorization Period Expiration: 12/31/24  Plan of Care Expiration: 4/8/24  Progress Note Due: 4/8/24  Visit # / Visits authorized: 1/ 20  FOTO: 1/3    Time In: 900  Time Out: 1000  Total Billable Time: 60 minutes  /  Subjective     Pt reports: knee has been feeling better since eval. Pt reports HEP going well walking 1 mile without issues.  She was compliant with home exercise program.  Response to previous treatment/ Functional change: Knee feeling good, no problems w/ HEP    Pain: 1/10  Location: left knee      Objective      Range of Motion:   Knee Right Left   Active 3-0-142 3-0-130   Passive 3-0-142 3-0-130        Treatment     Shelbie received the treatments listed below:      therapeutic exercises to develop strength, endurance, ROM, flexibility, posture, and core stabilization for 03 minutes including:     Prone quad stretch 3x30"    manual therapy techniques: Joint mobilizations were applied to the: L knee for 10 minutes, including:     Patellar mobs all directions  Fat pad mob  Medial tib femoral rotational gapping     neuromuscular re-education activities to improve: Balance, Coordination, Kinesthetic, Sense, Proprioception, and Posture for 34 minutes. The following activities were included:    DL bridge 3x10  GTB clamshell 2x20 5"  SLR 3x10 3" hold  SL ER 0# 2x20  SL hip abduction 0# 3x15    therapeutic activities to improve functional performance for 13  " "minutes, including:    Upright bike 7 min for LE endurance  6" step up 3x10  6" lateral step down 3x8      Patient Education and Home Exercises       Education provided:   - education on activity modification, return to activity, POC, prognosis    Written Home Exercises Provided: yes. Exercises were reviewed and Shelbie was able to demonstrate them prior to the end of the session.  Shelbie demonstrated good  understanding of the education provided. See EMR under Patient Instructions for exercises provided during therapy sessions    Assessment     Pt presents with improved knee extension, symmetrical to contralateral side. Pt tolerates progressed open/closed chain strengthening well, benefits from cues to avoid dynamic knee valgus. Pt will continue to benefit from quad, hip and core strength as tolerated.    Shelbie Is progressing well towards her goals.   Pt prognosis is Good.     Pt will continue to benefit from skilled outpatient physical therapy to address the deficits listed in the problem list box on initial evaluation, provide pt/family education and to maximize pt's level of independence in the home and community environment.     Pt's spiritual, cultural and educational needs considered and pt agreeable to plan of care and goals.     Anticipated barriers to physical therapy: work schedule, arthritic changes     Goals:     Short Term Goals: 3 weeks   Pt will be independent with HEP and report compliance at least 4 days/week  Pt will demonstrate improved knee flexion by 10 deg reporting no pain at end range  Pt will be able to perform 3x15 SLR without lag to demonstrate sufficient strength for amb     Long Term Goals: 12 weeks   Pt will be able to amb 3 miles on level surface reporting no pain  Pt will be able to perform 15 SL squats w/ sound form reporting no pain  Pt will be able to perform all ADL's/ job duties at [employer] reporting no pain    Plan     Continue quad strength as tolerated    Sarkis Zayas, PT "

## 2024-01-24 ENCOUNTER — CLINICAL SUPPORT (OUTPATIENT)
Dept: REHABILITATION | Facility: HOSPITAL | Age: 54
End: 2024-01-24
Payer: COMMERCIAL

## 2024-01-24 DIAGNOSIS — R53.1 DECREASED STRENGTH: ICD-10-CM

## 2024-01-24 DIAGNOSIS — Z74.09 IMPAIRED FUNCTIONAL MOBILITY, BALANCE, GAIT, AND ENDURANCE: ICD-10-CM

## 2024-01-24 DIAGNOSIS — M25.551 BILATERAL HIP PAIN: Primary | ICD-10-CM

## 2024-01-24 DIAGNOSIS — G89.29 CHRONIC PAIN OF LEFT KNEE: ICD-10-CM

## 2024-01-24 DIAGNOSIS — M25.562 CHRONIC PAIN OF LEFT KNEE: ICD-10-CM

## 2024-01-24 DIAGNOSIS — M25.552 BILATERAL HIP PAIN: Primary | ICD-10-CM

## 2024-01-24 PROCEDURE — 97112 NEUROMUSCULAR REEDUCATION: CPT | Performed by: PHYSICAL THERAPIST

## 2024-01-24 PROCEDURE — 97140 MANUAL THERAPY 1/> REGIONS: CPT | Performed by: PHYSICAL THERAPIST

## 2024-01-24 PROCEDURE — 97530 THERAPEUTIC ACTIVITIES: CPT | Performed by: PHYSICAL THERAPIST

## 2024-01-25 NOTE — PROGRESS NOTES
"[facility]   Physical Therapy Treatment Note      Name: Shelbie Ardon  Clinic Number: [id number]    Therapy Diagnosis:   Encounter Diagnoses   Name Primary?    Bilateral hip pain Yes    Decreased strength     Impaired functional mobility, balance, gait, and endurance      Physician: Vern Carrasquillo MD    Visit Date: 1/24/2024    Physician Orders: PT Eval and Treat L knee pain  Medical Diagnosis from Referral: Left knee pain, unspecified chronicity [M25.562]   Evaluation Date: 1/8/2024  Authorization Period Expiration: 12/31/24  Plan of Care Expiration: 4/8/24  Progress Note Due: 4/8/24  Visit # / Visits authorized: 2/ 20  FOTO: 1/3    Time In: 1200  Time Out: 100  Total Billable Time: 60 minutes    Subjective     Pt reports: having a little more soreness in knee since last visit. Walking 1 mile, HEP going well  She was compliant with home exercise program.  Response to previous treatment/ Functional change: Knee feeling good, no problems w/ HEP    Pain: 1/10  Location: left knee      Objective      Range of Motion:   Knee Right Left   Active 3-0-142 3-0-130   Passive 3-0-142 3-0-130        Treatment     Shelbie received the treatments listed below:      therapeutic exercises to develop strength, endurance, ROM, flexibility, posture, and core stabilization for 05 minutes including:     LLLD extension heel prop 4# 5 min  Prone quad stretch 3x30" - NP today    manual therapy techniques: Joint mobilizations were applied to the: L knee for 10 minutes, including:     Patellar mobs all directions  Fat pad mob  Medial tib femoral rotational gapping     neuromuscular re-education activities to improve: Balance, Coordination, Kinesthetic, Sense, Proprioception, and Posture for 35 minutes. The following activities were included:    DL bridge 3x10  GTB clamshell 2x20 5"  SLR 3x10 3" hold 1#  SL ER 0# 2x20  SL hip abduction 0# 3x15  OTB SLS anti rot press OTB 3x20    therapeutic activities to " "improve functional performance for 10  minutes, including:    Upright bike 7 min for LE endurance  DL anti rot press in mini squat 3x20 BTB    NP today  6" step up 3x10  6" lateral step down 3x8      Patient Education and Home Exercises       Education provided:   - education on activity modification, return to activity, POC, prognosis    Written Home Exercises Provided: yes. Exercises were reviewed and Shelbie was able to demonstrate them prior to the end of the session.  Shelbie demonstrated good  understanding of the education provided. See EMR under Patient Instructions for exercises provided during therapy sessions    Assessment     Pt presents with lacking terminal knee extension and min swelling to knee. Knee extension restored after manual/ LLLD. Held CKC strengthening today and focused on open chain strengthening for quad and hip. Will continue to progress strength as tolerated.    Shelbie Is progressing well towards her goals.   Pt prognosis is Good.     Pt will continue to benefit from skilled outpatient physical therapy to address the deficits listed in the problem list box on initial evaluation, provide pt/family education and to maximize pt's level of independence in the home and community environment.     Pt's spiritual, cultural and educational needs considered and pt agreeable to plan of care and goals.     Anticipated barriers to physical therapy: work schedule, arthritic changes     Goals:     Short Term Goals: 3 weeks   Pt will be independent with HEP and report compliance at least 4 days/week  Pt will demonstrate improved knee flexion by 10 deg reporting no pain at end range  Pt will be able to perform 3x15 SLR without lag to demonstrate sufficient strength for amb     Long Term Goals: 12 weeks   Pt will be able to amb 3 miles on level surface reporting no pain  Pt will be able to perform 15 SL squats w/ sound form reporting no pain  Pt will be able to perform all ADL's/ job duties at [employer] " reporting no pain    Plan     Continue quad strength as tolerated    Sarkis Zayas, PT

## 2024-01-31 ENCOUNTER — CLINICAL SUPPORT (OUTPATIENT)
Dept: REHABILITATION | Facility: HOSPITAL | Age: 54
End: 2024-01-31
Payer: COMMERCIAL

## 2024-01-31 DIAGNOSIS — M25.552 BILATERAL HIP PAIN: Primary | ICD-10-CM

## 2024-01-31 DIAGNOSIS — M25.551 BILATERAL HIP PAIN: Primary | ICD-10-CM

## 2024-01-31 DIAGNOSIS — R53.1 DECREASED STRENGTH: ICD-10-CM

## 2024-01-31 DIAGNOSIS — Z74.09 IMPAIRED FUNCTIONAL MOBILITY, BALANCE, GAIT, AND ENDURANCE: ICD-10-CM

## 2024-01-31 DIAGNOSIS — G89.29 CHRONIC PAIN OF LEFT KNEE: ICD-10-CM

## 2024-01-31 DIAGNOSIS — M25.562 CHRONIC PAIN OF LEFT KNEE: ICD-10-CM

## 2024-01-31 PROCEDURE — 97530 THERAPEUTIC ACTIVITIES: CPT | Performed by: PHYSICAL THERAPIST

## 2024-01-31 PROCEDURE — 97112 NEUROMUSCULAR REEDUCATION: CPT | Performed by: PHYSICAL THERAPIST

## 2024-01-31 PROCEDURE — 97140 MANUAL THERAPY 1/> REGIONS: CPT | Performed by: PHYSICAL THERAPIST

## 2024-02-01 NOTE — PROGRESS NOTES
"[facility]   Physical Therapy Treatment Note      Name: Shelbie Ardon  Clinic Number: [id number]    Therapy Diagnosis:   Encounter Diagnoses   Name Primary?    Bilateral hip pain Yes    Decreased strength     Impaired functional mobility, balance, gait, and endurance     Chronic pain of left knee      Physician: Vern Carrasquillo MD    Visit Date: 1/31/2024    Physician Orders: PT Eval and Treat L knee pain  Medical Diagnosis from Referral: Left knee pain, unspecified chronicity [M25.562]   Evaluation Date: 1/8/2024  Authorization Period Expiration: 12/31/24  Plan of Care Expiration: 4/8/24  Progress Note Due: 4/8/24  Visit # / Visits authorized: 2/ 20  FOTO: 1/3    Time In: 1200  Time Out: 1250  Total Billable Time: 50  minutes    Subjective     Pt reports: feeling better since last visit. Up to 2 miles without issues. HEP going well.  She was compliant with home exercise program.  Response to previous treatment/ Functional change: Knee feeling good, no problems w/ HEP    Pain: 1/10  Location: left knee      Objective      Range of Motion:   Knee Right Left   Active 3-0-142 3-0-130   Passive 3-0-142 3-0-130*    *100 deg in prone    Treatment     Shelbie received the treatments listed below:      therapeutic exercises to develop strength, endurance, ROM, flexibility, posture, and core stabilization for 3 minutes including:     LLLD extension heel prop 4# 5 min - NP today  Prone quad stretch 4x30"     manual therapy techniques: Joint mobilizations were applied to the: L knee for 10 minutes, including:     Patellar mobs all directions  Fat pad mob  Medial tib femoral rotational gapping     neuromuscular re-education activities to improve: Balance, Coordination, Kinesthetic, Sense, Proprioception, and Posture for 29 minutes. The following activities were included:    SL bridge 3x10  SLR 3x10 3" hold 0#  SL ER 2# 2x20  SL hip abduction 2# 3x15  Knee ext machine 15# 4x15    NP " "today    therapeutic activities to improve functional performance for 8  minutes, including:    Upright bike 8 min for LE endurance      NP today  DL anti rot press in mini squat 3x20 BTB  6" step up 3x10  6" lateral step down 3x8      Patient Education and Home Exercises       Education provided:   - education on activity modification, return to activity, POC, prognosis    Written Home Exercises Provided: yes. Exercises were reviewed and Shelbie was able to demonstrate them prior to the end of the session.  Shelbie demonstrated good  understanding of the education provided. See EMR under Patient Instructions for exercises provided during therapy sessions    Assessment     Pt presents with good carryover of extension from last visit. Pt tolerates progressed open chain strengthening well. Will gauge tolerance to strengthening and incorporate more CKC strengthening next visit if still feeling well.    Shelbie Is progressing well towards her goals.   Pt prognosis is Good.     Pt will continue to benefit from skilled outpatient physical therapy to address the deficits listed in the problem list box on initial evaluation, provide pt/family education and to maximize pt's level of independence in the home and community environment.     Pt's spiritual, cultural and educational needs considered and pt agreeable to plan of care and goals.     Anticipated barriers to physical therapy: work schedule, arthritic changes     Goals:     Short Term Goals: 3 weeks   Pt will be independent with HEP and report compliance at least 4 days/week  Pt will demonstrate improved knee flexion by 10 deg reporting no pain at end range  Pt will be able to perform 3x15 SLR without lag to demonstrate sufficient strength for amb     Long Term Goals: 12 weeks   Pt will be able to amb 3 miles on level surface reporting no pain  Pt will be able to perform 15 SL squats w/ sound form reporting no pain  Pt will be able to perform all ADL's/ job duties " at [employer] reporting no pain    Plan     Continue quad strength as tolerated    Sarkis Zayas, PT

## 2024-02-07 ENCOUNTER — CLINICAL SUPPORT (OUTPATIENT)
Dept: REHABILITATION | Facility: HOSPITAL | Age: 54
End: 2024-02-07
Payer: COMMERCIAL

## 2024-02-07 DIAGNOSIS — Z74.09 IMPAIRED FUNCTIONAL MOBILITY, BALANCE, GAIT, AND ENDURANCE: ICD-10-CM

## 2024-02-07 DIAGNOSIS — M25.562 CHRONIC PAIN OF LEFT KNEE: ICD-10-CM

## 2024-02-07 DIAGNOSIS — R53.1 DECREASED STRENGTH: ICD-10-CM

## 2024-02-07 DIAGNOSIS — M25.552 BILATERAL HIP PAIN: Primary | ICD-10-CM

## 2024-02-07 DIAGNOSIS — M25.551 BILATERAL HIP PAIN: Primary | ICD-10-CM

## 2024-02-07 DIAGNOSIS — G89.29 CHRONIC PAIN OF LEFT KNEE: ICD-10-CM

## 2024-02-07 PROCEDURE — 97140 MANUAL THERAPY 1/> REGIONS: CPT | Performed by: PHYSICAL THERAPIST

## 2024-02-07 PROCEDURE — 97112 NEUROMUSCULAR REEDUCATION: CPT | Performed by: PHYSICAL THERAPIST

## 2024-02-07 PROCEDURE — 97530 THERAPEUTIC ACTIVITIES: CPT | Performed by: PHYSICAL THERAPIST

## 2024-02-08 NOTE — PROGRESS NOTES
"[facility]   Physical Therapy Treatment Note      Name: Shelbie Ardon  Clinic Number: [id number]    Therapy Diagnosis:   Encounter Diagnoses   Name Primary?    Bilateral hip pain Yes    Decreased strength     Impaired functional mobility, balance, gait, and endurance     Chronic pain of left knee      Physician: Vern Carrasquillo MD    Visit Date: 2/7/2024    Physician Orders: PT Eval and Treat L knee pain  Medical Diagnosis from Referral: Left knee pain, unspecified chronicity [M25.562]   Evaluation Date: 1/8/2024  Authorization Period Expiration: 12/31/24  Plan of Care Expiration: 4/8/24  Progress Note Due: 4/8/24  Visit # / Visits authorized: 4/ 20  FOTO: 1/3    Time In: 1215  Time Out: 1000  Total Billable Time: 45 minutes    Subjective     Pt reports: felt good after last visit. Walking 2 miles without issues. HEP going well  She was compliant with home exercise program.  Response to previous treatment/ Functional change: Knee feeling good, no problems w/ HEP    Pain: 1/10  Location: left knee      Objective      Range of Motion:   Knee Right Left   Active 3-0-142 3-0-130   Passive 3-0-142 3-0-130*    *100 deg in prone    Treatment     Shelbie received the treatments listed below:      therapeutic exercises to develop strength, endurance, ROM, flexibility, posture, and core stabilization for 0 minutes including:     NP today  LLLD extension heel prop 4# 5 min -   Prone quad stretch 4x30"     manual therapy techniques: Joint mobilizations were applied to the: L knee for 10 minutes, including:     Patellar mobs all directions  Fat pad mob  Medial tib femoral rotational gapping     neuromuscular re-education activities to improve: Balance, Coordination, Kinesthetic, Sense, Proprioception, and Posture for 25 minutes. The following activities were included:    SL bridge 3x10  SLR 3x10 3" hold 2#  Knee ext machine 15# 4x15  YTB side steps 3 laps    NP today  SL ER 2# 2x20  SL hip " "abduction 2# 3x15    therapeutic activities to improve functional performance for 10  minutes, including:    DL squats 4x10  Retrowalking on treadmill up 4% grade 0.5 mph for TKE in stance 5 min    NP today  DL anti rot press in mini squat 3x20 BTB  6" step up 3x10  6" lateral step down 3x8  Upright bike 8 min for LE endurance      Patient Education and Home Exercises       Education provided:   - education on activity modification, return to activity, POC, prognosis    Written Home Exercises Provided: yes. Exercises were reviewed and Shelbie was able to demonstrate them prior to the end of the session.  Shelbie demonstrated good  understanding of the education provided. See EMR under Patient Instructions for exercises provided during therapy sessions    Assessment     Pt with full extension. Pt tolerates progressed open/closed chain strengthening well. Pt instructed to continue working on TKE with gait. Pt will continue to progress strengthening as tolerated.    Shelbie Is progressing well towards her goals.   Pt prognosis is Good.     Pt will continue to benefit from skilled outpatient physical therapy to address the deficits listed in the problem list box on initial evaluation, provide pt/family education and to maximize pt's level of independence in the home and community environment.     Pt's spiritual, cultural and educational needs considered and pt agreeable to plan of care and goals.     Anticipated barriers to physical therapy: work schedule, arthritic changes     Goals:     Short Term Goals: 3 weeks   Pt will be independent with HEP and report compliance at least 4 days/week  Pt will demonstrate improved knee flexion by 10 deg reporting no pain at end range  Pt will be able to perform 3x15 SLR without lag to demonstrate sufficient strength for amb     Long Term Goals: 12 weeks   Pt will be able to amb 3 miles on level surface reporting no pain  Pt will be able to perform 15 SL squats w/ sound form " reporting no pain  Pt will be able to perform all ADL's/ job duties at [employer] reporting no pain    Plan     Continue quad strength as tolerated    Sarkis Zayas, PT

## 2024-02-21 ENCOUNTER — CLINICAL SUPPORT (OUTPATIENT)
Dept: REHABILITATION | Facility: HOSPITAL | Age: 54
End: 2024-02-21
Payer: COMMERCIAL

## 2024-02-21 DIAGNOSIS — Z74.09 IMPAIRED FUNCTIONAL MOBILITY, BALANCE, GAIT, AND ENDURANCE: ICD-10-CM

## 2024-02-21 DIAGNOSIS — R53.1 DECREASED STRENGTH: ICD-10-CM

## 2024-02-21 DIAGNOSIS — M25.552 BILATERAL HIP PAIN: Primary | ICD-10-CM

## 2024-02-21 DIAGNOSIS — M25.562 CHRONIC PAIN OF LEFT KNEE: ICD-10-CM

## 2024-02-21 DIAGNOSIS — M25.551 BILATERAL HIP PAIN: Primary | ICD-10-CM

## 2024-02-21 DIAGNOSIS — G89.29 CHRONIC PAIN OF LEFT KNEE: ICD-10-CM

## 2024-02-21 PROCEDURE — 97140 MANUAL THERAPY 1/> REGIONS: CPT | Performed by: PHYSICAL THERAPIST

## 2024-02-21 PROCEDURE — 97112 NEUROMUSCULAR REEDUCATION: CPT | Performed by: PHYSICAL THERAPIST

## 2024-02-21 PROCEDURE — 97110 THERAPEUTIC EXERCISES: CPT | Performed by: PHYSICAL THERAPIST

## 2024-02-21 NOTE — PROGRESS NOTES
"[facility]   Physical Therapy Treatment Note      Name: Shelbie Ardon  Clinic Number: [id number]    Therapy Diagnosis:   Encounter Diagnoses   Name Primary?    Bilateral hip pain Yes    Decreased strength     Impaired functional mobility, balance, gait, and endurance     Chronic pain of left knee      Physician: Vern Carrasquillo MD    Visit Date: 2/21/2024    Physician Orders: PT Eval and Treat L knee pain  Medical Diagnosis from Referral: Left knee pain, unspecified chronicity [M25.562]   Evaluation Date: 1/8/2024  Authorization Period Expiration: 12/31/24  Plan of Care Expiration: 4/8/24  Progress Note Due: 4/8/24  Visit # / Visits authorized: 5/ 20  FOTO: 1/3    Time In: 1200  Time Out: 1253  Total Billable Time: 53 minutes    Subjective     Pt reports: went on 1-2 mile walk with her boss, didn't have a chance to do warm up, knee feeling sore today. HEP going well overall   She was compliant with home exercise program.  Response to previous treatment/ Functional change: Knee feeling good, no problems w/ HEP    Pain: 1/10  Location: left knee      Objective      Range of Motion:   Knee Right Left   Active 3-0-142 3-0-130   Passive 3-0-142 3-0-130*    *120 deg in prone    Treatment     Shelbie received the treatments listed below:      therapeutic exercises to develop strength, endurance, ROM, flexibility, posture, and core stabilization for 5 minutes including:     Prone quad stretch 4x30"     NP today  LLLD extension heel prop 4# 5 min -       manual therapy techniques: Joint mobilizations were applied to the: L knee for 10 minutes, including:     Patellar mobs all directions  Fat pad mob  Medial tib femoral rotational gapping     neuromuscular re-education activities to improve: Balance, Coordination, Kinesthetic, Sense, Proprioception, and Posture for 25 minutes. The following activities were included:    SL bridge 3x10  SLR 3x10 3" hold 0#  Knee ext machine 17.5# 4x15  YTB " "side steps 3 laps  YTB standing clam 3x20    NP today  SL ER 2# 2x20  SL hip abduction 2# 3x15    therapeutic activities to improve functional performance for 14 minutes, including:    Upright bike 8 min for LE endurance  5" step up 3x10  5" lateral step down  2x10    NP today  DL anti rot press in mini squat 3x20 BTB  6" step up 3x10  6" lateral step down 3x8  DL squats 4x10  Retrowalking on treadmill up 4% grade 0.5 mph for TKE in stance 5 min      Patient Education and Home Exercises       Education provided:   - education on activity modification, return to activity, POC, prognosis    Written Home Exercises Provided: yes. Exercises were reviewed and Shelbie was able to demonstrate them prior to the end of the session.  Shelbie demonstrated good  understanding of the education provided. See EMR under Patient Instructions for exercises provided during therapy sessions    Assessment     Pt with good carryover of extension, prone flexion progressing well. Pt tolerates all open chain strengthening well today. Tolerates step up to but inc pain with lateral step down. Pt demonstrates dynamic knee valgus/ femoral IR when more challenged. Will continue to progress hip / quad strength as tolerated.    Shelbie Is progressing well towards her goals.   Pt prognosis is Good.     Pt will continue to benefit from skilled outpatient physical therapy to address the deficits listed in the problem list box on initial evaluation, provide pt/family education and to maximize pt's level of independence in the home and community environment.     Pt's spiritual, cultural and educational needs considered and pt agreeable to plan of care and goals.     Anticipated barriers to physical therapy: work schedule, arthritic changes     Goals:     Short Term Goals: 3 weeks   Pt will be independent with HEP and report compliance at least 4 days/week  Pt will demonstrate improved knee flexion by 10 deg reporting no pain at end range  Pt will be " able to perform 3x15 SLR without lag to demonstrate sufficient strength for amb     Long Term Goals: 12 weeks   Pt will be able to amb 3 miles on level surface reporting no pain  Pt will be able to perform 15 SL squats w/ sound form reporting no pain  Pt will be able to perform all ADL's/ job duties at [employer] reporting no pain    Plan     Continue quad strength as tolerated    Sarkis Zayas, PT

## 2024-02-28 ENCOUNTER — CLINICAL SUPPORT (OUTPATIENT)
Dept: REHABILITATION | Facility: HOSPITAL | Age: 54
End: 2024-02-28
Payer: COMMERCIAL

## 2024-02-28 DIAGNOSIS — G89.29 CHRONIC PAIN OF LEFT KNEE: Primary | ICD-10-CM

## 2024-02-28 DIAGNOSIS — M25.562 CHRONIC PAIN OF LEFT KNEE: Primary | ICD-10-CM

## 2024-02-28 PROCEDURE — 97112 NEUROMUSCULAR REEDUCATION: CPT | Performed by: PHYSICAL THERAPIST

## 2024-02-28 PROCEDURE — 97140 MANUAL THERAPY 1/> REGIONS: CPT | Performed by: PHYSICAL THERAPIST

## 2024-02-29 PROBLEM — M25.562 CHRONIC PAIN OF LEFT KNEE: Status: ACTIVE | Noted: 2024-02-29

## 2024-02-29 PROBLEM — G89.29 CHRONIC PAIN OF LEFT KNEE: Status: ACTIVE | Noted: 2024-02-29

## 2024-03-06 ENCOUNTER — CLINICAL SUPPORT (OUTPATIENT)
Dept: REHABILITATION | Facility: HOSPITAL | Age: 54
End: 2024-03-06
Payer: COMMERCIAL

## 2024-03-06 DIAGNOSIS — G89.29 CHRONIC PAIN OF LEFT KNEE: Primary | ICD-10-CM

## 2024-03-06 DIAGNOSIS — M25.562 CHRONIC PAIN OF LEFT KNEE: Primary | ICD-10-CM

## 2024-03-06 PROCEDURE — 97530 THERAPEUTIC ACTIVITIES: CPT | Performed by: PHYSICAL THERAPIST

## 2024-03-06 PROCEDURE — 97112 NEUROMUSCULAR REEDUCATION: CPT | Performed by: PHYSICAL THERAPIST

## 2024-03-06 PROCEDURE — 97140 MANUAL THERAPY 1/> REGIONS: CPT | Performed by: PHYSICAL THERAPIST

## 2024-03-11 NOTE — PROGRESS NOTES
"[facility]   Physical Therapy Treatment Note      Name: Shelbie Ardon  Clinic Number: [id number]    Therapy Diagnosis:   Encounter Diagnosis   Name Primary?    Chronic pain of left knee Yes     Physician: Vern Carrasquillo MD    Visit Date: 3/6/2024    Physician Orders: PT Eval and Treat L knee pain  Medical Diagnosis from Referral: Left knee pain, unspecified chronicity [M25.562]   Evaluation Date: 1/8/2024  Authorization Period Expiration: 12/31/24  Plan of Care Expiration: 4/8/24  Progress Note Due: 4/8/24  Visit # / Visits authorized: 7/ 20  FOTO: 1/3    Time In: 1200  Time Out: 100  Total Billable Time: 60 minutes    Subjective     Pt reports: knee feeling good while walking. More aware of foot posture while walking/ doing stairs  She was compliant with home exercise program.  Response to previous treatment/ Functional change: Knee feeling good, no problems w/ HEP    Pain: 1/10  Location: left knee      Objective      Range of Motion:   Knee Right Left   Active 3-0-142 3-0-130   Passive 3-0-142 3-0-130*    *120 deg in prone    [facility] DF:  R: 30 deg  L: 18 deg    Treatment     Shelbie received the treatments listed below:      therapeutic exercises to develop strength, endurance, ROM, flexibility, posture, and core stabilization for 5 minutes including:    Kneeling DF mob 3x20    NP today  LLLD extension heel prop 4# 5 min -   Prone quad stretch 4x30"     manual therapy techniques: Joint mobilizations were applied to the: L knee for 15 minutes, including:     Gr V TC distraction  Gr IV TC post glide  Gr IV Great toe dorsal glide  Gr IV Great toe distraction  Patellar mobs all directions  Fat pad mob  Medial tib femoral rotational gapping     neuromuscular re-education activities to improve: Balance, Coordination, Kinesthetic, Sense, Proprioception, and Posture for 26 minutes. The following activities were included:    SL bridge 3x10  SLR 3x10 3" hold 0#  YTB side steps 3 laps  Arch " "doming 3x20 5" hold  Great toe flexion GTB 3x10  Great toe flexion on GTB w/ march over 3x10      NP today    Ankle supination against OTB 3x10  Seated heel raise cues for great toe ext 3x10  SL ER 2# 2x20  SL hip abduction 2# 3x15  Knee ext machine 17.5# 4x15  YTB standing clam 3x20    therapeutic activities to improve functional performance for 14 minutes, including:    Elliptical 8 min for LE endurance  3" forward step down 3x10    NP today  5" step up 3x10  5" lateral step down  2x10  DL anti rot press in mini squat 3x20 BTB  6" step up 3x10  6" lateral step down 3x8  DL squats 4x10  Retrowalking on treadmill up 4% grade 0.5 mph for TKE in stance 5 min      Patient Education and Home Exercises       Education provided:   - education on activity modification, return to activity, POC, prognosis    Written Home Exercises Provided: yes. Exercises were reviewed and Shelbie was able to demonstrate them prior to the end of the session.  Shelbie demonstrated good  understanding of the education provided. See EMR under Patient Instructions for exercises provided during therapy sessions    Assessment     Pt with good carryover of foot intrinsic control. Pt benefits from band cue at first toe to improve proprioception at foot/ankle. Will continue to progress ankle mobility and push off gait mechanics as tolerated.    Shelbie Is progressing well towards her goals.   Pt prognosis is Good.     Pt will continue to benefit from skilled outpatient physical therapy to address the deficits listed in the problem list box on initial evaluation, provide pt/family education and to maximize pt's level of independence in the home and community environment.     Pt's spiritual, cultural and educational needs considered and pt agreeable to plan of care and goals.     Anticipated barriers to physical therapy: work schedule, arthritic changes     Goals:     Short Term Goals: 3 weeks   Pt will be independent with HEP and report compliance " at least 4 days/week  Pt will demonstrate improved knee flexion by 10 deg reporting no pain at end range  Pt will be able to perform 3x15 SLR without lag to demonstrate sufficient strength for amb     Long Term Goals: 12 weeks   Pt will be able to amb 3 miles on level surface reporting no pain  Pt will be able to perform 15 SL squats w/ sound form reporting no pain  Pt will be able to perform all ADL's/ job duties at [employer] reporting no pain    Plan     Continue quad strength as tolerated    Sarkis Zayas, PT

## 2024-03-20 ENCOUNTER — CLINICAL SUPPORT (OUTPATIENT)
Dept: REHABILITATION | Facility: HOSPITAL | Age: 54
End: 2024-03-20
Payer: COMMERCIAL

## 2024-03-20 DIAGNOSIS — G89.29 CHRONIC PAIN OF LEFT KNEE: Primary | ICD-10-CM

## 2024-03-20 DIAGNOSIS — M25.562 CHRONIC PAIN OF LEFT KNEE: Primary | ICD-10-CM

## 2024-03-20 PROCEDURE — 97530 THERAPEUTIC ACTIVITIES: CPT | Performed by: PHYSICAL THERAPIST

## 2024-03-20 PROCEDURE — 97110 THERAPEUTIC EXERCISES: CPT | Performed by: PHYSICAL THERAPIST

## 2024-03-20 PROCEDURE — 97112 NEUROMUSCULAR REEDUCATION: CPT | Performed by: PHYSICAL THERAPIST

## 2024-03-20 PROCEDURE — 97140 MANUAL THERAPY 1/> REGIONS: CPT | Performed by: PHYSICAL THERAPIST

## 2024-03-22 NOTE — PROGRESS NOTES
"[facility]   Physical Therapy Treatment Note      Name: Shelbie Ardon  Clinic Number: [id number]    Therapy Diagnosis:   Encounter Diagnosis   Name Primary?    Chronic pain of left knee Yes     Physician: Vern Carrasquillo MD    Visit Date: 3/20/2024    Physician Orders: PT Eval and Treat L knee pain  Medical Diagnosis from Referral: Left knee pain, unspecified chronicity [M25.562]   Evaluation Date: 1/8/2024  Authorization Period Expiration: 12/31/24  Plan of Care Expiration: 4/8/24  Progress Note Due: 4/8/24  Visit # / Visits authorized: 8/ 20  FOTO: 1/3    Time In: 1200  Time Out: 100  Total Billable Time: 60 minutes    Subjective     Pt reports: knee has been feeling good while walking. Was on vacation and did a lot of walking in sandals, some inc pain at great toe.  She was compliant with home exercise program.  Response to previous treatment/ Functional change: Knee feeling good, no problems w/ HEP    Pain: 1/10  Location: left knee      Objective      Range of Motion:   Knee Right Left   Active 3-0-142 3-0-130   Passive 3-0-142 3-0-130*    *120 deg in prone    [facility] DF:  R: 30 deg  L: 18 deg    Treatment     Shelbie received the treatments listed below:      therapeutic exercises to develop strength, endurance, ROM, flexibility, posture, and core stabilization for 10 minutes including:    Gastroc wedge stretch 3x30"  Kneeling DF mob 3x20  Great toe mob in sitting against wall 5" hold x20    NP today  LLLD extension heel prop 4# 5 min -   Prone quad stretch 4x30"     manual therapy techniques: Joint mobilizations were applied to the: L knee for 15 minutes, including:     Gr V TC distraction  Gr IV TC post glide  Gr IV Great toe dorsal glide  Gr IV Great toe distraction  Patellar mobs all directions  Fat pad mob  Medial tib femoral rotational gapping     neuromuscular re-education activities to improve: Balance, Coordination, Kinesthetic, Sense, Proprioception, and Posture for 15 " "minutes. The following activities were included:    Quad set 5" hold x20  SLR 3x10 3" hold 0#  Knee ext machine 17.5# 4x15      NP today  YTB side steps 3 laps  Arch doming 3x20 5" hold  SL ER 2# 2x20  SL hip abduction 2# 3x15  YTB standing clam 3x20  SL bridge 3x10    therapeutic activities to improve functional performance for 20 minutes, including:    Sneaky lunge 3x10  6" lateral step down  4x8  Sled push 90# additional pounds 2 laps    NP today  Elliptical 8 min for LE endurance  5" step up 3x10  3" forward step down     Patient Education and Home Exercises       Education provided:   - education on activity modification, return to activity, POC, prognosis    Written Home Exercises Provided: yes. Exercises were reviewed and Shelbie was able to demonstrate them prior to the end of the session.  Shelbie demonstrated good  understanding of the education provided. See EMR under Patient Instructions for exercises provided during therapy sessions    Assessment     Pt presents w/ fairly good DF but limited great toe mobility. Pt tolerating progressed loading well today. Pt educated on mobility exercises to be performed at gym. Will continue to progress ankle/foot mobility and LE strengthening as tolerated    Shelbie Is progressing well towards her goals.   Pt prognosis is Good.     Pt will continue to benefit from skilled outpatient physical therapy to address the deficits listed in the problem list box on initial evaluation, provide pt/family education and to maximize pt's level of independence in the home and community environment.     Pt's spiritual, cultural and educational needs considered and pt agreeable to plan of care and goals.     Anticipated barriers to physical therapy: work schedule, arthritic changes     Goals:     Short Term Goals: 3 weeks   Pt will be independent with HEP and report compliance at least 4 days/week  Pt will demonstrate improved knee flexion by 10 deg reporting no pain at end " range  Pt will be able to perform 3x15 SLR without lag to demonstrate sufficient strength for amb     Long Term Goals: 12 weeks   Pt will be able to amb 3 miles on level surface reporting no pain  Pt will be able to perform 15 SL squats w/ sound form reporting no pain  Pt will be able to perform all ADL's/ job duties at [employer] reporting no pain    Plan     Continue quad strength as tolerated    Sarkis Zayas, PT

## 2024-03-25 ENCOUNTER — CLINICAL SUPPORT (OUTPATIENT)
Dept: REHABILITATION | Facility: HOSPITAL | Age: 54
End: 2024-03-25
Payer: COMMERCIAL

## 2024-03-25 DIAGNOSIS — M25.562 CHRONIC PAIN OF LEFT KNEE: Primary | ICD-10-CM

## 2024-03-25 DIAGNOSIS — G89.29 CHRONIC PAIN OF LEFT KNEE: Primary | ICD-10-CM

## 2024-03-25 PROCEDURE — 97530 THERAPEUTIC ACTIVITIES: CPT | Performed by: PHYSICAL THERAPIST

## 2024-03-25 PROCEDURE — 97140 MANUAL THERAPY 1/> REGIONS: CPT | Performed by: PHYSICAL THERAPIST

## 2024-03-25 PROCEDURE — 97112 NEUROMUSCULAR REEDUCATION: CPT | Performed by: PHYSICAL THERAPIST

## 2024-03-26 NOTE — PROGRESS NOTES
"[facility]   Physical Therapy Treatment Note      Name: Shelbie Ardon  Clinic Number: [id number]    Therapy Diagnosis:   Encounter Diagnosis   Name Primary?    Chronic pain of left knee Yes     Physician: Vern Carrasquillo MD    Visit Date: 3/25/2024    Physician Orders: PT Eval and Treat L knee pain  Medical Diagnosis from Referral: Left knee pain, unspecified chronicity [M25.562]   Evaluation Date: 1/8/2024  Authorization Period Expiration: 12/31/24  Plan of Care Expiration: 4/8/24  Progress Note Due: 4/8/24  Visit # / Visits authorized: 9/ 20  FOTO: 1/3    Time In: 1200  Time Out: 100  Total Billable Time: 60 minutes    Subjective     Pt reports: walking has been going well. Pt reports she has continued to do foot HEP at desk, feels it has been helping. Pt reports she would like to work on functional lifting, feels limited lifting things with knees vs back  She was compliant with home exercise program.  Response to previous treatment/ Functional change: walking going well    Pain: 1/10  Location: left knee      Objective      Range of Motion:   Knee Right Left   Active 3-0-142 3-0-130   Passive 3-0-142 3-0-130*    *120 deg in prone    [facility] DF:  R: 30 deg  L: 18 deg    Treatment     Shelbie received the treatments listed below:      therapeutic exercises to develop strength, endurance, ROM, flexibility, posture, and core stabilization for 00 minutes including:      NP today  LLLD extension heel prop 4# 5 min -   Prone quad stretch 4x30"   Gastroc wedge stretch 3x30"  Kneeling DF mob 3x20  Great toe mob in sitting against wall 5" hold x20    manual therapy techniques: Joint mobilizations were applied to the: L knee for 15 minutes, including:     Gr V TC distraction  Gr IV TC post glide  Gr IV Great toe dorsal glide  Gr IV Great toe distraction  Patellar mobs all directions  Fat pad mob  Medial tib femoral rotational gapping     neuromuscular re-education activities to improve: " "Balance, Coordination, Kinesthetic, Sense, Proprioception, and Posture for 17 minutes. The following activities were included:    Quad set 5" hold x20  SLR 3x10 3" hold 0#  Front plank 3x10"  Knee ext machine 17.5# 4x15      NP today  YTB side steps 3 laps  Arch doming 3x20 5" hold  SL ER 2# 2x20  SL hip abduction 2# 3x15  YTB standing clam 3x20  SL bridge 3x10    therapeutic activities to improve functional performance for 28 minutes, including:    Dowel hip hinge 3x20   SLDL w/ dowel assist 3x10  KB deadlift 35# 4x10  Pt education on neutral spine, lifting mechanics, activity modification    NP today  Elliptical 8 min for LE endurance  5" step up 3x10  3" forward step down   Sneaky lunge 3x10  6" lateral step down  4x8  Sled push 90# additional pounds 2 laps    Patient Education and Home Exercises       Education provided:   - education on activity modification, return to activity, POC, prognosis    Written Home Exercises Provided: yes. Exercises were reviewed and Shelbie was able to demonstrate them prior to the end of the session.  Shelbie demonstrated good  understanding of the education provided. See EMR under Patient Instructions for exercises provided during therapy sessions    Assessment     Pt demonstrates good carryover of knee extension ROM slightly improved great toe extension. Pt tolerates hip hinge loading well, benefits from dowel cues to improve neutral spine. Will continue to progress functional strength progressions as tolerated.    Shelbie Is progressing well towards her goals.   Pt prognosis is Good.     Pt will continue to benefit from skilled outpatient physical therapy to address the deficits listed in the problem list box on initial evaluation, provide pt/family education and to maximize pt's level of independence in the home and community environment.     Pt's spiritual, cultural and educational needs considered and pt agreeable to plan of care and goals.     Anticipated barriers to " physical therapy: work schedule, arthritic changes     Goals:     Short Term Goals: 3 weeks   Pt will be independent with HEP and report compliance at least 4 days/week  Pt will demonstrate improved knee flexion by 10 deg reporting no pain at end range  Pt will be able to perform 3x15 SLR without lag to demonstrate sufficient strength for amb     Long Term Goals: 12 weeks   Pt will be able to amb 3 miles on level surface reporting no pain  Pt will be able to perform 15 SL squats w/ sound form reporting no pain  Pt will be able to perform all ADL's/ job duties at [employer] reporting no pain    Plan     Continue quad strength as tolerated    Sarkis Zayas, PT

## 2024-04-03 ENCOUNTER — CLINICAL SUPPORT (OUTPATIENT)
Dept: REHABILITATION | Facility: HOSPITAL | Age: 54
End: 2024-04-03
Payer: COMMERCIAL

## 2024-04-03 DIAGNOSIS — G89.29 CHRONIC PAIN OF LEFT KNEE: Primary | ICD-10-CM

## 2024-04-03 DIAGNOSIS — M25.562 CHRONIC PAIN OF LEFT KNEE: Primary | ICD-10-CM

## 2024-04-03 PROCEDURE — 97112 NEUROMUSCULAR REEDUCATION: CPT | Performed by: PHYSICAL THERAPIST

## 2024-04-03 PROCEDURE — 97530 THERAPEUTIC ACTIVITIES: CPT | Performed by: PHYSICAL THERAPIST

## 2024-04-03 NOTE — PROGRESS NOTES
"[facility]   Physical Therapy Treatment Note      Name: Shelbie Ardon  Clinic Number: [id number]    Therapy Diagnosis:   Encounter Diagnosis   Name Primary?    Chronic pain of left knee Yes     Physician: Vern Carrasquillo MD    Visit Date: 4/3/2024    Physician Orders: PT Eval and Treat L knee pain  Medical Diagnosis from Referral: Left knee pain, unspecified chronicity [M25.562]   Evaluation Date: 1/8/2024  Authorization Period Expiration: 12/31/24  Plan of Care Expiration: 4/8/24  Progress Note Due: 4/8/24  Visit # / Visits authorized: 10/ 20  FOTO: 1/3    Time In: 1200  Time Out: 100  Total Billable Time: 60 minutes    Subjective     Pt reports: tolerated last visit well. Utilized hip hinge form while gardening/ pulling weeds over the weekend, tolerated well. Pt feels good about knees and ankle HEP, fee  She was compliant with home exercise program.  Response to previous treatment/ Functional change: walking going well    Pain: 1/10  Location: left knee      Objective      Range of Motion:   Knee Right Left   Active 3-0-142 3-0-130   Passive 3-0-142 3-0-130*    *120 deg in prone    CK DF:  R: 30 deg  L: 18 deg    Treatment     Shelbie received the treatments listed below:      therapeutic exercises to develop strength, endurance, ROM, flexibility, posture, and core stabilization for 00 minutes including:      NP today  LLLD extension heel prop 4# 5 min -   Prone quad stretch 4x30"   Gastroc wedge stretch 3x30"  Kneeling DF mob 3x20  Great toe mob in sitting against wall 5" hold x20    manual therapy techniques: Joint mobilizations were applied to the: L knee for 15 minutes, including:     Gr V TC distraction  Gr IV TC post glide  Gr IV Great toe dorsal glide  Gr IV Great toe distraction  Patellar mobs all directions  Fat pad mob  Medial tib femoral rotational gapping     neuromuscular re-education activities to improve: Balance, Coordination, Kinesthetic, Sense, Proprioception, " "and Posture for 17 minutes. The following activities were included:    Quad set 5" hold x20  SLR 3x10 3" hold 0#  Knee ext machine 17.5# 4x15  Toe yoga 2x20  Great toe ext mob in standing 5" hold 2x20  Heel raises 3x10    NP today  YTB side steps 3 laps  Arch doming 3x20 5" hold  SL ER 2# 2x20  SL hip abduction 2# 3x15  YTB standing clam 3x20  SL bridge 3x10  Front plank 3x10"    therapeutic activities to improve functional performance for 43 minutes, including:    Dowel hip hinge 3x20   SLDL w/ dowel assist 3x10  KB deadlift 35# 4x10  Slovenian split squat 3x10  Pistol squat to 24" box 3x10  Pt education on neutral spine, lifting mechanics, activity modification    NP today  Elliptical 8 min for LE endurance  5" step up 3x10  3" forward step down   Sneaky lunge 3x10  6" lateral step down  4x8  Sled push 90# additional pounds 2 laps    Patient Education and Home Exercises       Education provided:   - education on activity modification, return to activity, POC, prognosis    Written Home Exercises Provided: yes. Exercises were reviewed and Shelbie was able to demonstrate them prior to the end of the session.  Shelbie demonstrated good  understanding of the education provided. See EMR under Patient Instructions for exercises provided during therapy sessions    Assessment     Pt tolerates progressed functional strengthening well today. Improved great toe flexion demonstrated on heel raise and self mobilization. Pt educated on progressing exercise in gym and in her pool. Will initiate transition to independent HEP over the next few weeks    Shelbie Is progressing well towards her goals.   Pt prognosis is Good.     Pt will continue to benefit from skilled outpatient physical therapy to address the deficits listed in the problem list box on initial evaluation, provide pt/family education and to maximize pt's level of independence in the home and community environment.     Pt's spiritual, cultural and educational needs " considered and pt agreeable to plan of care and goals.     Anticipated barriers to physical therapy: work schedule, arthritic changes     Goals:     Short Term Goals: 3 weeks   Pt will be independent with HEP and report compliance at least 4 days/week  Pt will demonstrate improved knee flexion by 10 deg reporting no pain at end range  Pt will be able to perform 3x15 SLR without lag to demonstrate sufficient strength for amb     Long Term Goals: 12 weeks   Pt will be able to amb 3 miles on level surface reporting no pain  Pt will be able to perform 15 SL squats w/ sound form reporting no pain  Pt will be able to perform all ADL's/ job duties at [employer] reporting no pain    Plan     Continue quad strength as tolerated    Sarkis Zayas, PT

## 2024-04-10 ENCOUNTER — CLINICAL SUPPORT (OUTPATIENT)
Dept: REHABILITATION | Facility: HOSPITAL | Age: 54
End: 2024-04-10
Payer: COMMERCIAL

## 2024-04-10 DIAGNOSIS — G89.29 CHRONIC PAIN OF LEFT KNEE: Primary | ICD-10-CM

## 2024-04-10 DIAGNOSIS — M25.562 CHRONIC PAIN OF LEFT KNEE: Primary | ICD-10-CM

## 2024-04-10 PROCEDURE — 97112 NEUROMUSCULAR REEDUCATION: CPT | Performed by: PHYSICAL THERAPIST

## 2024-04-10 PROCEDURE — 97530 THERAPEUTIC ACTIVITIES: CPT | Performed by: PHYSICAL THERAPIST

## 2024-04-10 NOTE — PROGRESS NOTES
" [facility]   Physical Therapy Treatment Note      Name: Shelbie Ardon  Clinic Number: [id number]    Therapy Diagnosis:   Encounter Diagnosis   Name Primary?    Chronic pain of left knee Yes     Physician: Vern Carrasquillo MD    Visit Date: 4/10/2024    Physician Orders: PT Eval and Treat L knee pain  Medical Diagnosis from Referral: Left knee pain, unspecified chronicity [M25.562]   Evaluation Date: 1/8/2024  Authorization Period Expiration: 12/31/24  Plan of Care Expiration: 4/8/24  Progress Note Due: 4/8/24  Visit # / Visits authorized: 11/ 20  FOTO: 1/3    Time In: 1200  Time Out: 1255  Total Billable Time: 55 minutes    Subjective     Pt reports: muscle soreness after last visit, otherwise feeling good. Pt thinking about getting gym membership to continue strenghtening  She was compliant with home exercise program.  Response to previous treatment/ Functional change: walking going well    Pain: 1/10  Location: left knee      Objective      Range of Motion:   Knee Right Left   Active 3-0-142 3-0-130   Passive 3-0-142 3-0-130*    *120 deg in prone    [facility] DF:  R: 30 deg  L: 18 deg    Treatment     Shelbie received the treatments listed below:      therapeutic exercises to develop strength, endurance, ROM, flexibility, posture, and core stabilization for 00 minutes including:    NP today  LLLD extension heel prop 4# 5 min -   Prone quad stretch 4x30"   Gastroc wedge stretch 3x30"  Kneeling DF mob 3x20  Great toe mob in sitting against wall 5" hold x20    manual therapy techniques: Joint mobilizations were applied to the: L knee for 5 minutes, including:    Patellar mobs all directions  Fat pad mob  Medial tib femoral rotational gapping     neuromuscular re-education activities to improve: Balance, Coordination, Kinesthetic, Sense, Proprioception, and Posture for 12 minutes. The following activities were included:    Quad set 5" hold x20  SLR 3x10 3" hold 0#  Knee ext machine 17.5# " "4x15      NP today  Toe yoga 2x20  Great toe ext mob in standing 5" hold 2x20  Heel raises 3x10  YTB side steps 3 laps  Arch doming 3x20 5" hold  SL ER 2# 2x20  SL hip abduction 2# 3x15  YTB standing clam 3x20  SL bridge 3x10  Front plank 3x10"    therapeutic activities to improve functional performance for 43 minutes, including:    Leg press SL squat 80# 3x10  SLDL w/ 10# 3x10  KB deadlift 35# 4x10  Egyptian split squat 3x10  Pistol squat to 24" box 3x10  Pt education on neutral spine, lifting mechanics, activity modification    NP today  Elliptical 8 min for LE endurance  5" step up 3x10  3" forward step down   Sneaky lunge 3x10  6" lateral step down  4x8  Sled push 90# additional pounds 2 laps    Patient Education and Home Exercises       Education provided:   - education on activity modification, return to activity, POC, prognosis    Written Home Exercises Provided: yes. Exercises were reviewed and Shelbie was able to demonstrate them prior to the end of the session.  Shelbie demonstrated good  understanding of the education provided. See EMR under Patient Instructions for exercises provided during therapy sessions    Assessment     Pt presents w/ good carryover of knee ROM from last visit. Progressed functional strengthening today with good tolerance. Pt benefits from cues to dec dynamic knee valgus. Will put in orders for [facility] medical fitness program to continue independent HEP today. Will continue to transition to independent HEP    Shelbie Is progressing well towards her goals.   Pt prognosis is Good.     Pt will continue to benefit from skilled outpatient physical therapy to address the deficits listed in the problem list box on initial evaluation, provide pt/family education and to maximize pt's level of independence in the home and community environment.     Pt's spiritual, cultural and educational needs considered and pt agreeable to plan of care and goals.     Anticipated barriers to physical " therapy: work schedule, arthritic changes     Goals:     Short Term Goals: 3 weeks   Pt will be independent with HEP and report compliance at least 4 days/week  Pt will demonstrate improved knee flexion by 10 deg reporting no pain at end range  Pt will be able to perform 3x15 SLR without lag to demonstrate sufficient strength for amb     Long Term Goals: 12 weeks   Pt will be able to amb 3 miles on level surface reporting no pain  Pt will be able to perform 15 SL squats w/ sound form reporting no pain  Pt will be able to perform all ADL's/ job duties at [employer] reporting no pain    Plan     Continue quad strength as tolerated    Sarkis Zayas, PT

## 2024-04-17 ENCOUNTER — PATIENT MESSAGE (OUTPATIENT)
Dept: SPORTS MEDICINE | Facility: CLINIC | Age: 54
End: 2024-04-17
Payer: COMMERCIAL

## 2024-04-17 ENCOUNTER — CLINICAL SUPPORT (OUTPATIENT)
Dept: REHABILITATION | Facility: HOSPITAL | Age: 54
End: 2024-04-17
Payer: COMMERCIAL

## 2024-04-17 DIAGNOSIS — G89.29 CHRONIC PAIN OF LEFT KNEE: Primary | ICD-10-CM

## 2024-04-17 DIAGNOSIS — M25.562 CHRONIC PAIN OF LEFT KNEE: Primary | ICD-10-CM

## 2024-04-17 PROCEDURE — 97530 THERAPEUTIC ACTIVITIES: CPT | Performed by: PHYSICAL THERAPIST

## 2024-04-17 PROCEDURE — 97112 NEUROMUSCULAR REEDUCATION: CPT | Performed by: PHYSICAL THERAPIST

## 2024-04-18 NOTE — PROGRESS NOTES
" [facility]   Physical Therapy Treatment Note      Name: Shelbie Ardon  Clinic Number: [id number]    Therapy Diagnosis:   Encounter Diagnosis   Name Primary?    Chronic pain of left knee Yes     Physician: Vern Carrasquillo MD    Visit Date: 4/17/2024    Physician Orders: PT Eval and Treat L knee pain  Medical Diagnosis from Referral: Left knee pain, unspecified chronicity [M25.562]   Evaluation Date: 1/8/2024  Authorization Period Expiration: 12/31/24  Plan of Care Expiration: 4/8/24  Progress Note Due: 4/8/24  Visit # / Visits authorized: 12/ 20  FOTO: 1/3    Time In: 1200  Time Out: 1255  Total Billable Time: 55 minutes    Subjective     Pt reports:  everything feeling good since last visit. Pt reports she has continued to work on strengthening with good tolerance. Waitng to hear back from medical fitness program  She was compliant with home exercise program.  Response to previous treatment/ Functional change: walking going well    Pain: 1/10  Location: left knee      Objective      Range of Motion:   Knee Right Left   Active 3-0-142 3-0-130   Passive 3-0-142 3-0-130*    *120 deg in prone    [facility] DF:  R: 30 deg  L: 18 deg    Treatment     Shelbie received the treatments listed below:      therapeutic exercises to develop strength, endurance, ROM, flexibility, posture, and core stabilization for 00 minutes including:    NP today  LLLD extension heel prop 4# 5 min -   Prone quad stretch 4x30"   Gastroc wedge stretch 3x30"  Kneeling DF mob 3x20  Great toe mob in sitting against wall 5" hold x20    manual therapy techniques: Joint mobilizations were applied to the: L knee for 2 minutes, including:    Patellar mobs all directions  Fat pad mob  Medial tib femoral rotational gapping     neuromuscular re-education activities to improve: Balance, Coordination, Kinesthetic, Sense, Proprioception, and Posture for 12 minutes. The following activities were included:    Quad set 5" hold " "x20  SLR 3x10 3" hold 1#  Knee ext machine 17.5# 4x15    NP today  Toe yoga 2x20  Great toe ext mob in standing 5" hold 2x20  Heel raises 3x10  YTB side steps 3 laps  Arch doming 3x20 5" hold  SL ER 2# 2x20  SL hip abduction 2# 3x15  YTB standing clam 3x20  SL bridge 3x10  Front plank 3x10"    therapeutic activities to improve functional performance for 43 minutes, including:    Elliptical 8 min for LE endurance  wall ball squat 25# KB 3x10  Wall ball lunge 25# 3x10   KB deadlift 35# 4x10  Mexican split squat 3x10  Pistol squat to 24" box 3x10  Pt education on neutral spine, lifting mechanics, activity modification    NP today  Leg press SL squat 80# 3x10  SLDL w/ 10# 3x10    5" step up 3x10  3" forward step down   Sneaky lunge 3x10  6" lateral step down  4x8  Sled push 90# additional pounds 2 laps    Patient Education and Home Exercises       Education provided:   - education on activity modification, return to activity, POC, prognosis    Written Home Exercises Provided: yes. Exercises were reviewed and Shelbie was able to demonstrate them prior to the end of the session.  Shelbie demonstrated good  understanding of the education provided. See EMR under Patient Instructions for exercises provided during therapy sessions    Assessment     Pt with low irritability today. Pt tolerates all progressed strengthening well, fewer cues required for form. Pt instructed to reach out with any issues performing HEP at the gym. Will attempt to transition to independent HEP    Shelbie Is progressing well towards her goals.   Pt prognosis is Good.     Pt will continue to benefit from skilled outpatient physical therapy to address the deficits listed in the problem list box on initial evaluation, provide pt/family education and to maximize pt's level of independence in the home and community environment.     Pt's spiritual, cultural and educational needs considered and pt agreeable to plan of care and goals.     Anticipated " barriers to physical therapy: work schedule, arthritic changes     Goals:     Short Term Goals: 3 weeks   Pt will be independent with HEP and report compliance at least 4 days/week  Pt will demonstrate improved knee flexion by 10 deg reporting no pain at end range  Pt will be able to perform 3x15 SLR without lag to demonstrate sufficient strength for amb     Long Term Goals: 12 weeks   Pt will be able to amb 3 miles on level surface reporting no pain  Pt will be able to perform 15 SL squats w/ sound form reporting no pain  Pt will be able to perform all ADL's/ job duties at [employer] reporting no pain    Plan     Continue quad strength as tolerated    Sarkis Zayas, PT

## 2024-06-04 ENCOUNTER — OFFICE VISIT (OUTPATIENT)
Dept: PRIMARY CARE CLINIC | Facility: CLINIC | Age: 54
End: 2024-06-04
Payer: COMMERCIAL

## 2024-06-04 VITALS
OXYGEN SATURATION: 99 % | HEIGHT: 67 IN | DIASTOLIC BLOOD PRESSURE: 78 MMHG | BODY MASS INDEX: 27.41 KG/M2 | HEART RATE: 81 BPM | SYSTOLIC BLOOD PRESSURE: 112 MMHG | WEIGHT: 174.63 LBS

## 2024-06-04 DIAGNOSIS — Z12.31 SCREENING MAMMOGRAM FOR BREAST CANCER: ICD-10-CM

## 2024-06-04 DIAGNOSIS — Z79.899 HIGH RISK MEDICATION USE: ICD-10-CM

## 2024-06-04 DIAGNOSIS — Z12.11 SCREEN FOR COLON CANCER: ICD-10-CM

## 2024-06-04 DIAGNOSIS — I10 ESSENTIAL HYPERTENSION: ICD-10-CM

## 2024-06-04 DIAGNOSIS — E78.2 MIXED HYPERLIPIDEMIA: Primary | ICD-10-CM

## 2024-06-04 DIAGNOSIS — L43.9 LICHEN PLANUS: ICD-10-CM

## 2024-06-04 PROCEDURE — 3008F BODY MASS INDEX DOCD: CPT | Mod: CPTII,S$GLB,, | Performed by: FAMILY MEDICINE

## 2024-06-04 PROCEDURE — 3074F SYST BP LT 130 MM HG: CPT | Mod: CPTII,S$GLB,, | Performed by: FAMILY MEDICINE

## 2024-06-04 PROCEDURE — 99214 OFFICE O/P EST MOD 30 MIN: CPT | Mod: S$GLB,,, | Performed by: FAMILY MEDICINE

## 2024-06-04 PROCEDURE — 3078F DIAST BP <80 MM HG: CPT | Mod: CPTII,S$GLB,, | Performed by: FAMILY MEDICINE

## 2024-06-04 PROCEDURE — 4010F ACE/ARB THERAPY RXD/TAKEN: CPT | Mod: CPTII,S$GLB,, | Performed by: FAMILY MEDICINE

## 2024-06-04 PROCEDURE — 1159F MED LIST DOCD IN RCRD: CPT | Mod: CPTII,S$GLB,, | Performed by: FAMILY MEDICINE

## 2024-06-04 PROCEDURE — 99999 PR PBB SHADOW E&M-EST. PATIENT-LVL IV: CPT | Mod: PBBFAC,,, | Performed by: FAMILY MEDICINE

## 2024-06-04 PROCEDURE — 1160F RVW MEDS BY RX/DR IN RCRD: CPT | Mod: CPTII,S$GLB,, | Performed by: FAMILY MEDICINE

## 2024-06-04 RX ORDER — AMMONIUM LACTATE 12 G/100G
1 LOTION TOPICAL 2 TIMES DAILY PRN
COMMUNITY
Start: 2024-02-20

## 2024-06-04 RX ORDER — CHLORTHALIDONE 25 MG/1
25 TABLET ORAL DAILY
Qty: 90 TABLET | Refills: 3 | Status: SHIPPED | OUTPATIENT
Start: 2024-06-04

## 2024-06-04 RX ORDER — UPADACITINIB 15 MG/1
15 TABLET, EXTENDED RELEASE ORAL DAILY
COMMUNITY
Start: 2024-01-11

## 2024-06-04 RX ORDER — LOSARTAN POTASSIUM 100 MG/1
100 TABLET ORAL DAILY
Qty: 90 TABLET | Refills: 3 | Status: SHIPPED | OUTPATIENT
Start: 2024-06-04

## 2024-06-04 NOTE — PROGRESS NOTES
"Subjective:       Patient ID: Shelbie Ardon is a 54 y.o. female.    Chief Complaint: Lichen Planus, Follow-up, and Cough    HPI  53 y/o female with HTN, R sided thigh pain post op R LISETH 8/2020, hx of Covid, Lichen Planus is here for follow up HTN and weight loss.    She is following with Dr. Gonzales at [facility], she has been on Rinvoq 15 mg daily and her symptoms have resolved, plans to do a total of 6 months. She had labs 3 months ago at [facility]. She is recovering from a cold for the past 5 days, she has some chest congestion, cough is not productive, she denies f/n/v/sinus sx/d/constipation/cp/sob/urinary sx. She is taking Theraflu and Robitussin night and she is feeling much better today. She took a home covid test yesterday that was negative. She had a fall while in [employer] 9 days ago, she fell on her buttocks, it was sore for a while but its improved. Appetite ok. Sleeping ok.      Hx of Covid 8/2023  HTN: following with cardiology; losartan 100 mg daily and chlorthalidone 25 mg daily  Elevated LDL for over 9 years: has been reluctant to start statin, willing now if one more is elevated  OA hip s/p R LISETH 8/2020 Dr. Hamm and OA knee:  After surgery she had some nerve pain that she reports has resolved, she also notes some discomfort from the hardware that she was told would improve with weight loss, off celebrex, cymbalta caused sexual side effects, off mobic, off lyrica, tylenol not helpful  GYN:following with Dr. Mcneil, Combipatch on hold, mmg 8/2023, irregular bleeding EMB negative, US 8/2022   Hx of LPRD, not needed since  Lichen Planus: Rinvoq 15 mg daily  Colonoscopy due declined scope, mother had a perforation with colonoscopy, cologuard 6/2021  Vitamin D def: not on supplement  Eye exam utd  Dental utd  OTC: fish oil, mvi, calcium and d    Review of Systems    Objective:      /78 (BP Location: Right arm, Patient Position: Sitting, BP Method: Large (Manual))   Pulse 81   Ht 5' 7" (1.702 m)   " Wt 79.2 kg (174 lb 9.7 oz)   LMP  (LMP Unknown)   SpO2 99%   BMI 27.35 kg/m²   Physical Exam  Vitals and nursing note reviewed.   Constitutional:       Appearance: She is well-developed.   HENT:      Head: Normocephalic and atraumatic.      Mouth/Throat:      Pharynx: No oropharyngeal exudate or posterior oropharyngeal erythema.   Neck:      Thyroid: No thyromegaly.   Cardiovascular:      Rate and Rhythm: Normal rate and regular rhythm.      Heart sounds: Normal heart sounds.   Pulmonary:      Effort: Pulmonary effort is normal. No respiratory distress.      Breath sounds: Normal breath sounds.   Musculoskeletal:      Cervical back: Normal range of motion and neck supple.      Right lower leg: No edema.      Left lower leg: No edema.   Lymphadenopathy:      Cervical: No cervical adenopathy.   Skin:     General: Skin is warm and dry.   Neurological:      Mental Status: She is alert.         Assessment:       1. Mixed hyperlipidemia    2. Lichen planus    3. High risk medication use    4. Essential hypertension    5. Screening mammogram for breast cancer    6. Screen for colon cancer        Plan:   Shelbie was seen today for lichen planus, follow-up and cough.    Diagnoses and all orders for this visit:    Mixed hyperlipidemia  -     Lipid Panel; Future  -     Comprehensive Metabolic Panel; Future  -     Hemoglobin A1C; Future  -     losartan (COZAAR) 100 MG tablet; Take 1 tablet (100 mg total) by mouth once daily.    Lichen planus  -     Lipid Panel; Future  -     Comprehensive Metabolic Panel; Future  -     Hemoglobin A1C; Future    High risk medication use  -     Lipid Panel; Future  -     Comprehensive Metabolic Panel; Future  -     Hemoglobin A1C; Future    Essential hypertension  -     chlorthalidone (HYGROTEN) 25 MG Tab; Take 1 tablet (25 mg total) by mouth once daily.  -     losartan (COZAAR) 100 MG tablet; Take 1 tablet (100 mg total) by mouth once daily.    Screening mammogram for breast cancer  -      "Mammo Digital Screening Bilat w/ Charlie; Future    Screen for colon cancer  -     Cologuard Screening (Multitarget Stool DNA); Future  -     Cologuard Screening (Multitarget Stool DNA)      Will send message and can do trial of Doxycycline if needed and "cold" symptoms do not continue to improve  "

## 2024-06-06 ENCOUNTER — LAB VISIT (OUTPATIENT)
Dept: LAB | Facility: HOSPITAL | Age: 54
End: 2024-06-06
Attending: FAMILY MEDICINE
Payer: COMMERCIAL

## 2024-06-06 DIAGNOSIS — Z79.899 HIGH RISK MEDICATION USE: ICD-10-CM

## 2024-06-06 DIAGNOSIS — L43.9 LICHEN PLANUS: ICD-10-CM

## 2024-06-06 DIAGNOSIS — E78.2 MIXED HYPERLIPIDEMIA: ICD-10-CM

## 2024-06-06 LAB
ALBUMIN SERPL BCP-MCNC: 4 G/DL (ref 3.5–5.2)
ALP SERPL-CCNC: 61 U/L (ref 55–135)
ALT SERPL W/O P-5'-P-CCNC: 23 U/L (ref 10–44)
ANION GAP SERPL CALC-SCNC: 8 MMOL/L (ref 8–16)
AST SERPL-CCNC: 26 U/L (ref 10–40)
BILIRUB SERPL-MCNC: 0.5 MG/DL (ref 0.1–1)
BUN SERPL-MCNC: 12 MG/DL (ref 6–20)
CALCIUM SERPL-MCNC: 9.6 MG/DL (ref 8.7–10.5)
CHLORIDE SERPL-SCNC: 101 MMOL/L (ref 95–110)
CHOLEST SERPL-MCNC: 292 MG/DL (ref 120–199)
CHOLEST/HDLC SERPL: 4.9 {RATIO} (ref 2–5)
CO2 SERPL-SCNC: 26 MMOL/L (ref 23–29)
CREAT SERPL-MCNC: 0.8 MG/DL (ref 0.5–1.4)
EST. GFR  (NO RACE VARIABLE): >60 ML/MIN/1.73 M^2
ESTIMATED AVG GLUCOSE: 91 MG/DL (ref 68–131)
GLUCOSE SERPL-MCNC: 92 MG/DL (ref 70–110)
HBA1C MFR BLD: 4.8 % (ref 4–5.6)
HDLC SERPL-MCNC: 60 MG/DL (ref 40–75)
HDLC SERPL: 20.5 % (ref 20–50)
LDLC SERPL CALC-MCNC: 199.6 MG/DL (ref 63–159)
NONHDLC SERPL-MCNC: 232 MG/DL
POTASSIUM SERPL-SCNC: 3.8 MMOL/L (ref 3.5–5.1)
PROT SERPL-MCNC: 7 G/DL (ref 6–8.4)
SODIUM SERPL-SCNC: 135 MMOL/L (ref 136–145)
TRIGL SERPL-MCNC: 162 MG/DL (ref 30–150)

## 2024-06-06 PROCEDURE — 83036 HEMOGLOBIN GLYCOSYLATED A1C: CPT | Performed by: FAMILY MEDICINE

## 2024-06-06 PROCEDURE — 36415 COLL VENOUS BLD VENIPUNCTURE: CPT | Mod: PN | Performed by: FAMILY MEDICINE

## 2024-06-06 PROCEDURE — 80053 COMPREHEN METABOLIC PANEL: CPT | Performed by: FAMILY MEDICINE

## 2024-06-06 PROCEDURE — 80061 LIPID PANEL: CPT | Performed by: FAMILY MEDICINE

## 2024-06-12 ENCOUNTER — PATIENT MESSAGE (OUTPATIENT)
Dept: PRIMARY CARE CLINIC | Facility: CLINIC | Age: 54
End: 2024-06-12
Payer: COMMERCIAL

## 2024-07-10 DIAGNOSIS — E78.2 MIXED HYPERLIPIDEMIA: ICD-10-CM

## 2024-07-10 DIAGNOSIS — I10 ESSENTIAL HYPERTENSION: ICD-10-CM

## 2024-07-10 RX ORDER — LOSARTAN POTASSIUM 100 MG/1
100 TABLET ORAL
Qty: 90 TABLET | Refills: 3 | Status: SHIPPED | OUTPATIENT
Start: 2024-07-10

## 2024-07-10 NOTE — TELEPHONE ENCOUNTER
No care due was identified.  [facility] Embedded Care Due Messages. Reference number: [id number].   7/10/2024 10:34:56 AM CDT

## 2024-07-10 NOTE — TELEPHONE ENCOUNTER
Refill Decision Note   Shelbie Ardon  is requesting a refill authorization.  Brief Assessment and Rationale for Refill:  Approve     Medication Therapy Plan:        Pharmacist review requested: Yes   Extended chart review required: Yes   Comments:     Note composed:12:35 PM 07/10/2024

## 2024-07-10 NOTE — TELEPHONE ENCOUNTER
Refill Routing Note   Medication(s) are not appropriate for processing by [facility] for the following reason(s):        Drug-disease interaction    [facility] action(s):  Defer        Medication Therapy Plan: losartan and Recurrent pregnancy loss, antepartum condition or complication    Pharmacist review requested: Yes     Appointments  past 12m or future 3m with PCP    Date Provider   Last Visit   6/4/2024 Kendra Carrasquillo MD   Next Visit   9/4/2024 Kendra Carrasquillo MD   ED visits in past 90 days: 0        Note composed:11:05 AM 07/10/2024

## 2024-09-03 ENCOUNTER — PATIENT MESSAGE (OUTPATIENT)
Dept: PRIMARY CARE CLINIC | Facility: CLINIC | Age: 54
End: 2024-09-03
Payer: COMMERCIAL

## 2024-09-04 ENCOUNTER — HOSPITAL ENCOUNTER (OUTPATIENT)
Dept: RADIOLOGY | Facility: HOSPITAL | Age: 54
Discharge: HOME | End: 2024-09-04
Attending: FAMILY MEDICINE
Payer: COMMERCIAL

## 2024-09-04 ENCOUNTER — OFFICE VISIT (OUTPATIENT)
Dept: PRIMARY CARE CLINIC | Facility: CLINIC | Age: 54
End: 2024-09-04
Payer: COMMERCIAL

## 2024-09-04 VITALS — BODY MASS INDEX: 27.31 KG/M2 | WEIGHT: 174 LBS | HEIGHT: 67 IN

## 2024-09-04 DIAGNOSIS — I10 ESSENTIAL HYPERTENSION: ICD-10-CM

## 2024-09-04 DIAGNOSIS — L43.9 LICHEN PLANUS: ICD-10-CM

## 2024-09-04 DIAGNOSIS — E78.2 MIXED HYPERLIPIDEMIA: Primary | ICD-10-CM

## 2024-09-04 DIAGNOSIS — Z12.31 SCREENING MAMMOGRAM FOR BREAST CANCER: ICD-10-CM

## 2024-09-04 PROCEDURE — 4010F ACE/ARB THERAPY RXD/TAKEN: CPT | Mod: CPTII,95,, | Performed by: FAMILY MEDICINE

## 2024-09-04 PROCEDURE — 3044F HG A1C LEVEL LT 7.0%: CPT | Mod: CPTII,95,, | Performed by: FAMILY MEDICINE

## 2024-09-04 PROCEDURE — 77067 SCR MAMMO BI INCL CAD: CPT | Mod: TC

## 2024-09-04 PROCEDURE — 1159F MED LIST DOCD IN RCRD: CPT | Mod: CPTII,95,, | Performed by: FAMILY MEDICINE

## 2024-09-04 PROCEDURE — 1160F RVW MEDS BY RX/DR IN RCRD: CPT | Mod: CPTII,95,, | Performed by: FAMILY MEDICINE

## 2024-09-04 PROCEDURE — 99213 OFFICE O/P EST LOW 20 MIN: CPT | Mod: 95,,, | Performed by: FAMILY MEDICINE

## 2024-09-04 NOTE — PROGRESS NOTES
Subjective:       Patient ID: Shelbie Ardon is a 54 y.o. female.    Chief Complaint: Hyperlipidemia and Follow-up    Hyperlipidemia  Pertinent negatives include no chest pain.   Follow-up  Pertinent negatives include no arthralgias, chest pain, headaches, joint swelling, neck pain, vomiting or weakness.     3 y/o female with HTN, R sided thigh pain post op R LISETH 8/2020, hx of Covid, Lichen Planus is here for follow up HLPD.    She has been on Crestor 5 mg instead of 10 mg only, she thought she was on the 10 mg, she is feeling good, denies f/n/v/d/constipation/cp/sob/urinary sx. She has not been checking home bp.     Mmg done today, turned in Cologuard recently      Hx of Covid 8/2023  HTN: following with cardiology; losartan 100 mg daily and chlorthalidone 25 mg daily  Elevated LDL   OA hip s/p R LISETH 8/2020 Dr. Hamm and OA knee:  After surgery she had some nerve pain that she reports has resolved, she also notes some discomfort from the hardware that she was told would improve with weight loss, off celebrex, cymbalta caused sexual side effects, off mobic, off lyrica, tylenol not helpful  GYN:following with Dr. Mcneil, pelvic due, mmg done today report pending  Hx of LPRD, not needed meds  Lichen Planus: following with Dr. Gonzales at [facility], Rinvoq 15 mg every other day  Colonoscopy due declined scope, mother had a perforation with colonoscopy, cologuard done waiting for report  Vitamin D def: not on supplement  Eye exam utd  Dental utd       Review of Systems   Constitutional:  Negative for activity change and unexpected weight change.   HENT:  Negative for hearing loss, rhinorrhea and trouble swallowing.    Eyes:  Negative for discharge and visual disturbance.   Respiratory:  Negative for chest tightness and wheezing.    Cardiovascular:  Negative for chest pain and palpitations.   Gastrointestinal:  Negative for blood in stool, constipation, diarrhea and vomiting.   Endocrine: Negative for polydipsia and  polyuria.   Genitourinary:  Negative for difficulty urinating, dysuria, hematuria and menstrual problem.   Musculoskeletal:  Negative for arthralgias, joint swelling and neck pain.   Neurological:  Negative for weakness and headaches.   Psychiatric/Behavioral:  Negative for confusion and dysphoric mood.        Objective:      LMP  (LMP Unknown)   Physical Exam  Constitutional:       General: She is not in acute distress.     Appearance: She is well-developed. She is not diaphoretic.   HENT:      Head: Normocephalic and atraumatic.   Pulmonary:      Effort: No respiratory distress.   Neurological:      Mental Status: She is alert and oriented to person, place, and time.         Assessment:       1. Mixed hyperlipidemia    2. Essential hypertension    3. Lichen planus        Plan:   Shelbie was seen today for hyperlipidemia and follow-up.    Diagnoses and all orders for this visit:    Mixed hyperlipidemia  -     Comprehensive Metabolic Panel; Future  -     Lipid Panel; Future    Essential hypertension    Lichen planus    The patient location is: [place]  The chief complaint leading to consultation is: hlpd    Visit type: audiovisual    Face to Face time with patient: 15 minutes of total time spent on the encounter, which includes face to face time and non-face to face time preparing to see the patient (eg, review of tests), Obtaining and/or reviewing separately obtained history, Documenting clinical information in the electronic or other health record, Independently interpreting results (not separately reported) and communicating results to the patient/family/caregiver, or Care coordination (not separately reported).         Each patient to whom he or she provides medical services by telemedicine is:  (1) informed of the relationship between the physician and patient and the respective role of any other health care provider with respect to management of the patient; and (2) notified that he or she may decline to receive  medical services by telemedicine and may withdraw from such care at any time.    Notes:

## 2024-11-11 ENCOUNTER — OFFICE VISIT (OUTPATIENT)
Dept: PRIMARY CARE CLINIC | Facility: CLINIC | Age: 54
End: 2024-11-11
Payer: COMMERCIAL

## 2024-11-11 ENCOUNTER — LAB VISIT (OUTPATIENT)
Dept: LAB | Facility: HOSPITAL | Age: 54
End: 2024-11-11
Attending: FAMILY MEDICINE
Payer: COMMERCIAL

## 2024-11-11 VITALS
DIASTOLIC BLOOD PRESSURE: 76 MMHG | HEIGHT: 67 IN | OXYGEN SATURATION: 96 % | HEART RATE: 67 BPM | WEIGHT: 177.5 LBS | RESPIRATION RATE: 14 BRPM | BODY MASS INDEX: 27.86 KG/M2 | SYSTOLIC BLOOD PRESSURE: 118 MMHG

## 2024-11-11 DIAGNOSIS — R45.86 EMOTIONAL LABILITY: ICD-10-CM

## 2024-11-11 DIAGNOSIS — F41.8 SITUATIONAL ANXIETY: ICD-10-CM

## 2024-11-11 DIAGNOSIS — Z23 NEED FOR VACCINATION: ICD-10-CM

## 2024-11-11 DIAGNOSIS — E78.2 MIXED HYPERLIPIDEMIA: ICD-10-CM

## 2024-11-11 DIAGNOSIS — Z78.0 POST-MENOPAUSAL: ICD-10-CM

## 2024-11-11 DIAGNOSIS — I10 ESSENTIAL HYPERTENSION: ICD-10-CM

## 2024-11-11 DIAGNOSIS — Z00.00 ANNUAL PHYSICAL EXAM: Primary | ICD-10-CM

## 2024-11-11 DIAGNOSIS — Z72.820 POOR SLEEP: ICD-10-CM

## 2024-11-11 LAB
ALBUMIN SERPL BCP-MCNC: 4.1 G/DL (ref 3.5–5.2)
ALP SERPL-CCNC: 60 U/L (ref 40–150)
ALT SERPL W/O P-5'-P-CCNC: 22 U/L (ref 10–44)
ANION GAP SERPL CALC-SCNC: 11 MMOL/L (ref 8–16)
AST SERPL-CCNC: 23 U/L (ref 10–40)
BILIRUB SERPL-MCNC: 0.6 MG/DL (ref 0.1–1)
BUN SERPL-MCNC: 12 MG/DL (ref 6–20)
CALCIUM SERPL-MCNC: 9.8 MG/DL (ref 8.7–10.5)
CHLORIDE SERPL-SCNC: 102 MMOL/L (ref 95–110)
CHOLEST SERPL-MCNC: 180 MG/DL (ref 120–199)
CHOLEST/HDLC SERPL: 2.4 {RATIO} (ref 2–5)
CO2 SERPL-SCNC: 26 MMOL/L (ref 23–29)
CREAT SERPL-MCNC: 0.8 MG/DL (ref 0.5–1.4)
EST. GFR  (NO RACE VARIABLE): >60 ML/MIN/1.73 M^2
GLUCOSE SERPL-MCNC: 87 MG/DL (ref 70–110)
HDLC SERPL-MCNC: 75 MG/DL (ref 40–75)
HDLC SERPL: 41.7 % (ref 20–50)
LDLC SERPL CALC-MCNC: 86.4 MG/DL (ref 63–159)
NONHDLC SERPL-MCNC: 105 MG/DL
POTASSIUM SERPL-SCNC: 3.5 MMOL/L (ref 3.5–5.1)
PROT SERPL-MCNC: 7.1 G/DL (ref 6–8.4)
SODIUM SERPL-SCNC: 139 MMOL/L (ref 136–145)
TRIGL SERPL-MCNC: 93 MG/DL (ref 30–150)

## 2024-11-11 PROCEDURE — 80053 COMPREHEN METABOLIC PANEL: CPT | Performed by: FAMILY MEDICINE

## 2024-11-11 PROCEDURE — 4010F ACE/ARB THERAPY RXD/TAKEN: CPT | Mod: CPTII,S$GLB,, | Performed by: FAMILY MEDICINE

## 2024-11-11 PROCEDURE — 99396 PREV VISIT EST AGE 40-64: CPT | Mod: 25,S$GLB,, | Performed by: FAMILY MEDICINE

## 2024-11-11 PROCEDURE — 3078F DIAST BP <80 MM HG: CPT | Mod: CPTII,S$GLB,, | Performed by: FAMILY MEDICINE

## 2024-11-11 PROCEDURE — 1159F MED LIST DOCD IN RCRD: CPT | Mod: CPTII,S$GLB,, | Performed by: FAMILY MEDICINE

## 2024-11-11 PROCEDURE — 1160F RVW MEDS BY RX/DR IN RCRD: CPT | Mod: CPTII,S$GLB,, | Performed by: FAMILY MEDICINE

## 2024-11-11 PROCEDURE — 36415 COLL VENOUS BLD VENIPUNCTURE: CPT | Mod: PN | Performed by: FAMILY MEDICINE

## 2024-11-11 PROCEDURE — 3008F BODY MASS INDEX DOCD: CPT | Mod: CPTII,S$GLB,, | Performed by: FAMILY MEDICINE

## 2024-11-11 PROCEDURE — 99999 PR PBB SHADOW E&M-EST. PATIENT-LVL V: CPT | Mod: PBBFAC,,, | Performed by: FAMILY MEDICINE

## 2024-11-11 PROCEDURE — 3074F SYST BP LT 130 MM HG: CPT | Mod: CPTII,S$GLB,, | Performed by: FAMILY MEDICINE

## 2024-11-11 PROCEDURE — 90471 IMMUNIZATION ADMIN: CPT | Mod: S$GLB,,, | Performed by: FAMILY MEDICINE

## 2024-11-11 PROCEDURE — 90656 IIV3 VACC NO PRSV 0.5 ML IM: CPT | Mod: S$GLB,,, | Performed by: FAMILY MEDICINE

## 2024-11-11 PROCEDURE — 3044F HG A1C LEVEL LT 7.0%: CPT | Mod: CPTII,S$GLB,, | Performed by: FAMILY MEDICINE

## 2024-11-11 PROCEDURE — 80061 LIPID PANEL: CPT | Performed by: FAMILY MEDICINE

## 2024-11-11 RX ORDER — BUPROPION HYDROCHLORIDE 150 MG/1
150 TABLET ORAL DAILY
Qty: 30 TABLET | Refills: 3 | Status: SHIPPED | OUTPATIENT
Start: 2024-11-11 | End: 2025-11-11

## 2024-11-11 RX ORDER — ASPIRIN 325 MG
50 TABLET, DELAYED RELEASE (ENTERIC COATED) ORAL DAILY
COMMUNITY

## 2024-11-11 RX ORDER — HYDROXYZINE HYDROCHLORIDE 10 MG/1
10 TABLET, FILM COATED ORAL NIGHTLY PRN
Qty: 30 TABLET | Refills: 3 | Status: SHIPPED | OUTPATIENT
Start: 2024-11-11

## 2024-11-11 NOTE — PROGRESS NOTES
"Subjective:       Patient ID: Shelbie Ardon is a 54 y.o. female.    Chief Complaint: Annual Exam    HPI  53 y/o female with HTN, HLPD, R sided thigh pain post op R LISETH 8/2020, hx of Covid, Lichen Planus is here for annual exam.    She has been having bouts of depression since September, she feels like she can typically cope better than she can lately, she has been more anxious and had a moment where she felt like she did not want to leave the house, she endorses some lack of motivation, she was traveling a lot and her sleep was off, she is feeling better over the past few weeks. She is sleeping a little better, still has some anxiety in general, feeling overwhelmed and hopeless at times, she denies SI, she denies f/n/v/d/constipation/cp/sob/urinary sx. She is walking most days, gets 3 miles about 5 days a week. She has 3 coffees a day. She is staying hydrated, eating regularly and healthy.        Hx of Covid 8/2023  HTN: following with cardiology; losartan 100 mg daily and chlorthalidone 25 mg daily  Elevated LDL: labs pending today, Crestor 10 mg daily  OA hip s/p R LISETH 8/2020 Dr. Hamm and OA knee:  After surgery she had some nerve pain that she reports has resolved, she also notes some discomfort from the hardware that she was told would improve with weight loss, off celebrex, cymbalta caused sexual side effects, off mobic, off lyrica, tylenol not helpful  GYN: pelvic due, mmg 9/2024  Hx of LPRD, not needed meds  Situational anxiety and depression: has been on Duloxetine and Prozac in the past, she had decreased libido   Lichen Planus: following with Dr. Gonzales at [facility], Rinvoq 15 mg every other day  Colonoscopy due declined scope, mother had a perforation with colonoscopy, cologuard 8/2024  Vitamin D def: not on supplement  Eye exam utd  Dental utd        Review of Systems  see HPI  Objective:      /76 (BP Location: Left arm, Patient Position: Sitting)   Pulse 67   Resp 14   Ht 5' 7" (1.702 m) "   Wt 80.5 kg (177 lb 7.5 oz)   LMP  (LMP Unknown)   SpO2 96%   BMI 27.80 kg/m²   Physical Exam  Vitals and nursing note reviewed.   Constitutional:       Appearance: She is well-developed.   HENT:      Head: Normocephalic and atraumatic.      Right Ear: Tympanic membrane normal.      Left Ear: Tympanic membrane normal.      Mouth/Throat:      Pharynx: No oropharyngeal exudate or posterior oropharyngeal erythema.   Neck:      Thyroid: No thyromegaly.   Cardiovascular:      Rate and Rhythm: Normal rate and regular rhythm.      Heart sounds: Normal heart sounds.   Pulmonary:      Effort: Pulmonary effort is normal. No respiratory distress.      Breath sounds: Normal breath sounds.   Abdominal:      General: Bowel sounds are normal. There is no distension.      Palpations: Abdomen is soft. There is no mass.      Tenderness: There is no abdominal tenderness.   Musculoskeletal:      Cervical back: Normal range of motion and neck supple.      Right lower leg: No edema.      Left lower leg: No edema.   Lymphadenopathy:      Cervical: No cervical adenopathy.   Skin:     General: Skin is warm and dry.   Neurological:      Mental Status: She is alert.         Assessment:       1. Annual physical exam    2. Need for vaccination    3. Post-menopausal    4. Essential hypertension    5. Mixed hyperlipidemia    6. Situational anxiety    7. Emotional lability    8. Poor sleep        Plan:   Shelbie was seen today for annual exam.    Diagnoses and all orders for this visit:    Annual physical exam    Need for vaccination  -     Influenza - Trivalent - PF (ADULT)    Post-menopausal  -     Ambulatory referral/consult to Gynecology; Future    Essential hypertension    Mixed hyperlipidemia    Situational anxiety  -     Ambulatory referral/consult to Gynecology; Future  -     Ambulatory referral/consult to Psychology; Future  -     hydrOXYzine HCL (ATARAX) 10 MG Tab; Take 1 tablet (10 mg total) by mouth nightly as needed.  -      buPROPion (WELLBUTRIN XL) 150 MG TB24 tablet; Take 1 tablet (150 mg total) by mouth once daily.    Emotional lability  -     Ambulatory referral/consult to Gynecology; Future  -     Ambulatory referral/consult to Psychology; Future  -     buPROPion (WELLBUTRIN XL) 150 MG TB24 tablet; Take 1 tablet (150 mg total) by mouth once daily.    Poor sleep  -     hydrOXYzine HCL (ATARAX) 10 MG Tab; Take 1 tablet (10 mg total) by mouth nightly as needed.    Discussed Wellbutrin as an option for mood

## 2024-11-12 ENCOUNTER — PATIENT MESSAGE (OUTPATIENT)
Dept: OBSTETRICS AND GYNECOLOGY | Facility: CLINIC | Age: 54
End: 2024-11-12
Payer: COMMERCIAL

## 2024-11-25 ENCOUNTER — CLINICAL SUPPORT (OUTPATIENT)
Dept: OBSTETRICS AND GYNECOLOGY | Facility: CLINIC | Age: 54
End: 2024-11-25
Payer: COMMERCIAL

## 2024-11-25 DIAGNOSIS — N95.1 MENOPAUSAL SYMPTOMS: Primary | ICD-10-CM

## 2024-11-25 NOTE — PROGRESS NOTES
Personal Information    Full Name: Shelbie Ardon 1970    Medical History    Do you have a chronic medical condition? (e.g., diabetes, hypertension, thyroid issues)   If yes, please specify:   Yes - HTN    2.   Do you have a history of hormone- related conditions? (e.g., endometriosis, breast cancer)   If yes, please explain:   No    3.   Have you previously or are you currently taking hormone replacement therapy?   If yes, please list:   Yes - Previous Use - Combipatch stopped about 3 years ago.     Menstrual History    At what age did you begin menstruating?   11    2.   Have you experienced any changes in your menstrual cycle in the last year?   If yes, please describe:   No natural progression periods     3.   When was your last menstrual period or age of last period?   N/a    4.   Have you had a hysterectomy?   If yes, at what age?  No    Symptoms Assesments      Are you experiencing any of the following symptoms:  Hot Flashes: Yes   Night Sweats: Yes   Vaginal Dryness or Pain with [place]: Yes   Mood Swings: Yes   Anxiety or Depression: Yes   Sleep Disturbances: Yes   Fatigue: Yes   Weight Gain: Yes   Joint or Muscle Pain: Yes Osteoarthritis  Memory Issues or Brain Fog: Yes   Decreased Interest in Sex (Low Libido): Yes     Lifestyle Factors    How would you describe your diet?  Balanced    2.   How often do you exercise?   Regularly    3.   Do you smoke or consume alcohol?   If yes, please specify frequency:   Yes - Social drinker    4.   How would you rate your stress levels?   High    Family History    Is there a family history of menopause-related conditions? (e.g., osteoporosis, breast cancer)  If yes, please specify  No    2.   Is there a family history of heart disease?   If yes, please specify   Yes - Mother Vascular Disease

## 2024-12-03 ENCOUNTER — TELEPHONE (OUTPATIENT)
Dept: PHARMACY | Facility: CLINIC | Age: 54
End: 2024-12-03
Payer: COMMERCIAL

## 2024-12-03 NOTE — TELEPHONE ENCOUNTER
[facility]/Population Health Chart Review & Patient Outreach Details For Medication Adherence Project    Reason for Outreach Encounter: 3rd Party payor non-compliance report ([employer], [employer], [employer], etc)  2.  Patient Outreach Method: Reviewed patient chart   3.   Medication in question:   Hyperlipidemia Medications               omega-3 fatty acids/fish oil (FISH OIL-OMEGA-3 FATTY ACIDS) 300-1,000 mg capsule Take by mouth once daily.    rosuvastatin (CRESTOR) 10 MG tablet Take 1 tablet (10 mg total) by mouth once daily.                  rosuvastatin  last filled  10/9/24 for 90 day supply    4.  Reviewed and or Updates Made To: Patient Chart  5. Outreach Outcomes and/or actions taken: Patient filled medication and is on track to be adherent  Additional Notes:

## 2024-12-10 ENCOUNTER — OFFICE VISIT (OUTPATIENT)
Dept: OBSTETRICS AND GYNECOLOGY | Facility: CLINIC | Age: 54
End: 2024-12-10
Payer: COMMERCIAL

## 2024-12-10 VITALS
HEART RATE: 75 BPM | BODY MASS INDEX: 28.09 KG/M2 | SYSTOLIC BLOOD PRESSURE: 128 MMHG | DIASTOLIC BLOOD PRESSURE: 84 MMHG | WEIGHT: 179 LBS | HEIGHT: 67 IN

## 2024-12-10 DIAGNOSIS — G47.9 SLEEP DISTURBANCE: ICD-10-CM

## 2024-12-10 DIAGNOSIS — Z78.0 POST-MENOPAUSAL: Primary | ICD-10-CM

## 2024-12-10 DIAGNOSIS — R45.86 EMOTIONAL LABILITY: ICD-10-CM

## 2024-12-10 DIAGNOSIS — G89.29 CHRONIC INTRACTABLE HEADACHE, UNSPECIFIED HEADACHE TYPE: ICD-10-CM

## 2024-12-10 DIAGNOSIS — F41.8 SITUATIONAL ANXIETY: ICD-10-CM

## 2024-12-10 DIAGNOSIS — R51.9 CHRONIC INTRACTABLE HEADACHE, UNSPECIFIED HEADACHE TYPE: ICD-10-CM

## 2024-12-10 PROCEDURE — 3008F BODY MASS INDEX DOCD: CPT | Mod: CPTII,S$GLB,, | Performed by: OBSTETRICS & GYNECOLOGY

## 2024-12-10 PROCEDURE — 99213 OFFICE O/P EST LOW 20 MIN: CPT | Mod: S$GLB,,, | Performed by: OBSTETRICS & GYNECOLOGY

## 2024-12-10 PROCEDURE — 3044F HG A1C LEVEL LT 7.0%: CPT | Mod: CPTII,S$GLB,, | Performed by: OBSTETRICS & GYNECOLOGY

## 2024-12-10 PROCEDURE — 1159F MED LIST DOCD IN RCRD: CPT | Mod: CPTII,S$GLB,, | Performed by: OBSTETRICS & GYNECOLOGY

## 2024-12-10 PROCEDURE — 3074F SYST BP LT 130 MM HG: CPT | Mod: CPTII,S$GLB,, | Performed by: OBSTETRICS & GYNECOLOGY

## 2024-12-10 PROCEDURE — 4010F ACE/ARB THERAPY RXD/TAKEN: CPT | Mod: CPTII,S$GLB,, | Performed by: OBSTETRICS & GYNECOLOGY

## 2024-12-10 PROCEDURE — 3079F DIAST BP 80-89 MM HG: CPT | Mod: CPTII,S$GLB,, | Performed by: OBSTETRICS & GYNECOLOGY

## 2024-12-10 PROCEDURE — 99999 PR PBB SHADOW E&M-EST. PATIENT-LVL IV: CPT | Mod: PBBFAC,,, | Performed by: OBSTETRICS & GYNECOLOGY

## 2024-12-10 RX ORDER — ESTRADIOL 0.05 MG/D
1 PATCH TRANSDERMAL
Qty: 8 PATCH | Refills: 11 | Status: SHIPPED | OUTPATIENT
Start: 2024-12-12

## 2024-12-10 RX ORDER — CONJUGATED ESTROGENS 0.62 MG/G
1 CREAM VAGINAL
Qty: 45 G | Refills: 12 | Status: SHIPPED | OUTPATIENT
Start: 2024-12-12

## 2024-12-10 RX ORDER — PROGESTERONE 200 MG/1
200 CAPSULE ORAL NIGHTLY
Qty: 90 CAPSULE | Refills: 4 | Status: SHIPPED | OUTPATIENT
Start: 2024-12-10

## 2024-12-10 NOTE — PROGRESS NOTES
CC: Hormone consult    Shelbie Ardon is a 54 y.o. female  presents for hormone consult.  Postmenopausal LMP .  Previously on the Combipatch 0.05/0.14, however, she did not like the patch due to it cause a rash and often fell off.  She reports mild hot flashes and night sweats.  +vaginal dryness, decreased libido (+desire and arousal).  Her main concern is the anxiety, poor sleep quality and headaches.  She reports that she has a history of migraines with visual disturbance that has since returned.  She has a HA every couple of weeks.  Improved with rest, sleep, hydration and NSAIDs.  She thinks it may be due to poor sleep quality because she wakes up in the middle of the night.  She has tried hydroxyzine but feels groggy in the morning and she still does not sleep through the night.    ASCVD risk 1.5%    LMP: No LMP recorded (lmp unknown). Patient is postmenopausal..  No issues, problems, or complaints.      OB History          6    Para   0    Term   0       0    AB   6    Living   0         SAB   0    IAB   6    Ectopic   0    Multiple   0    Live Births   0               Gynecology   Past Medical History:   Diagnosis Date    Arthritis     Arthritis     Breast cyst     Cough     Female infertility NEC     Headache(784.0)     Hyperlipidemia     Hypertension     Joint pain     Tinnitus 2013     Past Surgical History:   Procedure Laterality Date    DILATION AND CURETTAGE OF UTERUS      ESOPHAGOGASTRODUODENOSCOPY N/A 2023    Procedure: EGD (ESOPHAGOGASTRODUODENOSCOPY);  Surgeon: Eleazar Walker MD;  [facility] ENDOSCOPY;  Service: Endoscopy;  Laterality: N/A;  pt last dose ozempic 6.16.23; not taking regularly; pt instructed to hold off on further ozempic inj until after procedure; pt verbalized understanding; instr via portal; AP  pre call attempted -CE    JOINT REPLACEMENT  20    Right hip, anterior approach    KNEE SURGERY      acl repair as child/ revision  2013     Social History     Socioeconomic History    Marital status:    Occupational History    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 No    Breast-feeding No   Social History Narrative    .      and her are in ortho research, he is an PHD in biomedical      Social Drivers of Health     Financial Resource Strain: Low Risk  (9/4/2024)    Overall Financial Resource Strain (CARDIA)     Difficulty of Paying Living Expenses: Not hard at all   Food Insecurity: No Food Insecurity (9/4/2024)    Hunger Vital Sign     Worried About Running Out of Food in the Last Year: Never true     Ran Out of Food in the Last Year: Never true   Transportation Needs: Patient Declined (5/29/2024)    Received from [facility] - Transportation     Lack of Transportation (Medical): Patient declined     Lack of Transportation (Non-Medical): Patient declined   Physical Activity: Sufficiently Active (9/4/2024)    Exercise Vital Sign     Days of Exercise per Week: 5 days     Minutes of Exercise per Session: 50 min   Stress: No Stress Concern Present (9/4/2024)    [employer] - Occupational Stress Questionnaire     Feeling of Stress : Only a little   Housing Stability: Unknown (9/4/2024)    Housing Stability Vital Sign     Unable to Pay for Housing in the Last Year: No     Family History   Problem Relation Name Age of Onset    Cancer Paternal Grandfather you dont need this info     Lung cancer Paternal Grandfather you dont need this info     Cancer Paternal Grandmother you  "dont need this info         unknown GYN cancer     Lung cancer Paternal Grandmother you dont need this info     No Known Problems Maternal Grandmother      No Known Problems Maternal Grandfather      Cancer Father you dont need this info         lung smoker    Lung cancer Father you dont need this info     Hypertension Mother you dont need this info     Hyperlipidemia Mother you dont need this info     COPD Mother you dont need this info     Stroke Mother you dont need this info     No Known Problems Brother      Psoriasis Sister PS     Psoriasis Sister JN     No Known Problems Maternal Aunt      No Known Problems Maternal Uncle      No Known Problems Paternal Aunt      No Known Problems Paternal Uncle      Anemia Neg Hx      Arrhythmia Neg Hx      Asthma Neg Hx      Clotting disorder Neg Hx      Fainting Neg Hx      Heart attack Neg Hx      Heart disease Neg Hx      Heart failure Neg Hx      Stroke Neg Hx      Atrial Septal Defect Neg Hx      Breast cancer Neg Hx      Colon cancer Neg Hx      Ovarian cancer Neg Hx      Melanoma Neg Hx      Cervical cancer Neg Hx      Uterine cancer Neg Hx           /84   Pulse 75   Ht 5' 7" (1.702 m)   Wt 81.2 kg (179 lb)   LMP  (LMP Unknown)   BMI 28.04 kg/m²       ROS  Review of Systems   Constitutional: Negative.    Genitourinary:         Vaginal dryness   Psychiatric/Behavioral:          Anxiety          PHYSICAL EXAM:  Physical Exam       Post-menopausal  -     Ambulatory referral/consult to Gynecology    Situational anxiety  -     Ambulatory referral/consult to Gynecology    Emotional lability  -     Ambulatory referral/consult to Gynecology    Other orders  -     conjugated estrogens (PREMARIN) vaginal cream; Place 1 g vaginally twice a week.  Dispense: 45 g; Refill: 12  -     progesterone (PROMETRIUM) 200 MG capsule; Take 1 capsule (200 mg total) by mouth nightly.  Dispense: 90 capsule; Refill: 4  -     estradiol 0.05 mg/24 hr td ptwk 0.05 mg/24 hr PTWK; Place 1 " patch onto the skin twice a week.  Dispense: 8 patch; Refill: 11      Postmenopause  -discussed option for estrogen pill, patch, gel as well as risks and benefits of each  -she would like to try the patch 0.05mg 2x/week  -will also start progesterone 200mg qHS which I instructed to take at the same time every night to help with sleep    Insomnia/sleep disturbance  -recommended starting a sleep routine  -progesterone 200mg qHS    Vaginal Dryness  -vaginal estrogen cream qd x 2 weeks the 2x/week    HSDD  -will have her follow up in 3 months after starting HRT and address    Follow up in 3 months for follow up and Pap

## 2025-01-08 ENCOUNTER — OFFICE VISIT (OUTPATIENT)
Dept: PRIMARY CARE CLINIC | Facility: CLINIC | Age: 55
End: 2025-01-08
Payer: COMMERCIAL

## 2025-01-08 VITALS
OXYGEN SATURATION: 99 % | BODY MASS INDEX: 28.3 KG/M2 | HEIGHT: 67 IN | SYSTOLIC BLOOD PRESSURE: 122 MMHG | DIASTOLIC BLOOD PRESSURE: 74 MMHG | HEART RATE: 76 BPM | WEIGHT: 180.31 LBS

## 2025-01-08 DIAGNOSIS — N95.1 MENOPAUSAL SYMPTOMS: ICD-10-CM

## 2025-01-08 DIAGNOSIS — I10 ESSENTIAL HYPERTENSION: Primary | ICD-10-CM

## 2025-01-08 DIAGNOSIS — Z72.820 POOR SLEEP: ICD-10-CM

## 2025-01-08 DIAGNOSIS — F41.8 SITUATIONAL ANXIETY: ICD-10-CM

## 2025-01-08 DIAGNOSIS — E78.2 MIXED HYPERLIPIDEMIA: ICD-10-CM

## 2025-01-08 PROCEDURE — 99999 PR PBB SHADOW E&M-EST. PATIENT-LVL IV: CPT | Mod: PBBFAC,,, | Performed by: FAMILY MEDICINE

## 2025-01-08 PROCEDURE — 1159F MED LIST DOCD IN RCRD: CPT | Mod: CPTII,S$GLB,, | Performed by: FAMILY MEDICINE

## 2025-01-08 PROCEDURE — 99213 OFFICE O/P EST LOW 20 MIN: CPT | Mod: S$GLB,,, | Performed by: FAMILY MEDICINE

## 2025-01-08 PROCEDURE — 3078F DIAST BP <80 MM HG: CPT | Mod: CPTII,S$GLB,, | Performed by: FAMILY MEDICINE

## 2025-01-08 PROCEDURE — 3074F SYST BP LT 130 MM HG: CPT | Mod: CPTII,S$GLB,, | Performed by: FAMILY MEDICINE

## 2025-01-08 PROCEDURE — 4010F ACE/ARB THERAPY RXD/TAKEN: CPT | Mod: CPTII,S$GLB,, | Performed by: FAMILY MEDICINE

## 2025-01-08 PROCEDURE — 3008F BODY MASS INDEX DOCD: CPT | Mod: CPTII,S$GLB,, | Performed by: FAMILY MEDICINE

## 2025-01-08 PROCEDURE — 1160F RVW MEDS BY RX/DR IN RCRD: CPT | Mod: CPTII,S$GLB,, | Performed by: FAMILY MEDICINE

## 2025-01-08 NOTE — PROGRESS NOTES
Subjective:       Patient ID: Shelbie Ardon is a 54 y.o. female.    Chief Complaint: Follow-up (Pt here for 6 week f/u ), Anxiety (Wellbutrin follow-up/ pt stated that she has not taken it yet), and Insomnia    HPI  55 y/o female with HTN, HLPD, R sided thigh pain post op R LISETH 8/2020, hx of Covid, Lichen Planus is here for follow up anxiety and poor sleep.    Since last visit she has started on estradiol patch and progesterone and is feeling better overall, she has not started the Wellbutrin and is not sure if she will, she has tried hydroxyzine which did help but now that she is on Progesterone she has not needed it, no longer having hot flashes, her mood has improved some now that she is sleeping better. She denies f/n/v/d/constipation/cp/sob/urinary sx. She is walking most days, gets 3 miles about 5 days a week. She is eating regularly and healthy.        Hx of Covid 8/2023  HTN: following with cardiology; losartan 100 mg daily and chlorthalidone 25 mg daily  Elevated LDL: lipids improved 11/2024 LDL 86, Crestor 10 mg daily  OA hip s/p R LISETH 8/2020 Dr. Hamm and OA knee:  After surgery she had some nerve pain that she reports has resolved, she also notes some discomfort from the hardware that she was told would improve with weight loss, off celebrex, cymbalta caused sexual side effects, off mobic, off lyrica, tylenol not helpful  GYN: following with Dr. Castillo, pelvic utd, mmg 9/2024  Hx of LPRD, not needed meds  Situational anxiety and depression: has been on Duloxetine and Prozac in the past, she had decreased libido   Lichen Planus: following with Dr. Gonzales at [facility], she is off of Rinvoq for over a month and Lichen Planus resolved  Colonoscopy due declined scope, mother had a perforation with colonoscopy, cologuard 8/2024  Vitamin D def: not on supplement  Eye exam utd  Dental utd        Review of Systems  see   Objective:      /74 (BP Location: Left arm, Patient Position: Sitting)   Pulse  "76   Ht 5' 7" (1.702 m)   Wt 81.8 kg (180 lb 5.4 oz)   LMP  (LMP Unknown)   SpO2 99%   BMI 28.24 kg/m²   Physical Exam  Constitutional:       General: She is not in acute distress.     Appearance: She is well-developed. She is not diaphoretic.   HENT:      Head: Normocephalic and atraumatic.   Pulmonary:      Effort: No respiratory distress.   Neurological:      Mental Status: She is alert and oriented to person, place, and time.         Assessment:       1. Essential hypertension    2. Mixed hyperlipidemia    3. Situational anxiety    4. Poor sleep    5. Menopausal symptoms        Plan:   Shelbie was seen today for follow-up, anxiety and insomnia.    Diagnoses and all orders for this visit:    Essential hypertension  -     Comprehensive Metabolic Panel; Future  -     Hemoglobin A1C; Future  -     TSH; Future  -     CBC Auto Differential; Future  -     Lipid Panel; Future    Mixed hyperlipidemia  -     Comprehensive Metabolic Panel; Future  -     Lipid Panel; Future    Situational anxiety    Poor sleep    Menopausal symptoms        "

## 2025-01-13 ENCOUNTER — HOSPITAL ENCOUNTER (OUTPATIENT)
Dept: RADIOLOGY | Facility: HOSPITAL | Age: 55
Discharge: HOME | End: 2025-01-13
Attending: ORTHOPAEDIC SURGERY
Payer: COMMERCIAL

## 2025-01-13 ENCOUNTER — OFFICE VISIT (OUTPATIENT)
Dept: SPORTS MEDICINE | Facility: CLINIC | Age: 55
End: 2025-01-13
Payer: COMMERCIAL

## 2025-01-13 VITALS — SYSTOLIC BLOOD PRESSURE: 123 MMHG | HEART RATE: 75 BPM | DIASTOLIC BLOOD PRESSURE: 80 MMHG

## 2025-01-13 DIAGNOSIS — M77.11 RIGHT LATERAL EPICONDYLITIS: Primary | ICD-10-CM

## 2025-01-13 DIAGNOSIS — M25.521 RIGHT ELBOW PAIN: ICD-10-CM

## 2025-01-13 DIAGNOSIS — M25.521 RIGHT ELBOW PAIN: Primary | ICD-10-CM

## 2025-01-13 PROCEDURE — 20551 NJX 1 TENDON ORIGIN/INSJ: CPT | Mod: RT,S$GLB,, | Performed by: ORTHOPAEDIC SURGERY

## 2025-01-13 PROCEDURE — 99214 OFFICE O/P EST MOD 30 MIN: CPT | Mod: 25,S$GLB,, | Performed by: ORTHOPAEDIC SURGERY

## 2025-01-13 PROCEDURE — 99999 PR PBB SHADOW E&M-EST. PATIENT-LVL III: CPT | Mod: PBBFAC,,, | Performed by: ORTHOPAEDIC SURGERY

## 2025-01-13 PROCEDURE — 3079F DIAST BP 80-89 MM HG: CPT | Mod: CPTII,S$GLB,, | Performed by: ORTHOPAEDIC SURGERY

## 2025-01-13 PROCEDURE — 1159F MED LIST DOCD IN RCRD: CPT | Mod: CPTII,S$GLB,, | Performed by: ORTHOPAEDIC SURGERY

## 2025-01-13 PROCEDURE — 4010F ACE/ARB THERAPY RXD/TAKEN: CPT | Mod: CPTII,S$GLB,, | Performed by: ORTHOPAEDIC SURGERY

## 2025-01-13 PROCEDURE — 76942 ECHO GUIDE FOR BIOPSY: CPT | Mod: S$GLB,,, | Performed by: ORTHOPAEDIC SURGERY

## 2025-01-13 PROCEDURE — 73080 X-RAY EXAM OF ELBOW: CPT | Mod: 26,RT,, | Performed by: RADIOLOGY

## 2025-01-13 PROCEDURE — 73080 X-RAY EXAM OF ELBOW: CPT | Mod: TC,RT

## 2025-01-13 PROCEDURE — 3074F SYST BP LT 130 MM HG: CPT | Mod: CPTII,S$GLB,, | Performed by: ORTHOPAEDIC SURGERY

## 2025-01-13 RX ORDER — CELECOXIB 200 MG/1
200 CAPSULE ORAL 2 TIMES DAILY
Qty: 60 CAPSULE | Refills: 2 | Status: SHIPPED | OUTPATIENT
Start: 2025-01-13 | End: 2025-04-13

## 2025-01-13 RX ORDER — BETAMETHASONE SODIUM PHOSPHATE AND BETAMETHASONE ACETATE 3; 3 MG/ML; MG/ML
3 INJECTION, SUSPENSION INTRA-ARTICULAR; INTRALESIONAL; INTRAMUSCULAR; SOFT TISSUE
Status: DISCONTINUED | OUTPATIENT
Start: 2025-01-13 | End: 2025-01-13 | Stop reason: HOSPADM

## 2025-01-13 RX ADMIN — BETAMETHASONE SODIUM PHOSPHATE AND BETAMETHASONE ACETATE 3 MG: 3; 3 INJECTION, SUSPENSION INTRA-ARTICULAR; INTRALESIONAL; INTRAMUSCULAR; SOFT TISSUE at 01:01

## 2025-01-13 NOTE — PROGRESS NOTES
Subjective:     Chief Complaint: Shelbie Ardon is a 54 y.o. female who had concerns including Pain of the Right Elbow.    History of Present Illness    CHIEF COMPLAINT:  - Shelbie presents today for evaluation of right elbow pain. She appears to be right-handed based on the discussion of symptoms.    HPI:  Shelbie presents for evaluation of right elbow pain that began in the fall, attributing the onset to frequent travel and posture while sitting on airplanes. The pain has progressed to affect her fingers, excluding the thumb. She reports that the pain feels like it's extending to her fingers, excluding the thumb. She has attempted self-management with Advil, Voltaren gel, and Celebrex, which provided some relief. She experiences a burning sensation and tightness in the elbow area, particularly when stretching, indicating the specific location of discomfort. The pain interferes with her knitting, a hobby she uses to manage stress. Shelbie denies any thumb pain or discomfort and any history of arthritis in the elbow.    SURGICAL HISTORY:  - Hip replacement: doing well                     Past Surgical History:   Procedure Laterality Date    DILATION AND CURETTAGE OF UTERUS      ESOPHAGOGASTRODUODENOSCOPY N/A 6/29/2023    Procedure: EGD (ESOPHAGOGASTRODUODENOSCOPY);  Surgeon: Eleazar Walker MD;  Location: [facility] ENDOSCOPY;  Service: Endoscopy;  Laterality: N/A;  pt last dose ozempic 6.16.23; not taking regularly; pt instructed to hold off on further ozempic inj until after procedure; pt verbalized understanding; instr via portal; AP  pre call attempted -CE    JOINT REPLACEMENT  08/19/20    Right hip, anterior approach    KNEE SURGERY      acl repair as child/ revision 2013       Objective:     General: Shelbie is well-developed, well-nourished, appears stated age, in no acute distress, alert and oriented to time, place and person.     General    Vitals reviewed.  Constitutional: She is oriented to  person, place, and time. She appears well-developed and well-nourished. No distress.   HENT:   Right Ear: External ear normal.   Left Ear: External ear normal.   Nose: Nose normal.   Eyes: Pupils are equal, round, and reactive to light. Right eye exhibits no discharge. Left eye exhibits no discharge.   Pulmonary/Chest: Effort normal and breath sounds normal. No respiratory distress.   Abdominal: Soft.   Neurological: She is alert and oriented to person, place, and time. She has normal reflexes. No cranial nerve deficit. She exhibits normal muscle tone. Coordination normal.   Psychiatric: She has a normal mood and affect. Her behavior is normal. Judgment and thought content normal.             Right Hand/Wrist Exam     Inspection   Scars: Wrist - absent   Effusion: Wrist - absent   Bruising: Wrist - absent   Deformity: Wrist - deformity     Range of Motion     Wrist   Extension:  50 normal   Flexion:  70 normal   Abduction: 20 normal  Adduction: 35 normal    Tests   Phalens Sign: negative  Tinel's sign (median nerve): negative  Finkelstein's test: negative  Carpal Tunnel Compression Test: negative  Cubital Tunnel Compression Test: negative  LT Stability: negative  Machuca's Test: negative      Other     Neuorologic Exam    Median Distribution: normal  Ulnar Distribution: normal  Radial Distribution: normal      Left Hand/Wrist Exam     Inspection   Scars: Wrist - absent   Effusion: Wrist - absent   Bruising: Wrist - absent   Deformity: Wrist - absent     Range of Motion     Wrist   Extension:  50 normal   Flexion:  70 normal   Abduction: 20 normal  Adduction: 35 normal    Tests   Phalens Sign: negative  Tinel's sign (median nerve): negative  Finkelstein's test: negative  Carpal Tunnel Compression Test: negative  Cubital Tunnel Compression Test: negative  LT Stability: negative  Machuca's Test: negative      Other     Sensory Exam  Median Distribution: normal  Ulnar Distribution: normal  Radial Distribution:  normal      Right Elbow Exam     Inspection   Scars: absent  Effusion: absent  Bruising: absent  Deformity: absent  Atrophy: absent    Pain   The patient exhibits pain of the lateral epicondyle    Range of Motion   Extension:  0 normal   Flexion:  130 normal   Pronation:  normal   Supination:  80 normal     Tests   Varus: negative  Valgus: negative  Tinel's sign (cubital tunnel): negative  Tennis Elbow: negative and severe  Golfer's Elbow: negative  Radial Capitellar Grind: negative    Other   Sensation: normal      Left Elbow Exam     Inspection   Scars: absent  Effusion: absent  Bruising: absent  Deformity: absent  Atrophy: absent    Range of Motion   Extension:  0 normal   Flexion:  130 normal   Pronation:  normal   Supination:  80 normal     Tests   Varus: negative  Tinel's sign (cubital tunnel): negative  Tennis Elbow: severe  Golfer's Elbow: negative  Radial Capitellar Grind: negative    Other   Sensation: normal        Muscle Strength   Right Upper Extremity   Wrist extension: 5/5   Wrist flexion: 5/5   : 5/5   Pinch Mechanism: 5/5  Elbow Pronation:  5/5   Elbow Supination:  5/5   Elbow Extension: 5/5  Elbow Flexion: 5/5  Intrinsics: 5/5  EPL (Extensor Pollicis Longus): 5/5  Left Upper Extremity  Wrist extension: 5/5   Wrist flexion: 5/5   :  5/5   Pinch Mechanism: 5/5  Elbow Pronation:  5/5   Elbow Supination:  5/5   Elbow Extension: 5/5  Elbow Flexion: 5/5  Intrinsics: 5/5  EPL (Extensor Pollicis Longus): 5/5    Vascular Exam     Right Pulses      Radial:                    2+      Left Pulses      Radial:                    2+      Capillary Refill  Right Hand: normal capillary refill  Left Hand: normal capillary refill  Right Raymond's Test: no rapid refill no slow refill  Left Raymond's Test: no rapid refill no slow refill        Edema  Right Forearm: absent  Left Forearm: absent          Radiographic findings today:     These findings were discussed and reviewed with the patient.     Assessment:      Encounter Diagnosis   Name Primary?    Right lateral epicondylitis Yes        Plan:     Assessment & Plan    LIFESTYLE:  - Advise using a tennis elbow armband during active periods, but removing it during rest.  - Suggest using silly putty to open and close the hand, warming up the muscle before stretching.    MEDICATIONS PRESCRIBED:  - Prescribed a steroid injection (betamethasone) for the elbow.  - Ordered a refill of anti-inflammatory medication to be sent to the [facility].    REFERRALS:  - Referred to occupational therapy for elbow treatment and stretching exercises.    PROCEDURES:  - Administered steroid injection (betamethasone) to the patient's elbow. Performed minor procedure to release the ECRB (Extensor Carpi Radialis Brevis) muscle. See quick procedure note.          All of the patient's questions were answered and the patient will contact us if they have any questions or concerns in the interim.    This note was generated with the assistance of ambient listening technology. Verbal consent was obtained by the patient and accompanying visitor(s) for the recording of patient appointment to facilitate this note. I attest to having reviewed and edited the generated note for accuracy, though some syntax or spelling errors may persist. Please contact the author of this note for any clarification.

## 2025-01-13 NOTE — PROCEDURES
"Tendon Origin: R elbow    Date/Time: 1/13/2025 1:30 PM    Performed by: Vern Carrasquillo MD  Authorized by: Vern Carrasquillo MD    Consent Done?:  Yes (Verbal)  Timeout: prior to procedure the correct patient, procedure, and site was verified    Indications:  Pain  Site marked: the procedure site was marked    Timeout: prior to procedure the correct patient, procedure, and site was verified    Location:  Elbow  Site:  R elbow  Prep: patient was prepped and draped in usual sterile fashion    Ultrasonic Guidance for Needle Placement?: Yes    Needle size:  22 G  Approach:  Anterolateral  Medications:  3 mg betamethasone acetate-betamethasone sodium phosphate 6 mg/mL  Patient tolerance:  Patient tolerated the procedure well with no immediate complications   Origin of common extensor tendon at the lateral epicondyle  Description of ultrasound utilization for needle guidance:   Ultrasound guidance used for needle localization. Images saved and stored for documentation. The common extensor tendon was visualized at its origin at the lateral epicondyle. Dynamic visualization of the 25g x 1.5" needle was continuous throughout the procedure.    "

## 2025-01-14 ENCOUNTER — CLINICAL SUPPORT (OUTPATIENT)
Dept: REHABILITATION | Facility: HOSPITAL | Age: 55
End: 2025-01-14
Payer: COMMERCIAL

## 2025-01-14 DIAGNOSIS — R29.898 DECREASED GRIP STRENGTH OF RIGHT HAND: ICD-10-CM

## 2025-01-14 DIAGNOSIS — M77.11 RIGHT LATERAL EPICONDYLITIS: ICD-10-CM

## 2025-01-14 DIAGNOSIS — R29.898 IMPAIRED STRENGTH OF WRIST MUSCLES: Primary | ICD-10-CM

## 2025-01-14 PROCEDURE — 97530 THERAPEUTIC ACTIVITIES: CPT

## 2025-01-14 PROCEDURE — 97165 OT EVAL LOW COMPLEX 30 MIN: CPT

## 2025-01-14 NOTE — PROGRESS NOTES
[facility]: Occupational Therapy Note     See plan of care for full initial OT evaluation.    Mariana Bhatia, BRIELLE, OTR/L, CHT

## 2025-01-14 NOTE — PATIENT INSTRUCTIONS
[facility], OCCUPATIONAL THERAPY  HOME EXERCISE PROGRAM      Lateral Epicondylitis Conservative Management    Joint Protection:  Use larger joints and muscles when possible (resting objects in crook of elbow)  Stop activities before the point of discomfort  Avoid activities that put strain on painful or stiff joints  Avoid a tight or prolonged grasp on objects  Avoid any lifting or gripping with elbow in extension  If you must lift, lift with elbows bent at side, object close to you, and hands turned palm up. elbow extension  Balance Rest and Activity:  Take frequent, short breaks to stretch  Avoid staying in one position for a long time  Alternate heavy and light activities  Eliminate unnecessary activities  Reduce the effort:  Avoid excessive loads. Dont be afraid to ask for help. Use carts and tabletops to make tasks easier.  Keep items nearby (such as keyboard)  Helpful Tips: slide objects; use your palms instead of fingers, keeps heavy items close to your body, use larger handles, pens and pencils, look for lightweight options, use wheels or carts to roll objects              [facility]  OCCUPATIONAL THERAPY  HOME EXERCISE PROGRAM   3 x 30 sec holds, 2 x a day

## 2025-01-15 NOTE — PLAN OF CARE
"[facility]  Occupational Therapy Initial Evaluation    Date: 1/14/2025  Name: Shelbie Ardon  Clinic Number: [id number]    Therapy Diagnosis:   Encounter Diagnoses   Name Primary?    Right lateral epicondylitis     Impaired strength of wrist muscles Yes    Decreased  strength of right hand      Physician: Vern Carrasquillo MD    Physician Orders: OT Eval and Treat; Right elbow lateral epicondylitis stretch the ECRB region   Medical Diagnosis: M77.11 (ICD-10-CM) - Right lateral epicondylitis   Surgical Procedure and Date: N/A / Date of Injury/Onset: September-October 2024  Evaluation Date: 1/14/2025  Insurance Authorization Period Expiration: 12/31/2025  Plan of Care Expiration: 4/11/25 (12 weeks)   Date of Return to MD: PRN  Visit # / Visits authorized: 1 / 1  FOTO: completed     Precautions:  Standard and HTN    Time In: 1:00 pm  Time Out: 1:53 pm  Total Appointment Time (timed & untimed codes): 53 minutes    SUBJECTIVE     Date of Onset: September-October 2024     History of Current Condition/Mechanism of Injury: Per MD note on 1/13/2025, "right elbow pain that began in the fall, attributing the onset to frequent travel and posture while sitting on airplanes. The pain has progressed to affect her fingers, excluding the thumb. She reports that the pain feels like it's extending to her fingers, excluding the thumb. She has attempted self-management with Advil, Voltaren gel, and Celebrex, which provided some relief. She experiences a burning sensation and tightness in the elbow area, particularly when stretching, indicating the specific location of discomfort. The pain interferes with her knitting, a hobby she uses to manage stress. Shelbie denies any thumb pain or discomfort and any history of arthritis in the elbow."     Falls: none    Involved Side: Right  Dominant Side: Right  Imaging: Reviewed   Prior Therapy: none  Occupation: Medical research in orthopedics   Working " presently: employed  Duties: computer work     Functional Limitations/Social History:    Previous functional status includes: Independent with all ADLs and IADLs.     Current Functional Status   Home/Living environment: lives with their spouse      Limitation of Functional Status as follows:   ADLs/IADLs: Pt requires assistance for heavy lifting, chopping, opening jars, etc.     - Feeding: Mod I    - Bathing: Mod I    - Dressing/Grooming: Mod I     - Driving: Mod I     Leisure: knitting, fishing     Pain:  Functional Pain Scale Rating 0-10: Current 6/10, worst 8/10, best 3/10   Location: R elbow  Description: Aching, Burning, and Sharp  Aggravating Factors: Touching and Lifting  Easing Factors: pain medication, ice, and rest     Patient's Goals for Therapy: pain-free function and preventative measures     Medical History:   Past Medical History:   Diagnosis Date    Arthritis     Arthritis     Breast cyst     Cough     Female infertility NEC     Headache(784.0)     Hyperlipidemia     Hypertension     Joint pain     Tinnitus 12/17/2013     Surgical History:    has a past surgical history that includes Knee surgery; Dilation and curettage of uterus; Joint replacement (08/19/20); and Esophagogastroduodenoscopy (N/A, 6/29/2023).    Medications:   has a current medication list which includes the following prescription(s): ammonium lactate, arm brace, celecoxib, chlorthalidone, clindamycin, co-enzyme q-10, premarin, estradiol 0.05 mg/24 hr td ptwk, hydroxyzine hcl, losartan, multivit with calcium,iron,min, fish oil-omega-3 fatty acids, progesterone, rosuvastatin, and triamcinolone acetonide 0.1%.    Allergies:   Review of patient's allergies indicates:   Allergen Reactions    Cephalexin Hives, Anaphylaxis, Itching, Rash and Shortness Of Breath    Methohexital Shortness Of Breath     Other reaction(s): Difficulty breathing  Other reaction(s): Difficulty breathing    Penicillins Hives, Itching, Rash and Shortness Of Breath     Amlodipine Edema     Edema, palpitations  Edema, palpitations    Sulfa (sulfonamide antibiotics) Hives, Itching, Photosensitivity and Rash      OBJECTIVE     Observation/Appearance: TTP over R lateral epicondyle.      Edema. Measured in centimeters.   1/14/2025 1/14/2025    Left Right   Wrist Crease 27.0 27.5     Elbow and Wrist ROM. WFLs - all planes of motion. End-range tightness with R elbow ext.     Hand ROM. WFLs - Pt able to make full composite fists with B/L hands and demonstrates thumb opposition touch to SF DPC.      Strength (Dynamometer - Rung II)   Measured in pounds to POP.   1/14/2025 1/14/2025    Left Right   Elbow flex 74 40   Elbow ext 83 23  (7/10 pain)     Sensation. Intact per report. Pt denies numbness and/or tingling in BUE(s).     Manual Muscle Test   1/14/2025    Right   Wrist Extension  5/5   Wrist Flexion 5/5   Radial Deviation 5/5  pain   Ulnar Deviation 5/5   Elbow Extension 5/5   Elbow Flexion 5/5     Limitation/Restriction for FOTO Elbow Survey    Therapist reviewed FOTO scores for Shelbie Ardon on 1/14/2025.   FOTO documents entered into [employer] - see Media section.    Limitation Score: 46%       Treatment   Total Treatment time (time-based codes) separate from Evaluation: 10 minutes    Shelbie received the treatments listed below:     Supervised modalities after being cleared for contradictions:   - Patient received moist heat x 10 min to R elbow to increase blood flow, circulation, and tissue elasticity prior to therex.    Therapeutic activities to improve functional performance for 10 minutes, including:  - Pt educated on HEP consisting of ASTYM stretch positions 1-3 and cross friction massage.  - Pt educated on modality use at home including ice vs heat.  - Pt educated on conservative management of lateral epicondylitis including joint protection and energy conservation principles, activity modification, and brace wear as needed.  - Issued ASHT patient education  resource on Tennis Elbow.  - Discussed and reviewed use of counterforce brace.     Patient Education and Home Exercises      Education provided:   - Role of OT and POC  - HEP     Written Home Exercises Provided: yes.  Exercises were reviewed and Shelbie was able to demonstrate them prior to the end of the session.  Shelbie demonstrated good  understanding of the education provided. See EMR under Patient Instructions for exercises provided during therapy sessions.     Pt was advised to perform these exercises free of pain, and to stop performing them if pain occurs.    Patient/Family Education: role of OT, goals for OT, scheduling/cancellations - pt verbalized understanding. Discussed insurance limitations with patient.    ASSESSMENT     Shelbie Ardon is a 54 y.o. female referred to outpatient occupational therapy and presents with a medical diagnosis of right lateral epicondylitis. Patient presents with the following therapy deficits: Decreased  strength, Decreased muscle strength, Decreased functional hand use, and Increased pain and demonstrates limitations as described in the chart below. Following medical record review it is determined that pt will benefit from occupational therapy services in order to maximize pain free and/or functional use of right arm. The following goals were discussed with the patient and patient is in agreement with them as to be addressed in the treatment plan. The patient's rehab potential is Good.     Anticipated barriers to occupational therapy: none identified at this time   Pt has no cultural, educational or language barriers to learning provided.    Profile and History Assessment of Occupational Performance Level of Clinical Decision Making Complexity Score   Occupational Profile:   Shelbie Ardon is a 54 y.o. female who lives with their spouse and is currently employed Shelbie Ardon has difficulty with  ADLs and IADLs as listed previously,  which  Affecting herdaily functional abilities.      Comorbidities:    has a past medical history of Arthritis, Arthritis, Breast cyst, Cough, Female infertility NEC, Headache(784.0), Hyperlipidemia, Hypertension, Joint pain, and Tinnitus.    Medical and Therapy History Review:   Expanded               Performance Deficits    Physical:  Muscle Power/Strength  Muscle Endurance  Edema   Strength  Muscle Tone  Postural Control  Pain    Cognitive:  No Deficits    Psychosocial:    Habits  Routines  Rituals     Clinical Decision Making:  low    Assessment Process:  Problem-Focused Assessments    Modification/Need for Assistance:  Minimal-Moderate Modifications/Assistance    Intervention Selection:  Several Treatment Options       low  Based on PMHX, co morbidities , data from assessments and functional level of assistance required with task and clinical presentation directly impacting function.         Goals:   The following goals were discussed with the patient and patient is in agreement with them as to be addressed in the treatment plan.   Short Term Goals (STGs) = Long Term Goals (LTGs); to be met by discharge.  LTG #1: Pt will report a pain level of 4 out of 10 at worst with resistive functional use of right arm.   LTG #2: Pt will demo improved FOTO score by 3-5 points.   LTG #3: Pt will return to prior level of function for ADLs.  LTG #4: Pt will increase right hand  strength in elbow flexed and extended positions by at least 3-5 lbs each needed to carry laundry, carry groceries, and increase functional independence of left hand for ADLs/IADLs.   LTG #5: Pt will demonstrate independence with issued HEP, brace wear, and modalities for pain/symptom management.     PLAN   Plan of Care Certification: 1/14/2025 to 4/11/25 (12 weeks).     Outpatient Occupational Therapy 1-2 times weekly for 12 weeks to include the following interventions: Paraffin, Fluidotherapy, Manual therapy/joint mobilizations, Modalities for  pain management, US 3 mhz, Therapeutic exercises/activities., Iontophoresis with 2.0 cc Dexamethasone, Strengthening, Orthotic Fabrication/Fit/Training, Edema Control, Electrical Modalities, Joint Protection, and Energy Conservation.      Mariana Bhatia, MOT, OTR/L, CHT      I CERTIFY THE NEED FOR THESE SERVICES FURNISHED UNDER THIS PLAN OF TREATMENT AND WHILE UNDER MY CARE  Physician's comments:      Physician's Signature: ___________________________________________________

## 2025-01-22 PROBLEM — M77.11 RIGHT LATERAL EPICONDYLITIS: Status: ACTIVE | Noted: 2025-01-22

## 2025-01-27 ENCOUNTER — CLINICAL SUPPORT (OUTPATIENT)
Dept: REHABILITATION | Facility: HOSPITAL | Age: 55
End: 2025-01-27
Payer: COMMERCIAL

## 2025-01-27 DIAGNOSIS — R29.898 DECREASED GRIP STRENGTH OF RIGHT HAND: ICD-10-CM

## 2025-01-27 DIAGNOSIS — R29.898 IMPAIRED STRENGTH OF WRIST MUSCLES: Primary | ICD-10-CM

## 2025-01-27 PROCEDURE — 97110 THERAPEUTIC EXERCISES: CPT

## 2025-01-27 PROCEDURE — 97140 MANUAL THERAPY 1/> REGIONS: CPT

## 2025-01-27 NOTE — PROGRESS NOTES
"[facility]  Occupational Therapy Treatment Note    Date: 1/27/2025  Name: Shelbie Ardno  Clinic Number: [id number]    Therapy Diagnosis:   Encounter Diagnoses   Name Primary?    Impaired strength of wrist muscles Yes    Decreased  strength of right hand      Physician: Vern Carrasquillo MD    Physician Orders: OT Eval and Treat; Right elbow lateral epicondylitis stretch the ECRB region   Medical Diagnosis: M77.11 (ICD-10-CM) - Right lateral epicondylitis   Surgical Procedure and Date: N/A / Date of Injury/Onset: September-October 2024  Evaluation Date: 1/14/2025  Insurance Authorization Period Expiration: 12/31/2025  Plan of Care Expiration: 4/11/25 (12 weeks)   Date of Return to MD: PRN  Visit # / Visits authorized: 2 / 13  FOTO: 1/3    Precautions: Standard and HTN     Time In: 12:30 pm  Time Out: 1:11 pm  Total Billable Time: 31 minutes    SUBJECTIVE     Pt reports: "Much better. I do everything, the heat, the strap, the stretches."   She was compliant with home exercise program given last session.   Response to previous treatment: First treatment  Functional change: less pain with activity     Pain: 0/10  Location: right elbow    OBJECTIVE   Objective Measures updated at progress report unless specified.    Observation/Appearance: TTP over R lateral epicondyle.       Edema. Measured in centimeters.    1/14/2025 1/14/2025     Left Right   Wrist Crease 27.0 27.5      Elbow and Wrist ROM. WFLs - all planes of motion. End-range tightness with R elbow ext.      Hand ROM. WFLs - Pt able to make full composite fists with B/L hands and demonstrates thumb opposition touch to SF DPC.       Strength (Dynamometer - Rung II)   Measured in pounds to POP.    1/14/2025 1/14/2025     Left Right   Elbow flex 74 40   Elbow ext 83 23  (7/10 pain)      Sensation. Intact per report. Pt denies numbness and/or tingling in BUE(s).      Manual Muscle Test    1/14/2025     Right   Wrist Extension  " 5/5   Wrist Flexion 5/5   Radial Deviation 5/5  pain   Ulnar Deviation 5/5   Elbow Extension 5/5   Elbow Flexion 5/5        Treatment     Shelbie received the treatments listed below:     Supervised modalities after being cleared for contradictions:   - Patient received moist heat x 10 min to R elbow to increase blood flow, circulation, and tissue elasticity prior to therex.    Manual therapy techniques: Soft tissue Mobilization and Friction Massage were applied to the: RUE for 23 minutes, including:  - Performed soft tissue mobilization/massage and cupping to relieve associated soft tissue tightness, improve joint mobility, decrease pain, and improve lymphatic drainage to increase ROM.   - Performed cross friction massage to decrease edema, improve joint mobility, decrease pain, and promote proper healing via increased circulation.     Therapeutic exercises to develop strength, endurance, ROM, flexibility, and posture for 8 minutes, including:  - ASTYM stretch positions 1-3, 2 x 30 sec holds   - BUE pulleys x 2 min, shoulder flexion     Patient Education and Home Exercises      Education provided:   - HEP in place  - Progress towards goals     Written Home Exercises Provided: Patient instructed to cont prior HEP.  Exercises were reviewed and Shelbie was able to demonstrate them prior to the end of the session.  Shelbie demonstrated good  understanding of the HEP provided. See EMR under Patient Instructions for exercises provided during therapy sessions.      ASSESSMENT     Pt returns for her first follow-up visit this date. She participated well and endorses good adherence to HEP. Reports good improvements in pain with function. Remains mostly limited by decreased muscle strength and endurance. Will progress as tolerated.      Shelbie is progressing well towards her goals and there are no updates to goals at this time. Pt prognosis is Good.     Pt will continue to benefit from skilled outpatient occupational  therapy to address the deficits listed in the problem list on initial evaluation, provide pt/family education and to maximize pt's level of independence in the home and community environment.     Pt's spiritual, cultural and educational needs considered and pt agreeable to plan of care and goals.    Anticipated barriers to occupational therapy: none identified at this time     Goals:   The following goals were discussed with the patient and patient is in agreement with them as to be addressed in the treatment plan.   Short Term Goals (STGs) = Long Term Goals (LTGs); to be met by discharge.  LTG #1: Pt will report a pain level of 4 out of 10 at worst with resistive functional use of right arm. progressing not met 1/27/2025  LTG #2: Pt will demo improved FOTO score by 3-5 points. progressing not met 1/27/2025  LTG #3: Pt will return to prior level of function for ADLs. progressing not met 1/27/2025  LTG #4: Pt will increase right hand  strength in elbow flexed and extended positions by at least 3-5 lbs each needed to carry laundry, carry groceries, and increase functional independence of left hand for ADLs/IADLs. progressing not met 1/27/2025  LTG #5: Pt will demonstrate independence with issued HEP, brace wear, and modalities for pain/symptom management. progressing not met 1/27/2025     PLAN     Continue skilled occupational therapy with individualized plan of care focusing on maximizing functional use of patient's right arm.    Updates/Grading for next session: progress as tolerated and per protocol.    Mariana Bhatia, MOT, OTR/L, CHT

## 2025-02-10 ENCOUNTER — CLINICAL SUPPORT (OUTPATIENT)
Dept: REHABILITATION | Facility: HOSPITAL | Age: 55
End: 2025-02-10
Payer: COMMERCIAL

## 2025-02-10 DIAGNOSIS — R29.898 IMPAIRED STRENGTH OF WRIST MUSCLES: Primary | ICD-10-CM

## 2025-02-10 DIAGNOSIS — R29.898 DECREASED GRIP STRENGTH OF RIGHT HAND: ICD-10-CM

## 2025-02-10 PROCEDURE — 97140 MANUAL THERAPY 1/> REGIONS: CPT

## 2025-02-10 PROCEDURE — 97110 THERAPEUTIC EXERCISES: CPT

## 2025-02-10 NOTE — PROGRESS NOTES
"[facility]  Occupational Therapy Treatment Note/Discharge Summary    Date: 2/10/2025  Name: Shelbie Ardon  Clinic Number: [id number]    Therapy Diagnosis:   Encounter Diagnoses   Name Primary?    Impaired strength of wrist muscles Yes    Decreased  strength of right hand      Physician: Vern Carrasquillo MD    Physician Orders: OT Eval and Treat; Right elbow lateral epicondylitis stretch the ECRB region   Medical Diagnosis: M77.11 (ICD-10-CM) - Right lateral epicondylitis   Surgical Procedure and Date: N/A / Date of Injury/Onset: September-October 2024  Evaluation Date: 1/14/2025  Insurance Authorization Period Expiration: 12/31/2025  Plan of Care Expiration: 4/11/25 (12 weeks)   Date of Return to MD: PRN  Visit # / Visits authorized: 3 / 13  FOTO: 2/2    Precautions: Standard and HTN     Time In: 11:00 am  Time Out: 11:53 am  Total Billable Time: 43 minutes    SUBJECTIVE     Pt reports: "Brilliant."   She was compliant with home exercise program given last session.   Response to previous treatment: less pain   Functional change: less pain with activity; 32 point improvement in FOTO    Pain: 0/10  Location: right elbow    OBJECTIVE   Objective Measures updated at progress report unless specified.    Observation/Appearance: Skin warm and dry.      Edema. Measured in centimeters.    1/14/2025 1/14/2025     Left Right   Wrist Crease 27.0 27.5      Elbow and Wrist ROM. WFLs - all planes of motion.      Hand ROM. WFLs - Pt able to make full composite fists with B/L hands and demonstrates thumb opposition touch to SF DPC.       Strength (Dynamometer - Rung II)   Measured in pounds to POP.    1/14/2025 1/14/2025 2/10/2025     Left Right Right   Elbow flex 74 40 72   Elbow ext 83 23  (7/10 pain) 72      Sensation. Intact per report. Pt denies numbness and/or tingling in BUE(s).      Manual Muscle Test    1/14/2025 2/10/2025     Right Right   Wrist Extension  5/5    Wrist Flexion 5/5  "   Radial Deviation 5/5  pain 5/5   Ulnar Deviation 5/5    Elbow Extension 5/5    Elbow Flexion 5/5         CMS Impairment/Limitation/Restriction for FOTO Elbow Survey    Therapist reviewed FOTO scores for Shelbie Ardon on 2/10/2025.   FOTO documents entered into [employer] - see Media section.    Limitation Score: 14% (32 point improvement)       Treatment     Shelbie received the treatments listed below:     Supervised modalities after being cleared for contradictions:   - Patient received moist heat x 10 min to R elbow to increase blood flow, circulation, and tissue elasticity prior to therex.    Manual therapy techniques: Soft tissue Mobilization and Friction Massage were applied to the: RUE for 15 minutes, including:  - Performed soft tissue mobilization/massage and cupping to relieve associated soft tissue tightness, improve joint mobility, decrease pain, and improve lymphatic drainage to increase ROM.   - Performed cross friction massage to decrease edema, improve joint mobility, decrease pain, and promote proper healing via increased circulation.     Therapeutic exercises to develop strength, endurance, ROM, flexibility, and posture for 28 minutes, including:  - Updated objective measurements, see above.   - ASTYM stretch positions 1-3, 2 x 30 sec holds    - Wrist PRE 3 ways x 20 reps each with 2# db  - Red putty gripping, elbow flex and elbow ext x 2 min each   - Reviewed HEP    Patient Education and Home Exercises      Education provided:   - HEP in place, added: hand and wrist strengthening, 1-2 x daily to tolerance.  - Progress towards goals     Written Home Exercises Provided: yes.  Exercises were reviewed and Shelbie was able to demonstrate them prior to the end of the session.  Shelbie demonstrated good  understanding of the HEP provided. See EMR under Patient Instructions for exercises provided during therapy sessions.      ASSESSMENT     Shelbie had good tolerance to treatment this date. Pt has  attended outpatient OT services since 1/14/2025 (~4 weeks) and has met all goals at this time. Pt has made significant gains with therapy, demonstrating return to PLOF with ADLs and IADLs, with improved ROM, increased overall strength, and decreased pain. She is independent with HEP and is appropriate for discharge from OT services at this time.     Pt's spiritual, cultural and educational needs considered and pt agreeable to plan of care and goals.    Goals:   The following goals were discussed with the patient and patient is in agreement with them as to be addressed in the treatment plan.   Short Term Goals (STGs) = Long Term Goals (LTGs); to be met by discharge.  LTG #1: Pt will report a pain level of 4 out of 10 at worst with resistive functional use of right arm. Met 2/10/2025  LTG #2: Pt will demo improved FOTO score by 3-5 points. Met 2/10/2025   LTG #3: Pt will return to prior level of function for ADLs. Met 2/10/2025  LTG #4: Pt will increase right hand  strength in elbow flexed and extended positions by at least 3-5 lbs each needed to carry laundry, carry groceries, and increase functional independence of left hand for ADLs/IADLs. Met 2/10/2025  LTG #5: Pt will demonstrate independence with issued HEP, brace wear, and modalities for pain/symptom management. Met 2/10/2025    Discharge reason: Patient is now asymptomatic and Patient has met all of his/her goals.    PLAN     This patient is discharged from Outpatient Occupational Therapy Services.     Mariana Bhatia, BRIELLE, OTR/L, CHT

## 2025-02-10 NOTE — PATIENT INSTRUCTIONS
[facility]  OCCUPATIONAL THERAPY  HOME EXERCISE PROGRAM     Complete the following strengthening exercises using 2 pound weight.  Do 20 repetitions of each, 2x per day.         Resisted Wrist Flexion  With palm up and weight in hand, bend wrist up. Return slowly.      Resisted Wrist Extension  With palm down and weight in hand, bend wrist up. Then bend wrist down.      Resisted Wrist Deviation  With thumb up and weight in hand, bend wrist up. Return slowly.    Copyright © I. All rights reserved.     Red putty gripping, elbow flex and elbow ext x 2 min each; 1 x daily

## 2025-03-11 ENCOUNTER — OFFICE VISIT (OUTPATIENT)
Dept: OBSTETRICS AND GYNECOLOGY | Facility: CLINIC | Age: 55
End: 2025-03-11
Payer: COMMERCIAL

## 2025-03-11 VITALS
WEIGHT: 181.38 LBS | SYSTOLIC BLOOD PRESSURE: 108 MMHG | BODY MASS INDEX: 28.41 KG/M2 | HEART RATE: 89 BPM | DIASTOLIC BLOOD PRESSURE: 70 MMHG

## 2025-03-11 DIAGNOSIS — Z12.4 SCREENING FOR MALIGNANT NEOPLASM OF THE CERVIX: Primary | ICD-10-CM

## 2025-03-11 DIAGNOSIS — Z01.419 WELL WOMAN EXAM WITH ROUTINE GYNECOLOGICAL EXAM: ICD-10-CM

## 2025-03-11 PROCEDURE — 99396 PREV VISIT EST AGE 40-64: CPT | Mod: S$GLB,,, | Performed by: OBSTETRICS & GYNECOLOGY

## 2025-03-11 PROCEDURE — 3078F DIAST BP <80 MM HG: CPT | Mod: CPTII,S$GLB,, | Performed by: OBSTETRICS & GYNECOLOGY

## 2025-03-11 PROCEDURE — 87624 HPV HI-RISK TYP POOLED RSLT: CPT | Performed by: OBSTETRICS & GYNECOLOGY

## 2025-03-11 PROCEDURE — 1159F MED LIST DOCD IN RCRD: CPT | Mod: CPTII,S$GLB,, | Performed by: OBSTETRICS & GYNECOLOGY

## 2025-03-11 PROCEDURE — 99999 PR PBB SHADOW E&M-EST. PATIENT-LVL III: CPT | Mod: PBBFAC,,, | Performed by: OBSTETRICS & GYNECOLOGY

## 2025-03-11 PROCEDURE — 3074F SYST BP LT 130 MM HG: CPT | Mod: CPTII,S$GLB,, | Performed by: OBSTETRICS & GYNECOLOGY

## 2025-03-11 PROCEDURE — 4010F ACE/ARB THERAPY RXD/TAKEN: CPT | Mod: CPTII,S$GLB,, | Performed by: OBSTETRICS & GYNECOLOGY

## 2025-03-11 PROCEDURE — 3008F BODY MASS INDEX DOCD: CPT | Mod: CPTII,S$GLB,, | Performed by: OBSTETRICS & GYNECOLOGY

## 2025-03-11 NOTE — PROGRESS NOTES
CC: Well woman exam    Shelbie Ardon is a 55 y.o. female  presents for well woman exam.  LMP: No LMP recorded (lmp unknown). Patient is postmenopausal..  No issues, problems, or complaints.  Patient is taking the Estrogen patch 0.05mg twice a week and Prometrium 200mg qHS.  She states that she feels much better and her symptoms have improved.  Her anxiety which was the most bothersome has also improved.  She had a few panic attacks but believes that was due to poor sleep.  She would like to continue the current dose.  She has not started the vaginal estrogen yet.    OB History          6    Para   0    Term   0       0    AB   6    Living   0         SAB   0    IAB   6    Ectopic   0    Multiple   0    Live Births   0               Gynecology   Past Medical History:   Diagnosis Date    Arthritis     Arthritis     Breast cyst     Cough     Female infertility NEC     Headache(784.0)     Hyperlipidemia     Hypertension     Joint pain     Tinnitus 2013     Past Surgical History:   Procedure Laterality Date    DILATION AND CURETTAGE OF UTERUS      ESOPHAGOGASTRODUODENOSCOPY N/A 2023    Procedure: EGD (ESOPHAGOGASTRODUODENOSCOPY);  Surgeon: Eleazar Walker MD;  Location: [facility] ENDOSCOPY;  Service: Endoscopy;  Laterality: N/A;  pt last dose ozempic 6.16.23; not taking regularly; pt instructed to hold off on further ozempic inj until after procedure; pt verbalized understanding; instr via portal; AP  pre call attempted -CE    JOINT REPLACEMENT  20    Right hip, anterior approach    KNEE SURGERY      acl repair as child/ revision      Social History[1]  Family History   Problem Relation Name Age of Onset    Cancer Paternal Grandfather you dont need this info     Lung cancer Paternal Grandfather you dont need this info     Cancer Paternal Grandmother you dont need this info         unknown GYN cancer     Lung cancer Paternal Grandmother you dont need this info     No Known  Problems Maternal Grandmother      No Known Problems Maternal Grandfather      Cancer Father you dont need this info         lung smoker    Lung cancer Father you dont need this info     Hypertension Mother you dont need this info     Hyperlipidemia Mother you dont need this info     COPD Mother you dont need this info     Stroke Mother you dont need this info     No Known Problems Brother      Psoriasis Sister PS     Psoriasis Sister JN     No Known Problems Maternal Aunt      No Known Problems Maternal Uncle      No Known Problems Paternal Aunt      No Known Problems Paternal Uncle      Anemia Neg Hx      Arrhythmia Neg Hx      Asthma Neg Hx      Clotting disorder Neg Hx      Fainting Neg Hx      Heart attack Neg Hx      Heart disease Neg Hx      Heart failure Neg Hx      Stroke Neg Hx      Atrial Septal Defect Neg Hx      Breast cancer Neg Hx      Colon cancer Neg Hx      Ovarian cancer Neg Hx      Melanoma Neg Hx      Cervical cancer Neg Hx      Uterine cancer Neg Hx           /70 (Patient Position: Sitting)   Pulse 89   Wt 82.3 kg (181 lb 6.4 oz)   LMP  (LMP Unknown)   BMI 28.41 kg/m²       ROS  Review of Systems   Constitutional: Negative.    Gastrointestinal: Negative.    Genitourinary: Negative.    Psychiatric/Behavioral: Negative.            PHYSICAL EXAM:  Physical Exam  Vitals reviewed.   Constitutional:       General: She is not in acute distress.     Appearance: Normal appearance. She is well-developed and normal weight.   Chest:   Breasts:     Breasts are symmetrical.      Right: Normal. No inverted nipple, mass, nipple discharge, skin change or tenderness.      Left: Normal. No inverted nipple, mass, nipple discharge, skin change or tenderness.   Abdominal:      General: Abdomen is flat.      Palpations: Abdomen is soft.      Tenderness: There is no guarding or rebound.   Genitourinary:     General: Normal vulva.      Exam position: Lithotomy position.      Labia:         Right: No rash,  tenderness or lesion.         Left: No rash, tenderness or lesion.       Urethra: No prolapse, urethral pain, urethral swelling or urethral lesion.      Vagina: Normal.      Cervix: Normal. No discharge, lesion or cervical bleeding.      Uterus: Normal. Not enlarged and not tender.       Adnexa: Right adnexa normal and left adnexa normal.        Right: No mass, tenderness or fullness.          Left: No mass, tenderness or fullness.     Lymphadenopathy:      Upper Body:      Right upper body: No axillary adenopathy.      Left upper body: No axillary adenopathy.   Neurological:      Mental Status: She is alert.   Psychiatric:         Behavior: Behavior is cooperative.            Screening for malignant neoplasm of the cervix  -     HPV High Risk Genotypes, PCR  -     Liquid-Based Pap Smear, Screening    Well woman exam with routine gynecological exam    Pap and HPV co-testing was done    MMG due 9/2024  SBE education completed  Encourage vaginal estrogen use  Continue current HRT regimen  RTO 1 year or prn      Patient was counseled today on A.C.S. Pap guidelines and recommendations for yearly pelvic exams, mammograms and monthly self breast exams; to see her PCP for other health maintenance.     No follow-ups on file.         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 No    Breast-feeding No   Social History Narrative    .      and her are in ortho research, he is an PHD in biomedical      Social  Drivers of Health     Financial Resource Strain: Low Risk  (9/4/2024)    Overall Financial Resource Strain (CARDIA)     Difficulty of Paying Living Expenses: Not hard at all   Food Insecurity: No Food Insecurity (9/4/2024)    Hunger Vital Sign     Worried About Running Out of Food in the Last Year: Never true     Ran Out of Food in the Last Year: Never true   Transportation Needs: Patient Declined (5/29/2024)    Received from [facility] - Transportation     Lack of Transportation (Medical): Patient declined     Lack of Transportation (Non-Medical): Patient declined   Physical Activity: Sufficiently Active (9/4/2024)    Exercise Vital Sign     Days of Exercise per Week: 5 days     Minutes of Exercise per Session: 50 min   Stress: No Stress Concern Present (9/4/2024)    [employer] - Occupational Stress Questionnaire     Feeling of Stress : Only a little   Housing Stability: Unknown (9/4/2024)    Housing Stability Vital Sign     Unable to Pay for Housing in the Last Year: No

## 2025-03-17 ENCOUNTER — RESULTS FOLLOW-UP (OUTPATIENT)
Dept: OBSTETRICS AND GYNECOLOGY | Facility: CLINIC | Age: 55
End: 2025-03-17

## 2025-06-16 DIAGNOSIS — I10 ESSENTIAL HYPERTENSION: ICD-10-CM

## 2025-06-16 DIAGNOSIS — E78.2 MIXED HYPERLIPIDEMIA: ICD-10-CM

## 2025-06-16 RX ORDER — LOSARTAN POTASSIUM 100 MG/1
100 TABLET ORAL
Qty: 90 TABLET | Refills: 1 | Status: SHIPPED | OUTPATIENT
Start: 2025-06-16

## 2025-06-16 RX ORDER — CHLORTHALIDONE 25 MG/1
25 TABLET ORAL DAILY
Qty: 90 TABLET | Refills: 3 | Status: SHIPPED | OUTPATIENT
Start: 2025-06-16

## 2025-06-16 NOTE — TELEPHONE ENCOUNTER
Refill Encounter    PCP Visits: Recent Visits  Date Type Provider Dept   01/08/25 Office [facility]   11/11/24 Office Visit Kendra Carrasquillo MD [facility]   09/04/24 Office Visit Kendra Carrasquillo MD [facility]   Showing recent visits within past 360 days and meeting all other requirements  Future Appointments  Date Type Provider Dept   07/08/25 Appointment Kendra Carrasquillo MD [facility]   Showing future appointments within next 720 days and meeting all other requirements      Last 3 Blood Pressure:   BP Readings from Last 3 Encounters:   03/11/25 108/70   01/13/25 123/80   01/08/25 122/74     Preferred Pharmacy:   [id number] - [place] - [address] AT [facility] [address] [facility]  [address]  [facility]  [place] [address]  Phone: [phone] Fax: [phone]    Requested RX:  Requested Prescriptions     Pending Prescriptions Disp Refills    chlorthalidone (HYGROTEN) 25 MG Tab 90 tablet 3     Sig: Take 1 tablet (25 mg total) by mouth once daily.      RX Route: Normal

## 2025-06-16 NOTE — TELEPHONE ENCOUNTER
Refill Routing Note   Medication(s) are not appropriate for processing by [facility] for the following reason(s):        Drug-disease interaction:  losartan and Recurrent pregnancy loss, antepartum condition or complication     [facility] action(s):  Defer             Pharmacist review requested: Yes     Appointments  past 12m or future 3m with PCP    Date Provider   Last Visit   1/8/2025 Kendra Carrasquillo MD   Next Visit   7/8/2025 Kendra Carrasquillo MD   ED visits in past 90 days: 0        Note composed:3:57 PM 06/16/2025

## 2025-06-16 NOTE — TELEPHONE ENCOUNTER
Refill Decision Note   Shelbie Crooklidya  is requesting a refill authorization.  Brief Assessment and Rationale for Refill:  Approve     Medication Therapy Plan:        Pharmacist review requested: Yes   Extended chart review required: Yes   Comments:     Note composed:4:55 PM 06/16/2025

## 2025-06-16 NOTE — TELEPHONE ENCOUNTER
No care due was identified.  [facility] Embedded Care Due Messages. Reference number: [id number].   6/16/2025 11:00:21 AM CDT

## 2025-06-16 NOTE — TELEPHONE ENCOUNTER
No care due was identified.  [facility] Embedded Care Due Messages. Reference number: [id number].   6/16/2025 8:58:41 AM CDT

## 2025-06-23 DIAGNOSIS — F41.8 SITUATIONAL ANXIETY: ICD-10-CM

## 2025-06-23 DIAGNOSIS — Z72.820 POOR SLEEP: ICD-10-CM

## 2025-06-23 RX ORDER — HYDROXYZINE HYDROCHLORIDE 10 MG/1
10 TABLET, FILM COATED ORAL NIGHTLY PRN
Qty: 30 TABLET | Refills: 3 | Status: SHIPPED | OUTPATIENT
Start: 2025-06-23

## 2025-06-23 NOTE — TELEPHONE ENCOUNTER
Refill Encounter    PCP Visits: Recent Visits  Date Type Provider Dept   01/08/25 [facility]   11/11/24 Office Visit Kendra Carrasquillo MD [facility]   09/04/24 Office Visit Kendra Carrasquillo MD [facility]   Showing recent visits within past 360 days and meeting all other requirements  Future Appointments  Date Type Provider Dept   08/25/25 Appointment Kendra Carrasquillo MD [facility]   Showing future appointments within next 720 days and meeting all other requirements      Last 3 Blood Pressure:   BP Readings from Last 3 Encounters:   03/11/25 108/70   01/13/25 123/80   01/08/25 122/74     Preferred Pharmacy[facility]   [id number] - [place] - [address] AT [facility] [address] [facility]  [address]  [facility] [address]  [place] [address]  Phone: [phone] Fax: [phone]    Requested RX:  Requested Prescriptions     Pending Prescriptions Disp Refills    hydrOXYzine HCL (ATARAX) 10 MG Tab 30 tablet 3     Sig: Take 1 tablet (10 mg total) by mouth nightly as needed.      RX Route: Normal

## 2025-08-27 DIAGNOSIS — E78.2 MIXED HYPERLIPIDEMIA: ICD-10-CM

## 2025-08-27 DIAGNOSIS — I10 ESSENTIAL HYPERTENSION: ICD-10-CM

## 2025-08-28 RX ORDER — ROSUVASTATIN CALCIUM 10 MG/1
10 TABLET, COATED ORAL DAILY
Qty: 90 TABLET | Refills: 0 | Status: SHIPPED | OUTPATIENT
Start: 2025-08-28